# Patient Record
Sex: FEMALE | Race: WHITE | NOT HISPANIC OR LATINO | Employment: OTHER | URBAN - METROPOLITAN AREA
[De-identification: names, ages, dates, MRNs, and addresses within clinical notes are randomized per-mention and may not be internally consistent; named-entity substitution may affect disease eponyms.]

---

## 2024-04-17 ENCOUNTER — OFFICE VISIT (OUTPATIENT)
Dept: OBSTETRICS AND GYNECOLOGY | Facility: CLINIC | Age: 74
End: 2024-04-17
Payer: MEDICARE

## 2024-04-17 VITALS — BODY MASS INDEX: 23.47 KG/M2 | WEIGHT: 124.31 LBS | HEIGHT: 61 IN

## 2024-04-17 DIAGNOSIS — N83.8 OVARIAN MASS: Primary | ICD-10-CM

## 2024-04-17 PROBLEM — E03.9 HYPOTHYROIDISM: Status: ACTIVE | Noted: 2024-03-24

## 2024-04-17 PROBLEM — E11.9 TYPE 2 DIABETES MELLITUS, WITHOUT LONG-TERM CURRENT USE OF INSULIN: Status: ACTIVE | Noted: 2024-03-24

## 2024-04-17 PROBLEM — K63.89 COLONIC MASS: Status: ACTIVE | Noted: 2024-03-24

## 2024-04-17 PROCEDURE — 99203 OFFICE O/P NEW LOW 30 MIN: CPT | Mod: PBBFAC | Performed by: OBSTETRICS & GYNECOLOGY

## 2024-04-17 PROCEDURE — 99202 OFFICE O/P NEW SF 15 MIN: CPT | Mod: S$PBB,,, | Performed by: OBSTETRICS & GYNECOLOGY

## 2024-04-17 PROCEDURE — 99999 PR PBB SHADOW E&M-NEW PATIENT-LVL III: CPT | Mod: PBBFAC,,, | Performed by: OBSTETRICS & GYNECOLOGY

## 2024-04-17 RX ORDER — HYDROCODONE BITARTRATE AND ACETAMINOPHEN 5; 325 MG/1; MG/1
1 TABLET ORAL 2 TIMES DAILY
COMMUNITY
Start: 2024-04-02 | End: 2024-04-30 | Stop reason: SDUPTHER

## 2024-04-17 RX ORDER — METOPROLOL SUCCINATE 50 MG/1
1 TABLET, EXTENDED RELEASE ORAL EVERY MORNING
COMMUNITY

## 2024-04-17 RX ORDER — METRONIDAZOLE 500 MG/1
500 TABLET ORAL
COMMUNITY
Start: 2024-04-12 | End: 2024-04-23

## 2024-04-17 RX ORDER — HYDROCODONE BITARTRATE AND ACETAMINOPHEN 7.5; 325 MG/1; MG/1
1 TABLET ORAL EVERY 8 HOURS PRN
COMMUNITY
Start: 2024-04-12 | End: 2024-04-19

## 2024-04-17 RX ORDER — CIPROFLOXACIN 500 MG/1
500 TABLET ORAL
COMMUNITY
Start: 2024-04-12 | End: 2024-04-23

## 2024-04-17 RX ORDER — MEDROXYPROGESTERONE ACETATE 10 MG/1
1 TABLET ORAL EVERY MORNING
COMMUNITY
End: 2024-05-21

## 2024-04-17 RX ORDER — METFORMIN HYDROCHLORIDE 1000 MG/1
1000 TABLET ORAL
COMMUNITY

## 2024-04-17 RX ORDER — AMLODIPINE BESYLATE 5 MG/1
1 TABLET ORAL EVERY MORNING
COMMUNITY

## 2024-04-17 RX ORDER — LEVOTHYROXINE SODIUM 75 UG/1
1 TABLET ORAL EVERY MORNING
COMMUNITY

## 2024-04-17 NOTE — PROGRESS NOTES
"  Subjective:       Patient ID: Bella Meyer is a 73 y.o. female.    Chief Complaint:  Pelvic Pain      History of Present Illness  HPI  Self referral from [facility]  Seen in hospital recently x 2 for lower abdominal pain and bloating   Treated for inflammatory bowel disease   CT with possible colon mass, Colonoscopy negative for cancer   CT and ultrasound show 6 cm complex ovarian cyst with no ascites and with nodules noted in the omentum.  Liver is normal    is elevated but the other tumor markers are normal   CEA is elevated   Was discharged and told to seek Gyn consultation   Reviewed hospital records and imaging   Advised patient to seek out Gyn oncology consultation for surgical management of this condition because of the increase in likelihood of ovarian malignancy       Health Maintenance   Topic Date Due    Hepatitis C Screening  Never done    Lipid Panel  Never done    DEXA Scan  Never done    Colorectal Cancer Screening  Never done    Mammogram  2018    TETANUS VACCINE  2032    Shingles Vaccine  Completed     GYN & OB History  No LMP recorded. Patient is postmenopausal.   Date of Last Pap: No result found    OB History    Para Term  AB Living   2 2 2     2   SAB IAB Ectopic Multiple Live Births           2      # Outcome Date GA Lbr [place]/2nd Weight Sex Type Anes PTL Lv   2 Term 79    F Vag-Spont   SAIDA   1 Term 12/15/73    F Vag-Spont   SAIDA       Review of Systems  Review of Systems        Objective:   BP (P) 112/70   Ht 5' 1" (1.549 m)   Wt 56.4 kg (124 lb 5.4 oz)   BMI 23.49 kg/m²    Physical Exam     Assessment:        1. Ovarian mass                Plan:            Bella was seen today for pelvic pain.    Diagnoses and all orders for this visit:    Ovarian mass  -     Ambulatory referral/consult to Gynecologic Oncology; Future        "

## 2024-04-18 ENCOUNTER — PATIENT MESSAGE (OUTPATIENT)
Dept: GYNECOLOGIC ONCOLOGY | Facility: CLINIC | Age: 74
End: 2024-04-18

## 2024-04-18 ENCOUNTER — HOSPITAL ENCOUNTER (OUTPATIENT)
Dept: RADIOLOGY | Facility: OTHER | Age: 74
Discharge: HOME | End: 2024-04-18
Attending: OBSTETRICS & GYNECOLOGY
Payer: MEDICARE

## 2024-04-18 ENCOUNTER — TELEPHONE (OUTPATIENT)
Dept: OBSTETRICS AND GYNECOLOGY | Facility: CLINIC | Age: 74
End: 2024-04-18
Payer: MEDICARE

## 2024-04-18 ENCOUNTER — OFFICE VISIT (OUTPATIENT)
Dept: GYNECOLOGIC ONCOLOGY | Facility: CLINIC | Age: 74
End: 2024-04-18
Payer: MEDICARE

## 2024-04-18 VITALS
DIASTOLIC BLOOD PRESSURE: 68 MMHG | SYSTOLIC BLOOD PRESSURE: 148 MMHG | WEIGHT: 124 LBS | HEIGHT: 61 IN | BODY MASS INDEX: 23.41 KG/M2 | HEART RATE: 83 BPM

## 2024-04-18 DIAGNOSIS — R19.00 PELVIC MASS: ICD-10-CM

## 2024-04-18 DIAGNOSIS — R97.1 ELEVATED CANCER ANTIGEN 125 (CA 125): ICD-10-CM

## 2024-04-18 DIAGNOSIS — C78.6 PERITONEAL CARCINOMATOSIS: Primary | ICD-10-CM

## 2024-04-18 DIAGNOSIS — C78.6 PERITONEAL CARCINOMATOSIS: ICD-10-CM

## 2024-04-18 DIAGNOSIS — Z12.4 CERVICAL CANCER SCREENING: ICD-10-CM

## 2024-04-18 PROCEDURE — 71260 CT THORAX DX C+: CPT | Mod: 26,,, | Performed by: RADIOLOGY

## 2024-04-18 PROCEDURE — 87624 HPV HI-RISK TYP POOLED RSLT: CPT | Performed by: OBSTETRICS & GYNECOLOGY

## 2024-04-18 PROCEDURE — 74177 CT ABD & PELVIS W/CONTRAST: CPT | Mod: TC

## 2024-04-18 PROCEDURE — 88175 CYTOPATH C/V AUTO FLUID REDO: CPT | Performed by: OBSTETRICS & GYNECOLOGY

## 2024-04-18 PROCEDURE — 99459 PELVIC EXAMINATION: CPT | Mod: PBBFAC | Performed by: OBSTETRICS & GYNECOLOGY

## 2024-04-18 PROCEDURE — 99205 OFFICE O/P NEW HI 60 MIN: CPT | Mod: S$PBB,,, | Performed by: OBSTETRICS & GYNECOLOGY

## 2024-04-18 PROCEDURE — 99459 PELVIC EXAMINATION: CPT | Mod: S$PBB,,, | Performed by: OBSTETRICS & GYNECOLOGY

## 2024-04-18 PROCEDURE — 99215 OFFICE O/P EST HI 40 MIN: CPT | Mod: PBBFAC,25 | Performed by: OBSTETRICS & GYNECOLOGY

## 2024-04-18 PROCEDURE — G2211 COMPLEX E/M VISIT ADD ON: HCPCS | Mod: S$PBB,,, | Performed by: OBSTETRICS & GYNECOLOGY

## 2024-04-18 PROCEDURE — 74177 CT ABD & PELVIS W/CONTRAST: CPT | Mod: 26,,, | Performed by: RADIOLOGY

## 2024-04-18 PROCEDURE — 25500020 PHARM REV CODE 255: Performed by: OBSTETRICS & GYNECOLOGY

## 2024-04-18 PROCEDURE — 99999 PR PBB SHADOW E&M-EST. PATIENT-LVL V: CPT | Mod: PBBFAC,,, | Performed by: OBSTETRICS & GYNECOLOGY

## 2024-04-18 RX ADMIN — IOHEXOL 75 ML: 350 INJECTION, SOLUTION INTRAVENOUS at 12:04

## 2024-04-18 NOTE — PROGRESS NOTES
"Subjective     Patient ID: Bella Meyer is a 73 y.o. female.    Chief Complaint:  Peritoneal carcinomatosis     73 year old female  referred by Dr. Evans for evaluation of a pelvic mass, peritoneal carcinomatosis and elevated . She has had 2 emergency room visits for evaluation of RLQ pain, during which this was discovered on imaging (initial 3/24/24, see report below). After CT, she was admitted and IV abx started. Colonoscopy scheduled for the following day. She had attempted colonoscopy on 3/25/2024 however  reports "S/p flexible sigmoidoscopy today but unable to complete colonoscopy as distal sigmoid was fixed. No fistulas or masses were seen in the 20 cm in, unable to pass scope/pediatric scope further." Per GI note: "Diverticulitis with possible fistula, malignancy felt to be less likely as patient had negative colonoscopy around a year ago.Continue IV fluids and IV antibiotics Cipro/Flagyl ".   Of note, patient reports colonscopy with Dr.Hickle KOEHLER in [place] in  with 2 polpys removed that were benign.  She returned to the ER  for worsening of RLQ pain and was admitted. Imaging showed prior findings and ovarian complex cyst. OBGYN consulted. CA-125 was collected and elevated at 188. CEA, CA-19-9, Inhibins normal. She was instructed to follow up with Gyn. She saw Dr. Salomon in  yesterday, who referred her to us.     Patient reports consistent pain. She presents today in a wheelchair wit her two daughters. She has hydrocodone for pain relief however takes it sparingly. She also reports she gets hives after taking it. Also notes no appetite,15lb weight loss and feels malnourished and weak. Denies any changes in bowel or bladder. Denies any vaginal bleeding.     DATA REVIEWED:   CT Abdomen Pelvis with IV Contrast Only (3/24/2024)  -Abnormal inflammatory changes in the lower pelvis associated with the distal sigmoid colon. Tubular or channel-like structure along the posterior margin of the " lower descending colon possibly connecting between the sigmoid and rectal regions. Underlying neoplasm and/or fistula not excluded. Mildly enlarged left iliac bifurcation lymph nodes noted. No drainable abscess.   -There is also abnormal appearance of the descending colon and several small soft tissue nodules in the pericolonic tissues of the descending colon. Malignant neoplasm should be considered.     US Transvaginal Non-OB 4/9/2024  1. Grossly unremarkable appearance of the uterus.   2. The left ovary is not seen sonographically. Enlarged right ovary at 6.6 cm with several cysts the largest 3.4 cm with some thin septations and blood flow to the ovary. Trace fluid adjacent to the ovary. No gross ascitic fluid.     CT Abdomen Pelvis with IV Contrast Only 4/8/24  Final Result   1. Rectosigmoid colon wall thickening with tubular/channel-like structure coursing posterior to the sigmoid colon wall that could be a fistula/sinus tract or abscess. Underlying neoplasm not excluded. Findings similar to 3/24/2024 CT.   2. Findings concerning for peritoneal carcinomatosis are unchanged.   3. Right adnexal/ovarian mildly complex cystic lesion is mildly increased in size; ovarian neoplasm not excluded.     GYN HX: Denies any hx of abnormal paps (Previous GYNs include Dr. Pollard and Dr. Selby-- [facility])  PMHX: T2dM, HTN, and hypothyroidism.  PSHX:  BTL.  FAM HX:  includes a possible ? Paternal grandmother with ovarian cancer. She reports a brother with brain cancer     Review of Systems   Constitutional:  Positive for appetite change and unexpected weight change. Negative for chills and fever.   HENT:  Negative for mouth sores.    Respiratory:  Negative for chest tightness and shortness of breath.    Cardiovascular:  Negative for chest pain.   Gastrointestinal:  Positive for abdominal pain, nausea and vomiting (once, patient attributes due to abx use). Negative for abdominal distention.   Genitourinary:  Positive for pelvic  pain. Negative for dysuria, hematuria, vaginal bleeding and vaginal discharge.   Neurological:  Negative for syncope and weakness.      Objective     Vitals:    04/18/24 1024   BP: (!) 148/68   Pulse: 83      Physical Exam  Vitals reviewed. Exam conducted with a chaperone present.   Constitutional:       Appearance: Normal appearance.   HENT:      Head: Normocephalic and atraumatic.   Eyes:      Extraocular Movements: Extraocular movements intact.      Conjunctiva/sclera: Conjunctivae normal.      Pupils: Pupils are equal, round, and reactive to light.   Pulmonary:      Effort: Pulmonary effort is normal. No respiratory distress.   Abdominal:      General: There is no distension.      Palpations: Abdomen is soft.      Tenderness: There is no abdominal tenderness.   Genitourinary:     General: Normal vulva.      Vagina: Normal.      Cervix: Normal.      Adnexa:         Right: Mass present.         Left: Mass present.       Comments: Solid Nodular mass filling cul de sac and rectovaginal septum. There is no noted mobility.   Musculoskeletal:         General: Normal range of motion.   Skin:     General: Skin is warm and dry.   Neurological:      General: No focal deficit present.      Mental Status: She is alert and oriented to person, place, and time. Mental status is at baseline.   Psychiatric:         Mood and Affect: Mood normal.         Behavior: Behavior normal.         Thought Content: Thought content normal.         Judgment: Judgment normal.      Assessment and Plan     1. Peritoneal carcinomatosis    2. Pelvic mass    3. Elevated cancer antigen 125 ()    4. Cervical cancer screening  -     Liquid-Based Pap Smear, Screening  -     HPV High Risk Genotypes, PCR    Today I reviewed and interpreted referring physicians documentation and imaging reports.   I discussed with patient that she has peritoneal carcinomatosis and what I anticipate is  at least a Stage IIIC ovarian cancer based on reports. I have  ordered a CT CAP for our system as well today.  Based on extent of disease, performance status, nutritional status (albumin), and likelihood of needing extensive surgery, I have recommended a neoadjuvant approach to treatment. We discussed most patients respond to neoadjuvant chemotherapy which decreases the morbidity of interval cytoreductive surgery.  After surgery we would plan for additional chemotherapy usually equaling 6 cycles total with each cycle being three weeks. Maintanence therapy will be determined by germline genetic testing and possibly somatic tumor tests, we will discuss further when we reach this phase of care. I discussed initial therapy with carboplatin and paclitaxel IV every 21 days and associated toxicities including allergic rxn, myelosuppression, fatigue, n/v, nephrotoxicity, hepatotoxicity, alopecia, neuropathy.  She expressed understanding of risks and agreed to proceed. She was given the opportunity to ask questions, and stated all were answered.  - CT CAP Today   - Urgent IR Referral for biopsy and tissue diagnosis, discussed we will need this before treatment initiation   - [facility] genetic testing to be collected   - Treatment plan placed   - Follow up before cycle #1 with labs (CBC, CMP, ). Plan for [facility].        Maliha Crawford MD  Gynecologic Oncology

## 2024-04-18 NOTE — TELEPHONE ENCOUNTER
Dr. Albarado's last office visit [facility]ed to [facility]. Pt Labs, Imaging, Colonoscopy done outside of [facility].     Marry DIAZ LPN  OB/GYN

## 2024-04-18 NOTE — TELEPHONE ENCOUNTER
----- Message from Martha Devyn sent at 4/18/2024  9:26 AM CDT -----  Type:  Needs Medical Advice    Who Called: Maria Del Carmen reyes/ [facility] -  Symptoms (please be specific): -   How long has patient had these symptoms:  -  Pharmacy name and phone #:  -  Would the patient rather a call back or a response via [facility]? -  Best Call Back Number: [phone]  Additional Information:  received referral, needs any progress notes, labs, imaging and colonoscopy report faxed to [phone]

## 2024-04-19 ENCOUNTER — TELEPHONE (OUTPATIENT)
Dept: HEMATOLOGY/ONCOLOGY | Facility: CLINIC | Age: 74
End: 2024-04-19
Payer: MEDICARE

## 2024-04-19 DIAGNOSIS — N83.8 OVARIAN MASS, RIGHT: Primary | ICD-10-CM

## 2024-04-19 DIAGNOSIS — D49.9 NEOPLASM OF UNSPECIFIED BEHAVIOR OF UNSPECIFIED SITE: ICD-10-CM

## 2024-04-19 NOTE — NURSING
Spoke with pt.  Introduced myself and explained my role in her plan of care.  Briefly reviewed chemo class & what to expect to initiate chemo.  Scheduled chemo class & baseline lab work for Friday 4/26.  She lives 3 hours away, but has sisters in [place] and plans to stay with them when she has chemo.  Going forward she will have labs drawn the day before chemo in [place] at the [facility] location. Location and contact information reviewed.  Questions answered.  Encouraged her to call with any questions or concerns. She thanked me and verbalized understanding.

## 2024-04-22 ENCOUNTER — TELEPHONE (OUTPATIENT)
Dept: OBSTETRICS AND GYNECOLOGY | Facility: CLINIC | Age: 74
End: 2024-04-22
Payer: MEDICARE

## 2024-04-22 ENCOUNTER — TELEPHONE (OUTPATIENT)
Dept: INTERVENTIONAL RADIOLOGY/VASCULAR | Facility: HOSPITAL | Age: 74
End: 2024-04-22
Payer: MEDICARE

## 2024-04-22 ENCOUNTER — HOSPITAL ENCOUNTER (OUTPATIENT)
Dept: PREADMISSION TESTING | Facility: HOSPITAL | Age: 74
Discharge: HOME | End: 2024-04-22
Attending: RADIOLOGY
Payer: MEDICARE

## 2024-04-22 ENCOUNTER — PATIENT MESSAGE (OUTPATIENT)
Dept: GYNECOLOGIC ONCOLOGY | Facility: CLINIC | Age: 74
End: 2024-04-22
Payer: MEDICARE

## 2024-04-22 VITALS — BODY MASS INDEX: 22.28 KG/M2 | HEIGHT: 61 IN | WEIGHT: 118 LBS

## 2024-04-22 DIAGNOSIS — Z79.01 ANTICOAGULANT LONG-TERM USE: ICD-10-CM

## 2024-04-22 DIAGNOSIS — C78.6 PERITONEAL CARCINOMATOSIS: Primary | ICD-10-CM

## 2024-04-22 NOTE — TELEPHONE ENCOUNTER
Message sent to Dr. Vega's staff to address.  CT LAB AND  faxed to [phone]  Unable to Fax Demographics ans Ins electronically     Marry DIAZ LPN  OB/GYN

## 2024-04-22 NOTE — TELEPHONE ENCOUNTER
----- Message from Yanely Santiago sent at 4/22/2024 12:25 PM CDT -----  Contact: Herlinda/ [facility]  Herlinda is calling regards to the referral she received, She stated that she needs  CT, Labs and Colonoscopy report in order to get the patient schedule alone with the demographics and insurance fax to [phone] and or call her at [phone].    Thanks  Td

## 2024-04-23 ENCOUNTER — HOSPITAL ENCOUNTER (OUTPATIENT)
Dept: INTERVENTIONAL RADIOLOGY/VASCULAR | Facility: HOSPITAL | Age: 74
Discharge: HOME | End: 2024-04-23
Attending: OBSTETRICS & GYNECOLOGY | Admitting: RADIOLOGY
Payer: MEDICARE

## 2024-04-23 VITALS
SYSTOLIC BLOOD PRESSURE: 130 MMHG | HEART RATE: 64 BPM | DIASTOLIC BLOOD PRESSURE: 60 MMHG | TEMPERATURE: 98 F | RESPIRATION RATE: 16 BRPM | OXYGEN SATURATION: 97 %

## 2024-04-23 DIAGNOSIS — N83.8 OVARIAN MASS: ICD-10-CM

## 2024-04-23 DIAGNOSIS — Z79.01 ANTICOAGULANT LONG-TERM USE: ICD-10-CM

## 2024-04-23 DIAGNOSIS — C78.6 PERITONEAL CARCINOMATOSIS: ICD-10-CM

## 2024-04-23 LAB
INR PPP: 1.1 (ref 0.8–1.2)
POCT GLUCOSE: 277 MG/DL (ref 70–110)
PROTHROMBIN TIME: 12 SEC (ref 9–12.5)

## 2024-04-23 PROCEDURE — 88342 IMHCHEM/IMCYTCHM 1ST ANTB: CPT | Mod: 26,XU,, | Performed by: PATHOLOGY

## 2024-04-23 PROCEDURE — 99153 MOD SED SAME PHYS/QHP EA: CPT | Performed by: RADIOLOGY

## 2024-04-23 PROCEDURE — 88309 TISSUE EXAM BY PATHOLOGIST: CPT | Mod: 26,,, | Performed by: PATHOLOGY

## 2024-04-23 PROCEDURE — 77012 CT SCAN FOR NEEDLE BIOPSY: CPT | Mod: TC | Performed by: RADIOLOGY

## 2024-04-23 PROCEDURE — 88360 TUMOR IMMUNOHISTOCHEM/MANUAL: CPT | Mod: 26,,, | Performed by: PATHOLOGY

## 2024-04-23 PROCEDURE — 99152 MOD SED SAME PHYS/QHP 5/>YRS: CPT | Performed by: RADIOLOGY

## 2024-04-23 PROCEDURE — 88341 IMHCHEM/IMCYTCHM EA ADD ANTB: CPT | Mod: 26,XU,, | Performed by: PATHOLOGY

## 2024-04-23 PROCEDURE — 49180 BIOPSY ABDOMINAL MASS: CPT | Performed by: RADIOLOGY

## 2024-04-23 PROCEDURE — 99152 MOD SED SAME PHYS/QHP 5/>YRS: CPT

## 2024-04-23 PROCEDURE — 36415 COLL VENOUS BLD VENIPUNCTURE: CPT | Performed by: RADIOLOGY

## 2024-04-23 PROCEDURE — 88309 TISSUE EXAM BY PATHOLOGIST: CPT | Performed by: PATHOLOGY

## 2024-04-23 PROCEDURE — 99203 OFFICE O/P NEW LOW 30 MIN: CPT | Mod: ,,, | Performed by: RADIOLOGY

## 2024-04-23 PROCEDURE — 25000003 PHARM REV CODE 250: Performed by: RADIOLOGY

## 2024-04-23 PROCEDURE — 88185 FLOWCYTOMETRY/TC ADD-ON: CPT | Performed by: PATHOLOGY

## 2024-04-23 PROCEDURE — 63600175 PHARM REV CODE 636 W HCPCS: Performed by: RADIOLOGY

## 2024-04-23 PROCEDURE — 77012 CT SCAN FOR NEEDLE BIOPSY: CPT | Mod: 26,,, | Performed by: RADIOLOGY

## 2024-04-23 PROCEDURE — 88341 IMHCHEM/IMCYTCHM EA ADD ANTB: CPT | Mod: 59 | Performed by: PATHOLOGY

## 2024-04-23 PROCEDURE — 85610 PROTHROMBIN TIME: CPT | Performed by: RADIOLOGY

## 2024-04-23 PROCEDURE — 88342 IMHCHEM/IMCYTCHM 1ST ANTB: CPT | Mod: 59 | Performed by: PATHOLOGY

## 2024-04-23 PROCEDURE — 88184 FLOWCYTOMETRY/ TC 1 MARKER: CPT | Performed by: PATHOLOGY

## 2024-04-23 PROCEDURE — 99152 MOD SED SAME PHYS/QHP 5/>YRS: CPT | Mod: ,,, | Performed by: RADIOLOGY

## 2024-04-23 PROCEDURE — 88189 FLOWCYTOMETRY/READ 16 & >: CPT | Mod: ,,, | Performed by: PATHOLOGY

## 2024-04-23 PROCEDURE — 88360 TUMOR IMMUNOHISTOCHEM/MANUAL: CPT | Performed by: PATHOLOGY

## 2024-04-23 RX ORDER — MIDAZOLAM HYDROCHLORIDE 2 MG/2ML
INJECTION, SOLUTION INTRAMUSCULAR; INTRAVENOUS
Status: COMPLETED | OUTPATIENT
Start: 2024-04-23 | End: 2024-04-23

## 2024-04-23 RX ORDER — NAPROXEN SODIUM 220 MG
220 TABLET ORAL 2 TIMES DAILY WITH MEALS
COMMUNITY
End: 2024-05-21

## 2024-04-23 RX ORDER — LIDOCAINE HYDROCHLORIDE 10 MG/ML
INJECTION INFILTRATION; PERINEURAL
Status: COMPLETED | OUTPATIENT
Start: 2024-04-23 | End: 2024-04-23

## 2024-04-23 RX ORDER — LIDOCAINE HYDROCHLORIDE 10 MG/ML
1 INJECTION, SOLUTION EPIDURAL; INFILTRATION; INTRACAUDAL; PERINEURAL ONCE
Status: DISCONTINUED | OUTPATIENT
Start: 2024-04-23 | End: 2024-04-24 | Stop reason: HOSPADM

## 2024-04-23 RX ORDER — FENTANYL CITRATE 50 UG/ML
INJECTION, SOLUTION INTRAMUSCULAR; INTRAVENOUS
Status: COMPLETED | OUTPATIENT
Start: 2024-04-23 | End: 2024-04-23

## 2024-04-23 RX ORDER — SODIUM CHLORIDE 9 MG/ML
INJECTION, SOLUTION INTRAVENOUS CONTINUOUS
Status: DISCONTINUED | OUTPATIENT
Start: 2024-04-23 | End: 2024-04-24 | Stop reason: HOSPADM

## 2024-04-23 RX ADMIN — FENTANYL CITRATE 50 MCG: 50 INJECTION, SOLUTION INTRAMUSCULAR; INTRAVENOUS at 08:04

## 2024-04-23 RX ADMIN — MIDAZOLAM HYDROCHLORIDE 2 MG: 1 INJECTION, SOLUTION INTRAMUSCULAR; INTRAVENOUS at 08:04

## 2024-04-23 RX ADMIN — LIDOCAINE HYDROCHLORIDE 10 ML: 10 INJECTION, SOLUTION INFILTRATION; PERINEURAL at 08:04

## 2024-04-23 NOTE — DISCHARGE SUMMARY
Radiology Discharge Summary      Hospital Course: No complications    Admit Date: 4/23/2024  Discharge Date: 04/23/2024     Instructions Given to Patient: Yes    Diet: Resume prior diet    Activity: No driving for today. No heavy lifting, strenuous activity, exercising or submerging in water x 2 days.    Description of Condition on Discharge: Stable    Vital Signs (Most Recent): Temp: 97.8 °F (36.6 °C) (04/23/24 0649)  Pulse: 72 (04/23/24 0857)  Resp: 15 (04/23/24 0857)  BP: (!) 144/67 (04/23/24 0857)  SpO2: 98 % (04/23/24 0857)    Discharge Disposition: Home    Discharge Diagnosis:  73 y.o. female with pelvic mass with peritoneal carcinomatosis, including a 2.0 x 2.5 x 2.7-cm midline lower abdominal peritoneal nodule, associated with elevated CA-125 and concerns for stage IIIc ovarian CA requiring image-guided tissue-sampling for diagnosis and treatment planning.    Patient is now s/p successful US and CT-guided percutaneous midline lower abdominal-approach 18-gauge core biopsy of a peritoneal nodule with local anesthetic and moderate conscious sedation. Patient tolerated the procedure well. No immediate post-procedural complications noted.      No heavy lifting, strenuous activity, exercising or submerging in water x 2 days.     Thank you for considering IR for the care of your patient.      Ryan Workman MD  Interventional Radiology

## 2024-04-23 NOTE — PLAN OF CARE
Pre-op plan of care reviewed with patient and daughter. Admit assessment complete. Questions encouraged and answered. Post-op education begun with pt. Pt ready to proceed.

## 2024-04-23 NOTE — BRIEF OP NOTE
Radiology Post-Procedure Note    Pre Op Diagnosis: 1. Peritoneal carcinomatosis  Post Op Diagnosis: Same    Procedure: 1. US and CT-guided percutaneous midline lower abdominal-approach 18-gauge core biopsy of a peritoneal nodule    Procedure performed by: Rayn Workman MD    Written Informed Consent Obtained: Yes  Specimen Removed: YES, 18-G cores x 8  Estimated Blood Loss: Minimal    Findings:   Successful US and CT-guided percutaneous midline lower abdominal-approach 18-gauge core biopsy of a peritoneal nodule with local anesthetic and moderate conscious sedation. Patient tolerated the procedure well. No immediate post-procedural complications noted.     Patient transferred back to [facility] for 1 hours post-op monitoring and bed rest prior to tentative discharge.    No heavy lifting, strenuous activity, exercising or submerging in water x 2 days.    Thank you for considering IR for the care of your patient.     Ryan Workman MD  Interventional Radiology

## 2024-04-23 NOTE — H&P
Radiology History & Physical      SUBJECTIVE:     Principal Problem: Peritoneal carcinomatosis    History of Present Illness:  Bella Meyer is a 73 y.o. female with PMHx of HTN, HPLD, DMII and recent images revealing a pelvic mass with peritoneal carcinomatosis, including a 2.0 x 2.5 x 2.7-cm midline lower abdominal peritoneal nodule, associated with elevated CA-125 and concerns for stage IIIc ovarian CA requiring image-guided tissue-sampling for diagnosis and treatment planning.    A new outpatient IR consult received for US and CT-guided percutaneous midline lower abdominal-approach 18-gauge core biopsy of a peritoneal nodule.    No past medical history on file.  No past surgical history on file.    Home Meds:   Prior to Admission medications    Medication Sig Start Date End Date Taking? Authorizing Provider   amLODIPine (NORVASC) 5 MG tablet Take 1 tablet by mouth every morning.   Yes Provider, Historical   ciprofloxacin HCl (CIPRO) 500 MG tablet Take 500 mg by mouth. 4/12/24 4/23/24 Yes Provider, Historical   HYDROcodone-acetaminophen (NORCO) 5-325 mg per tablet Take 1 tablet by mouth 2 (two) times daily. 4/2/24  Yes Provider, Historical   levothyroxine (SYNTHROID) 75 MCG tablet Take 1 tablet by mouth every morning.   Yes Provider, Historical   medroxyPROGESTERone (PROVERA) 10 MG tablet Take 1 tablet by mouth every morning.   Yes Provider, Historical   metFORMIN (GLUCOPHAGE) 1000 MG tablet Take 1,000 mg by mouth.   Yes Provider, Historical   metoprolol succinate (TOPROL-XL) 50 MG 24 hr tablet Take 1 tablet by mouth every morning.   Yes Provider, Historical   metroNIDAZOLE (FLAGYL) 500 MG tablet Take 500 mg by mouth. 4/12/24 4/23/24 Yes Provider, Historical   naproxen sodium (ANAPROX) 220 MG tablet Take 220 mg by mouth 2 (two) times daily with meals.    Provider, Historical     Anticoagulants/Antiplatelets: no anticoagulation    Allergies: Review of patient's allergies indicates:  No Known Allergies    Sedation  "History:  no adverse reactions    Review of Systems:   Hematological: positive for - weight loss  Respiratory: no cough, shortness of breath, or wheezing  Cardiovascular: no chest pain or dyspnea on exertion  Gastrointestinal: positive for - abdominal pain, appetite loss, and nausea/vomiting  Genito-Urinary: positive for - pelvic pain  Musculoskeletal: negative  Neurological: no TIA or stroke symptoms     OBJECTIVE:     Vital Signs (Most Recent)  Temp: 97.8 °F (36.6 °C) (04/23/24 0649)  Pulse: 73 (04/23/24 0649)  Resp: 18 (04/23/24 0649)  BP: 135/69 (04/23/24 0649)  SpO2: 97 % (04/23/24 0649)    Physical Exam:  ASA: III  Mallampati: II    General: no acute distress  Mental Status: alert and oriented to person, place and time  HEENT: normocephalic, atraumatic  Chest: unlabored breathing  Heart: regular heart rate  Abdomen: nondistended  Extremity: moves all extremities    Laboratory  Lab Results   Component Value Date    INR 1.1 04/23/2024     No results found for: "WBC", "HGB", "HCT", "MCV", "PLT"   Lab Results   Component Value Date     04/11/2024    K 3.9 04/11/2024     04/11/2024    CO2 19 (L) 04/11/2024    BUN 5 04/11/2024    CREATININE 0.52 (L) 04/11/2024    CALCIUM 8.6 (L) 04/11/2024     ASSESSMENT/PLAN:     73 y.o. female with pelvic mass with peritoneal carcinomatosis, including a 2.0 x 2.5 x 2.7-cm midline lower abdominal peritoneal nodule, associated with elevated CA-125 and concerns for stage IIIc ovarian CA requiring image-guided tissue-sampling for diagnosis and treatment planning.    Peritoneal carcinomatosis - Will attempt US and CT-guided percutaneous midline lower abdominal-approach 18-gauge core biopsy of a peritoneal nodule with local anesthetic and moderate conscious sedation.    Risks (including, but not limited to, pain, bleeding, infection, damage to nearby structures, failure to obtain sufficient material for a diagnosis, the need for additional procedures, and death), benefits, " and alternatives were discussed with the patient. All questions were answered to the best of my abilities. The patient wishes to proceed with the procedure. Written informed consent was obtained.    Thank you for considering IR for the care of your patient.     Ryan Workman MD  Interventional Radiology

## 2024-04-23 NOTE — PLAN OF CARE
Procedure complete, pt tolerated without s/sx of complication. Report called to CAESAR Newby in [facility]. Pt transported via stretcher to [facility] per IR RN.

## 2024-04-23 NOTE — DISCHARGE INSTRUCTIONS
DIET: You may resume your home diet. If nausea is present, increase your diet gradually with fluids and bland  Foods    ACTIVITY LEVEL: You have received sedation or an anesthetic, you may feel sleepy for several hours. Rest until  you are more awake. Gradually resume your normal activities    Medications: Pain medication should be taken only if needed and as directed. If antibiotics are prescribed, the  medication should be taken until completed.    No driving, alcoholic beverages or signing legal documents for next 24 hours or while taking pain  medication.    CALL THE DOCTOR:  For any obvious bleeding (some dried blood over the incision is normal).  Redness, swelling, foul smell around incision or fever over 101.  Shortness of breath, Coughing up Bloody sputum, Pains or Swelling in your Calves .  Persistent pain or nausea not relieved by medication.    Contact us with any questions or concerns:    INTERVENTIONAL RADIOLOGY  [phone]   [facility] paging :  [phone]  Ask for the interventional radiologist on call   If not available call your Attending surgeon    If any unusual problems or difficulties occur contact your doctor. If you cannot contact your doctor but  feel your signs and symptoms warrant a physicians attention return to the emergency room.      Please contact your referring provider for results.            Fall Prevention  Millions of people fall every year and injure themselves. You may have had anesthesia or sedation which may increase your risk of falling. You may have health issues that put you at an increased risk of falling.     Here are ways to reduce your risk of falling.    Make your home safe by keeping walkways clear of objects you may trip over.  Use non-slip pads under rugs. Do not use area rugs or small throw rugs.  Use non-slip mats in bathtubs and showers.  Install handrails and lights on staircases.  Do not walk in poorly lit areas.  Do not stand on  chairs or wobbly ladders.  Use caution when reaching overhead or looking upward. This position can cause a loss of balance.  Be sure your shoes fit properly, have non-slip bottoms and are in good condition.   Wear shoes both inside and out. Avoid going barefoot or wearing slippers.  Be cautious when going up and down stairs, curbs, and when walking on uneven sidewalks.  If your balance is poor, consider using a cane or walker.  If your fall was related to alcohol use, stop or limit alcohol intake.   If your fall was related to use of sleeping medicines, talk to your doctor about this. You may need to reduce your dosage at bedtime if you awaken during the night to go to the bathroom.    To reduce the need for nighttime bathroom trips:  Avoid drinking fluids for several hours before going to bed  Empty your bladder before going to bed  Men can keep a urinal at the bedside  Stay as active as you can. Balance, flexibility, strength, and endurance all come from exercise. They all play a role in preventing falls. Ask your healthcare provider which types of activity are right for you.  Get your vision checked on a regular basis.  If you have pets, know where they are before you stand up or walk so you don't trip over them.  Use night lights.

## 2024-04-23 NOTE — Clinical Note
Bilateral: Abdomen.   Scrubbed with Chlorhexidine/Alcohol.    Hair: N/A.  Skin prep dry before draping.  Prepped by: Ryan Workman MD 4/23/2024 8:27 AM.

## 2024-04-24 ENCOUNTER — TELEPHONE (OUTPATIENT)
Dept: INTERVENTIONAL RADIOLOGY/VASCULAR | Facility: HOSPITAL | Age: 74
End: 2024-04-24
Payer: MEDICARE

## 2024-04-24 LAB
FLOW CYTOMETRY ANTIBODIES ANALYZED - TISSUE: NORMAL
FLOW CYTOMETRY COMMENT - TISSUE: NORMAL
FLOW CYTOMETRY INTERPRETATION - TISSUE: NORMAL
SPECIMEN TYPE - TISSUE: NORMAL

## 2024-04-25 ENCOUNTER — TELEPHONE (OUTPATIENT)
Dept: HEMATOLOGY/ONCOLOGY | Facility: CLINIC | Age: 74
End: 2024-04-25
Payer: MEDICARE

## 2024-04-25 LAB
FINAL PATHOLOGIC DIAGNOSIS: NORMAL
Lab: NORMAL

## 2024-04-25 NOTE — NURSING
"Spoke with pt, who asked me to call her daughter, Eugenia.  Pt is interested in dignicaps. She has been to the website to enroll and will send payment today.  Pt states her mother's hair "is everything".  Confirmed we have a kit here to use for treatment on Tuesday if hers does not arrive in time. Answered several questions related to dignicap, cryotherapy and infusion.   Pre chemo lab work will be done after chemo class.  Pt lives 3 hours away but has several family members who live in [place].  She will stay with one of them during her treatments.  The daughter said her mother is interested in oncology psych and palliative care.  Will coordinate appts.  Encouraged her to call with any other questions or concerns.  She thanked me and verbalized understanding.     "

## 2024-04-26 ENCOUNTER — DOCUMENTATION ONLY (OUTPATIENT)
Dept: HEMATOLOGY/ONCOLOGY | Facility: CLINIC | Age: 74
End: 2024-04-26
Payer: MEDICARE

## 2024-04-26 ENCOUNTER — TELEPHONE (OUTPATIENT)
Dept: HEMATOLOGY/ONCOLOGY | Facility: CLINIC | Age: 74
End: 2024-04-26

## 2024-04-26 ENCOUNTER — DOCUMENTATION ONLY (OUTPATIENT)
Dept: INFUSION THERAPY | Facility: HOSPITAL | Age: 74
End: 2024-04-26
Payer: MEDICARE

## 2024-04-26 ENCOUNTER — LAB VISIT (OUTPATIENT)
Dept: LAB | Facility: HOSPITAL | Age: 74
End: 2024-04-26
Attending: OBSTETRICS & GYNECOLOGY
Payer: MEDICARE

## 2024-04-26 ENCOUNTER — OFFICE VISIT (OUTPATIENT)
Dept: GYNECOLOGIC ONCOLOGY | Facility: CLINIC | Age: 74
End: 2024-04-26
Payer: MEDICARE

## 2024-04-26 VITALS
RESPIRATION RATE: 16 BRPM | SYSTOLIC BLOOD PRESSURE: 113 MMHG | HEART RATE: 81 BPM | OXYGEN SATURATION: 98 % | DIASTOLIC BLOOD PRESSURE: 72 MMHG | BODY MASS INDEX: 22.98 KG/M2 | WEIGHT: 121.69 LBS | HEIGHT: 61 IN | TEMPERATURE: 97 F

## 2024-04-26 DIAGNOSIS — C78.6 PERITONEAL CARCINOMATOSIS: ICD-10-CM

## 2024-04-26 DIAGNOSIS — Z71.9 ENCOUNTER FOR EDUCATION: Primary | ICD-10-CM

## 2024-04-26 DIAGNOSIS — R97.1 ELEVATED CANCER ANTIGEN 125 (CA 125): ICD-10-CM

## 2024-04-26 DIAGNOSIS — R19.00 PELVIC MASS: ICD-10-CM

## 2024-04-26 DIAGNOSIS — C78.6 PERITONEAL CARCINOMATOSIS: Primary | ICD-10-CM

## 2024-04-26 LAB
ALBUMIN SERPL BCP-MCNC: 4.1 G/DL (ref 3.5–5.2)
ALP SERPL-CCNC: 36 U/L (ref 55–135)
ALT SERPL W/O P-5'-P-CCNC: 15 U/L (ref 10–44)
ANION GAP SERPL CALC-SCNC: 17 MMOL/L (ref 8–16)
AST SERPL-CCNC: 16 U/L (ref 10–40)
BILIRUB SERPL-MCNC: 0.3 MG/DL (ref 0.1–1)
BUN SERPL-MCNC: 9 MG/DL (ref 8–23)
CALCIUM SERPL-MCNC: 10 MG/DL (ref 8.7–10.5)
CANCER AG125 SERPL-ACNC: 373 U/ML (ref 0–30)
CHLORIDE SERPL-SCNC: 100 MMOL/L (ref 95–110)
CO2 SERPL-SCNC: 20 MMOL/L (ref 23–29)
CREAT SERPL-MCNC: 0.8 MG/DL (ref 0.5–1.4)
ERYTHROCYTE [DISTWIDTH] IN BLOOD BY AUTOMATED COUNT: 11.9 % (ref 11.5–14.5)
EST. GFR  (NO RACE VARIABLE): >60 ML/MIN/1.73 M^2
GLUCOSE SERPL-MCNC: 166 MG/DL (ref 70–110)
HCT VFR BLD AUTO: 43.2 % (ref 37–48.5)
HGB BLD-MCNC: 15 G/DL (ref 12–16)
HPV HR 12 DNA SPEC QL NAA+PROBE: NEGATIVE
HPV16 AG SPEC QL: NEGATIVE
HPV18 DNA SPEC QL NAA+PROBE: NEGATIVE
IMM GRANULOCYTES # BLD AUTO: 0.04 K/UL (ref 0–0.04)
MCH RBC QN AUTO: 31.3 PG (ref 27–31)
MCHC RBC AUTO-ENTMCNC: 34.7 G/DL (ref 32–36)
MCV RBC AUTO: 90 FL (ref 82–98)
NEUTROPHILS # BLD AUTO: 5.3 K/UL (ref 1.8–7.7)
PLATELET # BLD AUTO: 406 K/UL (ref 150–450)
PMV BLD AUTO: 8.9 FL (ref 9.2–12.9)
POTASSIUM SERPL-SCNC: 4.7 MMOL/L (ref 3.5–5.1)
PROT SERPL-MCNC: 7.6 G/DL (ref 6–8.4)
RBC # BLD AUTO: 4.8 M/UL (ref 4–5.4)
SODIUM SERPL-SCNC: 137 MMOL/L (ref 136–145)
WBC # BLD AUTO: 9.38 K/UL (ref 3.9–12.7)

## 2024-04-26 PROCEDURE — 36415 COLL VENOUS BLD VENIPUNCTURE: CPT | Mod: PN | Performed by: OBSTETRICS & GYNECOLOGY

## 2024-04-26 PROCEDURE — 85027 COMPLETE CBC AUTOMATED: CPT | Mod: PN | Performed by: OBSTETRICS & GYNECOLOGY

## 2024-04-26 PROCEDURE — 99999 PR PBB SHADOW E&M-EST. PATIENT-LVL V: CPT | Mod: PBBFAC,,,

## 2024-04-26 PROCEDURE — 99215 OFFICE O/P EST HI 40 MIN: CPT | Mod: PBBFAC,PN

## 2024-04-26 PROCEDURE — 80053 COMPREHEN METABOLIC PANEL: CPT | Mod: PN | Performed by: OBSTETRICS & GYNECOLOGY

## 2024-04-26 PROCEDURE — 99215 OFFICE O/P EST HI 40 MIN: CPT | Mod: S$PBB,24,,

## 2024-04-26 PROCEDURE — 86304 IMMUNOASSAY TUMOR CA 125: CPT | Performed by: OBSTETRICS & GYNECOLOGY

## 2024-04-26 RX ORDER — PROCHLORPERAZINE MALEATE 5 MG
5 TABLET ORAL EVERY 6 HOURS PRN
Qty: 20 TABLET | Refills: 5 | Status: SHIPPED | OUTPATIENT
Start: 2024-04-26 | End: 2024-04-26

## 2024-04-26 RX ORDER — PRAVASTATIN SODIUM 10 MG/1
1 TABLET ORAL NIGHTLY
COMMUNITY

## 2024-04-26 RX ORDER — DEXAMETHASONE 4 MG/1
TABLET ORAL
Qty: 6 TABLET | Refills: 11 | Status: SHIPPED | OUTPATIENT
Start: 2024-04-26

## 2024-04-26 RX ORDER — PROCHLORPERAZINE MALEATE 5 MG
5 TABLET ORAL EVERY 6 HOURS PRN
Qty: 20 TABLET | Refills: 5 | Status: SHIPPED | OUTPATIENT
Start: 2024-04-26

## 2024-04-26 RX ORDER — DEXAMETHASONE 4 MG/1
TABLET ORAL
Qty: 6 TABLET | Refills: 11 | Status: SHIPPED | OUTPATIENT
Start: 2024-04-26 | End: 2024-04-26

## 2024-04-26 RX ORDER — OLANZAPINE 5 MG/1
TABLET ORAL
Qty: 4 TABLET | Refills: 11 | Status: SHIPPED | OUTPATIENT
Start: 2024-04-26

## 2024-04-26 RX ORDER — OLANZAPINE 5 MG/1
TABLET ORAL
Qty: 4 TABLET | Refills: 11 | Status: SHIPPED | OUTPATIENT
Start: 2024-04-26 | End: 2024-04-26

## 2024-04-26 NOTE — PROGRESS NOTES
Oncology   Chemotherapy School Education  Bella Meyer   1950    Social Service Education   This is a 73 y.o.female with a medical diagnosis of peritoneal carcinomatosis. Pt said she will be starting treatment next week. She said she is glad to be starting because has been a long time that they have been trying to figure out what was wrong with her.     Current Support System: Pt is here today with her daughter Isaiah and her  Tiburcio and sister Aaliyah are int  waiting room. Pt reports she has good support. She is  to her  Tiburcio for 53 years. She has two daughters and four grandchildren. Pt has nine siblings and said she will be staying with her different siblings while coming here for treatment. Pt lives over 3&1/2 hours away from the cancer center and plan to stay with siblings in [place]. SW will look for discount for hotels in the area per daughters request incase pt wants to stay closer tot he cancer center.     Met with pt for new pt education. Reviewed role of  during cancer treatment. Educated on role of SW on care team and resources available at the cancer center.     Answered all psychosocial/socioeconomic related questions. Pt reports they live on a [employer] income. Support enrolment completed and pt does meet requirement for help with gas card when in active treatment. SW explained this program and pt voiced understanding.    Pt's daughter asked questions about a sitter daily with home health. LALY explained  will not send a sitter daily and discussed limited services of home health verses a PCA worker with a waiver service. Her family member is a PCA. LALY provided them with the number to apply for waiver services [phone]. Explained there is a waiting list. Daughter is worried her father who needs back surgery will not always be able to help and wants to plan ahead. She will call the number LALY provided to start process of having pt assessed  for possible waiver services.     Patient provided with social contact number and advised to call with questions or make future appointment if further intervention is needed. SW to follow throughout tx.    Swathi Pedraza, PRITI  04/26/2024  3:39 PM

## 2024-04-26 NOTE — TELEPHONE ENCOUNTER
----- Message from Litzy Lane MA sent at 4/26/2024  3:05 PM CDT -----  Regarding: FW: New IO Referral  Good afternoon, can we get a referral to see Karla before her Inf on 4/30 please?  ----- Message -----  From: Valerie Sebastian RD  Sent: 4/26/2024   3:02 PM CDT  To: [facility] Staff  Subject: New IO Referral                                  Hello,    This patient would like a referral to IO. Interested in classes and acupuncture. She is starting chemotherapy Tuesday. She is coming from far away, staying with family in [facility], so would like a chairside visit if possible.    Thanks,  Valerie

## 2024-04-26 NOTE — PROGRESS NOTES
Oncology Nutrition   New Patient Education  Bella Meyer   1950    Nutrition Education   This is a 73 y.o.female with a medical diagnosis of peritoneal carcinomatosis.     Met w/ pt and her daughter, Eugenia, to discuss current nutritional status and nutrition as it relates to cancer and cancer treatment. Pt currently mild nutrition risk. Pt reports no appetite. RD discussed ways to help with appetite such as eating smaller, more frequent meals, high calorie smoothies/shakes, and high calorie/protein foods.     Wt Readings from Last 10 Encounters:   04/26/24 55.2 kg (121 lb 11.1 oz)   04/22/24 53.5 kg (118 lb)   04/18/24 56.2 kg (124 lb)   04/17/24 56.4 kg (124 lb 5.4 oz)      [x] PMHx reviewed  [x] Labs reviewed    Educated on food safety and common nutrition impact symptoms associated with chemotherapy treatment. Reinforced the importance of good hydration. Handouts provided.    Answered all nutrition related questions.     Patient provided with dietitian contact number and advised to call with questions or make future appointment if further intervention is needed. RD to follow throughout tx PRN.    Valerie Sebastian, MS, RD, LDN  04/26/2024  3:04 PM

## 2024-04-26 NOTE — PROGRESS NOTES
SUBJECTIVE     Chief complaint: Chemotherapy and Patient Education    History of present Illness:  Bella Meyer is a 73 y.o. 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 Her oncologist, Dr. Crawford, recommended treatment with Paclitaxel/Carboplatin and the patient has agreed to proceed.      Data Reviewed:   Referred by Dr. Evans for evaluation of a pelvic mass, peritoneal carcinomatosis and elevated .     3/24/24 CT AP-   Abnormal inflammatory changes in the lower pelvis associated with the distal sigmoid colon. Tubular or channel-like structure along the posterior margin of the lower descending colon possibly connecting between the sigmoid and rectal regions. Underlying neoplasm and/or fistula not excluded. Mildly enlarged left iliac bifurcation lymph nodes noted. No drainable abscess.   There is also abnormal appearance of the descending colon and several small soft tissue nodules in the pericolonic tissues of the descending colon. Malignant neoplasm should be considered.     4/9/24 TVUS-   Grossly unremarkable appearance of the uterus.   The left ovary is not seen sonographically. Enlarged right ovary at 6.6 cm with several cysts the largest 3.4 cm with some thin septations and blood flow to the ovary. Trace fluid adjacent to the ovary. No gross ascitic fluid.     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 Negative for fever.   HENT:  Negative.     Eyes: Negative.    Respiratory: Negative.  Negative for cough and shortness of breath.    Cardiovascular: Negative.  Negative for chest pain.   Gastrointestinal:  Positive for abdominal pain. Negative for abdominal distention, constipation, diarrhea and nausea.   Endocrine: Negative.    Genitourinary:  Positive for pelvic pain. Negative for difficulty urinating,  vaginal bleeding and vaginal discharge.    Musculoskeletal: Negative.    Skin: Negative.    Neurological: Negative.    Hematological: Negative.  Negative for adenopathy.   Psychiatric/Behavioral: Negative.     All other systems reviewed and are negative.     Review of patient's allergies indicates:  No Known Allergies  Current Outpatient Medications   Medication Instructions    amLODIPine (NORVASC) 5 MG tablet 1 tablet, Oral, Every morning    dexAMETHasone (DECADRON) 4 MG Tab Take 8 mg (2 tablets) by mouth daily on days 2-4 of each chemotherapy cycle.    HYDROcodone-acetaminophen (NORCO) 5-325 mg per tablet 1 tablet, Oral, 2 times daily    levothyroxine (SYNTHROID) 75 MCG tablet 1 tablet, Oral, Every morning    medroxyPROGESTERone (PROVERA) 10 MG tablet 1 tablet, Oral, Every morning    metFORMIN (GLUCOPHAGE) 1,000 mg, Oral    metoprolol succinate (TOPROL-XL) 50 MG 24 hr tablet 1 tablet, Oral, Every morning    naproxen sodium (ANAPROX) 220 mg, 2 times daily with meals    OLANZapine (ZYPREXA) 5 MG tablet Take 1 tablet by mouth nightly on days 1-4 of each chemotherapy cycle.    pravastatin (PRAVACHOL) 10 MG tablet 1 tablet, Oral, Nightly    prochlorperazine (COMPAZINE) 5 mg, Oral, Every 6 hours PRN     No past medical history on file.   No past surgical history on file.   OB History    Para Term  AB Living   2 2 2     2   SAB IAB Ectopic Multiple Live Births           2      # Outcome Date GA Lbr [place]/2nd Weight Sex Type Anes PTL Lv   2 Term 79    F Vag-Spont   SAIDA   1 Term 12/15/73    F Vag-Spont   SAIDA     Social History     Tobacco Use    Smoking status: Never    Smokeless tobacco: Never   Substance Use Topics    Alcohol use: Never    Drug use: Never      Family History   Problem Relation Name Age of Onset    Cancer Brother      Breast cancer Neg Hx      Colon cancer Neg Hx      Ovarian cancer Neg Hx      Uterine cancer Neg Hx      Cervical cancer Neg Hx       Health Maintenance Topics with due  "status: Not Due       Topic Last Completion Date    TETANUS VACCINE 09/07/2022     Health Maintenance Due   Topic Date Due    Hepatitis C Screening  Never done    Lipid Panel  Never done    High Dose Statin  Never done    DEXA Scan  Never done    Colorectal Cancer Screening  Never done    RSV Vaccine (Age 60+ and Pregnant patients) (1 - 1-dose 60+ series) Never done    Mammogram  12/21/2018    COVID-19 Vaccine (3 - Moderna risk series) 04/05/2021    Influenza Vaccine (1) 09/01/2023     OBJECTIVE     /72 (BP Location: Right arm, Patient Position: Sitting, BP Method: Medium (Automatic))   Pulse 81   Temp 96.9 °F (36.1 °C) (Temporal)   Resp 16   Ht 5' 1" (1.549 m)   Wt 55.2 kg (121 lb 11.1 oz)   SpO2 98%   BMI 22.99 kg/m²     Physical Exam  Constitutional:       General: She is awake.      Appearance: Normal appearance. She is not ill-appearing.   Neurological:      Mental Status: She is alert.   Psychiatric:         Behavior: Behavior normal. Behavior is cooperative.       ASSESSMENT     1. Peritoneal carcinomatosis  - Ambulatory referral/consult to Chemo School  - Ambulatory referral/consult to Hematology/Oncology/Nutrition  - Ambulatory referral/consult to Oncology Social Work  - Ambulatory referral/consult to CLINIC Palliative Care; Future  - dexAMETHasone (DECADRON) 4 MG Tab; Take 8 mg (2 tablets) by mouth daily on days 2-4 of each chemotherapy cycle.  Dispense: 6 tablet; Refill: 11  - OLANZapine (ZYPREXA) 5 MG tablet; Take 1 tablet by mouth nightly on days 1-4 of each chemotherapy cycle.  Dispense: 4 tablet; Refill: 11  - prochlorperazine (COMPAZINE) 5 MG tablet; Take 1 tablet (5 mg total) by mouth every 6 (six) hours as needed for Nausea.  Dispense: 20 tablet; Refill: 5    2. Pelvic mass  - Ambulatory referral/consult to Chemo School  - Ambulatory referral/consult to Hematology/Oncology/Nutrition  - Ambulatory referral/consult to Oncology Social Work    3. Elevated cancer antigen 125 ()  - " Ambulatory referral/consult to Chemo School  - Ambulatory referral/consult to Hematology/Oncology/Nutrition  - Ambulatory referral/consult to Oncology Social Work  - Ambulatory referral/consult to CLINIC Palliative Care; Future    4. Encounter for education      PLAN   Treatment plan of Paclitaxel/Carboplatin every 21 days x 6 cycles discussed with patient. Reviewed schedule and provided calendar with appointments.   Handouts provided on chemotherapy agents. Premeds, supportive meds explained.  Discussed the mechanism of action, potential side effects of this treatment as well as ways to manage them at home.   Dietary modifications discussed. Detail instructions to be provided by dietician.   Chemotherapy folder supplied with contact information.   Discussed follow-up with the physician for toxicity monitoring throughout treatment.    Cycle 1, Day 1 scheduled for 4/30/24; patient has home prescriptions.  Reviewed treatment plan and the patient was educated on the planned duration of the treatment and schedule of the treatment administration. Reviewed final pathology confirming ovarian cancer. Will obtain consent at next visit.   Discussed plan to use Dignicaps during infusions. Paperwork to be completed. Would like to add Ativan to treatment plan.   Encouraged to call office - phone number provided - to assist with any concerns.    60 minutes were spent in coordination of patient's care, record review and counseling.     TREY Finch

## 2024-04-29 PROBLEM — C56.9 MALIGNANT NEOPLASM OF OVARY: Status: ACTIVE | Noted: 2024-04-29

## 2024-04-29 RX ORDER — SODIUM CHLORIDE 0.9 % (FLUSH) 0.9 %
10 SYRINGE (ML) INJECTION
Status: CANCELLED | OUTPATIENT
Start: 2024-04-30

## 2024-04-29 RX ORDER — HEPARIN 100 UNIT/ML
500 SYRINGE INTRAVENOUS
Status: CANCELLED | OUTPATIENT
Start: 2024-04-30

## 2024-04-29 RX ORDER — FAMOTIDINE 10 MG/ML
20 INJECTION INTRAVENOUS
Status: CANCELLED | OUTPATIENT
Start: 2024-04-30

## 2024-04-29 RX ORDER — PROCHLORPERAZINE EDISYLATE 5 MG/ML
5 INJECTION INTRAMUSCULAR; INTRAVENOUS ONCE AS NEEDED
Status: CANCELLED | OUTPATIENT
Start: 2024-04-30

## 2024-04-29 RX ORDER — DIPHENHYDRAMINE HYDROCHLORIDE 50 MG/ML
50 INJECTION INTRAMUSCULAR; INTRAVENOUS ONCE AS NEEDED
Status: CANCELLED | OUTPATIENT
Start: 2024-04-30

## 2024-04-29 RX ORDER — EPINEPHRINE 0.3 MG/.3ML
0.3 INJECTION SUBCUTANEOUS ONCE AS NEEDED
Status: CANCELLED | OUTPATIENT
Start: 2024-04-30

## 2024-04-29 NOTE — PROGRESS NOTES
SUBJECTIVE     Chief complaint:  Ovarian Cancer Pre-treatment Cycle #1 Carbo/Taxol     History of present Illness:  Bella Meyer is a 73 y.o.  female with recent diagnosis of Stage IIIC ovarian cancer presenting today for evaluation prior to cycle # 1 of Paclitaxel/Carboplatin. Oncology history below.    Doing fairly well. She reports abdominal and pelvic pain. Taking hydrocodone with acceptable relief per patient. Palliative referral pending.  Otherwise, denies fever, chills, chest pain, nausea, vomiting. Having normal bowel and bladder function.     Oncology History   Peritoneal carcinomatosis   2024 Initial Diagnosis    Peritoneal carcinomatosis     2024 -  Chemotherapy    Treatment Summary   Plan Name: OP GYN paclitaxel CARBOplatin (AUC 6) Q3W  Treatment Goal: Curative  Status: Active  Start Date: 2024  End Date: 2025 (Planned)  Provider: Maliha Crawford MD  Chemotherapy: CARBOplatin (PARAPLATIN) 410 mg in sodium chloride 0.9% 291 mL chemo infusion, 410 mg (100 % of original dose 411 mg), Intravenous, Clinic/HOD 1 time, 1 of 17 cycles  Dose modification:   (original dose 411 mg, Cycle 1)  PACLitaxeL (TAXOL) 175 mg/m2 = 276 mg in sodium chloride 0.9% 250 mL chemo infusion, 175 mg/m2 = 276 mg (100 % of original dose 175 mg/m2), Intravenous, Clinic/HOD 1 time, 1 of 17 cycles  Dose modification: 135 mg/m2 (original dose 175 mg/m2, Cycle 1, Reason: MD Discretion), 175 mg/m2 (original dose 175 mg/m2, Cycle 1)     Malignant neoplasm of ovary   2024 Initial Diagnosis    Malignant neoplasm of ovary  Referred by Dr. Evans for evaluation of a pelvic mass, peritoneal carcinomatosis and elevated .     3/24/24 CT AP- Abnormal inflammatory changes in the lower pelvis associated with the distal sigmoid colon. Tubular or channel-like structure along the posterior margin of the lower descending colon possibly connecting between the sigmoid and rectal regions. Underlying neoplasm and/or  fistula not excluded. Mildly enlarged left iliac bifurcation lymph nodes noted. No drainable abscess. There is also abnormal appearance of the descending colon and several small soft tissue nodules in the pericolonic tissues of the descending colon. Malignant neoplasm should be considered.     4/9/24 TVUS- Grossly unremarkable appearance of the uterus. The left ovary is not seen sonographically. Enlarged right ovary at 6.6 cm with several cysts the largest 3.4 cm with some thin septations and blood flow to the ovary. Trace fluid adjacent to the ovary. No gross ascitic fluid.     4/9/24 - 188    4/18/24 CT CAP- Diffuse peritoneal carcinomatosis. Right adnexal mass (6 cm) which could be the primary neoplasm/ovarian cancer. Sigmoid colon thickening which could be the primary neoplasm/colon cancer.   Index implant left anterior abdomen 2 cm.   Index left pelvic sidewall implant 1.5 cm.    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 Negative for fever.   HENT:  Negative.     Eyes: Negative.    Respiratory: Negative.  Negative for cough and shortness of breath.    Cardiovascular: Negative.  Negative for chest pain.   Gastrointestinal:  Positive for abdominal pain. Negative for abdominal distention, constipation, diarrhea and nausea.   Endocrine: Negative.    Genitourinary:  Positive for pelvic pain. Negative for difficulty urinating, vaginal bleeding and vaginal discharge.    Musculoskeletal: Negative.    Skin: Negative.    Neurological: Negative.    Hematological: Negative.  Negative for adenopathy.   Psychiatric/Behavioral: Negative.     All other systems reviewed and are negative.     Review of patient's allergies indicates:  No Known Allergies  Current Outpatient Medications   Medication Instructions    amLODIPine  "(NORVASC) 5 MG tablet 1 tablet, Oral, Every morning    dexAMETHasone (DECADRON) 4 MG Tab Take 8 mg (2 tablets) by mouth daily on days 2-4 of each chemotherapy cycle.    HYDROcodone-acetaminophen (NORCO) 5-325 mg per tablet 1 tablet, Oral, 2 times daily    levothyroxine (SYNTHROID) 75 MCG tablet 1 tablet, Oral, Every morning    medroxyPROGESTERone (PROVERA) 10 MG tablet 1 tablet, Oral, Every morning    metFORMIN (GLUCOPHAGE) 1,000 mg, Oral    metoprolol succinate (TOPROL-XL) 50 MG 24 hr tablet 1 tablet, Oral, Every morning    naproxen sodium (ANAPROX) 220 mg, 2 times daily with meals    OLANZapine (ZYPREXA) 5 MG tablet Take 1 tablet by mouth nightly on days 1-4 of each chemotherapy cycle.    pravastatin (PRAVACHOL) 10 MG tablet 1 tablet, Oral, Nightly    prochlorperazine (COMPAZINE) 5 mg, Oral, Every 6 hours PRN     OBJECTIVE     /79 (BP Location: Right arm, Patient Position: Sitting, BP Method: Medium (Automatic))   Pulse 75   Temp 97.1 °F (36.2 °C) (Temporal)   Resp 16   Ht 5' 1" (1.549 m)   Wt 55.8 kg (123 lb 0.3 oz)   SpO2 99%   BMI 23.24 kg/m²     Physical Exam  Constitutional:       General: She is awake. She is not in acute distress.     Appearance: Normal appearance. She is not ill-appearing.   HENT:      Mouth/Throat:      Mouth: Mucous membranes are moist.      Pharynx: Oropharynx is clear. No oropharyngeal exudate or posterior oropharyngeal erythema.   Cardiovascular:      Rate and Rhythm: Normal rate.      Pulses: Normal pulses.   Pulmonary:      Effort: Pulmonary effort is normal.   Abdominal:      General: There is no distension.      Palpations: Abdomen is soft.      Tenderness: There is no abdominal tenderness.   Musculoskeletal:      Cervical back: Neck supple. No tenderness.   Lymphadenopathy:      Cervical: No cervical adenopathy.   Neurological:      General: No focal deficit present.      Mental Status: She is alert. Mental status is at baseline.   Skin:     General: Skin is warm and " dry.   Psychiatric:         Mood and Affect: Mood normal.         Behavior: Behavior normal. Behavior is cooperative.   Vitals reviewed.     ASSESSMENT AND PLAN     1. Malignant neoplasm of right ovary   Reviewed final pathology confirming ovarian carcinoma with patient and family present. Plan for NACT.   CBC, CMP,  and pre-treatment visit every cycle.    CT after cycle #3 with evaluation for interval debulking.   Kesha Germline Genetic testing to be collected with next set of labs.   CARIS to be sent on pathologic specimen     2. Examination prior to chemotherapy  Patient examined, labs reviewed, orders signed. OK to treat Cycle #1.   Chemotherapy Informed consent obtained and documented.   She was given the opportunity to ask questions, and stated all were answered.   Therapy requires ongoing intensive monitoring for toxicity     Return to clinic in 3 weeks prior to next cycle or sooner if needed.     Maliha Crawford MD  Gynecologic Oncology

## 2024-04-30 ENCOUNTER — OFFICE VISIT (OUTPATIENT)
Dept: GYNECOLOGIC ONCOLOGY | Facility: CLINIC | Age: 74
End: 2024-04-30
Payer: MEDICARE

## 2024-04-30 ENCOUNTER — PATIENT MESSAGE (OUTPATIENT)
Dept: GYNECOLOGIC ONCOLOGY | Facility: CLINIC | Age: 74
End: 2024-04-30

## 2024-04-30 ENCOUNTER — INFUSION (OUTPATIENT)
Dept: INFUSION THERAPY | Facility: HOSPITAL | Age: 74
End: 2024-04-30
Attending: OBSTETRICS & GYNECOLOGY
Payer: MEDICARE

## 2024-04-30 ENCOUNTER — OFFICE VISIT (OUTPATIENT)
Dept: HEMATOLOGY/ONCOLOGY | Facility: CLINIC | Age: 74
End: 2024-04-30
Payer: MEDICARE

## 2024-04-30 ENCOUNTER — DOCUMENTATION ONLY (OUTPATIENT)
Dept: INFUSION THERAPY | Facility: HOSPITAL | Age: 74
End: 2024-04-30
Payer: MEDICARE

## 2024-04-30 ENCOUNTER — DOCUMENTATION ONLY (OUTPATIENT)
Dept: HEMATOLOGY/ONCOLOGY | Facility: CLINIC | Age: 74
End: 2024-04-30

## 2024-04-30 VITALS
HEART RATE: 84 BPM | OXYGEN SATURATION: 99 % | HEIGHT: 61 IN | DIASTOLIC BLOOD PRESSURE: 82 MMHG | TEMPERATURE: 97 F | SYSTOLIC BLOOD PRESSURE: 130 MMHG | BODY MASS INDEX: 23.22 KG/M2 | WEIGHT: 123 LBS | RESPIRATION RATE: 15 BRPM

## 2024-04-30 VITALS
HEIGHT: 61 IN | HEART RATE: 75 BPM | TEMPERATURE: 97 F | OXYGEN SATURATION: 99 % | RESPIRATION RATE: 16 BRPM | WEIGHT: 123 LBS | BODY MASS INDEX: 23.22 KG/M2 | SYSTOLIC BLOOD PRESSURE: 129 MMHG | DIASTOLIC BLOOD PRESSURE: 79 MMHG

## 2024-04-30 DIAGNOSIS — C56.9 MALIGNANT NEOPLASM OF OVARY, UNSPECIFIED LATERALITY: ICD-10-CM

## 2024-04-30 DIAGNOSIS — C78.6 PERITONEAL CARCINOMATOSIS: ICD-10-CM

## 2024-04-30 DIAGNOSIS — C56.1 MALIGNANT NEOPLASM OF RIGHT OVARY: Primary | ICD-10-CM

## 2024-04-30 DIAGNOSIS — C78.6 PERITONEAL CARCINOMATOSIS: Primary | ICD-10-CM

## 2024-04-30 DIAGNOSIS — Z01.818 EXAMINATION PRIOR TO CHEMOTHERAPY: ICD-10-CM

## 2024-04-30 DIAGNOSIS — R53.1 GENERALIZED WEAKNESS: ICD-10-CM

## 2024-04-30 DIAGNOSIS — R53.83 FATIGUE, UNSPECIFIED TYPE: Primary | ICD-10-CM

## 2024-04-30 PROCEDURE — 99215 OFFICE O/P EST HI 40 MIN: CPT | Mod: S$PBB,,, | Performed by: NURSE PRACTITIONER

## 2024-04-30 PROCEDURE — G2211 COMPLEX E/M VISIT ADD ON: HCPCS | Mod: S$PBB,,, | Performed by: OBSTETRICS & GYNECOLOGY

## 2024-04-30 PROCEDURE — 96376 TX/PRO/DX INJ SAME DRUG ADON: CPT | Mod: PN

## 2024-04-30 PROCEDURE — 96417 CHEMO IV INFUS EACH ADDL SEQ: CPT | Mod: PN

## 2024-04-30 PROCEDURE — 99999 PR PBB SHADOW E&M-EST. PATIENT-LVL III: CPT | Mod: PBBFAC,,, | Performed by: NURSE PRACTITIONER

## 2024-04-30 PROCEDURE — 96413 CHEMO IV INFUSION 1 HR: CPT | Mod: PN

## 2024-04-30 PROCEDURE — 96375 TX/PRO/DX INJ NEW DRUG ADDON: CPT | Mod: PN

## 2024-04-30 PROCEDURE — 99215 OFFICE O/P EST HI 40 MIN: CPT | Mod: S$PBB,,, | Performed by: OBSTETRICS & GYNECOLOGY

## 2024-04-30 PROCEDURE — 99213 OFFICE O/P EST LOW 20 MIN: CPT | Mod: PBBFAC,27,PN,25 | Performed by: NURSE PRACTITIONER

## 2024-04-30 PROCEDURE — 96367 TX/PROPH/DG ADDL SEQ IV INF: CPT | Mod: PN

## 2024-04-30 PROCEDURE — 25000003 PHARM REV CODE 250: Mod: PN | Performed by: OBSTETRICS & GYNECOLOGY

## 2024-04-30 PROCEDURE — 99213 OFFICE O/P EST LOW 20 MIN: CPT | Mod: PBBFAC,PN,25 | Performed by: OBSTETRICS & GYNECOLOGY

## 2024-04-30 PROCEDURE — 99999 PR PBB SHADOW E&M-EST. PATIENT-LVL III: CPT | Mod: PBBFAC,,, | Performed by: OBSTETRICS & GYNECOLOGY

## 2024-04-30 PROCEDURE — 63600175 PHARM REV CODE 636 W HCPCS: Mod: PN | Performed by: OBSTETRICS & GYNECOLOGY

## 2024-04-30 RX ORDER — EPINEPHRINE 0.3 MG/.3ML
0.3 INJECTION SUBCUTANEOUS ONCE AS NEEDED
Status: DISCONTINUED | OUTPATIENT
Start: 2024-04-30 | End: 2024-04-30 | Stop reason: HOSPADM

## 2024-04-30 RX ORDER — ACETAMINOPHEN 500 MG
1000 TABLET ORAL
Status: CANCELLED
Start: 2024-04-30

## 2024-04-30 RX ORDER — SODIUM CHLORIDE 0.9 % (FLUSH) 0.9 %
10 SYRINGE (ML) INJECTION
Status: DISCONTINUED | OUTPATIENT
Start: 2024-04-30 | End: 2024-04-30 | Stop reason: HOSPADM

## 2024-04-30 RX ORDER — FAMOTIDINE 10 MG/ML
20 INJECTION INTRAVENOUS
Status: COMPLETED | OUTPATIENT
Start: 2024-04-30 | End: 2024-04-30

## 2024-04-30 RX ORDER — DIPHENHYDRAMINE HYDROCHLORIDE 50 MG/ML
50 INJECTION INTRAMUSCULAR; INTRAVENOUS ONCE AS NEEDED
Status: DISCONTINUED | OUTPATIENT
Start: 2024-04-30 | End: 2024-04-30 | Stop reason: HOSPADM

## 2024-04-30 RX ORDER — ACETAMINOPHEN 500 MG
1000 TABLET ORAL
Status: COMPLETED | OUTPATIENT
Start: 2024-04-30 | End: 2024-04-30

## 2024-04-30 RX ORDER — LORAZEPAM 2 MG/ML
0.5 INJECTION INTRAMUSCULAR
Status: CANCELLED
Start: 2024-04-30

## 2024-04-30 RX ORDER — PROCHLORPERAZINE EDISYLATE 5 MG/ML
5 INJECTION INTRAMUSCULAR; INTRAVENOUS ONCE AS NEEDED
Status: DISCONTINUED | OUTPATIENT
Start: 2024-04-30 | End: 2024-04-30 | Stop reason: HOSPADM

## 2024-04-30 RX ORDER — LORAZEPAM 2 MG/ML
0.5 INJECTION INTRAMUSCULAR
Status: COMPLETED | OUTPATIENT
Start: 2024-04-30 | End: 2024-04-30

## 2024-04-30 RX ORDER — HYDROCODONE BITARTRATE AND ACETAMINOPHEN 5; 325 MG/1; MG/1
1 TABLET ORAL 2 TIMES DAILY
Qty: 15 TABLET | Refills: 0 | Status: SHIPPED | OUTPATIENT
Start: 2024-04-30 | End: 2024-05-10 | Stop reason: SDUPTHER

## 2024-04-30 RX ADMIN — CARBOPLATIN 410 MG: 600 INJECTION, SOLUTION INTRAVENOUS at 12:04

## 2024-04-30 RX ADMIN — DEXAMETHASONE SODIUM PHOSPHATE 0.25 MG: 10 INJECTION, SOLUTION INTRAMUSCULAR; INTRAVENOUS at 09:04

## 2024-04-30 RX ADMIN — LORAZEPAM 0.5 MG: 2 INJECTION INTRAMUSCULAR; INTRAVENOUS at 11:04

## 2024-04-30 RX ADMIN — SODIUM CHLORIDE: 9 INJECTION, SOLUTION INTRAVENOUS at 09:04

## 2024-04-30 RX ADMIN — PACLITAXEL 276 MG: 6 INJECTION, SOLUTION INTRAVENOUS at 11:04

## 2024-04-30 RX ADMIN — LORAZEPAM 0.5 MG: 2 INJECTION INTRAMUSCULAR; INTRAVENOUS at 10:04

## 2024-04-30 RX ADMIN — APREPITANT 130 MG: 130 INJECTION, EMULSION INTRAVENOUS at 10:04

## 2024-04-30 RX ADMIN — ACETAMINOPHEN 1000 MG: 500 TABLET, FILM COATED ORAL at 10:04

## 2024-04-30 RX ADMIN — FAMOTIDINE 20 MG: 10 INJECTION INTRAVENOUS at 10:04

## 2024-04-30 RX ADMIN — DIPHENHYDRAMINE HYDROCHLORIDE 50 MG: 50 INJECTION, SOLUTION INTRAMUSCULAR; INTRAVENOUS at 10:04

## 2024-04-30 NOTE — LETTER
April 30, 2024    Bella Jean-Baptiste  [address]  [place] [address]             [facility] - GYN Oncology  [address]  [place] [address]  Phone: [phone]   April 30, 2024    MEDICARE   Through its , [employer]  Attn: Claims Department  [address] [address]  [place] [address]    RE: LETTER OF MEDICAL NECESSITY    PATIENT'S NAME:  BELLA JEAN-BAPTISTE  POLICY NUMBER:  GROUP NUMBER:    TO WHOM IT MAY CONCERN:    This letter is to request coverage and payment for scalp cooling for BELLA JEAN-BAPTISTE during their course of chemotherapy for MALIGNANT NEOPLASM OF OVARY [C56.9]. The DigniCap® Scalp Cooling System is an FDA cleared device that is used to reduce the likelihood of chemotherapy-induced alopecia (JO). I strongly recommend scalp cooling while SHE undergoes chemotherapy to preserve quality of life and believe it warrants coverage in this case.  The treatment regimen with DigniCap is to use it prior to the chemotherapy service, during the IV administration of chemotherapy, and after chemotherapy has been discontinued for two to three hours. The DigniCap® Scalp Cooling System requires direct supervision and is used during each chemotherapy service. The use of The DigniCap® Scalp Cooling System is discontinued once chemotherapy is discontinued.  The 2019 NCCN Guidelines and Compendium introduced a recommendation for scalp cooling as a Category 2A treatment option for patients with invasive breast cancer, and since then have recommended scalp cooling treatment as a Category 2A treatment option for patients with ovarian cancer, fallopian tube cancer and primary peritoneal cancer. Referenced with permission from the NCCN Clinical Practice Guidelines in Oncology (NCCN Guidelines®) for Breast Cancer V.2.2022. © National Comprehensive Cancer Network, Inc. 2022. All rights reserved. Accessed March 2, 2022. Referenced with permission from the NCCN Clinical Practice Guidelines in  Oncology (NCCN Guidelines®) for Ovarian Cancer/Fallopian Tube Cancer/Primary Peritoneal Cancer V.1.2022. © National Comprehensive Cancer Network, Inc. 2022. All rights reserved. Accessed March 2, 2022     Published in LM in February 2017, the pivotal trial Jesus HS, Belkis P, Elizabeth SA, et al: Association between use of a scalp cooling device and alopecia after chemotherapy for breast cancer. :606-614, 2017    for the DigniCap FDA clearance showed a 66% rate of hair retention in patients undergoing scalp cooling as well as additional improvement in patients' symptoms such as feeling less distressed by hair loss, fewer patients feeling less attractive, and patients feeling more satisfaction with their body image.  Systematic reviews and meta-analysis Carson H, Geovanna SJ, Daphney DH, Mariah 0, Magalis 5-K. Efficacy of interventions for prevention of chemotherapy-induced alopecia: A systematic review and meta-analysis. International Journal of Cancer 2014;136:F308-J335   , Aixa TG, Isaías J, Jeremiah R. Effectiveness of scalp cooling in chemotherapy. Bulletin du Cancer 2011;98(9):0626-0629.    have demonstrated a significant reduction in hair loss from scalp cooling. The studies reviewed and reported on in the medical literature have included randomized controlled trial Evens J, Gunnar T, Laura C, et al: Effect of a scalp cooling device on alopecia in women undergoing chemotherapy for breast cancer: the SCALP randomized clinical trial. -308-603, 2017.   and; prospective single arm/historical control trials. The vast majority of these peer reviewed articles have demonstrated a positive effect of scalp hypothermia in reducing hair loss; no matter the type of scalp hypothermia system used.  Reducing the incidence of hair loss during chemotherapy for cancer minimizes hair loss's effect on a patient's anxiety, distress, depression, negative body image, and lowered self-esteem, contributing to a more-positive clinical  outcome.  My patient has purchased the DigniCap directly from the  at an out-of-pocket cost of $505.50, the invoice will be attached to the claim.  DigniCap Scalp Cooling Cap  Subscription service for utilization of the scalp cooling system  I request that scalp cooling be considered a covered benefit based on medical necessity, and associated fees for this treatment be covered and reimbursed to the patient in light of their cancer diagnosis, treatment regimen and side effects of those treatments.  Please contact us with any questions at: [phone].    Sincerely,                  Maliha Crawford MD

## 2024-04-30 NOTE — PROGRESS NOTES
"Bella Meyer  73 y.o. is here to seek an integrative approach to discuss side effects related to ovarian cancer treatment.    HPI  Mrs. Meyer is here today getting chemo. She reports she started having pain to her side so she went to the ED. It was originally thought she was constipated and a colonoscopy was but could not be completed. She was given antibiotics for diverticulitis. She went back to the ED again for pain and had more imagining and an area on the ovary was noted. She then went to the GYN in [place] and then was sent to Dr. Hitchcock. She reports she stays with abdominal pain all of the time but the pain medication makes it tolerable. She sleeps well and does have occasional fatigue. She was in so much pain that she was not eating and she became weak. Her  was having to help lift her up to get around.  She is now eating more and is feeling better and she is able to walk and get around. She reports over the last week she has gained weight. She is eating protein bars and focusing on healthy diet. She was very active before the abdominal pain began. She currently reports low activity level. She reports she initially had anxiety, but now reports low stress and anxiety. She states, "when I met Dr. Hitchcock I felt so comfortable and my anxiety went away."  She has been  52 years and they have 2 daughters. They live in [facility], but are staying with family. She has a good support system in her family and friends.     Pillars Assessment    Sleep  How many hours of sleep per night? 9-10 hours  Do you have trouble falling asleep, staying asleep or waking up earlier than you need to? no  Do you have daytime fatigue? occasionally  Do you need medication for sleep? no  Do you use any supplements or other interventions for sleep? no    Resilience  Rate your current level of stress- low    Spirituality-  Believes in God    Nutrition   Food allergies or sensitivities: no  Do you adhere to a " particular type of diet? no  Do you have any concerns with your eating habits?  Increased eating    Exercise  How would you describe your physical activity level? low    Past Medical History  No past medical history on file.   Past Surgical History   No past surgical history on file.   Family History   Family History   Problem Relation Name Age of Onset    Cancer Brother      Breast cancer Neg Hx      Colon cancer Neg Hx      Ovarian cancer Neg Hx      Uterine cancer Neg Hx      Cervical cancer Neg Hx        Allergies  Review of patient's allergies indicates:  No Known Allergies   Current Medications:    Current Outpatient Medications:     amLODIPine (NORVASC) 5 MG tablet, Take 1 tablet by mouth every morning., Disp: , Rfl:     dexAMETHasone (DECADRON) 4 MG Tab, Take 8 mg (2 tablets) by mouth daily on days 2-4 of each chemotherapy cycle., Disp: 6 tablet, Rfl: 11    HYDROcodone-acetaminophen (NORCO) 5-325 mg per tablet, Take 1 tablet by mouth 2 (two) times daily., Disp: 15 tablet, Rfl: 0    levothyroxine (SYNTHROID) 75 MCG tablet, Take 1 tablet by mouth every morning., Disp: , Rfl:     medroxyPROGESTERone (PROVERA) 10 MG tablet, Take 1 tablet by mouth every morning., Disp: , Rfl:     metFORMIN (GLUCOPHAGE) 1000 MG tablet, Take 1,000 mg by mouth., Disp: , Rfl:     metoprolol succinate (TOPROL-XL) 50 MG 24 hr tablet, Take 1 tablet by mouth every morning., Disp: , Rfl:     naproxen sodium (ANAPROX) 220 MG tablet, Take 220 mg by mouth 2 (two) times daily with meals. (Patient not taking: Reported on 4/26/2024), Disp: , Rfl:     OLANZapine (ZYPREXA) 5 MG tablet, Take 1 tablet by mouth nightly on days 1-4 of each chemotherapy cycle., Disp: 4 tablet, Rfl: 11    pravastatin (PRAVACHOL) 10 MG tablet, Take 1 tablet by mouth every evening., Disp: , Rfl:     prochlorperazine (COMPAZINE) 5 MG tablet, Take 1 tablet (5 mg total) by mouth every 6 (six) hours as needed for Nausea., Disp: 20 tablet, Rfl: 5  No current  facility-administered medications for this visit.    Facility-Administered Medications Ordered in Other Visits:     acetaminophen tablet 1,000 mg, 1,000 mg, Oral, 1 time in [facility]", Maliha Crawford MD    CARBOplatin (PARAPLATIN) 410 mg in sodium chloride 0.9% 326 mL chemo infusion, 410 mg, Intravenous, 1 time in [facility]\"", Maliha Crawford MD    diphenhydrAMINE (BENADRYL) 50 mg in NS 50 mL IVPB, 50 mg, Intravenous, 1 time in Clinic/\[facility]\"", Maliha Crawford MD    diphenhydrAMINE injection 50 mg, 50 mg, Intravenous, Once PRN, Maliha Crawford MD    EPINEPHrine (EPIPEN) 0.3 mg/0.3 mL pen injection 0.3 mg, 0.3 mg, Intramuscular, Once PRN, Maliha Crawford MD    famotidine (PF) injection 20 mg, 20 mg, Intravenous, 1 time in [facility]\"", Maliha Crawford MD    hydrocortisone sodium succinate injection 100 mg, 100 mg, Intravenous, Once PRN, Maliha Crawford MD    LORazepam injection 0.5 mg, 0.5 mg, Intravenous, PRN, Maliha Crawford MD    PACLitaxeL (TAXOL) 175 mg/m2 = 276 mg in sodium chloride 0.9% 250 mL chemo infusion, 175 mg/m2 (Treatment Plan Recorded), Intravenous, 1 time in Clinic/\[facility]\"", Maliha Crawford MD    prochlorperazine injection Soln 5 mg, 5 mg, Intravenous, Once PRN, Maliha Crawford MD    sodium chloride 0.9% 250 mL flush bag, , Intravenous, PRN, Maliha Crawford MD, Last Rate: 25 mL/hr at 04/30/24 0927, New Bag at 04/30/24 0927    sodium chloride 0.9% flush 10 mL, 10 mL, Intravenous, PRNTy Ana L., MD     Review of Systems  Review of Systems   Constitutional:  Positive for malaise/fatigue.   HENT: Negative.     Eyes: Negative.    Respiratory: Negative.     Cardiovascular: Negative.    Gastrointestinal:  Positive for abdominal pain.   Genitourinary: Negative.    Musculoskeletal: Negative.    Skin: Negative.    Neurological:  Positive for weakness.   Endo/Heme/Allergies: Negative.    Psychiatric/Behavioral: Negative.        Physical Exam      /79   HR 75  RR 16  Temp  97.1  Physical Exam  Vitals  reviewed.   Constitutional:       Appearance: Normal appearance.   Neurological:      Mental Status: She is alert.   Psychiatric:         Mood and Affect: Mood normal.         Behavior: Behavior normal.      ASSESSMENT :  1. Fatigue, unspecified type    2. Generalized weakness    3. Peritoneal carcinomatosis    4. Malignant neoplasm of ovary, unspecified laterality      PLAN:  Reviewed all information discussed at today's visit and all questions were answered.    Counseled on healthy lifestyle and behavior modifications-encouraged an increase in physical activity. Recommended starting with light walking.   See Nutrition during treatment as needed    Continue with protein bars, recommended protein drink daily.   I discussed and recommended the following support services:  Cesar Chi and Yoga I suggested Cesar Chi and/or Yoga as these practices reduce stress, increases flexibility and muscle strength, improves balance and promotes serenity in the power of movement to help fight disease and boost your immune system.   Music and relaxation therapy and Meditation which can decrease stress by lowering blood pressure, slowing breathing, and helping you be more present in the moment. It improves sleep by relaxing the body and mind at the end of the day.Meditation also trains you how to focus on one thing at a time, improving concentration. It also promotes emotional well-being by decreasing depression and anxiety, and helping create a more positive outlook on life.    Discussed physical therapy, they will notify me if they choose PT. I can order PT near there home in [place].     Follow up with [facility] in 6 weeks chairside.     I spent a total of 50 minutes on the day of the visit.This includes face to face time and non-face to face time preparing to see the patient (eg, review of tests), obtaining and/or reviewing separately obtained history, documenting clinical information in the electronic or other health record,  independently interpreting results and communicating results to the patient/family/caregiver, or care coordinator.

## 2024-04-30 NOTE — PROGRESS NOTES
Oncology   Chemotherapy Infusion Visit    Quick Social Service Status Follow Up   Met w/ pt briefly to follow up on social and emotional needs on her first day of treatment. Pt is here with her two daughters and . They are taking turns sitting with the pt. SW provided pt a gas card. She will be staying in [place] when coming for treatment, 69 miles from the cancer center. Her home is over 3 hours away.   Pt is using the cold cap today. She tried to use ice on hands and feet but had difficulties with this. SW provided support. No other needs noted at this time.          Swathi Pedraza, PRITI  04/30/2024  4:18 PM

## 2024-04-30 NOTE — PLAN OF CARE
Problem: Adult Inpatient Plan of Care  Goal: Patient-Specific Goal (Individualized)  Outcome: Progressing  Flowsheets (Taken 4/30/2024 0845)  Individualized Care Needs:   recliner, blanket, pillow   oriented to pod/unit   reviewed home medications, treatment today   dignicaps and cryotherapy for hands/feet  Anxieties, Fears or Concerns: first infusion     Problem: Fatigue  Goal: Improved Activity Tolerance  Intervention: Promote Improved Energy  Flowsheets (Taken 4/30/2024 0845)  Sleep/Rest Enhancement:   natural light exposure provided   noise level reduced  Activity Management:   assistive device used  Environmental Support:   distractions minimized   calm environment promoted     Patient here today for C1D1 Taxol/Carbo. VSS. IV place to left forearm without difficulty; patent with blood return. Orders released. Pt oriented to unit. Symptoms reviewed. Questions and concerns addressed.     Reviewed home medications.   Reviewed treatment with patient and daughter. Discussed dignicap process and cryotherapy for hands/feet.

## 2024-04-30 NOTE — PLAN OF CARE
Problem: Adult Inpatient Plan of Care  Goal: Plan of Care Review  Outcome: Progressing  Flowsheets (Taken 4/30/2024 1330)  Plan of Care Reviewed With: patient     Patient tolerated #1 Taxol/Carbo without reaction. IV flushed with blood return, saline locked, and removed. AVS provided per portal;schedule reviewed. Ambulates per w/c. Escorted to lobby to family.  No acute distress noted.  Next appointment 5/21/24.    Patient attempted cryotherapy for hands and feet. She did not tolerate well. She kept the ice on her hands/feet for approximately 15 minutes of infusion.   After 2 doses of ativan, patient rested well enough to tolerate dignicaps. She remained for 2 hours post cool. Caps were removed after 5 minutes to prevent damage to hair. Patient instructed to be very careful with hair.    Taxol was given over 1 hour instead of 3. MD, supervisor, and manager notified. Patient tolerated Taxol without any reactions. See flowsheets for vital signs. Patient instructed to hydrate well over the next 3 days.

## 2024-04-30 NOTE — DISCHARGE INSTRUCTIONS
HOME MEDICATIONS:  Dexamethasone (Decadron) - take medication daily (in AM) on days 2,3,4 starting the day after chemotherapy.  Olanzepine (Zyprexa) - take medication at night on days 1,2,3,4 starting the day of chemotherapy.  Prochlorperazine (Compazine) - take as needed every 6 hours for nausea.    Things to report to your provider:  Diarrhea (4 or more watery stools in a 24 hour period)  Fever greater than 100.4 degrees  Sores in your mouth  Shortness of breath  Vomiting     If you need us before your next appointment, you can reach the [facility] at [phone]. Thank you for choosing us for your care!

## 2024-04-30 NOTE — PROGRESS NOTES
Notified Dr. Crawford of Taxol administration variation today due to the amount of diluent in bag.  Dr. Crawford confirmed ok to hydrate patient orally as IV had already been removed and believed greatest risk to patient has passed (hypersensitivity reaction or bradycardia).  Confirmed with pharmacy that there was no data to indicated a different intervention.  Spoke at length with family members in the lobby (2 daughters) with manager JOSE EDUARDO Morrison and talked thru the event, that the MD was aware and the interventions required (oral hydration).  Family members were encouraged to call us if patient had any other symptoms that they were concerned about or if they felt as if she needed some hydration and we would get her in for fluids.  Encouraged family to voice their concerns and we would address them, gave them our contact info and they understood.

## 2024-05-01 ENCOUNTER — OFFICE VISIT (OUTPATIENT)
Dept: PALLIATIVE MEDICINE | Facility: CLINIC | Age: 74
End: 2024-05-01
Payer: MEDICARE

## 2024-05-01 DIAGNOSIS — G89.3 CANCER RELATED PAIN: ICD-10-CM

## 2024-05-01 DIAGNOSIS — R97.1 ELEVATED CANCER ANTIGEN 125 (CA 125): ICD-10-CM

## 2024-05-01 DIAGNOSIS — C78.6 PERITONEAL CARCINOMATOSIS: ICD-10-CM

## 2024-05-01 DIAGNOSIS — Z51.5 PALLIATIVE CARE BY SPECIALIST: Primary | ICD-10-CM

## 2024-05-01 PROCEDURE — 99204 OFFICE O/P NEW MOD 45 MIN: CPT | Mod: 95,,, | Performed by: STUDENT IN AN ORGANIZED HEALTH CARE EDUCATION/TRAINING PROGRAM

## 2024-05-01 NOTE — PROGRESS NOTES
The patient location is: home    Visit type: audiovisual    Face to Face time with patient: 31  54 minutes of total time spent on the encounter, which includes face to face time and non-face to face time preparing to see the patient (eg, review of tests), Obtaining and/or reviewing separately obtained history, Documenting clinical information in the electronic or other health record, Independently interpreting results (not separately reported) and communicating results to the patient/family/caregiver, or Care coordination (not separately reported).         Each patient to whom he or she provides medical services by telemedicine is:  (1) informed of the relationship between the physician and patient and the respective role of any other health care provider with respect to management of the patient; and (2) notified that he or she may decline to receive medical services by telemedicine and may withdraw from such care at any time.    Notes:     Office Visit  [facility]      Reason for Consult: pain and symptom management      ASSESSMENT/PLAN:     Plan/Recommendations:  Diagnoses and all orders for this visit:    Palliative care by specialist    Peritoneal carcinomatosis  -     Ambulatory referral/consult to [facility] Pall[facility]    Elevated cancer antigen 125 ()  -     Ambulatory referral/consult to CLINIC Palliative Care    Cancer related pain        # Stage III Ovarian cancer  # Palliative care by specialist  # Cancer related pain  Patient with abdominal pain, nociceptive and potentially neuropathic component from tumor burden. She is weary of pain medications and her family has had to encourage her to take pain medication to be more comfortable. She has had good pain relief with hydrocodone 5/325 and 7.5/325. She has more doses of these medications at home and is not in need of a script at this time. We talked about potentially starting an extended release medication if needed in the future  "for more baseline control if her breakthrough pain needs increase. We also talked about the benefit of gabapentin for the neuropathic component. This will be addressed at future appointments as her family continues to encourage her to take medications and prioritize her comfort.   - Continue hydrocodone-APAP 5-325mg q6h prn  - Opioid contract to be sent and signed electronically  - Having daily bowel movements at this time, will start bowel regimen in the future if needed  - Follow up with gyn/onc    Follow up: 2 weeks virtual    Patient's encounter and above plan of care discussed with patient's daughter, Eugenia.     SUBJECTIVE:     History of Present Illness:  Patient is a 73 y.o. year old female presenting with recent diagnosis of Stage IIIC ovarian cancer. She is a patient of Dr. Maliha Crawford.     Introduced the role of palliative care including symptom management and advance care planning through the cancer journey.    She reports pain rated 10/10. When she takes a pain pill it drops down to a 4-5/10. She takes hydrocodone. Pain is located in her right side of the abdomen. Describes this pain as shooting and throbbing.     She reports daily bowel movements. Denies nausea, vomiting. She had her first infusion yesterday. She had no side effects.     Her daughter states that her mother hates pain medications. Family has noted grunting. Family has noticed movement would exacerbate pain. Would be hard to find a comfortable position to sit in. Patient wants to "take the edge off" and rest comfortably. Benadryl knocks her out.     The increased dose of hydrocodone at 7.5 helped better. She takes one dose in the morning and one at night. She sometimes take an additional dose if she takes a long car ride.     She does not feel strong. She was in the recliner all day.       No past medical history on file.  No past surgical history on file.  Family History   Problem Relation Name Age of Onset    Cancer Brother      Breast " cancer Neg Hx      Colon cancer Neg Hx      Ovarian cancer Neg Hx      Uterine cancer Neg Hx      Cervical cancer Neg Hx       Review of patient's allergies indicates:  No Known Allergies  Social Determinants of Health     Tobacco Use: Low Risk  (4/30/2024)    Patient History     Smoking Tobacco Use: Never     Smokeless Tobacco Use: Never     Passive Exposure: Not on file   Alcohol Use: Not At Risk (4/9/2024)    Received from [facility]    AUDIT-C     Frequency of Alcohol Consumption: Never     Average Number of Drinks: Patient does not drink     Frequency of Binge Drinking: Never   Financial Resource Strain: Not on file   Food Insecurity: Not on file   Transportation Needs: Not on file   Physical Activity: Not on file   Stress: Not on file   Housing Stability: Not on file   Depression: Low Risk  (4/30/2024)    Depression     Last PHQ-4: Flowsheet Data: 0   Utilities: Not on file   Health Literacy: Not on file   Social Isolation: Not on file          Medications:    Current Outpatient Medications:     amLODIPine (NORVASC) 5 MG tablet, Take 1 tablet by mouth every morning., Disp: , Rfl:     dexAMETHasone (DECADRON) 4 MG Tab, Take 8 mg (2 tablets) by mouth daily on days 2-4 of each chemotherapy cycle., Disp: 6 tablet, Rfl: 11    HYDROcodone-acetaminophen (NORCO) 5-325 mg per tablet, Take 1 tablet by mouth 2 (two) times daily., Disp: 15 tablet, Rfl: 0    levothyroxine (SYNTHROID) 75 MCG tablet, Take 1 tablet by mouth every morning., Disp: , Rfl:     medroxyPROGESTERone (PROVERA) 10 MG tablet, Take 1 tablet by mouth every morning., Disp: , Rfl:     metFORMIN (GLUCOPHAGE) 1000 MG tablet, Take 1,000 mg by mouth., Disp: , Rfl:     metoprolol succinate (TOPROL-XL) 50 MG 24 hr tablet, Take 1 tablet by mouth every morning., Disp: , Rfl:     naproxen sodium (ANAPROX) 220 MG tablet, Take 220 mg by mouth 2 (two) times daily with meals. (Patient not  taking: Reported on 4/26/2024), Disp: , Rfl:     OLANZapine (ZYPREXA) 5 MG tablet, Take 1 tablet by mouth nightly on days 1-4 of each chemotherapy cycle., Disp: 4 tablet, Rfl: 11    pravastatin (PRAVACHOL) 10 MG tablet, Take 1 tablet by mouth every evening., Disp: , Rfl:     prochlorperazine (COMPAZINE) 5 MG tablet, Take 1 tablet (5 mg total) by mouth every 6 (six) hours as needed for Nausea., Disp: 20 tablet, Rfl: 5  No current facility-administered medications for this visit.    OBJECTIVE:       ROS:  Review of Systems   Constitutional:  Negative for appetite change and unexpected weight change.   HENT:  Negative for mouth sores.    Eyes:  Negative for visual disturbance.   Respiratory:  Negative for cough and shortness of breath.    Cardiovascular:  Negative for chest pain.   Gastrointestinal:  Negative for abdominal pain and diarrhea.   Genitourinary:  Negative for frequency.   Musculoskeletal:  Negative for back pain.   Skin:  Negative for rash.   Neurological:  Negative for headaches.   Hematological:  Negative for adenopathy.   Psychiatric/Behavioral:  The patient is not nervous/anxious.        Review of Symptoms      Symptom Assessment (ESAS 0-10 Scale)  Pain:  0  Dyspnea:  0  Anxiety:  0  Nausea:  0  Depression:  0  Anorexia:  0  Fatigue:  0  Insomnia:  0  Restlessness:  0  Agitation:  0       Anxiety:  Is not nervous/anxious    ECOG Performance Status ndGndrndanddndend:nd nd2nd Living Arrangements:  Lives with family    Psychosocial/Cultural:   See Palliative Psychosocial Note: Yes  **Primary  to Follow**  Palliative Care  Consult: Yes     Time-Based Charting:  Yes  Chart Review: 23 minutes  Symptom Assessment: 31 minutes    Total Time Spent: 54 minutes      Advance Care Planning   Advance Directives:     Decision Making:  Patient answered questions  Goals of Care: The patient endorses that what is most important right now is to focus on symptom/pain control    Accordingly, we have decided that  the best plan to meet the patient's goals includes continuing with treatment              Physical Exam:  Vitals:    Physical Exam  Constitutional:       General: She is not in acute distress.     Appearance: Normal appearance. She is not ill-appearing or toxic-appearing.   Musculoskeletal:      Cervical back: Normal range of motion.   Skin:     Coloration: Skin is not jaundiced or pale.   Neurological:      Mental Status: She is alert and oriented to person, place, and time.   Psychiatric:         Mood and Affect: Mood normal.         Behavior: Behavior normal.         Thought Content: Thought content normal.         Judgment: Judgment normal.         Labs:  CBC:   WBC   Date Value Ref Range Status   04/26/2024 9.38 3.90 - 12.70 K/uL Final     Hemoglobin   Date Value Ref Range Status   04/26/2024 15.0 12.0 - 16.0 g/dL Final     Hematocrit   Date Value Ref Range Status   04/26/2024 43.2 37.0 - 48.5 % Final     MCV   Date Value Ref Range Status   04/26/2024 90 82 - 98 fL Final     Platelets   Date Value Ref Range Status   04/26/2024 406 150 - 450 K/uL Final       LFT:   Lab Results   Component Value Date    AST 16 04/26/2024    ALKPHOS 36 (L) 04/26/2024    BILITOT 0.3 04/26/2024       Albumin:   Albumin   Date Value Ref Range Status   04/26/2024 4.1 3.5 - 5.2 g/dL Final     Protein:   Total Protein   Date Value Ref Range Status   04/26/2024 7.6 6.0 - 8.4 g/dL Final       Radiology:I have reviewed all pertinent imaging results/findings within the past 24 hours.  CT chest abdomen pelvis 04/2024:     FINDINGS:  Comparison is 04/08/2024.     No mediastinal, hilar, or axillary adenopathy is seen.  No pulmonary nodules, masses, or infiltrates are seen.  The liver, gallbladder, biliary tree, spleen, stomach, pancreas, and duodenum show nothing unusual.  The adrenal glands are normal in morphology.  The kidneys enhance normally.  There is may be a small left renal cyst.  Vessels enhance normally.  No para-aortic,  retroperitoneal, or mesenteric adenopathy is seen.  There is thickening of the sigmoid colon.  There is peritoneal carcinomatosis.  Index implant left anterior abdomen 2 cm.  Left pelvic sidewall demonstrates peritoneal implants the largest 1.5 cm.  There is a 6 cm right pelvic cystic mass.  Bones demonstrate DJD.     Impression:     Diffuse peritoneal carcinomatosis.     Right adnexal mass which could be the primary neoplasm/ovarian cancer.     Sigmoid colon thickening which could be the primary neoplasm/colon cancer.     Index implant left anterior abdomen 2 cm.     Index left pelvic sidewall implant 1.5 cm.    54 minutes of total time spent on the encounter, which includes face to face time and non-face to face time preparing to see the patient (eg, review of tests), Obtaining and/or reviewing separately obtained history, Documenting clinical information in the electronic or other health record, Independently interpreting results (not separately reported) and communicating results to the patient/family/caregiver, or Care coordination (not separately reported).    0 minutes spent in discussing ACP    Signature: Kristen Blum DO

## 2024-05-10 ENCOUNTER — PATIENT MESSAGE (OUTPATIENT)
Dept: PALLIATIVE MEDICINE | Facility: CLINIC | Age: 74
End: 2024-05-10
Payer: MEDICARE

## 2024-05-10 DIAGNOSIS — C56.1 MALIGNANT NEOPLASM OF RIGHT OVARY: ICD-10-CM

## 2024-05-10 RX ORDER — HYDROCODONE BITARTRATE AND ACETAMINOPHEN 5; 325 MG/1; MG/1
1 TABLET ORAL EVERY 6 HOURS PRN
Qty: 28 TABLET | Refills: 0 | Status: SHIPPED | OUTPATIENT
Start: 2024-05-10 | End: 2024-05-15

## 2024-05-15 ENCOUNTER — OFFICE VISIT (OUTPATIENT)
Dept: PALLIATIVE MEDICINE | Facility: CLINIC | Age: 74
End: 2024-05-15
Payer: MEDICARE

## 2024-05-15 DIAGNOSIS — G89.3 CANCER RELATED PAIN: ICD-10-CM

## 2024-05-15 DIAGNOSIS — C56.1 MALIGNANT NEOPLASM OF RIGHT OVARY: ICD-10-CM

## 2024-05-15 DIAGNOSIS — Z51.5 PALLIATIVE CARE BY SPECIALIST: Primary | ICD-10-CM

## 2024-05-15 DIAGNOSIS — C78.6 PERITONEAL CARCINOMATOSIS: ICD-10-CM

## 2024-05-15 PROCEDURE — 99214 OFFICE O/P EST MOD 30 MIN: CPT | Mod: 95,,, | Performed by: STUDENT IN AN ORGANIZED HEALTH CARE EDUCATION/TRAINING PROGRAM

## 2024-05-15 RX ORDER — HYDROCODONE BITARTRATE AND ACETAMINOPHEN 7.5; 325 MG/1; MG/1
1 TABLET ORAL EVERY 4 HOURS PRN
Qty: 90 TABLET | Refills: 0 | Status: SHIPPED | OUTPATIENT
Start: 2024-05-15 | End: 2024-05-24 | Stop reason: SDUPTHER

## 2024-05-15 NOTE — PROGRESS NOTES
The patient location is: home    Visit type: audiovisual    Face to Face time with patient: 21  31 minutes of total time spent on the encounter, which includes face to face time and non-face to face time preparing to see the patient (eg, review of tests), Obtaining and/or reviewing separately obtained history, Documenting clinical information in the electronic or other health record, Independently interpreting results (not separately reported) and communicating results to the patient/family/caregiver, or Care coordination (not separately reported).         Each patient to whom he or she provides medical services by telemedicine is:  (1) informed of the relationship between the physician and patient and the respective role of any other health care provider with respect to management of the patient; and (2) notified that he or she may decline to receive medical services by telemedicine and may withdraw from such care at any time.    Notes:     Office Visit  [facility]      Reason for Consult: pain and symptom management      ASSESSMENT/PLAN:     Plan/Recommendations:  Diagnoses and all orders for this visit:    Palliative care by specialist    Malignant neoplasm of right ovary  -     HYDROcodone-acetaminophen (NORCO) 7.5-325 mg per tablet; Take 1 tablet by mouth every 4 (four) hours as needed for Pain.    Peritoneal carcinomatosis  -     HYDROcodone-acetaminophen (NORCO) 7.5-325 mg per tablet; Take 1 tablet by mouth every 4 (four) hours as needed for Pain.    Cancer related pain  -     HYDROcodone-acetaminophen (NORCO) 7.5-325 mg per tablet; Take 1 tablet by mouth every 4 (four) hours as needed for Pain.          # Stage III Ovarian cancer  # Palliative care by specialist  # Cancer related pain  Nociceptive and neuropathic in origin. Has needed increasing frequency of doses per day, now every 6 hours. Discussed the role of extended release medication in the near future. She would like to increase dose  "of hydrocodone at this time. Discussed maintaining APAP to less than 3g per day.   - Start hydrocodone-APAP 7.5-325mg q4h prn  - Opioid contract to be sent and signed electronically  - Start senna S one tab daily for OIC ppx  - Follow up with gyn/onc    Follow up: 2 weeks virtual    Patient's encounter and above plan of care discussed with patient's daughter, Eugenia.     SUBJECTIVE:     History of Present Illness:  Patient is a 73 y.o. year old female presenting with recent diagnosis of Stage IIIC ovarian cancer. She is a patient of Dr. Maliha Crawford. She presents via virtual visit for follow up.     5/15/24:  Pain has worsened. She has had excruciating abdominal pain. She could not sleep after the pain medication took effect. Then had sweats. She has been taking the Norco 5mg every 6 hours but it takes 45-60 minutes to take effect.     Has started taking two senna s to prevent constipation. Started this yesterday. She had a good BM today.     Current meds for nausea have been helping.     Very groggy days after chemotherapy.       Initial visit:    Introduced the role of palliative care including symptom management and advance care planning through the cancer journey.    She reports pain rated 10/10. When she takes a pain pill it drops down to a 4-5/10. She takes hydrocodone. Pain is located in her right side of the abdomen. Describes this pain as shooting and throbbing.     She reports daily bowel movements. Denies nausea, vomiting. She had her first infusion yesterday. She had no side effects.     Her daughter states that her mother hates pain medications. Family has noted grunting. Family has noticed movement would exacerbate pain. Would be hard to find a comfortable position to sit in. Patient wants to "take the edge off" and rest comfortably. Benadryl knocks her out.     The increased dose of hydrocodone at 7.5 helped better. She takes one dose in the morning and one at night. She sometimes take an additional " dose if she takes a long car ride.     She does not feel strong. She was in the recliner all day.       No past medical history on file.  No past surgical history on file.  Family History   Problem Relation Name Age of Onset    Cancer Brother      Breast cancer Neg Hx      Colon cancer Neg Hx      Ovarian cancer Neg Hx      Uterine cancer Neg Hx      Cervical cancer Neg Hx       Review of patient's allergies indicates:  No Known Allergies  Social Determinants of Health     Tobacco Use: Low Risk  (4/30/2024)    Patient History     Smoking Tobacco Use: Never     Smokeless Tobacco Use: Never     Passive Exposure: Not on file   Alcohol Use: Not At Risk (4/9/2024)    Received from [facility]    AUDIT-C     Frequency of Alcohol Consumption: Never     Average Number of Drinks: Patient does not drink     Frequency of Binge Drinking: Never   Financial Resource Strain: Not on file   Food Insecurity: Not on file   Transportation Needs: Not on file   Physical Activity: Not on file   Stress: Not on file   Housing Stability: Not on file   Depression: Low Risk  (4/30/2024)    Depression     Last PHQ-4: Flowsheet Data: 0   Utilities: Not on file   Health Literacy: Not on file   Social Isolation: Not on file          Medications:    Current Outpatient Medications:     amLODIPine (NORVASC) 5 MG tablet, Take 1 tablet by mouth every morning., Disp: , Rfl:     dexAMETHasone (DECADRON) 4 MG Tab, Take 8 mg (2 tablets) by mouth daily on days 2-4 of each chemotherapy cycle., Disp: 6 tablet, Rfl: 11    HYDROcodone-acetaminophen (NORCO) 7.5-325 mg per tablet, Take 1 tablet by mouth every 4 (four) hours as needed for Pain., Disp: 90 tablet, Rfl: 0    levothyroxine (SYNTHROID) 75 MCG tablet, Take 1 tablet by mouth every morning., Disp: , Rfl:     medroxyPROGESTERone (PROVERA) 10 MG tablet, Take 1 tablet by mouth every morning., Disp: , Rfl:     metFORMIN (GLUCOPHAGE)  1000 MG tablet, Take 1,000 mg by mouth., Disp: , Rfl:     metoprolol succinate (TOPROL-XL) 50 MG 24 hr tablet, Take 1 tablet by mouth every morning., Disp: , Rfl:     naproxen sodium (ANAPROX) 220 MG tablet, Take 220 mg by mouth 2 (two) times daily with meals. (Patient not taking: Reported on 4/26/2024), Disp: , Rfl:     OLANZapine (ZYPREXA) 5 MG tablet, Take 1 tablet by mouth nightly on days 1-4 of each chemotherapy cycle., Disp: 4 tablet, Rfl: 11    pravastatin (PRAVACHOL) 10 MG tablet, Take 1 tablet by mouth every evening., Disp: , Rfl:     prochlorperazine (COMPAZINE) 5 MG tablet, Take 1 tablet (5 mg total) by mouth every 6 (six) hours as needed for Nausea., Disp: 20 tablet, Rfl: 5    OBJECTIVE:       ROS:  Review of Systems   Constitutional:  Negative for appetite change and unexpected weight change.   HENT:  Negative for mouth sores.    Eyes:  Negative for visual disturbance.   Respiratory:  Negative for cough and shortness of breath.    Cardiovascular:  Negative for chest pain.   Gastrointestinal:  Negative for abdominal pain and diarrhea.   Genitourinary:  Negative for frequency.   Musculoskeletal:  Negative for back pain.   Skin:  Negative for rash.   Neurological:  Negative for headaches.   Hematological:  Negative for adenopathy.   Psychiatric/Behavioral:  The patient is not nervous/anxious.        Review of Symptoms      Symptom Assessment (ESAS 0-10 Scale)  Pain:  0  Dyspnea:  0  Anxiety:  0  Nausea:  0  Depression:  0  Anorexia:  0  Fatigue:  0  Insomnia:  0  Restlessness:  0  Agitation:  0       Anxiety:  Is not nervous/anxious    ECOG Performance Status ndGndrndanddndend:nd nd2nd Living Arrangements:  Lives with family    Psychosocial/Cultural:   See Palliative Psychosocial Note: Yes  **Primary  to Follow**  Palliative Care  Consult: Yes     Time-Based Charting:  Yes  Chart Review: 10 minutes  Symptom Assessment: 21 minutes    Total Time Spent: 31 minutes      Advance Care Planning    Advance Directives:     Decision Making:  Patient answered questions  Goals of Care: The patient endorses that what is most important right now is to focus on symptom/pain control    Accordingly, we have decided that the best plan to meet the patient's goals includes continuing with treatment              Physical Exam:  Vitals:    Physical Exam  Constitutional:       General: She is not in acute distress.     Appearance: Normal appearance. She is not ill-appearing or toxic-appearing.   Musculoskeletal:      Cervical back: Normal range of motion.   Skin:     Coloration: Skin is not jaundiced or pale.   Neurological:      Mental Status: She is alert and oriented to person, place, and time.   Psychiatric:         Mood and Affect: Mood normal.         Behavior: Behavior normal.         Thought Content: Thought content normal.         Judgment: Judgment normal.         Labs:  CBC:   WBC   Date Value Ref Range Status   04/26/2024 9.38 3.90 - 12.70 K/uL Final     Hemoglobin   Date Value Ref Range Status   04/26/2024 15.0 12.0 - 16.0 g/dL Final     Hematocrit   Date Value Ref Range Status   04/26/2024 43.2 37.0 - 48.5 % Final     MCV   Date Value Ref Range Status   04/26/2024 90 82 - 98 fL Final     Platelets   Date Value Ref Range Status   04/26/2024 406 150 - 450 K/uL Final       LFT:   Lab Results   Component Value Date    AST 16 04/26/2024    ALKPHOS 36 (L) 04/26/2024    BILITOT 0.3 04/26/2024       Albumin:   Albumin   Date Value Ref Range Status   04/26/2024 4.1 3.5 - 5.2 g/dL Final     Protein:   Total Protein   Date Value Ref Range Status   04/26/2024 7.6 6.0 - 8.4 g/dL Final       Radiology:None    31 minutes of total time spent on the encounter, which includes face to face time and non-face to face time preparing to see the patient (eg, review of tests), Obtaining and/or reviewing separately obtained history, Documenting clinical information in the electronic or other health record, Independently interpreting  results (not separately reported) and communicating results to the patient/family/caregiver, or Care coordination (not separately reported).    0 minutes spent in discussing ACP    Signature: Kristen Blum, DO

## 2024-05-21 ENCOUNTER — DOCUMENTATION ONLY (OUTPATIENT)
Dept: HEMATOLOGY/ONCOLOGY | Facility: CLINIC | Age: 74
End: 2024-05-21
Payer: MEDICARE

## 2024-05-21 ENCOUNTER — OFFICE VISIT (OUTPATIENT)
Dept: GYNECOLOGIC ONCOLOGY | Facility: CLINIC | Age: 74
End: 2024-05-21
Payer: MEDICARE

## 2024-05-21 ENCOUNTER — DOCUMENTATION ONLY (OUTPATIENT)
Dept: INFUSION THERAPY | Facility: HOSPITAL | Age: 74
End: 2024-05-21
Payer: MEDICARE

## 2024-05-21 ENCOUNTER — INFUSION (OUTPATIENT)
Dept: INFUSION THERAPY | Facility: HOSPITAL | Age: 74
End: 2024-05-21
Attending: OBSTETRICS & GYNECOLOGY
Payer: MEDICARE

## 2024-05-21 VITALS
HEART RATE: 101 BPM | BODY MASS INDEX: 22.43 KG/M2 | HEIGHT: 61 IN | RESPIRATION RATE: 16 BRPM | OXYGEN SATURATION: 98 % | SYSTOLIC BLOOD PRESSURE: 162 MMHG | TEMPERATURE: 97 F | WEIGHT: 118.81 LBS | DIASTOLIC BLOOD PRESSURE: 73 MMHG

## 2024-05-21 VITALS
SYSTOLIC BLOOD PRESSURE: 120 MMHG | HEART RATE: 88 BPM | HEIGHT: 61 IN | BODY MASS INDEX: 22.43 KG/M2 | OXYGEN SATURATION: 98 % | WEIGHT: 118.81 LBS | DIASTOLIC BLOOD PRESSURE: 79 MMHG | TEMPERATURE: 97 F | RESPIRATION RATE: 16 BRPM

## 2024-05-21 DIAGNOSIS — C78.6 PERITONEAL CARCINOMATOSIS: Primary | ICD-10-CM

## 2024-05-21 DIAGNOSIS — F32.89 OTHER DEPRESSION: ICD-10-CM

## 2024-05-21 DIAGNOSIS — C56.1 MALIGNANT NEOPLASM OF RIGHT OVARY: Primary | ICD-10-CM

## 2024-05-21 DIAGNOSIS — R53.83 FATIGUE, UNSPECIFIED TYPE: ICD-10-CM

## 2024-05-21 DIAGNOSIS — E11.9 TYPE 2 DIABETES MELLITUS WITHOUT COMPLICATION, WITHOUT LONG-TERM CURRENT USE OF INSULIN: ICD-10-CM

## 2024-05-21 DIAGNOSIS — Z01.818 EXAMINATION PRIOR TO CHEMOTHERAPY: ICD-10-CM

## 2024-05-21 PROCEDURE — 96415 CHEMO IV INFUSION ADDL HR: CPT | Mod: PN

## 2024-05-21 PROCEDURE — 96375 TX/PRO/DX INJ NEW DRUG ADDON: CPT | Mod: PN

## 2024-05-21 PROCEDURE — 96367 TX/PROPH/DG ADDL SEQ IV INF: CPT | Mod: PN

## 2024-05-21 PROCEDURE — 63600175 PHARM REV CODE 636 W HCPCS: Mod: JZ,JG,PN | Performed by: OBSTETRICS & GYNECOLOGY

## 2024-05-21 PROCEDURE — 25000003 PHARM REV CODE 250: Mod: PN | Performed by: OBSTETRICS & GYNECOLOGY

## 2024-05-21 PROCEDURE — 99214 OFFICE O/P EST MOD 30 MIN: CPT | Mod: PBBFAC,PN,25 | Performed by: OBSTETRICS & GYNECOLOGY

## 2024-05-21 PROCEDURE — 96417 CHEMO IV INFUS EACH ADDL SEQ: CPT | Mod: PN

## 2024-05-21 PROCEDURE — 99215 OFFICE O/P EST HI 40 MIN: CPT | Mod: S$PBB,,, | Performed by: OBSTETRICS & GYNECOLOGY

## 2024-05-21 PROCEDURE — 96413 CHEMO IV INFUSION 1 HR: CPT | Mod: PN

## 2024-05-21 PROCEDURE — 99999 PR PBB SHADOW E&M-EST. PATIENT-LVL IV: CPT | Mod: PBBFAC,,, | Performed by: OBSTETRICS & GYNECOLOGY

## 2024-05-21 RX ORDER — ONDANSETRON 4 MG/1
TABLET, ORALLY DISINTEGRATING ORAL
COMMUNITY
Start: 2024-04-29

## 2024-05-21 RX ORDER — ACETAMINOPHEN 500 MG
1000 TABLET ORAL
Status: COMPLETED | OUTPATIENT
Start: 2024-05-21 | End: 2024-05-21

## 2024-05-21 RX ORDER — EPINEPHRINE 0.3 MG/.3ML
0.3 INJECTION SUBCUTANEOUS ONCE AS NEEDED
Status: CANCELLED | OUTPATIENT
Start: 2024-05-21

## 2024-05-21 RX ORDER — LORAZEPAM 2 MG/ML
0.5 INJECTION INTRAMUSCULAR
Status: DISCONTINUED | OUTPATIENT
Start: 2024-05-21 | End: 2024-05-21 | Stop reason: HOSPADM

## 2024-05-21 RX ORDER — SODIUM CHLORIDE 0.9 % (FLUSH) 0.9 %
10 SYRINGE (ML) INJECTION
Status: CANCELLED | OUTPATIENT
Start: 2024-05-21

## 2024-05-21 RX ORDER — PROCHLORPERAZINE EDISYLATE 5 MG/ML
5 INJECTION INTRAMUSCULAR; INTRAVENOUS ONCE AS NEEDED
Status: CANCELLED | OUTPATIENT
Start: 2024-05-21

## 2024-05-21 RX ORDER — DIPHENHYDRAMINE HYDROCHLORIDE 50 MG/ML
50 INJECTION INTRAMUSCULAR; INTRAVENOUS ONCE AS NEEDED
Status: CANCELLED | OUTPATIENT
Start: 2024-05-21

## 2024-05-21 RX ORDER — HEPARIN 100 UNIT/ML
500 SYRINGE INTRAVENOUS
Status: CANCELLED | OUTPATIENT
Start: 2024-05-21

## 2024-05-21 RX ORDER — DIPHENHYDRAMINE HYDROCHLORIDE 50 MG/ML
50 INJECTION INTRAMUSCULAR; INTRAVENOUS ONCE AS NEEDED
Status: DISCONTINUED | OUTPATIENT
Start: 2024-05-21 | End: 2024-05-21 | Stop reason: HOSPADM

## 2024-05-21 RX ORDER — LORAZEPAM 2 MG/ML
0.5 INJECTION INTRAMUSCULAR
Status: CANCELLED
Start: 2024-05-21

## 2024-05-21 RX ORDER — ESCITALOPRAM OXALATE 10 MG/1
10 TABLET ORAL DAILY
Qty: 90 TABLET | Refills: 3 | Status: SHIPPED | OUTPATIENT
Start: 2024-05-21 | End: 2025-05-21

## 2024-05-21 RX ORDER — SODIUM CHLORIDE 0.9 % (FLUSH) 0.9 %
10 SYRINGE (ML) INJECTION
Status: DISCONTINUED | OUTPATIENT
Start: 2024-05-21 | End: 2024-05-21 | Stop reason: HOSPADM

## 2024-05-21 RX ORDER — FAMOTIDINE 10 MG/ML
20 INJECTION INTRAVENOUS
Status: COMPLETED | OUTPATIENT
Start: 2024-05-21 | End: 2024-05-21

## 2024-05-21 RX ORDER — FAMOTIDINE 10 MG/ML
20 INJECTION INTRAVENOUS
Status: CANCELLED | OUTPATIENT
Start: 2024-05-21

## 2024-05-21 RX ORDER — EPINEPHRINE 0.3 MG/.3ML
0.3 INJECTION SUBCUTANEOUS ONCE AS NEEDED
Status: DISCONTINUED | OUTPATIENT
Start: 2024-05-21 | End: 2024-05-21 | Stop reason: HOSPADM

## 2024-05-21 RX ORDER — ACETAMINOPHEN 500 MG
1000 TABLET ORAL
Status: CANCELLED
Start: 2024-05-21

## 2024-05-21 RX ADMIN — DEXAMETHASONE SODIUM PHOSPHATE 0.25 MG: 10 INJECTION, SOLUTION INTRAMUSCULAR; INTRAVENOUS at 10:05

## 2024-05-21 RX ADMIN — APREPITANT 130 MG: 130 INJECTION, EMULSION INTRAVENOUS at 09:05

## 2024-05-21 RX ADMIN — PACLITAXEL 210 MG: 6 INJECTION, SOLUTION INTRAVENOUS at 11:05

## 2024-05-21 RX ADMIN — DIPHENHYDRAMINE HYDROCHLORIDE 50 MG: 50 INJECTION, SOLUTION INTRAMUSCULAR; INTRAVENOUS at 10:05

## 2024-05-21 RX ADMIN — ACETAMINOPHEN 1000 MG: 500 TABLET, FILM COATED ORAL at 09:05

## 2024-05-21 RX ADMIN — SODIUM CHLORIDE: 9 INJECTION, SOLUTION INTRAVENOUS at 09:05

## 2024-05-21 RX ADMIN — LORAZEPAM 0.5 MG: 2 INJECTION INTRAMUSCULAR; INTRAVENOUS at 10:05

## 2024-05-21 RX ADMIN — FAMOTIDINE 20 MG: 10 INJECTION INTRAVENOUS at 09:05

## 2024-05-21 RX ADMIN — CARBOPLATIN 380 MG: 600 INJECTION, SOLUTION INTRAVENOUS at 02:05

## 2024-05-21 NOTE — PROGRESS NOTES
SW met with pt and daughter at chairside. SW provided a gas card today. Vijaya shared with SW stressful situation in today's doctors appointment. She voiced worries about what her father understands regarding her moms illness and prognosis. She also voiced worries about keeping her mom motivated through treatment. She said they are worried about her mom being depressed. SW asked if they have considered seeing the psychologist for counseling. She said the doctor placed an order today. SW reinforced this plan and encouraged them to voice concerns regarding pt and her .     SW attempted to speak with pt but she was very sleepy and was difficult to wake up.     Swathi Pedraza, CRISTHIANW

## 2024-05-21 NOTE — PROGRESS NOTES
SUBJECTIVE     Chief complaint:  Ovarian Cancer Pre-treatment Cycle #2 Carbo/Taxol     History of present Illness:  Bella Meyer is a 73 y.o.  female with recent diagnosis of Stage IIIC ovarian cancer presenting today for evaluation prior to cycle # 2 of Paclitaxel/Carboplatin. Oncology history below.    Doing fairly well. She reports ongoing abdominal and pelvic pain. Taking hydrocodone with acceptable relief per patient. Palliative managing. She also has persistent fatigue, some days worse than others. Ranges from Grade 2-3. She is having increased hot flushes as she stopped her estrogen. She also has intermittent depressive symptoms, no suicidal or homicidal ideations. Otherwise, denies fever, chills, chest pain, nausea, vomiting. Normal bladder function. Intermittent constipation which is her baseline.     Oncology History   Peritoneal carcinomatosis   2024 Initial Diagnosis    Peritoneal carcinomatosis     2024 -  Chemotherapy    Treatment Summary   Plan Name: OP GYN paclitaxel CARBOplatin (AUC 6) Q3W  Treatment Goal: Curative  Status: Active  Start Date: 2024  End Date: 2025 (Planned)  Provider: Maliha Crawford MD  Chemotherapy: CARBOplatin (PARAPLATIN) 410 mg in sodium chloride 0.9% 326 mL chemo infusion, 410 mg (100 % of original dose 411 mg), Intravenous, Clinic/HOD 1 time, 2 of 17 cycles  Dose modification:   (original dose 411 mg, Cycle 1),   (original dose 379.5 mg, Cycle 2)  Administration: 410 mg (2024)  PACLitaxeL (TAXOL) 175 mg/m2 = 276 mg in sodium chloride 0.9% 250 mL chemo infusion, 175 mg/m2 = 276 mg (100 % of original dose 175 mg/m2), Intravenous, Clinic/HOD 1 time, 2 of 17 cycles  Dose modification: 135 mg/m2 (original dose 175 mg/m2, Cycle 1, Reason: MD Discretion), 175 mg/m2 (original dose 175 mg/m2, Cycle 1)  Administration: 276 mg (2024)     Malignant neoplasm of ovary   2024 Initial Diagnosis    Malignant neoplasm of ovary  Referred by Dr. Evans  for evaluation of a pelvic mass, peritoneal carcinomatosis and elevated .     3/24/24 CT AP- Abnormal inflammatory changes in the lower pelvis associated with the distal sigmoid colon. Tubular or channel-like structure along the posterior margin of the lower descending colon possibly connecting between the sigmoid and rectal regions. Underlying neoplasm and/or fistula not excluded. Mildly enlarged left iliac bifurcation lymph nodes noted. No drainable abscess. There is also abnormal appearance of the descending colon and several small soft tissue nodules in the pericolonic tissues of the descending colon. Malignant neoplasm should be considered.     4/9/24 TVUS- Grossly unremarkable appearance of the uterus. The left ovary is not seen sonographically. Enlarged right ovary at 6.6 cm with several cysts the largest 3.4 cm with some thin septations and blood flow to the ovary. Trace fluid adjacent to the ovary. No gross ascitic fluid.     4/9/24 - 188    4/18/24 CT CAP- Diffuse peritoneal carcinomatosis. Right adnexal mass (6 cm) which could be the primary neoplasm/ovarian cancer. Sigmoid colon thickening which could be the primary neoplasm/colon cancer.   Index implant left anterior abdomen 2 cm.   Index left pelvic sidewall implant 1.5 cm.    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 Negative for fever.   HENT:  Negative.     Eyes: Negative.    Respiratory: Negative.  Negative for cough and shortness of breath.    Cardiovascular: Negative.  Negative for chest pain.   Gastrointestinal:  Positive for abdominal pain. Negative for abdominal distention, constipation, diarrhea and nausea.   Endocrine: Negative.    Genitourinary:  Positive for pelvic pain. Negative for difficulty urinating, vaginal bleeding and vaginal  "discharge.    Musculoskeletal: Negative.    Skin: Negative.    Neurological: Negative.    Hematological: Negative.  Negative for adenopathy.   Psychiatric/Behavioral: Negative.     All other systems reviewed and are negative.     Review of patient's allergies indicates:  No Known Allergies  Current Outpatient Medications   Medication Instructions    amLODIPine (NORVASC) 5 MG tablet 1 tablet, Oral, Every morning    dexAMETHasone (DECADRON) 4 MG Tab Take 8 mg (2 tablets) by mouth daily on days 2-4 of each chemotherapy cycle.    EScitalopram oxalate (LEXAPRO) 10 mg, Oral, Daily    HYDROcodone-acetaminophen (NORCO) 7.5-325 mg per tablet 1 tablet, Oral, Every 4 hours PRN    levothyroxine (SYNTHROID) 75 MCG tablet 1 tablet, Oral, Every morning    medroxyPROGESTERone (PROVERA) 10 MG tablet 1 tablet, Oral, Every morning    metFORMIN (GLUCOPHAGE) 1,000 mg, Oral    metoprolol succinate (TOPROL-XL) 50 MG 24 hr tablet 1 tablet, Oral, Every morning    naproxen sodium (ANAPROX) 220 mg, 2 times daily with meals    OLANZapine (ZYPREXA) 5 MG tablet Take 1 tablet by mouth nightly on days 1-4 of each chemotherapy cycle.    ondansetron (ZOFRAN-ODT) 4 MG TbDL DISSOLVE ONE TABLET BY MOUTH EVERY 4 TO 6 HOURS AS NEEDED FOR NAUSEA    pravastatin (PRAVACHOL) 10 MG tablet 1 tablet, Oral, Nightly    prochlorperazine (COMPAZINE) 5 mg, Oral, Every 6 hours PRN     OBJECTIVE     /79 (BP Location: Right arm, Patient Position: Sitting, BP Method: Medium (Automatic))   Pulse 88   Temp 97.4 °F (36.3 °C) (Temporal)   Resp 16   Ht 5' 1" (1.549 m)   Wt 53.9 kg (118 lb 13.3 oz)   SpO2 98%   BMI 22.45 kg/m²     Physical Exam  Constitutional:       General: She is awake. She is not in acute distress.     Appearance: Normal appearance. She is not ill-appearing.   HENT:      Mouth/Throat:      Mouth: Mucous membranes are moist.      Pharynx: Oropharynx is clear. No oropharyngeal exudate or posterior oropharyngeal erythema.   Cardiovascular:      " Rate and Rhythm: Normal rate.      Pulses: Normal pulses.   Pulmonary:      Effort: Pulmonary effort is normal.   Abdominal:      General: There is no distension.      Palpations: Abdomen is soft.      Tenderness: There is no abdominal tenderness.   Musculoskeletal:      Cervical back: Neck supple. No tenderness.   Lymphadenopathy:      Cervical: No cervical adenopathy.   Neurological:      General: No focal deficit present.      Mental Status: She is alert. Mental status is at baseline.   Skin:     General: Skin is warm and dry.   Psychiatric:         Mood and Affect: Mood normal.         Behavior: Behavior normal. Behavior is cooperative.   Vitals reviewed.       ASSESSMENT AND PLAN   Diagnoses and all orders for this visit:    Malignant neoplasm of right ovary    Examination prior to chemotherapy    Other depression  -     EScitalopram oxalate (LEXAPRO) 10 MG tablet; Take 1 tablet (10 mg total) by mouth once daily.  -     Ambulatory referral/consult to Hematology/Oncology/Psychology; Future    Fatigue, unspecified type    Type 2 diabetes mellitus without complication, without long-term current use of insulin    Labs reviewed. Pt examined. OK to treat, will dose reduce to 135mg/m2 and AUC5 given persistent fatigue.   CBC, CMP,  and pre-treatment visit every cycle.    Plan for CT after cycle #3 with evaluation for interval debulking.   Kesha Germline Genetic testing - pending   JERRY to be sent on pathologic specimen   Diabetes is uncontrolled at this time, blood glucose was 398 today on labs. She reports in 200s at home. I asked her to get in contact with PCP as she needs tighter glycemic control for consideration for future surgery.   Will start lexapro 10mg daily for depression related to diagnosis. Discussed risks/benefits. Consult heme/onc psych for ongoing management.   Return to clinic in 3 weeks prior to next cycle or sooner if needed.     Maliha Crawford MD  Gynecologic Oncology

## 2024-05-21 NOTE — PLAN OF CARE
Problem: Adult Inpatient Plan of Care  Goal: Plan of Care Review  Outcome: Progressing  Flowsheets (Taken 5/21/2024 1706)  Plan of Care Reviewed With:   patient   child   Pt tolerated Cycle 2 taxol carbo infusion well.  No adverse reaction noted.   IV flushed with NS and D/C per protocol.  Patient left clinic in no acute distress via wheelchair escorted by daughter.

## 2024-05-21 NOTE — PROGRESS NOTES
ONCOLOGY NUTRITION   FOLLOW UP VISIT        Bella Meyer is a 73 y.o. female.  DATE: 05/21/2024        Oncology Diagnosis: ovarian cancer     REFERRAL FROM:   [] Integrative Oncology   [] Med/Heme Oncology  [] Radiation Oncology  [] Surgical Oncology   [] Infusion Nurse    [x] Routine Nutrition follow up    TREATMENT PLAN:   [] Full treatment plan pending  [x] Chemotherapy  [] Immunotherapy  [] Radiation  [] Concurrent  [] Surgery  [] Treatment complete/post-treatment    ANTHROPOMETRICS:  Wt Readings from Last 10 Encounters:   05/21/24 53.9 kg (118 lb 13.3 oz)   05/21/24 53.9 kg (118 lb 13.3 oz)   04/30/24 55.8 kg (123 lb 0.3 oz)   04/30/24 55.8 kg (123 lb 0.3 oz)   04/26/24 55.2 kg (121 lb 11.1 oz)   04/22/24 53.5 kg (118 lb)   04/18/24 56.2 kg (124 lb)   04/17/24 56.4 kg (124 lb 5.4 oz)      Weight Changes: has decreased 3 pounds over last 3.5 weeks (Since initiation of tx)    PHYSICAL EXAM:  Muscle Wasting Observed:  [] No Deficit   [x] Mild Deficit   [] Moderate   [] Severe    INTAKE:  [x] PO Intake [] TF Intake  Current Diet: regular diet  Dietary Patterns:  Eating meals/snacks as tolerated  [x] Oral nutritional supplements: PRN - not sure which one    SYMPTOMS/COMPLAINTS:   [] No nutritional concerns at current  [] Diarrhea                    [] Constipation           [] Nausea                 [] Vomiting                [] Indigestion                [] Reflux              [] Poor Appetite            [] Anorexia                 [] Early Satiety         [] Gas                       [] Bloating                     [] Dry Mouth    [] Mucositis                   [] Mouth Sores           [] Poor Dentition      [] Difficulty chewing  [] Difficulty Swallowing   [] Pain with swallowing [] Change in taste      [] Change in smell   [] Pain (general)       [] Fatigue                      [] Sleep issues    [x] Weight loss  [x] other, please specify- High Blood Glucose    Nutrition Re-Assessment Risk: mild    [x] Labs  reviewed   [x] Meds reviewed    Education Provided:   [] No Education Needed at this time  [] Diarrhea                                              [] Constipation                          [] Nausea/Vomiting  [] Mucositis                [] Dry Mouth    [] Dealing with changes in Taste/Smell  [] Dealing with Poor Appetite   [] Soft/moist Diet      [] Weight Loss/Gain     [x] Weight Maintenance                           [] Indigestion/GERD                 [] Gas/Bloating          [] Foods High/ Low in specific nutrients [] Increasing Calories/Protein   [] Milkshake/Smoothies Recipes   [] Nutrition Supplements                        [] Increasing Fluid Intakes         [] Foods that fight cancer    [] Evidence bases resources                 [] Fermented Foods/Probiotics  [] Mediterranean/Plant Based Diet     [x] Other, specify: BG Management, Eating adequately                                    [x] Handouts provided: Tips to Increasing Calories and Protein and Carbohydrate Counting for People with Diabetes       [x] Samples provided: Glucerna, Ensure Max Protein    RD NOTE:  RD met with patient and daughter, Eugenia, at chairside during infusion tx. Pt states although she has lost a few pounds she actually feels she is eating better. Pt states this may be related to drinking more water and decrease of diet sodas. Pt is struggling with her BG, was almost 400 this morning.     RD Goals:   [x] Weight stable                  [] Weight gain                      [] Weight Loss                               [] Continue adequate Kcal/protein   [] Increase Kcal/protein      [] Adjust Tube-feeding Rx   [] Tolerate Tube Feedings             [] Increase tube feedings to goal     [] Tolerate Supplements     [x] Symptom Improvement   [x] Understand nutrition Education  [] Offer supportive visits   [] other, please specify    RECOMMENDATIONS:  Eat smaller, more frequent meals  Always have a protein when carbohydrates are  consumed  Choose complex carbohydrates over simple/white, refined sugar  Use ONS (Glucerna, Ensure Max Protein) to help consume adequate calories  Consume adequate fluid intake to maintain proper hydration    Follow up: next infusion or PRN throughout tx    Valerie Sebastian MS, RD, LDN  05/21/2024  3:13 PM

## 2024-05-21 NOTE — PLAN OF CARE
Problem: Adult Inpatient Plan of Care  Goal: Patient-Specific Goal (Individualized)  Outcome: Progressing  Flowsheets (Taken 5/21/2024 0941)  Individualized Care Needs: recliner,warm blanket, daughter chairside  Anxieties, Fears or Concerns: wants this infusion to go well     Problem: Fatigue  Goal: Improved Activity Tolerance  Outcome: Progressing  Intervention: Promote Improved Energy  Flowsheets (Taken 5/21/2024 0941)  Fatigue Management: frequent rest breaks encouraged  Sleep/Rest Enhancement: regular sleep/rest pattern promoted  Activity Management: Up in chair - L3  Environmental Support: rest periods encouraged

## 2024-05-27 LAB
FINAL PATHOLOGIC DIAGNOSIS: NORMAL
GROSS: NORMAL
Lab: NORMAL
SUPPLEMENTAL DIAGNOSIS: NORMAL

## 2024-05-29 ENCOUNTER — OFFICE VISIT (OUTPATIENT)
Dept: PALLIATIVE MEDICINE | Facility: CLINIC | Age: 74
End: 2024-05-29
Payer: MEDICARE

## 2024-05-29 DIAGNOSIS — G89.3 CANCER RELATED PAIN: ICD-10-CM

## 2024-05-29 DIAGNOSIS — Z51.5 PALLIATIVE CARE BY SPECIALIST: Primary | ICD-10-CM

## 2024-05-29 DIAGNOSIS — C56.1 MALIGNANT NEOPLASM OF RIGHT OVARY: ICD-10-CM

## 2024-05-29 PROCEDURE — 99215 OFFICE O/P EST HI 40 MIN: CPT | Mod: 95,,, | Performed by: STUDENT IN AN ORGANIZED HEALTH CARE EDUCATION/TRAINING PROGRAM

## 2024-05-29 RX ORDER — MORPHINE SULFATE 30 MG/1
30 TABLET, FILM COATED, EXTENDED RELEASE ORAL 2 TIMES DAILY
Qty: 28 TABLET | Refills: 0 | Status: SHIPPED | OUTPATIENT
Start: 2024-05-29 | End: 2024-05-29 | Stop reason: SDUPTHER

## 2024-05-29 NOTE — PROGRESS NOTES
The patient location is: home    Visit type: audiovisual    Face to Face time with patient: 20  40 minutes of total time spent on the encounter, which includes face to face time and non-face to face time preparing to see the patient (eg, review of tests), Obtaining and/or reviewing separately obtained history, Documenting clinical information in the electronic or other health record, Independently interpreting results (not separately reported) and communicating results to the patient/family/caregiver, or Care coordination (not separately reported).         Each patient to whom he or she provides medical services by telemedicine is:  (1) informed of the relationship between the physician and patient and the respective role of any other health care provider with respect to management of the patient; and (2) notified that he or she may decline to receive medical services by telemedicine and may withdraw from such care at any time.    Notes:     Office Visit  [facility]      Reason for Consult: pain and symptom management      ASSESSMENT/PLAN:     Plan/Recommendations:  Diagnoses and all orders for this visit:    Palliative care by specialist    Cancer related pain  -     morphine (MS CONTIN) 30 MG 12 hr tablet; Take 1 tablet (30 mg total) by mouth 2 (two) times daily. for 14 days    Malignant neoplasm of right ovary  -     morphine (MS CONTIN) 30 MG 12 hr tablet; Take 1 tablet (30 mg total) by mouth 2 (two) times daily. for 14 days        # Stage III Ovarian cancer  # Palliative care by specialist  # Cancer related pain  Nociceptive and neuropathic in origin. Improved pain control at higher dose of Norco however continued use at 4 hours around the clock. Discussed benefits of extended release medication and patient is agreeable. Will prescribe 2 weeks quantity and reassess. She is aware to continue Norco for breakthrough pain control.   - Continue hydrocodone-APAP 7.5-325mg q4h prn for breakthrough  pain  - Start MS Contin 30mg PO BID scheduled--counseled on use  - Opioid contract to be sent and signed electronically  - Continue senna S one tab daily for OIC ppx  - Follow up with gyn/onc    Follow up: 2 weeks virtual    Patient's encounter and above plan of care discussed with patient's daughter.    SUBJECTIVE:     History of Present Illness:  Patient is a 73 y.o. year old female presenting with recent diagnosis of Stage IIIC ovarian cancer. She is a patient of Dr. Maliha Crawford. She presents via virtual visit for follow up.     Overall pain is a little better. She feels stronger pain pill is lasting a bit longer and quicker onset. Still having pain though. She takes pain medication every 4-5 hours.     She plans to start PT. Her PCP is managing her care overall but her and her family are happy to keep all pain medications with palliative care. She is currently still in [place] with family.     She is having bowel movements every day. She is taking senna everyday.     5/15/24:  Pain has worsened. She has had excruciating abdominal pain. She could not sleep after the pain medication took effect. Then had sweats. She has been taking the Norco 5mg every 6 hours but it takes 45-60 minutes to take effect.     Has started taking two senna s to prevent constipation. Started this yesterday. She had a good BM today.     Current meds for nausea have been helping.     Very groggy days after chemotherapy.       Initial visit:    Introduced the role of palliative care including symptom management and advance care planning through the cancer journey.    She reports pain rated 10/10. When she takes a pain pill it drops down to a 4-5/10. She takes hydrocodone. Pain is located in her right side of the abdomen. Describes this pain as shooting and throbbing.     She reports daily bowel movements. Denies nausea, vomiting. She had her first infusion yesterday. She had no side effects.     Her daughter states that her mother hates  "pain medications. Family has noted grunting. Family has noticed movement would exacerbate pain. Would be hard to find a comfortable position to sit in. Patient wants to "take the edge off" and rest comfortably. Benadryl knocks her out.     The increased dose of hydrocodone at 7.5 helped better. She takes one dose in the morning and one at night. She sometimes take an additional dose if she takes a long car ride.     She does not feel strong. She was in the recliner all day.       No past medical history on file.  No past surgical history on file.  Family History   Problem Relation Name Age of Onset    Cancer Brother      Breast cancer Neg Hx      Colon cancer Neg Hx      Ovarian cancer Neg Hx      Uterine cancer Neg Hx      Cervical cancer Neg Hx       Review of patient's allergies indicates:  No Known Allergies  Social Determinants of Health     Tobacco Use: Low Risk  (5/23/2024)    Received from [facility] and Its Subsidiaries and Affiliates    Patient History     Smoking Tobacco Use: Never     Smokeless Tobacco Use: Never     Passive Exposure: Not on file   Alcohol Use: Not At Risk (4/9/2024)    Received from [facility] and Its Subsidiaries and Affiliates    AUDIT-C     Frequency of Alcohol Consumption: Never     Average Number of Drinks: Patient does not drink     Frequency of Binge Drinking: Never   Financial Resource Strain: Not on file   Food Insecurity: Not on file   Transportation Needs: Not on file   Physical Activity: Not on file   Stress: Not on file   Housing Stability: Not on file   Depression: Low Risk  (5/21/2024)    Depression     Last PHQ-4: Flowsheet Data: 0   Utilities: Not on file   Health Literacy: Not on file   Social Isolation: Not on file          Medications:    Current Outpatient Medications:     amLODIPine (NORVASC) 5 MG tablet, Take 1 tablet by mouth every morning., Disp: , Rfl:     dexAMETHasone (DECADRON) 4 " MG Tab, Take 8 mg (2 tablets) by mouth daily on days 2-4 of each chemotherapy cycle., Disp: 6 tablet, Rfl: 11    EScitalopram oxalate (LEXAPRO) 10 MG tablet, Take 1 tablet (10 mg total) by mouth once daily., Disp: 90 tablet, Rfl: 3    HYDROcodone-acetaminophen (NORCO) 7.5-325 mg per tablet, Take 1 tablet by mouth every 4 (four) hours as needed for Pain., Disp: 90 tablet, Rfl: 0    levothyroxine (SYNTHROID) 75 MCG tablet, Take 1 tablet by mouth every morning., Disp: , Rfl:     metFORMIN (GLUCOPHAGE) 1000 MG tablet, Take 1,000 mg by mouth., Disp: , Rfl:     metoprolol succinate (TOPROL-XL) 50 MG 24 hr tablet, Take 1 tablet by mouth every morning., Disp: , Rfl:     morphine (MS CONTIN) 30 MG 12 hr tablet, Take 1 tablet (30 mg total) by mouth 2 (two) times daily. for 14 days, Disp: 28 tablet, Rfl: 0    OLANZapine (ZYPREXA) 5 MG tablet, Take 1 tablet by mouth nightly on days 1-4 of each chemotherapy cycle., Disp: 4 tablet, Rfl: 11    ondansetron (ZOFRAN-ODT) 4 MG TbDL, DISSOLVE ONE TABLET BY MOUTH EVERY 4 TO 6 HOURS AS NEEDED FOR NAUSEA, Disp: , Rfl:     pravastatin (PRAVACHOL) 10 MG tablet, Take 1 tablet by mouth every evening., Disp: , Rfl:     prochlorperazine (COMPAZINE) 5 MG tablet, Take 1 tablet (5 mg total) by mouth every 6 (six) hours as needed for Nausea., Disp: 20 tablet, Rfl: 5    OBJECTIVE:       ROS:  Review of Systems   Constitutional:  Negative for appetite change and unexpected weight change.   HENT:  Negative for mouth sores.    Eyes:  Negative for visual disturbance.   Respiratory:  Negative for cough and shortness of breath.    Cardiovascular:  Negative for chest pain.   Gastrointestinal:  Negative for abdominal pain and diarrhea.   Genitourinary:  Negative for frequency.   Musculoskeletal:  Negative for back pain.   Skin:  Negative for rash.   Neurological:  Negative for headaches.   Hematological:  Negative for adenopathy.   Psychiatric/Behavioral:  The patient is not nervous/anxious.        Review  of Symptoms      Symptom Assessment (ESAS 0-10 Scale)  Pain:  0  Dyspnea:  0  Anxiety:  0  Nausea:  0  Depression:  0  Anorexia:  0  Fatigue:  0  Insomnia:  0  Restlessness:  0  Agitation:  0       Anxiety:  Is not nervous/anxious    ECOG Performance Status ndGndrndanddndend:nd nd2nd Living Arrangements:  Lives with family    Psychosocial/Cultural:   See Palliative Psychosocial Note: Yes  **Primary  to Follow**  Palliative Care  Consult: Yes     Time-Based Charting:  Yes  Chart Review: 20 minutes  Symptom Assessment: 20 minutes    Total Time Spent: 40 minutes      Advance Care Planning   Advance Directives:     Decision Making:  Patient answered questions  Goals of Care: The patient endorses that what is most important right now is to focus on symptom/pain control    Accordingly, we have decided that the best plan to meet the patient's goals includes continuing with treatment              Physical Exam:  Vitals:    Physical Exam  Constitutional:       General: She is not in acute distress.     Appearance: Normal appearance. She is not ill-appearing or toxic-appearing.   Musculoskeletal:      Cervical back: Normal range of motion.   Skin:     Coloration: Skin is not jaundiced or pale.   Neurological:      Mental Status: She is alert and oriented to person, place, and time.   Psychiatric:         Mood and Affect: Mood normal.         Behavior: Behavior normal.         Thought Content: Thought content normal.         Judgment: Judgment normal.         Labs:  CBC:   WBC   Date Value Ref Range Status   05/21/2024 9.64 3.90 - 12.70 K/uL Final     Hemoglobin   Date Value Ref Range Status   05/21/2024 14.4 12.0 - 16.0 g/dL Final     Hematocrit   Date Value Ref Range Status   05/21/2024 40.5 37.0 - 48.5 % Final     MCV   Date Value Ref Range Status   05/21/2024 88 82 - 98 fL Final     Platelets   Date Value Ref Range Status   05/21/2024 362 150 - 450 K/uL Final       LFT:   Lab Results   Component Value Date     AST 20 05/21/2024    ALKPHOS 46 (L) 05/21/2024    BILITOT 0.3 05/21/2024       Albumin:   Albumin   Date Value Ref Range Status   05/21/2024 3.8 3.5 - 5.2 g/dL Final     Protein:   Total Protein   Date Value Ref Range Status   05/21/2024 7.0 6.0 - 8.4 g/dL Final       Radiology:None    20 minutes of total time spent on the encounter, which includes face to face time and non-face to face time preparing to see the patient (eg, review of tests), Obtaining and/or reviewing separately obtained history, Documenting clinical information in the electronic or other health record, Independently interpreting results (not separately reported) and communicating results to the patient/family/caregiver, or Care coordination (not separately reported).    Signature: Kristen Blum DO

## 2024-05-31 ENCOUNTER — PATIENT MESSAGE (OUTPATIENT)
Dept: PALLIATIVE MEDICINE | Facility: CLINIC | Age: 74
End: 2024-05-31
Payer: MEDICARE

## 2024-06-11 ENCOUNTER — OFFICE VISIT (OUTPATIENT)
Dept: GYNECOLOGIC ONCOLOGY | Facility: CLINIC | Age: 74
End: 2024-06-11
Payer: MEDICARE

## 2024-06-11 ENCOUNTER — DOCUMENTATION ONLY (OUTPATIENT)
Dept: INFUSION THERAPY | Facility: HOSPITAL | Age: 74
End: 2024-06-11
Payer: MEDICARE

## 2024-06-11 ENCOUNTER — INFUSION (OUTPATIENT)
Dept: INFUSION THERAPY | Facility: HOSPITAL | Age: 74
End: 2024-06-11
Attending: OBSTETRICS & GYNECOLOGY
Payer: MEDICARE

## 2024-06-11 VITALS
RESPIRATION RATE: 16 BRPM | SYSTOLIC BLOOD PRESSURE: 156 MMHG | WEIGHT: 118.63 LBS | BODY MASS INDEX: 22.4 KG/M2 | HEART RATE: 72 BPM | DIASTOLIC BLOOD PRESSURE: 70 MMHG | HEIGHT: 61 IN | OXYGEN SATURATION: 98 % | TEMPERATURE: 97 F

## 2024-06-11 VITALS
BODY MASS INDEX: 22.4 KG/M2 | WEIGHT: 118.63 LBS | RESPIRATION RATE: 16 BRPM | TEMPERATURE: 97 F | HEART RATE: 87 BPM | OXYGEN SATURATION: 98 % | SYSTOLIC BLOOD PRESSURE: 141 MMHG | DIASTOLIC BLOOD PRESSURE: 81 MMHG | HEIGHT: 61 IN

## 2024-06-11 DIAGNOSIS — C78.6 PERITONEAL CARCINOMATOSIS: Primary | ICD-10-CM

## 2024-06-11 DIAGNOSIS — C56.1 MALIGNANT NEOPLASM OF RIGHT OVARY: Primary | ICD-10-CM

## 2024-06-11 DIAGNOSIS — Z01.818 EXAMINATION PRIOR TO CHEMOTHERAPY: ICD-10-CM

## 2024-06-11 PROCEDURE — 25000003 PHARM REV CODE 250: Mod: PN | Performed by: OBSTETRICS & GYNECOLOGY

## 2024-06-11 PROCEDURE — 96415 CHEMO IV INFUSION ADDL HR: CPT | Mod: PN

## 2024-06-11 PROCEDURE — 99215 OFFICE O/P EST HI 40 MIN: CPT | Mod: S$PBB,,, | Performed by: OBSTETRICS & GYNECOLOGY

## 2024-06-11 PROCEDURE — 63600175 PHARM REV CODE 636 W HCPCS: Mod: PN | Performed by: OBSTETRICS & GYNECOLOGY

## 2024-06-11 PROCEDURE — 99214 OFFICE O/P EST MOD 30 MIN: CPT | Mod: PBBFAC,PN | Performed by: OBSTETRICS & GYNECOLOGY

## 2024-06-11 PROCEDURE — 99999 PR PBB SHADOW E&M-EST. PATIENT-LVL IV: CPT | Mod: PBBFAC,,, | Performed by: OBSTETRICS & GYNECOLOGY

## 2024-06-11 PROCEDURE — 96413 CHEMO IV INFUSION 1 HR: CPT | Mod: PN

## 2024-06-11 PROCEDURE — 96367 TX/PROPH/DG ADDL SEQ IV INF: CPT | Mod: PN

## 2024-06-11 PROCEDURE — 96375 TX/PRO/DX INJ NEW DRUG ADDON: CPT | Mod: PN

## 2024-06-11 PROCEDURE — 96417 CHEMO IV INFUS EACH ADDL SEQ: CPT | Mod: PN

## 2024-06-11 RX ORDER — EPINEPHRINE 0.3 MG/.3ML
0.3 INJECTION SUBCUTANEOUS ONCE AS NEEDED
Status: DISCONTINUED | OUTPATIENT
Start: 2024-06-11 | End: 2024-06-11 | Stop reason: HOSPADM

## 2024-06-11 RX ORDER — PROCHLORPERAZINE EDISYLATE 5 MG/ML
5 INJECTION INTRAMUSCULAR; INTRAVENOUS ONCE AS NEEDED
Status: CANCELLED | OUTPATIENT
Start: 2024-06-11

## 2024-06-11 RX ORDER — LORAZEPAM 2 MG/ML
0.5 INJECTION INTRAMUSCULAR
Status: CANCELLED
Start: 2024-06-11

## 2024-06-11 RX ORDER — PROCHLORPERAZINE EDISYLATE 5 MG/ML
5 INJECTION INTRAMUSCULAR; INTRAVENOUS ONCE AS NEEDED
Status: DISCONTINUED | OUTPATIENT
Start: 2024-06-11 | End: 2024-06-11 | Stop reason: HOSPADM

## 2024-06-11 RX ORDER — SODIUM CHLORIDE 0.9 % (FLUSH) 0.9 %
10 SYRINGE (ML) INJECTION
Status: CANCELLED | OUTPATIENT
Start: 2024-06-11

## 2024-06-11 RX ORDER — DIPHENHYDRAMINE HYDROCHLORIDE 50 MG/ML
50 INJECTION INTRAMUSCULAR; INTRAVENOUS ONCE AS NEEDED
Status: DISCONTINUED | OUTPATIENT
Start: 2024-06-11 | End: 2024-06-11 | Stop reason: HOSPADM

## 2024-06-11 RX ORDER — FAMOTIDINE 10 MG/ML
20 INJECTION INTRAVENOUS
Status: CANCELLED | OUTPATIENT
Start: 2024-06-11

## 2024-06-11 RX ORDER — SENNOSIDES 8.6 MG/1
1 TABLET ORAL EVERY MORNING
COMMUNITY

## 2024-06-11 RX ORDER — HEPARIN 100 UNIT/ML
500 SYRINGE INTRAVENOUS
Status: CANCELLED | OUTPATIENT
Start: 2024-06-11

## 2024-06-11 RX ORDER — BLOOD-GLUCOSE SENSOR
EACH MISCELLANEOUS
COMMUNITY
Start: 2024-05-28

## 2024-06-11 RX ORDER — LORAZEPAM 2 MG/ML
0.5 INJECTION INTRAMUSCULAR
Status: DISCONTINUED | OUTPATIENT
Start: 2024-06-11 | End: 2024-06-11 | Stop reason: HOSPADM

## 2024-06-11 RX ORDER — ACETAMINOPHEN 500 MG
1000 TABLET ORAL
Status: CANCELLED
Start: 2024-06-11

## 2024-06-11 RX ORDER — EPINEPHRINE 0.3 MG/.3ML
0.3 INJECTION SUBCUTANEOUS ONCE AS NEEDED
Status: CANCELLED | OUTPATIENT
Start: 2024-06-11

## 2024-06-11 RX ORDER — ACETAMINOPHEN 500 MG
1000 TABLET ORAL
Status: COMPLETED | OUTPATIENT
Start: 2024-06-11 | End: 2024-06-11

## 2024-06-11 RX ORDER — HEPARIN 100 UNIT/ML
500 SYRINGE INTRAVENOUS
Status: DISCONTINUED | OUTPATIENT
Start: 2024-06-11 | End: 2024-06-11 | Stop reason: HOSPADM

## 2024-06-11 RX ORDER — SODIUM CHLORIDE 0.9 % (FLUSH) 0.9 %
10 SYRINGE (ML) INJECTION
Status: DISCONTINUED | OUTPATIENT
Start: 2024-06-11 | End: 2024-06-11 | Stop reason: HOSPADM

## 2024-06-11 RX ORDER — DIPHENHYDRAMINE HYDROCHLORIDE 50 MG/ML
50 INJECTION INTRAMUSCULAR; INTRAVENOUS ONCE AS NEEDED
Status: CANCELLED | OUTPATIENT
Start: 2024-06-11

## 2024-06-11 RX ORDER — FAMOTIDINE 10 MG/ML
20 INJECTION INTRAVENOUS
Status: COMPLETED | OUTPATIENT
Start: 2024-06-11 | End: 2024-06-11

## 2024-06-11 RX ADMIN — CARBOPLATIN 410 MG: 10 INJECTION, SOLUTION INTRAVENOUS at 02:06

## 2024-06-11 RX ADMIN — DIPHENHYDRAMINE HYDROCHLORIDE 50 MG: 50 INJECTION, SOLUTION INTRAMUSCULAR; INTRAVENOUS at 10:06

## 2024-06-11 RX ADMIN — SODIUM CHLORIDE: 9 INJECTION, SOLUTION INTRAVENOUS at 10:06

## 2024-06-11 RX ADMIN — PACLITAXEL 210 MG: 6 INJECTION, SOLUTION INTRAVENOUS at 12:06

## 2024-06-11 RX ADMIN — ACETAMINOPHEN 1000 MG: 500 TABLET, FILM COATED ORAL at 11:06

## 2024-06-11 RX ADMIN — APREPITANT 130 MG: 130 INJECTION, EMULSION INTRAVENOUS at 11:06

## 2024-06-11 RX ADMIN — DEXAMETHASONE SODIUM PHOSPHATE 0.25 MG: 10 INJECTION, SOLUTION INTRAMUSCULAR; INTRAVENOUS at 11:06

## 2024-06-11 RX ADMIN — FAMOTIDINE 20 MG: 10 INJECTION INTRAVENOUS at 11:06

## 2024-06-11 RX ADMIN — LORAZEPAM 0.5 MG: 2 INJECTION INTRAMUSCULAR; INTRAVENOUS at 11:06

## 2024-06-11 NOTE — PROGRESS NOTES
SW met with pt and  at chairside. Pt is alert and talkative. She reports she has been feeling much better since her pain medication was been changed. She said she is no longer experiencing pain during the day or night like she was. Her  also said she has been eating better. Pt said things are going well staying in her families home during treatment. She said they are taking good care of her.     SW did provide pt a gas card today.     LALY also met with pt's daughter who confirmed her mom has been doing better with change in pain meds, addition of Lexapro and a dexcom for her blood sugars. She did say the pt learned today may need a colon resection. Pt was disappointed but said they reminded her she is doing better overall and walked into the cancer center today for the first time since March. LALY provided support and encouraged the family to reach out if needs arise.     Swathi Pedraza, CRISTHIANW       Graft Donor Site Bandage (Optional-Leave Blank If You Don't Want In Note): Steri-strips and a pressure bandage were applied to the donor site.

## 2024-06-11 NOTE — PROGRESS NOTES
SUBJECTIVE     Chief complaint:  Ovarian Cancer Pre-treatment Carbo/Taxol     History of present Illness:  Bella Meyer is a 73 y.o.  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 Oncology history below.    Dose reduced cycle 2 to 135mg/m2 and AUC5 given persistent fatigue. Started on Lexapro at last visit. Has an appointment 24 with Onc/Psychology. Started on insulin by endocrinologist for optimization of glucose control in anticipation of future surgery.     Doing fairly well. She reports ongoing abdominal and pelvic pain. Taking hydrocodone with acceptable relief per patient. Palliative managing. She also has persistent fatigue, some days worse than others. Ranges from Grade 2-3. She is having increased hot flushes as she stopped her estrogen. She also has intermittent depressive symptoms, no suicidal or homicidal ideations. Otherwise, denies fever, chills, chest pain, nausea, vomiting. Normal bladder function. Intermittent constipation which is her baseline.     Oncology History   Peritoneal carcinomatosis   2024 Initial Diagnosis    Peritoneal carcinomatosis     2024 -  Chemotherapy    Treatment Summary   Plan Name: OP GYN paclitaxel CARBOplatin (AUC 6) Q3W  Treatment Goal: Curative  Status: Active  Start Date: 2024  End Date: 2025 (Planned)  Provider: Maliha Crawford MD  Chemotherapy: CARBOplatin (PARAPLATIN) 410 mg in sodium chloride 0.9% 326 mL chemo infusion, 410 mg (100 % of original dose 411 mg), Intravenous, Clinic/HOD 1 time, 3 of 17 cycles  Dose modification:   (original dose 411 mg, Cycle 1),   (original dose 379.5 mg, Cycle 2),   (original dose 411 mg, Cycle 3, Reason: Dose not tolerated)  Administration: 410 mg (2024), 380 mg (2024)  PACLitaxeL (TAXOL) 175 mg/m2 = 276 mg in sodium chloride 0.9% 250 mL chemo infusion, 175 mg/m2 =  276 mg (100 % of original dose 175 mg/m2), Intravenous, Clinic/HOD 1 time, 3 of 17 cycles  Dose modification: 135 mg/m2 (original dose 175 mg/m2, Cycle 1, Reason: MD Discretion), 175 mg/m2 (original dose 175 mg/m2, Cycle 1)  Administration: 276 mg (4/30/2024), 210 mg (5/21/2024)     Malignant neoplasm of ovary   4/23/2024 Initial Diagnosis    Malignant neoplasm of ovary  Referred by Dr. Evans for evaluation of a pelvic mass, peritoneal carcinomatosis and elevated .     3/24/24 CT AP- Abnormal inflammatory changes in the lower pelvis associated with the distal sigmoid colon. Tubular or channel-like structure along the posterior margin of the lower descending colon possibly connecting between the sigmoid and rectal regions. Underlying neoplasm and/or fistula not excluded. Mildly enlarged left iliac bifurcation lymph nodes noted. No drainable abscess. There is also abnormal appearance of the descending colon and several small soft tissue nodules in the pericolonic tissues of the descending colon. Malignant neoplasm should be considered.     4/9/24 TVUS- Grossly unremarkable appearance of the uterus. The left ovary is not seen sonographically. Enlarged right ovary at 6.6 cm with several cysts the largest 3.4 cm with some thin septations and blood flow to the ovary. Trace fluid adjacent to the ovary. No gross ascitic fluid.     4/9/24 - 188    4/18/24 CT CAP- Diffuse peritoneal carcinomatosis. Right adnexal mass (6 cm) which could be the primary neoplasm/ovarian cancer. Sigmoid colon thickening which could be the primary neoplasm/colon cancer.   Index implant left anterior abdomen 2 cm.   Index left pelvic sidewall implant 1.5 cm.    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 Negative for fever.   HENT:  Negative.     Eyes: Negative.    Respiratory: Negative.  Negative for cough and shortness of breath.    Cardiovascular: Negative.  Negative for chest pain.   Gastrointestinal:  Positive for abdominal pain. Negative for abdominal distention, constipation, diarrhea and nausea.   Endocrine: Negative.    Genitourinary:  Positive for pelvic pain. Negative for difficulty urinating, vaginal bleeding and vaginal discharge.    Musculoskeletal: Negative.    Skin: Negative.    Neurological: Negative.    Hematological: Negative.  Negative for adenopathy.   Psychiatric/Behavioral: Negative.     All other systems reviewed and are negative.     Review of patient's allergies indicates:  No Known Allergies  Current Outpatient Medications   Medication Instructions    amLODIPine (NORVASC) 5 MG tablet 1 tablet, Oral, Every morning    blood-glucose sensor (DEXCOM G7 SENSOR) Charito Use every 10 days    dexAMETHasone (DECADRON) 4 MG Tab Take 8 mg (2 tablets) by mouth daily on days 2-4 of each chemotherapy cycle.    EScitalopram oxalate (LEXAPRO) 10 mg, Oral, Daily    HYDROcodone-acetaminophen (NORCO) 7.5-325 mg per tablet 1 tablet, Oral, Every 4 hours PRN    levothyroxine (SYNTHROID) 75 MCG tablet 1 tablet, Oral, Every morning    metFORMIN (GLUCOPHAGE) 1,000 mg, Oral    metoprolol succinate (TOPROL-XL) 50 MG 24 hr tablet 1 tablet, Oral, Every morning    morphine (MS CONTIN) 30 mg, Oral, 2 times daily    OLANZapine (ZYPREXA) 5 MG tablet Take 1 tablet by mouth nightly on days 1-4 of each chemotherapy cycle.    ondansetron (ZOFRAN-ODT) 4 MG TbDL DISSOLVE ONE TABLET BY MOUTH EVERY 4 TO 6 HOURS AS NEEDED FOR NAUSEA    pravastatin (PRAVACHOL) 10 MG tablet 1 tablet, Oral, Nightly    prochlorperazine (COMPAZINE) 5 mg, Oral, Every  "6 hours PRN    senna (SENOKOT) 8.6 mg tablet 1 tablet, Oral, Every morning     OBJECTIVE     BP (!) 141/81 (BP Location: Right arm, Patient Position: Sitting, BP Method: Medium (Automatic))   Pulse 87   Temp 96.9 °F (36.1 °C) (Temporal)   Resp 16   Ht 5' 1" (1.549 m)   Wt 53.8 kg (118 lb 9.7 oz)   SpO2 98%   BMI 22.41 kg/m²     Physical Exam  Constitutional:       General: She is awake. She is not in acute distress.     Appearance: Normal appearance. She is not ill-appearing.     Genitourinary:    Vagina normal.   Right adnexum displays mass. Left adnexum displays mass.    Adnexa exam comments: Solid mass fills rectovaginal space. Minimal mobility. .   Cervix is normal. HENT:      Mouth/Throat:      Mouth: Mucous membranes are moist.      Pharynx: Oropharynx is clear. No oropharyngeal exudate or posterior oropharyngeal erythema.   Cardiovascular:      Rate and Rhythm: Normal rate.      Pulses: Normal pulses.   Pulmonary:      Effort: Pulmonary effort is normal.   Abdominal:      General: There is no distension.      Palpations: Abdomen is soft.      Tenderness: There is no abdominal tenderness.   Musculoskeletal:      Cervical back: Neck supple. No tenderness.   Lymphadenopathy:      Cervical: No cervical adenopathy.   Neurological:      General: No focal deficit present.      Mental Status: She is alert. Mental status is at baseline.   Skin:     General: Skin is warm and dry.   Psychiatric:         Mood and Affect: Mood normal.         Behavior: Behavior normal. Behavior is cooperative.   Vitals reviewed.       ASSESSMENT AND PLAN   1. Malignant neoplasm of right ovary  - CT Chest Abdomen Pelvis With IV Contrast (XPD) Routine Oral Contrast; Future    2. Examination prior to chemotherapy     Labs reviewed. Pt examined. OK to treat.  CBC, CMP,  and pre-treatment visit every cycle.    Plan for CT after this cycle with evaluation for interval debulking. Anticipate with exam from today, she will need LAR at " time of resection to reach NGR. Discussed with patient.   Return to clinic in 3 weeks prior to next cycle or sooner if needed     Maliha Crawford MD  Gynecologic Oncology

## 2024-06-11 NOTE — PLAN OF CARE
Pt tolerated taxol and carboplatin well today. Dignicap applied to scalp per protocol. Reviewed follow-up appointments. All questions were answered, pt adequate for d/c.

## 2024-06-11 NOTE — PROGRESS NOTES
Oncology Nutrition   Chemotherapy Infusion Visit    Nutrition Follow Up   RD met with patient at chairside during infusion tx. Pt states she is doing much better because they have figured out her pain medication. Pt denies any pain- is ambulating much better and eating more. Pt states she does have constipation with pain medicine but it is managed with Senokot. No further needs identified by RD today. Weight stable.    Wt Readings from Last 10 Encounters:   06/11/24 53.8 kg (118 lb 9.7 oz)   06/11/24 53.8 kg (118 lb 9.7 oz)   05/21/24 53.9 kg (118 lb 13.3 oz)   05/21/24 53.9 kg (118 lb 13.3 oz)   04/30/24 55.8 kg (123 lb 0.3 oz)   04/30/24 55.8 kg (123 lb 0.3 oz)   04/26/24 55.2 kg (121 lb 11.1 oz)   04/22/24 53.5 kg (118 lb)   04/18/24 56.2 kg (124 lb)   04/17/24 56.4 kg (124 lb 5.4 oz)       All other nutrition questions/concerns addressed as appropriate. Will continue to follow and monitor throughout treatment PRN.     Valerie Sebastian, MS, RD, LDN  06/11/2024  12:21 PM

## 2024-06-12 ENCOUNTER — OFFICE VISIT (OUTPATIENT)
Dept: PALLIATIVE MEDICINE | Facility: CLINIC | Age: 74
End: 2024-06-12
Payer: MEDICARE

## 2024-06-12 ENCOUNTER — TELEPHONE (OUTPATIENT)
Dept: GYNECOLOGIC ONCOLOGY | Facility: CLINIC | Age: 74
End: 2024-06-12
Payer: MEDICARE

## 2024-06-12 DIAGNOSIS — C78.6 PERITONEAL CARCINOMATOSIS: ICD-10-CM

## 2024-06-12 DIAGNOSIS — G89.3 CANCER RELATED PAIN: ICD-10-CM

## 2024-06-12 DIAGNOSIS — C56.1 MALIGNANT NEOPLASM OF RIGHT OVARY: ICD-10-CM

## 2024-06-12 DIAGNOSIS — Z51.5 PALLIATIVE CARE BY SPECIALIST: Primary | ICD-10-CM

## 2024-06-12 PROCEDURE — 99442 PR PHYSICIAN TELEPHONE EVALUATION 11-20 MIN: CPT | Mod: 95,,, | Performed by: STUDENT IN AN ORGANIZED HEALTH CARE EDUCATION/TRAINING PROGRAM

## 2024-06-12 RX ORDER — MORPHINE SULFATE 15 MG/1
15 TABLET, FILM COATED, EXTENDED RELEASE ORAL 2 TIMES DAILY
Qty: 60 TABLET | Refills: 0 | Status: SHIPPED | OUTPATIENT
Start: 2024-06-12 | End: 2024-07-12

## 2024-06-12 NOTE — TELEPHONE ENCOUNTER
11:00 AM: RE: 6/24 psych appt in [place] with Dr. Wilkinson. Per pts daughter, pt would prefer appt closer to home ([place]). Advised pts daughter to keep scheduled appt for now as we reach out [facility] facilities closer to [place] for additional options. Pts daughter verbalizes understanding.

## 2024-06-12 NOTE — PROGRESS NOTES
The patient location is: home    Visit type: audio      20 minutes of total time spent on the encounter, which includes face to face time and non-face to face time preparing to see the patient (eg, review of tests), Obtaining and/or reviewing separately obtained history, Documenting clinical information in the electronic or other health record, Independently interpreting results (not separately reported) and communicating results to the patient/family/caregiver, or Care coordination (not separately reported).         Each patient to whom he or she provides medical services by telemedicine is:  (1) informed of the relationship between the physician and patient and the respective role of any other health care provider with respect to management of the patient; and (2) notified that he or she may decline to receive medical services by telemedicine and may withdraw from such care at any time.    Notes:     Office Visit  [facility]      Reason for Consult: pain and symptom management      ASSESSMENT/PLAN:     Plan/Recommendations:  Diagnoses and all orders for this visit:    Palliative care by specialist    Cancer related pain  -     morphine (MS CONTIN) 15 MG 12 hr tablet; Take 1 tablet (15 mg total) by mouth 2 (two) times daily.    Malignant neoplasm of right ovary  -     morphine (MS CONTIN) 15 MG 12 hr tablet; Take 1 tablet (15 mg total) by mouth 2 (two) times daily.    Peritoneal carcinomatosis          # Stage III Ovarian cancer  # Palliative care by specialist  # Cancer related pain  Nociceptive and neuropathic in origin. Significant pain control with MS Contin 15mg twice daily. 30mg twice daily was too intense, it made her not feel well at all. She is active and denies any pain with 15mg dose. No breakthrough pain med requirements.   - Continue hydrocodone-APAP 7.5-325mg q4h prn for breakthrough pain  - Continue MS Contin 15mg PO BID scheduled (refilled today)  - Opioid contract to be sent and  signed electronically  - Continue senna S one tab daily for OIC ppx  - Follow up with gyn/onc    Follow up: 4 weeks virtual      SUBJECTIVE:     History of Present Illness:  Patient is a 73 y.o. year old female presenting with recent diagnosis of Stage IIIC ovarian cancer. She is a patient of Dr. Maliha Crawford. She presents via virtual visit for follow up. She is currently on paclitaxel/carboplatin.    6/12/24: She states she is doing really well. She had her treatment yesterday and tolerated it well. No pain at all after being on the half tablet of the morphine. She denies any breakthrough at all. She has been able to resume her housework. She feels almost back to normal. She gets tired. Takes a nap once a day. She is having daily bowel movements. She takes senna daily.    5/29/24:Overall pain is a little better. She feels stronger pain pill is lasting a bit longer and quicker onset. Still having pain though. She takes pain medication every 4-5 hours.     She plans to start PT. Her PCP is managing her care overall but her and her family are happy to keep all pain medications with palliative care. She is currently still in [place] with family.     She is having bowel movements every day. She is taking senna everyday.     5/15/24:  Pain has worsened. She has had excruciating abdominal pain. She could not sleep after the pain medication took effect. Then had sweats. She has been taking the Norco 5mg every 6 hours but it takes 45-60 minutes to take effect.     Has started taking two senna s to prevent constipation. Started this yesterday. She had a good BM today.     Current meds for nausea have been helping.     Very groggy days after chemotherapy.       Initial visit:    Introduced the role of palliative care including symptom management and advance care planning through the cancer journey.    She reports pain rated 10/10. When she takes a pain pill it drops down to a 4-5/10. She takes hydrocodone. Pain is located  "in her right side of the abdomen. Describes this pain as shooting and throbbing.     She reports daily bowel movements. Denies nausea, vomiting. She had her first infusion yesterday. She had no side effects.     Her daughter states that her mother hates pain medications. Family has noted grunting. Family has noticed movement would exacerbate pain. Would be hard to find a comfortable position to sit in. Patient wants to "take the edge off" and rest comfortably. Benadryl knocks her out.     The increased dose of hydrocodone at 7.5 helped better. She takes one dose in the morning and one at night. She sometimes take an additional dose if she takes a long car ride.     She does not feel strong. She was in the recliner all day.       No past medical history on file.  No past surgical history on file.  Family History   Problem Relation Name Age of Onset    Cancer Brother      Breast cancer Neg Hx      Colon cancer Neg Hx      Ovarian cancer Neg Hx      Uterine cancer Neg Hx      Cervical cancer Neg Hx       Review of patient's allergies indicates:  No Known Allergies  Social Determinants of Health     Tobacco Use: Low Risk  (6/11/2024)    Patient History     Smoking Tobacco Use: Never     Smokeless Tobacco Use: Never     Passive Exposure: Not on file   Alcohol Use: Not At Risk (4/9/2024)    [facility]    AUDIT-C     Frequency of Alcohol Consumption: Never     Average Number of Drinks: Patient does not drink     Frequency of Binge Drinking: Never   Financial Resource Strain: Not on file   Food Insecurity: Not on file   Transportation Needs: Not on file   Physical Activity: Not on file   Stress: Not on file   Housing Stability: Not on file   Depression: Low Risk  (6/11/2024)    Depression     Last PHQ-4: Flowsheet Data: 0   Utilities: Not on file   Health Literacy: Not on file   Social Isolation: Not on file    "       Medications:    Current Outpatient Medications:     amLODIPine (NORVASC) 5 MG tablet, Take 1 tablet by mouth every morning., Disp: , Rfl:     blood-glucose sensor (DEXCOM G7 SENSOR) Charito, Use every 10 days, Disp: , Rfl:     dexAMETHasone (DECADRON) 4 MG Tab, Take 8 mg (2 tablets) by mouth daily on days 2-4 of each chemotherapy cycle., Disp: 6 tablet, Rfl: 11    EScitalopram oxalate (LEXAPRO) 10 MG tablet, Take 1 tablet (10 mg total) by mouth once daily., Disp: 90 tablet, Rfl: 3    HYDROcodone-acetaminophen (NORCO) 7.5-325 mg per tablet, Take 1 tablet by mouth every 4 (four) hours as needed for Pain. (Patient not taking: Reported on 6/11/2024), Disp: 90 tablet, Rfl: 0    levothyroxine (SYNTHROID) 75 MCG tablet, Take 1 tablet by mouth every morning., Disp: , Rfl:     metFORMIN (GLUCOPHAGE) 1000 MG tablet, Take 1,000 mg by mouth., Disp: , Rfl:     metoprolol succinate (TOPROL-XL) 50 MG 24 hr tablet, Take 1 tablet by mouth every morning., Disp: , Rfl:     morphine (MS CONTIN) 15 MG 12 hr tablet, Take 1 tablet (15 mg total) by mouth 2 (two) times daily., Disp: 60 tablet, Rfl: 0    OLANZapine (ZYPREXA) 5 MG tablet, Take 1 tablet by mouth nightly on days 1-4 of each chemotherapy cycle., Disp: 4 tablet, Rfl: 11    ondansetron (ZOFRAN-ODT) 4 MG TbDL, DISSOLVE ONE TABLET BY MOUTH EVERY 4 TO 6 HOURS AS NEEDED FOR NAUSEA, Disp: , Rfl:     pravastatin (PRAVACHOL) 10 MG tablet, Take 1 tablet by mouth every evening., Disp: , Rfl:     prochlorperazine (COMPAZINE) 5 MG tablet, Take 1 tablet (5 mg total) by mouth every 6 (six) hours as needed for Nausea., Disp: 20 tablet, Rfl: 5    senna (SENOKOT) 8.6 mg tablet, Take 1 tablet by mouth every morning., Disp: , Rfl:   No current facility-administered medications for this visit.    OBJECTIVE:       ROS:  Review of Systems   Constitutional:  Negative for appetite change and unexpected weight change.   HENT:  Negative for mouth sores.    Eyes:  Negative for visual disturbance.    Respiratory:  Negative for cough and shortness of breath.    Cardiovascular:  Negative for chest pain.   Gastrointestinal:  Negative for abdominal pain and diarrhea.   Genitourinary:  Negative for frequency.   Musculoskeletal:  Negative for back pain.   Skin:  Negative for rash.   Neurological:  Negative for headaches.   Hematological:  Negative for adenopathy.   Psychiatric/Behavioral:  The patient is not nervous/anxious.        Review of Symptoms      Symptom Assessment (ESAS 0-10 Scale)  Pain:  0  Dyspnea:  0  Anxiety:  0  Nausea:  0  Depression:  0  Anorexia:  0  Fatigue:  0  Insomnia:  0  Restlessness:  0  Agitation:  0       Anxiety:  Is not nervous/anxious    ECOG Performance Status ndGndrndanddndend:nd nd2nd Living Arrangements:  Lives with family    Psychosocial/Cultural:   See Palliative Psychosocial Note: Yes  **Primary  to Follow**  Palliative Care  Consult: Yes     Time-Based Charting:  Yes  Chart Review: 10 minutes  Face to Face: 10 minutes    Total Time Spent: 20 minutes      Advance Care Planning   Advance Directives:     Decision Making:  Patient answered questions  Goals of Care: The patient endorses that what is most important right now is to focus on symptom/pain control    Accordingly, we have decided that the best plan to meet the patient's goals includes continuing with treatment              Physical Exam:  Vitals:    Physical Exam  Constitutional:       General: She is not in acute distress.     Appearance: Normal appearance. She is not ill-appearing or toxic-appearing.   Musculoskeletal:      Cervical back: Normal range of motion.   Skin:     Coloration: Skin is not jaundiced or pale.   Neurological:      Mental Status: She is alert and oriented to person, place, and time.   Psychiatric:         Mood and Affect: Mood normal.         Behavior: Behavior normal.         Thought Content: Thought content normal.         Judgment: Judgment normal.         Labs:  CBC:   WBC   Date  Value Ref Range Status   06/11/2024 7.47 3.90 - 12.70 K/uL Final     Hemoglobin   Date Value Ref Range Status   06/11/2024 13.7 12.0 - 16.0 g/dL Final     Hematocrit   Date Value Ref Range Status   06/11/2024 38.5 37.0 - 48.5 % Final     MCV   Date Value Ref Range Status   06/11/2024 87 82 - 98 fL Final     Platelets   Date Value Ref Range Status   06/11/2024 252 150 - 450 K/uL Final       LFT:   Lab Results   Component Value Date    AST 21 06/11/2024    ALKPHOS 33 (L) 06/11/2024    BILITOT 0.4 06/11/2024       Albumin:   Albumin   Date Value Ref Range Status   06/11/2024 3.7 3.5 - 5.2 g/dL Final     Protein:   Total Protein   Date Value Ref Range Status   06/11/2024 6.9 6.0 - 8.4 g/dL Final       Radiology:None    20 minutes of total time spent on the encounter, which includes face to face time and non-face to face time preparing to see the patient (eg, review of tests), Obtaining and/or reviewing separately obtained history, Documenting clinical information in the electronic or other health record, Independently interpreting results (not separately reported) and communicating results to the patient/family/caregiver, or Care coordination (not separately reported).    Signature: Kristen Blum DO

## 2024-06-13 ENCOUNTER — TELEPHONE (OUTPATIENT)
Dept: SURGERY | Facility: CLINIC | Age: 74
End: 2024-06-13
Payer: MEDICARE

## 2024-06-13 NOTE — TELEPHONE ENCOUNTER
Spoke with pt regarding VV with provider to discuss surgery options and plan for a pre-op colonoscopy. Offered to complete visit on 7/8 at 3:40 PM. Advised to log on to portal at least 5 minutes ahead of time to consent to virtual visit. Pt verbalized understanding to all. Denies further questions at this time.

## 2024-06-14 ENCOUNTER — TELEPHONE (OUTPATIENT)
Dept: GYNECOLOGIC ONCOLOGY | Facility: CLINIC | Age: 74
End: 2024-06-14
Payer: MEDICARE

## 2024-06-14 NOTE — TELEPHONE ENCOUNTER
----- Message from Maliha Crawford MD sent at 6/14/2024 10:32 AM CDT -----  Regarding: RE: Lexapro  Fine with PCP - thanks. Maliha  ----- Message -----  From: Renetta Pool RN  Sent: 6/14/2024   9:14 AM CDT  To: Maliha Crawford MD  Subject: Lexapro                                          PCP is willing to prescribe lexapro; is this appropriate or would you prefer pt keeps psych appt with Dr. Wilkinson on 6/24? Thanks for your input!  ----- Message -----  From: Chemo Shukla  Sent: 6/13/2024   4:18 PM CDT  To: Ty Jett Staff    Name of Who is Calling:TRE JEAN-BAPTISTE [[id number]]                   What is the request in detail:PT wants a call back from the nurse please assist                   Can the clinic reply by [facility]: No                   What Number to Call Back [facility]:[phone]

## 2024-06-14 NOTE — TELEPHONE ENCOUNTER
8:59 AM: Per daughter, pts PCP is willing to prescribe leaxpro. Inquiring if Dr. Crawford still wants pt to see Dr. Wilkinson (psych) or if Dr. Crawford is ok with PCP prescibing lexapro. RN will reach out to Dr. Crawford for further directive. Daughter verbalizes understanding.      ----- Message from Renetta Pool RN sent at 6/13/2024  4:21 PM CDT -----    ----- Message -----  From: Chemo Shukla  Sent: 6/13/2024   4:18 PM CDT  To: Ty Jett Staff    Name of Who is Calling:TRE JEAN-BAPTISTE [[id number]]                   What is the request in detail:PT wants a call back from the nurse please assist                   Can the clinic reply by [facility]: No                   What Number [facility]:[phone]

## 2024-06-19 ENCOUNTER — TELEPHONE (OUTPATIENT)
Dept: PSYCHIATRY | Facility: CLINIC | Age: 74
End: 2024-06-19
Payer: MEDICARE

## 2024-06-19 NOTE — TELEPHONE ENCOUNTER
Spoke to patient, she said she did a virtual visit with a provider closer to home, so she wouldn't have to drive 3 hours for this. So please cancel out referral. ----- Message from Emanuel Cole MA sent at 6/19/2024 11:12 AM CDT -----    ----- Message -----  From: Damaris Alfaro  Sent: 6/19/2024  11:10 AM CDT  To: [facility] Psych[facility] Clinical Support Staff      ----- Message -----  From: Pia Clancy, Patient Care Assistant  Sent: 6/19/2024  11:05 AM CDT  To: Jaja Duran Staff    Type: Needs Medical Advice  Who Called:  dilan  Dav Call Back Number: [phone]    Additional Information: dilan states she needs to cancel her 6/24 and don't need service no more ,please call to further discuss ,thank you .

## 2024-06-21 ENCOUNTER — TELEPHONE (OUTPATIENT)
Dept: GYNECOLOGIC ONCOLOGY | Facility: HOSPITAL | Age: 74
End: 2024-06-21
Payer: MEDICARE

## 2024-06-21 ENCOUNTER — PATIENT MESSAGE (OUTPATIENT)
Dept: GYNECOLOGIC ONCOLOGY | Facility: CLINIC | Age: 74
End: 2024-06-21
Payer: MEDICARE

## 2024-06-21 DIAGNOSIS — I26.99 ACUTE PULMONARY EMBOLISM, UNSPECIFIED PULMONARY EMBOLISM TYPE, UNSPECIFIED WHETHER ACUTE COR PULMONALE PRESENT: Primary | ICD-10-CM

## 2024-06-21 RX ORDER — APIXABAN 5 MG (74)
KIT ORAL
Qty: 75 TABLET | Refills: 0 | Status: SHIPPED | OUTPATIENT
Start: 2024-06-21

## 2024-06-22 NOTE — TELEPHONE ENCOUNTER
S/w pt re: imaging results c/w PE. Eliquis starter pack and additional supply sent to pt's preferred pharmacy. Denies CP, SOB, palpitations, difficulty breathing, LE pain, HA, vision changes. All questions answered at this time.

## 2024-06-25 ENCOUNTER — TELEPHONE (OUTPATIENT)
Dept: GYNECOLOGIC ONCOLOGY | Facility: OTHER | Age: 74
End: 2024-06-25
Payer: MEDICARE

## 2024-06-25 DIAGNOSIS — I26.99 PULMONARY EMBOLISM, UNSPECIFIED CHRONICITY, UNSPECIFIED PULMONARY EMBOLISM TYPE, UNSPECIFIED WHETHER ACUTE COR PULMONALE PRESENT: Primary | ICD-10-CM

## 2024-06-25 NOTE — TELEPHONE ENCOUNTER
Called to check in on pt and discuss CT.  CT shows RI to chemotherapy  Also showed new PE. Started Eliquis. Will consult heme. Plan to continue neoadjuvant chemo at this time and delay interval surgery given new PE and need for uninterrupted anticoagulation x 3 months. Will plan interval surgery after cycle #6.

## 2024-06-26 ENCOUNTER — PATIENT MESSAGE (OUTPATIENT)
Dept: HEMATOLOGY/ONCOLOGY | Facility: CLINIC | Age: 74
End: 2024-06-26
Payer: MEDICARE

## 2024-06-26 ENCOUNTER — TELEPHONE (OUTPATIENT)
Dept: HEMATOLOGY/ONCOLOGY | Facility: CLINIC | Age: 74
End: 2024-06-26
Payer: MEDICARE

## 2024-06-26 NOTE — TELEPHONE ENCOUNTER
RAFAELOVSAVANAH for pt that we were returning her call. Left contact information to call us back  ----- Message from Jeana Franco sent at 6/26/2024  9:46 AM CDT -----  Type:  Needs Medical Advice    Who Called:  Bella Lawrenceon    Would the patient rather a call back or a response via [facility]?    Best Call Back Number:   [phone]  Additional Information:  Pt has a virtual appt on 7/1/24 at 9:00 am and would like for the Dr to call the number noted above.

## 2024-07-01 ENCOUNTER — OFFICE VISIT (OUTPATIENT)
Dept: HEMATOLOGY/ONCOLOGY | Facility: CLINIC | Age: 74
End: 2024-07-01

## 2024-07-01 DIAGNOSIS — R53.1 GENERALIZED WEAKNESS: ICD-10-CM

## 2024-07-01 DIAGNOSIS — R53.83 FATIGUE, UNSPECIFIED TYPE: ICD-10-CM

## 2024-07-01 DIAGNOSIS — C56.1 MALIGNANT NEOPLASM OF RIGHT OVARY: ICD-10-CM

## 2024-07-01 DIAGNOSIS — R63.0 DECREASED APPETITE: Primary | ICD-10-CM

## 2024-07-01 PROCEDURE — 99214 OFFICE O/P EST MOD 30 MIN: CPT | Mod: 95,,, | Performed by: NURSE PRACTITIONER

## 2024-07-01 NOTE — PROGRESS NOTES
The patient location is: home  The chief complaint leading to consultation is: fatigue, decreased appetite    Visit type: audiovisual    30 minutes of total time spent on the encounter, which includes face to face time and non-face to face time preparing to see the patient (eg, review of tests), Obtaining and/or reviewing separately obtained history, Documenting clinical information in the electronic or other health record, Independently interpreting results (not separately reported) and communicating results to the patient/family/caregiver, or Care coordination (not separately reported).     Each patient to whom he or she provides medical services by telemedicine is:  (1) informed of the relationship between the physician and patient and the respective role of any other health care provider with respect to management of the patient; and (2) notified that he or she may decline to receive medical services by telemedicine and may withdraw from such care at any time.    Notes:   Bella Meyer  73 y.o. is here to seek an integrative approach to discuss side effects related to ovarian cancer treatment.    HPI  Mrs. Meyer is here today getting chemo. She reports she started having pain to her side so she went to the ED. It was originally thought she was constipated and a colonoscopy was but could not be completed. She was given antibiotics for diverticulitis. She went back to the ED again for pain and had more imagining and an area on the ovary was noted. She then went to the GYN in [place] and then was sent to Dr. Hitchcock. She reports she stays with abdominal pain all of the time but the pain medication makes it tolerable. She sleeps well and does have occasional fatigue. She was in so much pain that she was not eating and she became weak. Her  was having to help lift her up to get around.  She is now eating more and is feeling better and she is able to walk and get around. She reports over the last week she has  "gained weight. She is eating protein bars and focusing on healthy diet. She was very active before the abdominal pain began. She currently reports low activity level. She reports she initially had anxiety, but now reports low stress and anxiety. She states, "when I met Dr. Hitchcock I felt so comfortable and my anxiety went away."  She has been  52 years and they have 2 daughters. They live in [place], but are staying with family. She has a good support system in her family and friends.     Today's Visit  Mrs. Meyer reports she continues to feel tired. She thinks it might be the pain medication. The medication is working and she has not pain, but she is always tired. She is sleeping well at night.  She reports she is eating, but does not eat a lot. She states, "I do not want to eat and I am not hungry." She reports the food taste the good, she just doesn't want to eat.  She thinks she is losing weight.  She is making herself get up and move and she is doing daily housework, no exercise.      Pillars Assessment    Sleep  How many hours of sleep per night? 10-11 hours  Do you have trouble falling asleep, staying asleep or waking up earlier than you need to? no  Do you have daytime fatigue? yes  Do you need medication for sleep? no  Do you use any supplements or other interventions for sleep? no    Resilience  Rate your current level of stress- low    Nutrition   Food allergies or sensitivities: no  Do you adhere to a particular type of diet? no  Do you have any concerns with your eating habits? yes    Exercise  How would you describe your physical activity level? low    Past Medical History  No past medical history on file.   Past Surgical History   No past surgical history on file.   Family History   Family History   Problem Relation Name Age of Onset    Cancer Brother      Breast cancer Neg Hx      Colon cancer Neg Hx      Ovarian cancer Neg Hx      Uterine cancer Neg Hx      Cervical cancer Neg Hx      "   Allergies  Review of patient's allergies indicates:  No Known Allergies   Current Medications:    Current Outpatient Medications:     amLODIPine (NORVASC) 5 MG tablet, Take 1 tablet by mouth every morning., Disp: , Rfl:     apixaban (ELIQUIS DVT-PE TREAT 30D START) 5 mg (74 tabs) DsPk, For the first 7 days take two 5 mg tablets twice daily.  After 7 days take one 5 mg tablet twice daily., Disp: 75 tablet, Rfl: 0    apixaban (ELIQUIS) 5 mg Tab, Take 1 tablet (5 mg total) by mouth 2 (two) times daily., Disp: 60 tablet, Rfl: 2    blood-glucose sensor (DEXCOM G7 SENSOR) Charito, Use every 10 days, Disp: , Rfl:     dexAMETHasone (DECADRON) 4 MG Tab, Take 8 mg (2 tablets) by mouth daily on days 2-4 of each chemotherapy cycle., Disp: 6 tablet, Rfl: 11    EScitalopram oxalate (LEXAPRO) 10 MG tablet, Take 1 tablet (10 mg total) by mouth once daily., Disp: 90 tablet, Rfl: 3    HYDROcodone-acetaminophen (NORCO) 7.5-325 mg per tablet, Take 1 tablet by mouth every 4 (four) hours as needed for Pain. (Patient not taking: Reported on 6/11/2024), Disp: 90 tablet, Rfl: 0    levothyroxine (SYNTHROID) 75 MCG tablet, Take 1 tablet by mouth every morning., Disp: , Rfl:     metFORMIN (GLUCOPHAGE) 1000 MG tablet, Take 1,000 mg by mouth., Disp: , Rfl:     metoprolol succinate (TOPROL-XL) 50 MG 24 hr tablet, Take 1 tablet by mouth every morning., Disp: , Rfl:     morphine (MS CONTIN) 15 MG 12 hr tablet, Take 1 tablet (15 mg total) by mouth 2 (two) times daily., Disp: 60 tablet, Rfl: 0    OLANZapine (ZYPREXA) 5 MG tablet, Take 1 tablet by mouth nightly on days 1-4 of each chemotherapy cycle., Disp: 4 tablet, Rfl: 11    ondansetron (ZOFRAN-ODT) 4 MG TbDL, DISSOLVE ONE TABLET BY MOUTH EVERY 4 TO 6 HOURS AS NEEDED FOR NAUSEA, Disp: , Rfl:     pravastatin (PRAVACHOL) 10 MG tablet, Take 1 tablet by mouth every evening., Disp: , Rfl:     prochlorperazine (COMPAZINE) 5 MG tablet, Take 1 tablet (5 mg total) by mouth every 6 (six) hours as needed for  Nausea., Disp: 20 tablet, Rfl: 5    senna (SENOKOT) 8.6 mg tablet, Take 1 tablet by mouth every morning., Disp: , Rfl:      Review of Systems  Review of Systems   Constitutional:  Positive for malaise/fatigue and weight loss.   HENT: Negative.     Eyes: Negative.    Respiratory: Negative.     Cardiovascular: Negative.    Genitourinary: Negative.    Musculoskeletal: Negative.    Skin: Negative.    Neurological:  Positive for weakness.   Endo/Heme/Allergies: Negative.    Psychiatric/Behavioral: Negative.        Physical Exam      There were no vitals filed for this visit.    Physical Exam  Vitals reviewed.   Constitutional:       Appearance: Normal appearance.   Neurological:      Mental Status: She is alert.   Psychiatric:         Mood and Affect: Mood normal.         Behavior: Behavior normal.      ASSESSMENT :  1. Decreased appetite    2. Fatigue, unspecified type    3. Generalized weakness    4. Malignant neoplasm of right ovary      PLAN:  Reviewed all information discussed at today's visit and all questions were answered.    Counseled on healthy lifestyle and behavior modifications-continued to encourage an increase in physical activity. Recommended starting with light walking 5 minutes daily with the goal of increasing. Counseled on the role of exercise and fatigue.   Staff message sent to Renee Navarro NP and dietitians regarding patients appetite.   Dietitians will see patient chairside.   I discussed and recommended the following support services:  Cesar Chi and Yoga I suggested Cesar Chi and/or Yoga as these practices reduce stress, increases flexibility and muscle strength, improves balance and promotes serenity in the power of movement to help fight disease and boost your immune system.   Music and relaxation therapy and Meditation which can decrease stress by lowering blood pressure, slowing breathing, and helping you be more present in the moment. It improves sleep by relaxing the body and mind at the end  of the day.Meditation also trains you how to focus on one thing at a time, improving concentration. It also promotes emotional well-being by decreasing depression and anxiety, and helping create a more positive outlook on life.    Declined PT at this time.     Follow up with [facility] in 6 months

## 2024-07-02 ENCOUNTER — INFUSION (OUTPATIENT)
Dept: INFUSION THERAPY | Facility: HOSPITAL | Age: 74
End: 2024-07-02
Attending: OBSTETRICS & GYNECOLOGY
Payer: MEDICARE

## 2024-07-02 ENCOUNTER — PATIENT MESSAGE (OUTPATIENT)
Dept: GYNECOLOGIC ONCOLOGY | Facility: CLINIC | Age: 74
End: 2024-07-02

## 2024-07-02 ENCOUNTER — LAB VISIT (OUTPATIENT)
Dept: LAB | Facility: HOSPITAL | Age: 74
End: 2024-07-02
Attending: OBSTETRICS & GYNECOLOGY
Payer: MEDICARE

## 2024-07-02 ENCOUNTER — DOCUMENTATION ONLY (OUTPATIENT)
Dept: INFUSION THERAPY | Facility: HOSPITAL | Age: 74
End: 2024-07-02
Payer: MEDICARE

## 2024-07-02 ENCOUNTER — OFFICE VISIT (OUTPATIENT)
Dept: GYNECOLOGIC ONCOLOGY | Facility: CLINIC | Age: 74
End: 2024-07-02
Payer: MEDICARE

## 2024-07-02 VITALS
WEIGHT: 120.56 LBS | OXYGEN SATURATION: 98 % | SYSTOLIC BLOOD PRESSURE: 140 MMHG | DIASTOLIC BLOOD PRESSURE: 70 MMHG | HEIGHT: 61 IN | TEMPERATURE: 97 F | RESPIRATION RATE: 17 BRPM | BODY MASS INDEX: 22.76 KG/M2 | HEART RATE: 71 BPM

## 2024-07-02 VITALS
HEIGHT: 61 IN | WEIGHT: 120.56 LBS | DIASTOLIC BLOOD PRESSURE: 62 MMHG | HEART RATE: 86 BPM | SYSTOLIC BLOOD PRESSURE: 106 MMHG | OXYGEN SATURATION: 98 % | RESPIRATION RATE: 17 BRPM | TEMPERATURE: 97 F | BODY MASS INDEX: 22.76 KG/M2

## 2024-07-02 DIAGNOSIS — C78.6 PERITONEAL CARCINOMATOSIS: Primary | ICD-10-CM

## 2024-07-02 DIAGNOSIS — C78.6 PERITONEAL CARCINOMATOSIS: ICD-10-CM

## 2024-07-02 DIAGNOSIS — R97.1 ELEVATED CANCER ANTIGEN 125 (CA 125): ICD-10-CM

## 2024-07-02 DIAGNOSIS — Z01.818 EXAMINATION PRIOR TO CHEMOTHERAPY: ICD-10-CM

## 2024-07-02 DIAGNOSIS — C56.1 MALIGNANT NEOPLASM OF RIGHT OVARY: Primary | ICD-10-CM

## 2024-07-02 DIAGNOSIS — R19.00 PELVIC MASS: ICD-10-CM

## 2024-07-02 LAB
ALBUMIN SERPL BCP-MCNC: 3.7 G/DL (ref 3.5–5.2)
ALP SERPL-CCNC: 39 U/L (ref 55–135)
ALT SERPL W/O P-5'-P-CCNC: 13 U/L (ref 10–44)
ANION GAP SERPL CALC-SCNC: 15 MMOL/L (ref 8–16)
AST SERPL-CCNC: 20 U/L (ref 10–40)
BILIRUB SERPL-MCNC: 0.4 MG/DL (ref 0.1–1)
BUN SERPL-MCNC: 10 MG/DL (ref 8–23)
CALCIUM SERPL-MCNC: 9.7 MG/DL (ref 8.7–10.5)
CANCER AG125 SERPL-ACNC: 67 U/ML (ref 0–30)
CHLORIDE SERPL-SCNC: 102 MMOL/L (ref 95–110)
CO2 SERPL-SCNC: 22 MMOL/L (ref 23–29)
CREAT SERPL-MCNC: 0.7 MG/DL (ref 0.5–1.4)
ERYTHROCYTE [DISTWIDTH] IN BLOOD BY AUTOMATED COUNT: 15.4 % (ref 11.5–14.5)
EST. GFR  (NO RACE VARIABLE): >60 ML/MIN/1.73 M^2
GLUCOSE SERPL-MCNC: 155 MG/DL (ref 70–110)
HCT VFR BLD AUTO: 35.7 % (ref 37–48.5)
HGB BLD-MCNC: 12.7 G/DL (ref 12–16)
IMM GRANULOCYTES # BLD AUTO: 0.04 K/UL (ref 0–0.04)
MCH RBC QN AUTO: 32.1 PG (ref 27–31)
MCHC RBC AUTO-ENTMCNC: 35.6 G/DL (ref 32–36)
MCV RBC AUTO: 90 FL (ref 82–98)
NEUTROPHILS # BLD AUTO: 2.7 K/UL (ref 1.8–7.7)
PLATELET # BLD AUTO: 289 K/UL (ref 150–450)
PMV BLD AUTO: 8.3 FL (ref 9.2–12.9)
POTASSIUM SERPL-SCNC: 5 MMOL/L (ref 3.5–5.1)
PROT SERPL-MCNC: 7 G/DL (ref 6–8.4)
RBC # BLD AUTO: 3.96 M/UL (ref 4–5.4)
SODIUM SERPL-SCNC: 139 MMOL/L (ref 136–145)
WBC # BLD AUTO: 5.73 K/UL (ref 3.9–12.7)

## 2024-07-02 PROCEDURE — 25000003 PHARM REV CODE 250: Mod: PN

## 2024-07-02 PROCEDURE — 86304 IMMUNOASSAY TUMOR CA 125: CPT | Performed by: OBSTETRICS & GYNECOLOGY

## 2024-07-02 PROCEDURE — 96415 CHEMO IV INFUSION ADDL HR: CPT | Mod: PN

## 2024-07-02 PROCEDURE — 96367 TX/PROPH/DG ADDL SEQ IV INF: CPT | Mod: PN

## 2024-07-02 PROCEDURE — 99999 PR PBB SHADOW E&M-EST. PATIENT-LVL IV: CPT | Mod: PBBFAC,,,

## 2024-07-02 PROCEDURE — 85027 COMPLETE CBC AUTOMATED: CPT | Mod: PN | Performed by: OBSTETRICS & GYNECOLOGY

## 2024-07-02 PROCEDURE — 36415 COLL VENOUS BLD VENIPUNCTURE: CPT | Mod: PN | Performed by: OBSTETRICS & GYNECOLOGY

## 2024-07-02 PROCEDURE — 80053 COMPREHEN METABOLIC PANEL: CPT | Mod: PN | Performed by: OBSTETRICS & GYNECOLOGY

## 2024-07-02 PROCEDURE — 96413 CHEMO IV INFUSION 1 HR: CPT | Mod: PN

## 2024-07-02 PROCEDURE — 99214 OFFICE O/P EST MOD 30 MIN: CPT | Mod: PBBFAC,PN,25

## 2024-07-02 PROCEDURE — 63600175 PHARM REV CODE 636 W HCPCS: Mod: PN

## 2024-07-02 PROCEDURE — 96417 CHEMO IV INFUS EACH ADDL SEQ: CPT | Mod: PN

## 2024-07-02 PROCEDURE — 96375 TX/PRO/DX INJ NEW DRUG ADDON: CPT | Mod: PN

## 2024-07-02 RX ORDER — HEPARIN 100 UNIT/ML
500 SYRINGE INTRAVENOUS
Status: CANCELLED | OUTPATIENT
Start: 2024-07-02

## 2024-07-02 RX ORDER — DIPHENHYDRAMINE HYDROCHLORIDE 50 MG/ML
50 INJECTION INTRAMUSCULAR; INTRAVENOUS ONCE AS NEEDED
Status: DISCONTINUED | OUTPATIENT
Start: 2024-07-02 | End: 2024-07-02 | Stop reason: HOSPADM

## 2024-07-02 RX ORDER — EPINEPHRINE 0.3 MG/.3ML
0.3 INJECTION SUBCUTANEOUS ONCE AS NEEDED
Status: DISCONTINUED | OUTPATIENT
Start: 2024-07-02 | End: 2024-07-02 | Stop reason: HOSPADM

## 2024-07-02 RX ORDER — ACETAMINOPHEN 500 MG
1000 TABLET ORAL
Status: CANCELLED
Start: 2024-07-02

## 2024-07-02 RX ORDER — FAMOTIDINE 10 MG/ML
20 INJECTION INTRAVENOUS
Status: COMPLETED | OUTPATIENT
Start: 2024-07-02 | End: 2024-07-02

## 2024-07-02 RX ORDER — SODIUM CHLORIDE 0.9 % (FLUSH) 0.9 %
10 SYRINGE (ML) INJECTION
Status: DISCONTINUED | OUTPATIENT
Start: 2024-07-02 | End: 2024-07-02 | Stop reason: HOSPADM

## 2024-07-02 RX ORDER — SODIUM CHLORIDE 0.9 % (FLUSH) 0.9 %
10 SYRINGE (ML) INJECTION
Status: CANCELLED | OUTPATIENT
Start: 2024-07-02

## 2024-07-02 RX ORDER — INSULIN GLARGINE 300 U/ML
INJECTION, SOLUTION SUBCUTANEOUS
COMMUNITY

## 2024-07-02 RX ORDER — ACETAMINOPHEN 500 MG
1000 TABLET ORAL
Status: COMPLETED | OUTPATIENT
Start: 2024-07-02 | End: 2024-07-02

## 2024-07-02 RX ORDER — GEL DRESSING
GEL (GRAM) TOPICAL
COMMUNITY
Start: 2024-06-13

## 2024-07-02 RX ORDER — PROCHLORPERAZINE EDISYLATE 5 MG/ML
5 INJECTION INTRAMUSCULAR; INTRAVENOUS ONCE AS NEEDED
Status: DISCONTINUED | OUTPATIENT
Start: 2024-07-02 | End: 2024-07-02 | Stop reason: HOSPADM

## 2024-07-02 RX ORDER — INSULIN LISPRO-AABC 100 [IU]/ML
2-10 INJECTION, SOLUTION SUBCUTANEOUS
COMMUNITY
Start: 2024-06-14

## 2024-07-02 RX ORDER — LORAZEPAM 2 MG/ML
0.5 INJECTION INTRAMUSCULAR
Status: CANCELLED
Start: 2024-07-02

## 2024-07-02 RX ORDER — FAMOTIDINE 10 MG/ML
20 INJECTION INTRAVENOUS
Status: CANCELLED | OUTPATIENT
Start: 2024-07-02

## 2024-07-02 RX ORDER — BLOOD-GLUCOSE,RECEIVER,CONT
EACH MISCELLANEOUS
COMMUNITY
Start: 2024-06-13

## 2024-07-02 RX ORDER — DIPHENHYDRAMINE HYDROCHLORIDE 50 MG/ML
50 INJECTION INTRAMUSCULAR; INTRAVENOUS ONCE AS NEEDED
Status: CANCELLED | OUTPATIENT
Start: 2024-07-02

## 2024-07-02 RX ORDER — HEPARIN 100 UNIT/ML
500 SYRINGE INTRAVENOUS
Status: DISCONTINUED | OUTPATIENT
Start: 2024-07-02 | End: 2024-07-02 | Stop reason: HOSPADM

## 2024-07-02 RX ORDER — EPINEPHRINE 0.3 MG/.3ML
0.3 INJECTION SUBCUTANEOUS ONCE AS NEEDED
Status: CANCELLED | OUTPATIENT
Start: 2024-07-02

## 2024-07-02 RX ORDER — LORAZEPAM 2 MG/ML
0.5 INJECTION INTRAMUSCULAR
Status: DISCONTINUED | OUTPATIENT
Start: 2024-07-02 | End: 2024-07-02 | Stop reason: HOSPADM

## 2024-07-02 RX ORDER — PROCHLORPERAZINE EDISYLATE 5 MG/ML
5 INJECTION INTRAMUSCULAR; INTRAVENOUS ONCE AS NEEDED
Status: CANCELLED | OUTPATIENT
Start: 2024-07-02

## 2024-07-02 RX ADMIN — ACETAMINOPHEN 1000 MG: 500 TABLET, FILM COATED ORAL at 11:07

## 2024-07-02 RX ADMIN — DIPHENHYDRAMINE HYDROCHLORIDE 50 MG: 50 INJECTION, SOLUTION INTRAMUSCULAR; INTRAVENOUS at 11:07

## 2024-07-02 RX ADMIN — FAMOTIDINE 20 MG: 10 INJECTION INTRAVENOUS at 11:07

## 2024-07-02 RX ADMIN — PACLITAXEL 210 MG: 6 INJECTION, SOLUTION INTRAVENOUS at 12:07

## 2024-07-02 RX ADMIN — APREPITANT 130 MG: 130 INJECTION, EMULSION INTRAVENOUS at 11:07

## 2024-07-02 RX ADMIN — SODIUM CHLORIDE: 9 INJECTION, SOLUTION INTRAVENOUS at 10:07

## 2024-07-02 RX ADMIN — DEXAMETHASONE SODIUM PHOSPHATE 0.25 MG: 10 INJECTION, SOLUTION INTRAMUSCULAR; INTRAVENOUS at 11:07

## 2024-07-02 RX ADMIN — CARBOPLATIN 450 MG: 10 INJECTION, SOLUTION INTRAVENOUS at 03:07

## 2024-07-02 NOTE — PROGRESS NOTES
SUBJECTIVE     Chief complaint:  Ovarian Cancer Pre-treatment Carbo/Taxol     History of present Illness:  Bella Meyer is a 73 y.o.  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 Oncology history below.    Dose reduced cycle 2 to 135mg/m2 and AUC5 given persistent fatigue.     Doing fairly well. Incidental finding of asymptomatic PE on 24 CT. Denies chest pain or shortness of breath. Started on Elliquis. She reports ongoing abdominal and pelvic pain which is controlled with morphine ER. Palliative managing. She also has persistent fatigue and poor appetite, some days worse than others. Fatigue ranges from Grade 2-3. She also has intermittent depressive symptoms, no suicidal or homicidal ideations. PCP managing Lexapro. Started on insulin by endocrinologist for optimization of glucose control in anticipation of future surgery. Reports glucose levels are well controlled. Wearing a dexcom. Otherwise, denies fever, chills, chest pain, nausea, vomiting. Normal bladder function. Intermittent constipation which is her baseline.     Oncology History   Peritoneal carcinomatosis   2024 Initial Diagnosis    Peritoneal carcinomatosis     2024 -  Chemotherapy    Treatment Summary   Plan Name: OP GYN paclitaxel CARBOplatin (AUC 6) Q3W  Treatment Goal: Curative  Status: Active  Start Date: 2024  End Date: 2025 (Planned)  Provider: Maliha Crawford MD  Chemotherapy: CARBOplatin (PARAPLATIN) 410 mg in sodium chloride 0.9% 326 mL chemo infusion, 410 mg (100 % of original dose 411 mg), Intravenous, Clinic/HOD 1 time, 4 of 17 cycles  Dose modification:   (original dose 411 mg, Cycle 1),   (original dose 379.5 mg, Cycle 2),   (original dose 411 mg, Cycle 3, Reason: Dose not tolerated),   (original dose 452 mg, Cycle 4)  Administration: 410 mg (2024), 380 mg (2024),  410 mg (6/11/2024)  PACLitaxeL (TAXOL) 175 mg/m2 = 276 mg in sodium chloride 0.9% 250 mL chemo infusion, 175 mg/m2 = 276 mg (100 % of original dose 175 mg/m2), Intravenous, Clinic/HOD 1 time, 4 of 17 cycles  Dose modification: 135 mg/m2 (original dose 175 mg/m2, Cycle 1, Reason: MD Discretion), 175 mg/m2 (original dose 175 mg/m2, Cycle 1)  Administration: 276 mg (4/30/2024), 210 mg (5/21/2024), 210 mg (6/11/2024)     Malignant neoplasm of ovary   4/23/2024 Initial Diagnosis    Malignant neoplasm of ovary  Referred by Dr. Evans for evaluation of a pelvic mass, peritoneal carcinomatosis and elevated .     3/24/24 CT AP- Abnormal inflammatory changes in the lower pelvis associated with the distal sigmoid colon. Tubular or channel-like structure along the posterior margin of the lower descending colon possibly connecting between the sigmoid and rectal regions. Underlying neoplasm and/or fistula not excluded. Mildly enlarged left iliac bifurcation lymph nodes noted. No drainable abscess. There is also abnormal appearance of the descending colon and several small soft tissue nodules in the pericolonic tissues of the descending colon. Malignant neoplasm should be considered.     4/9/24 TVUS- Grossly unremarkable appearance of the uterus. The left ovary is not seen sonographically. Enlarged right ovary at 6.6 cm with several cysts the largest 3.4 cm with some thin septations and blood flow to the ovary. Trace fluid adjacent to the ovary. No gross ascitic fluid.     4/9/24 - 188    4/18/24 CT CAP- Diffuse peritoneal carcinomatosis. Right adnexal mass (6 cm) which could be the primary neoplasm/ovarian cancer. Sigmoid colon thickening which could be the primary neoplasm/colon cancer.   Index implant left anterior abdomen 2 cm.   Index left pelvic sidewall implant 1.5 cm.    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  Positive pulmonary emboli within the right lower lobe segmental branches, nonocclusive.  No right heart strain.  Persistent sigmoid wall thickening.       Review of Systems   Constitutional:  Positive for appetite change and fatigue. Negative for fever and unexpected weight change.   HENT:  Negative.     Eyes: Negative.    Respiratory: Negative.  Negative for chest tightness, cough and shortness of breath.    Cardiovascular: Negative.  Negative for chest pain and palpitations.   Gastrointestinal:  Positive for abdominal pain. Negative for abdominal distention, constipation, diarrhea and nausea.   Endocrine: Negative.    Genitourinary:  Positive for pelvic pain. Negative for difficulty urinating, vaginal bleeding and vaginal discharge.    Musculoskeletal: Negative.    Skin: Negative.    Neurological: Negative.    Hematological: Negative.  Negative for adenopathy.   Psychiatric/Behavioral: Negative.     All other systems reviewed and are negative.     Review of patient's allergies indicates:  No Known Allergies  Current Outpatient Medications   Medication Instructions    adhesive remover (UNISOLVE ADHESIVE REMOVER WIPE) Misc One every 10 days    amLODIPine (NORVASC) 5 MG tablet 1 tablet, Oral, Every morning    apixaban (ELIQUIS DVT-PE TREAT 30D START) 5 mg (74 tabs) DsPk For the first 7 days take two 5 mg tablets twice daily.  After 7 days take one 5 mg tablet twice daily.    apixaban (ELIQUIS) 5 mg, Oral, 2 times daily    blood-glucose meter,continuous (DEXCOM G7 ) Misc Use daily     "blood-glucose sensor (DEXCOM G7 SENSOR) Charito Use every 10 days    dexAMETHasone (DECADRON) 4 MG Tab Take 8 mg (2 tablets) by mouth daily on days 2-4 of each chemotherapy cycle.    EScitalopram oxalate (LEXAPRO) 10 mg, Oral, Daily    HYDROcodone-acetaminophen (NORCO) 7.5-325 mg per tablet 1 tablet, Oral, Every 4 hours PRN    levothyroxine (SYNTHROID) 75 MCG tablet 1 tablet, Oral, Every morning    LYUMJEV KWIKPEN U-100 INSULIN 2-10 Units, Subcutaneous    metFORMIN (GLUCOPHAGE) 1,000 mg, Oral    metoprolol succinate (TOPROL-XL) 50 MG 24 hr tablet 1 tablet, Oral, Every morning    morphine (MS CONTIN) 15 mg, Oral, 2 times daily    OLANZapine (ZYPREXA) 5 MG tablet Take 1 tablet by mouth nightly on days 1-4 of each chemotherapy cycle.    ondansetron (ZOFRAN-ODT) 4 MG TbDL DISSOLVE ONE TABLET BY MOUTH EVERY 4 TO 6 HOURS AS NEEDED FOR NAUSEA    pravastatin (PRAVACHOL) 10 MG tablet 1 tablet, Oral, Nightly    prochlorperazine (COMPAZINE) 5 mg, Oral, Every 6 hours PRN    senna (SENOKOT) 8.6 mg tablet 1 tablet, Oral, Every morning    TOUJEO SOLOSTAR U-300 INSULIN 300 unit/mL (1.5 mL) InPn pen SMARTSI Unit(s) SUB-Q Daily     OBJECTIVE     BP (!) 140/70 (BP Location: Left arm, Patient Position: Sitting, BP Method: Small (Automatic))   Pulse 71   Temp 96.8 °F (36 °C) (Temporal)   Resp 17   Ht 5' 1" (1.549 m)   Wt 54.7 kg (120 lb 9.5 oz)   SpO2 98%   BMI 22.79 kg/m²     Physical Exam  Constitutional:       General: She is awake. She is not in acute distress.     Appearance: Normal appearance. She is not ill-appearing.   HENT:      Mouth/Throat:      Mouth: Mucous membranes are moist.      Pharynx: Oropharynx is clear. No oropharyngeal exudate or posterior oropharyngeal erythema.   Cardiovascular:      Rate and Rhythm: Normal rate.      Pulses: Normal pulses.   Pulmonary:      Effort: Pulmonary effort is normal.   Abdominal:      General: There is no distension.      Palpations: Abdomen is soft.      Tenderness: There is no " abdominal tenderness.   Musculoskeletal:      Cervical back: Neck supple. No tenderness.   Lymphadenopathy:      Cervical: No cervical adenopathy.   Neurological:      General: No focal deficit present.      Mental Status: She is alert. Mental status is at baseline.   Skin:     General: Skin is warm and dry.   Psychiatric:         Mood and Affect: Mood normal.         Behavior: Behavior normal. Behavior is cooperative.   Vitals reviewed.       ASSESSMENT AND PLAN   1. Malignant neoplasm of right ovary    2. Peritoneal carcinomatosis    3. Examination prior to chemotherapy       Labs reviewed. Pt examined. OK to treat.  CBC, CMP,  and pre-treatment visit every cycle.    Plan for surgery after cycle #6 with anticipated LAR at the time of resection to reach NGR. Patient has virtual appointment on 7/8/24 with Dr. Caicedo.     Return to clinic in 3 weeks prior to next cycle or sooner if needed     AMBROSIO Finch-SIMON  Gynecologic Oncology

## 2024-07-02 NOTE — PLAN OF CARE
Problem: Adult Inpatient Plan of Care  Goal: Plan of Care Review  Outcome: Progressing  Flowsheets (Taken 7/2/2024 1000)  Plan of Care Reviewed With:   patient   spouse  Goal: Patient-Specific Goal (Individualized)  Outcome: Progressing  Flowsheets (Taken 7/2/2024 1000)  Individualized Care Needs: Recliner, warm blankets,  at chairside, conversation, education  Anxieties, Fears or Concerns: None  Patient/Family-Specific Goals (Include Timeframe): Free of S/S of reaction with infusions.     Problem: Fatigue  Goal: Improved Activity Tolerance  Outcome: Progressing  Intervention: Promote Improved Energy  Flowsheets (Taken 7/2/2024 1000)  Fatigue Management:   frequent rest breaks encouraged   paced activity encouraged  Sleep/Rest Enhancement: regular sleep/rest pattern promoted  Activity Management: Ambulated -L4  Environmental Support: rest periods encouraged   Patient to Infusion for Taxol and Carbo following appointment with her provider. Accompanied by her  and dtr. Treatment plan reviewed with patient. VSS. She has decided not to continue with the dignicap, but will continue with cryotherapy. Cryotherapy initiated with Taxol. Tolerated treatment. Provided with copy of upcoming appointment schedule. Escorted to the front lobby in no distress for discharge to home.

## 2024-07-02 NOTE — PROGRESS NOTES
Oncology Nutrition   Chemotherapy Infusion Visit    Nutrition Follow Up   RD met with patient at chairside during infusion tx. Pt states her appetite remains better than when she first started. She reports her blood sugar has also been better controlled. She has been doing a variety of different protein shakes - Premier Protein, Muscle Milk, and Fairlife. Pt asking for high calorie, low carb options. RD suggested avocados, cheese, butter/oils, nuts, and animal products. Pt appreciative of visit and help.    Wt Readings from Last 10 Encounters:   07/02/24 54.7 kg (120 lb 9.5 oz)   07/02/24 54.7 kg (120 lb 9.5 oz)   06/11/24 53.8 kg (118 lb 9.7 oz)   06/11/24 53.8 kg (118 lb 9.7 oz)   05/21/24 53.9 kg (118 lb 13.3 oz)   05/21/24 53.9 kg (118 lb 13.3 oz)   04/30/24 55.8 kg (123 lb 0.3 oz)   04/30/24 55.8 kg (123 lb 0.3 oz)   04/26/24 55.2 kg (121 lb 11.1 oz)   04/22/24 53.5 kg (118 lb)       All other nutrition questions/concerns addressed as appropriate. Will continue to follow and monitor throughout treatment PRN.     Valerie Sebastian, MS, RD, LDN  07/02/2024  12:21 PM

## 2024-07-03 ENCOUNTER — DOCUMENTATION ONLY (OUTPATIENT)
Dept: INFUSION THERAPY | Facility: HOSPITAL | Age: 74
End: 2024-07-03
Payer: MEDICARE

## 2024-07-03 NOTE — PROGRESS NOTES
Late entry for 7/2/24    LALY met with pt,  and daughter. Pt reports she is doing alright. She said she has been able to do a little bit around the house when she feels up to it. She said it feels good to get back to a little activity. She does make sure she rest when needed. She was recently was able to go back to her home and spend several days before going back to family's home for this round of treatment. She enjoyed this time. LALY provided support.     LALY provided a gas card, no other practical needs noted.     Swathi Pedraza, CRISTHIANW

## 2024-07-08 ENCOUNTER — OFFICE VISIT (OUTPATIENT)
Dept: SURGERY | Facility: CLINIC | Age: 74
End: 2024-07-08
Payer: MEDICARE

## 2024-07-08 DIAGNOSIS — N83.8 OVARIAN MASS: Primary | ICD-10-CM

## 2024-07-08 PROCEDURE — 99441 PR PHYSICIAN TELEPHONE EVALUATION 5-10 MIN: CPT | Mod: 95,,, | Performed by: SURGERY

## 2024-07-10 NOTE — PROGRESS NOTES
The patient location is: [place]   The chief complaint leading to consultation is: ovarian cancer and sigmoid colon thickening     Visit type: audiovisual    Face to Face time with patient: 7 min   15 minutes of total time spent on the encounter, which includes face to face time and non-face to face time preparing to see the patient (eg, review of tests), Obtaining and/or reviewing separately obtained history, Documenting clinical information in the electronic or other health record, Independently interpreting results (not separately reported) and communicating results to the patient/family/caregiver, or Care coordination (not separately reported).         Each patient to whom he or she provides medical services by telemedicine is:  (1) informed of the relationship between the physician and patient and the respective role of any other health care provider with respect to management of the patient; and (2) notified that he or she may decline to receive medical services by telemedicine and may withdraw from such care at any time.    Notes:    Ms. Meyer is a 72 yo F with stage IIIC ovarian cancer being treated by Dr. Crawford.     She initially presented to an outside hospital 3/2024 with abdominal pain.  Was noted to have a thickened sigmoid at that time and concern for possible diverticular disease with ? Colorectal fistula. Had attempted colonoscopy at that time which was aborted due to a fixed sigmoid colon at approximately 20 cm.  Patient had a reportedly negative colonoscopy 1 year prior.  Subsequent work up revealed ovarian cancer.  Currently undergoing chemotherapy.  Repeat imaging showed improved peritoneal disease and persistent thickening of the sigmoid colon.     CT 6/11/24   Abdomen/Pelvis:     Hepatobiliary: Liver is normal in appearance.  No evidence of biliary duct dilatation.  Gallbladder is normal in appearance.     Pancreas: Unremarkable.     Spleen: Unremarkable.     Adrenals: Unremarkable.      Kidneys/Ureters: Kidneys normal in appearance.  No evidence of hydronephrosis or hydroureter.  No evidence of urolithiasis, cyst or mass.     Stomach/Bowel: No evidence of obstruction. Sigmoid wall thickening is again noted.     Reproductive organs/bladder:  Bilateral ovarian cystic masses measuring 6.3 cm on the right and 6.2 cm a couple large from previous.  No significant mural nodule new or aggressive features.     Peritoneum/Retroperitoneum: No evidence of free fluid.  No evidence of free air.  Soft tissue density masses beneath the abdominal rectus artery smaller in the interval.  Mental endplates in the upper abdomen are also decreased in size     Lymph nodes: No evidence of lymphadenopathy.     Vessels: Unremarkable.     Bones/Soft tissue: Unremarkable.     Impression:     Mild interval enlargement of the bilateral ovarian cystic masses however, there is interval decrease in size and number of omental implants/metastasis.     Positive pulmonary emboli within the right lower lobe segmental branches, nonocclusive.  No right heart strain.     Persistent sigmoid wall thickening.        Electronically signed by:Justo Toussaint  Date:                                            06/21/2024  Time:                                           18:57    A/P:   Ms. Meyer is a 72 yo F with Stage III C ovarian cancer with concern for involvement of the sigmoid colon     - Imaging personally reviewed and sigmoid colon is in close proximity to pelvic masses.  Recent colonoscopy aborted due to adhesions and fixed sigmoid colon.    - We discussed possibility of colon resection at time of surgery.  Would possibly need on table colonoscopy as well to rule out intraluminal lesions in the colon. Possible ostomy depending on extent of resection in the setting of malignancy requiring chemotherapy  - will coordinate pre-op appt with Dr. Crawford for surgical planning when appropriate.     Ruby Caicedo MD  Staff Surgeon   Colon & Rectal  Surgery

## 2024-07-12 ENCOUNTER — OFFICE VISIT (OUTPATIENT)
Dept: PALLIATIVE MEDICINE | Facility: CLINIC | Age: 74
End: 2024-07-12
Payer: MEDICARE

## 2024-07-12 DIAGNOSIS — T45.1X5A CHEMOTHERAPY-INDUCED PERIPHERAL NEUROPATHY: ICD-10-CM

## 2024-07-12 DIAGNOSIS — C56.1 MALIGNANT NEOPLASM OF RIGHT OVARY: ICD-10-CM

## 2024-07-12 DIAGNOSIS — G62.0 CHEMOTHERAPY-INDUCED PERIPHERAL NEUROPATHY: ICD-10-CM

## 2024-07-12 DIAGNOSIS — C78.6 PERITONEAL CARCINOMATOSIS: ICD-10-CM

## 2024-07-12 DIAGNOSIS — G89.3 CANCER RELATED PAIN: ICD-10-CM

## 2024-07-12 DIAGNOSIS — Z51.5 PALLIATIVE CARE BY SPECIALIST: Primary | ICD-10-CM

## 2024-07-12 RX ORDER — DULOXETIN HYDROCHLORIDE 30 MG/1
30 CAPSULE, DELAYED RELEASE ORAL DAILY
Qty: 30 CAPSULE | Refills: 0 | Status: SHIPPED | OUTPATIENT
Start: 2024-07-12 | End: 2024-08-11

## 2024-07-12 NOTE — PROGRESS NOTES
The patient location is: home    Visit type: audiovisual    Face to Face time with patient: 10  20 minutes of total time spent on the encounter, which includes face to face time and non-face to face time preparing to see the patient (eg, review of tests), Obtaining and/or reviewing separately obtained history, Documenting clinical information in the electronic or other health record, Independently interpreting results (not separately reported) and communicating results to the patient/family/caregiver, or Care coordination (not separately reported).         Each patient to whom he or she provides medical services by telemedicine is:  (1) informed of the relationship between the physician and patient and the respective role of any other health care provider with respect to management of the patient; and (2) notified that he or she may decline to receive medical services by telemedicine and may withdraw from such care at any time.    Notes:     Office Visit  [facility]      Reason for Consult: pain and symptom management      ASSESSMENT/PLAN:     Plan/Recommendations:  Diagnoses and all orders for this visit:    Palliative care by specialist    Chemotherapy-induced peripheral neuropathy  -     DULoxetine (CYMBALTA) 30 MG capsule; Take 1 capsule (30 mg total) by mouth once daily.    Malignant neoplasm of right ovary    Peritoneal carcinomatosis    Cancer related pain        # Stage III Ovarian Cancer  # Palliative care by specialist  # Peritoneal carcinomatosis  Currently on Paclitaxel/Carboplatin. Denies severe side effects. Overall feeling well. Evaluated by colorectal surgery. Due to VTE, holding off on surgery until September at this time.   - Follow up gyn onc  - Follow CRS    # Cancer related pain  Nociceptive and neuropathic in origin. Due to fear of morphine, she has decided to revert back to Norco. Using 2-3 times per day. Pain is well controlled. Discussed morphine details at length. She will  consider going back on morphine in the future.   - Continue hydrocodone-APAP 7.5-325mg q4h prn for breakthrough pain  - discontinue MS Contin 15mg PO BID scheduled (refilled today)  - Opioid contract to be sent and signed electronically  - Continue senna S one tab daily for OIC ppx  - Follow up with gyn/onc    # Chemotherapy induced peripheral neuropathy  Numbness and tingling of hands and feet. This has been bothersome.   - Start Cymbalta 30mg once daily    Follow up: 4 weeks virtual      SUBJECTIVE:     History of Present Illness:  Patient is a 73 y.o. year old female presenting with recent diagnosis of Stage IIIC ovarian cancer. She is a patient of Dr. Maliha Crawford. She presents via virtual visit for follow up. She is currently on paclitaxel/carboplatin.    7/12/24:  Currently using Norco 2-3 times per day. Got scared of morphine so she stopped taking it and requested Norco reinitiation prior to this appointment. Discussed use of morphine and stereotyped fears. Having daily bms. Saw colorectal surgery, planning surgery for September. Delayed due to recent clot.    She endorses numbness and tingling of her hands and feet. Discussed neuropathy. She is open to try treatment.    6/12/24: She states she is doing really well. She had her treatment yesterday and tolerated it well. No pain at all after being on the half tablet of the morphine. She denies any breakthrough at all. She has been able to resume her housework. She feels almost back to normal. She gets tired. Takes a nap once a day. She is having daily bowel movements. She takes senna daily.    5/29/24:Overall pain is a little better. She feels stronger pain pill is lasting a bit longer and quicker onset. Still having pain though. She takes pain medication every 4-5 hours.     She plans to start PT. Her PCP is managing her care overall but her and her family are happy to keep all pain medications with palliative care. She is currently still in [place] with  "family.     She is having bowel movements every day. She is taking senna everyday.     5/15/24:  Pain has worsened. She has had excruciating abdominal pain. She could not sleep after the pain medication took effect. Then had sweats. She has been taking the Norco 5mg every 6 hours but it takes 45-60 minutes to take effect.     Has started taking two senna s to prevent constipation. Started this yesterday. She had a good BM today.     Current meds for nausea have been helping.     Very groggy days after chemotherapy.       Initial visit:    Introduced the role of palliative care including symptom management and advance care planning through the cancer journey.    She reports pain rated 10/10. When she takes a pain pill it drops down to a 4-5/10. She takes hydrocodone. Pain is located in her right side of the abdomen. Describes this pain as shooting and throbbing.     She reports daily bowel movements. Denies nausea, vomiting. She had her first infusion yesterday. She had no side effects.     Her daughter states that her mother hates pain medications. Family has noted grunting. Family has noticed movement would exacerbate pain. Would be hard to find a comfortable position to sit in. Patient wants to "take the edge off" and rest comfortably. Benadryl knocks her out.     The increased dose of hydrocodone at 7.5 helped better. She takes one dose in the morning and one at night. She sometimes take an additional dose if she takes a long car ride.     She does not feel strong. She was in the recliner all day.       No past medical history on file.  No past surgical history on file.  Family History   Problem Relation Name Age of Onset    Cancer Brother      Breast cancer Neg Hx      Colon cancer Neg Hx      Ovarian cancer Neg Hx      Uterine cancer Neg Hx      Cervical cancer Neg Hx       Review of patient's allergies indicates:  No Known Allergies  Social Determinants of Health     Tobacco Use: Low Risk  (7/2/2024)    " Patient History     Smoking Tobacco Use: Never     Smokeless Tobacco Use: Never     Passive Exposure: Not on file   Alcohol Use: Not At Risk (7/5/2024)    AUDIT-C     Frequency of Alcohol Consumption: Never     Average Number of Drinks: Patient does not drink     Frequency of Binge Drinking: Never   Financial Resource Strain: Low Risk  (7/5/2024)    Overall Financial Resource Strain (CARDIA)     Difficulty of Paying Living Expenses: Not hard at all   Food Insecurity: No Food Insecurity (7/5/2024)    Hunger Vital Sign     Worried About Running Out of Food in the Last Year: Never true     Ran Out of Food in the Last Year: Never true   Transportation Needs: Not on file   Physical Activity: Inactive (7/5/2024)    Exercise Vital Sign     Days of Exercise per Week: 0 days     Minutes of Exercise per Session: 20 min   Stress: No Stress Concern Present (7/5/2024)    [employer] [place] [employer] - Occupational Stress Questionnaire     Feeling of Stress : Only a little   Housing Stability: Unknown (7/5/2024)    Housing Stability Vital Sign     Unable to Pay for Housing in the Last Year: No     Homeless in the Last Year: Not on file   Depression: Low Risk  (7/2/2024)    Depression     Last PHQ-4: Flowsheet Data: 0   Utilities: Not At Risk (7/5/2024)    [facility] Utilities     Threatened with loss of utilities: No   Health Literacy: Inadequate Health Literacy (7/5/2024)     Health Literacy     Frequency of need for help with medical instructions: Sometimes   Social Isolation: Not on file          Medications:    Current Outpatient Medications:     adhesive remover (UNISOLVE ADHESIVE REMOVER WIPE) Misc, One every 10 days, Disp: , Rfl:     amLODIPine (NORVASC) 5 MG tablet, Take 1 tablet by mouth every morning., Disp: , Rfl:     apixaban (ELIQUIS DVT-PE TREAT 30D START) 5 mg (74 tabs) DsPk, For the first 7 days take two 5 mg tablets twice daily.  After 7 days take one 5 mg tablet twice daily., Disp: 75 tablet, Rfl: 0     apixaban (ELIQUIS) 5 mg Tab, Take 1 tablet (5 mg total) by mouth 2 (two) times daily., Disp: 60 tablet, Rfl: 2    blood-glucose meter,continuous (DEXCOM G7 ) Misc, Use daily, Disp: , Rfl:     blood-glucose sensor (DEXCOM G7 SENSOR) [place], Use every 10 days, Disp: , Rfl:     dexAMETHasone (DECADRON) 4 MG Tab, Take 8 mg (2 tablets) by mouth daily on days 2-4 of each chemotherapy cycle., Disp: 6 tablet, Rfl: 11    DULoxetine (CYMBALTA) 30 MG capsule, Take 1 capsule (30 mg total) by mouth once daily., Disp: 30 capsule, Rfl: 0    EScitalopram oxalate (LEXAPRO) 10 MG tablet, Take 1 tablet (10 mg total) by mouth once daily., Disp: 90 tablet, Rfl: 3    HYDROcodone-acetaminophen (NORCO) 7.5-325 mg per tablet, Take 1 tablet by mouth every 4 (four) hours as needed for Pain., Disp: 180 tablet, Rfl: 0    levothyroxine (SYNTHROID) 75 MCG tablet, Take 1 tablet by mouth every morning., Disp: , Rfl:     LYUMJEV KWIKPEN U-100 INSULIN 100 unit/mL pen, Inject 2-10 Units into the skin., Disp: , Rfl:     metFORMIN (GLUCOPHAGE) 1000 MG tablet, Take 1,000 mg by mouth., Disp: , Rfl:     metoprolol succinate (TOPROL-XL) 50 MG 24 hr tablet, Take 1 tablet by mouth every morning., Disp: , Rfl:     morphine (MS CONTIN) 15 MG 12 hr tablet, Take 1 tablet (15 mg total) by mouth 2 (two) times daily., Disp: 60 tablet, Rfl: 0    OLANZapine (ZYPREXA) 5 MG tablet, Take 1 tablet by mouth nightly on days 1-4 of each chemotherapy cycle., Disp: 4 tablet, Rfl: 11    ondansetron (ZOFRAN-ODT) 4 MG TbDL, DISSOLVE ONE TABLET BY MOUTH EVERY 4 TO 6 HOURS AS NEEDED FOR NAUSEA, Disp: , Rfl:     pravastatin (PRAVACHOL) 10 MG tablet, Take 1 tablet by mouth every evening., Disp: , Rfl:     prochlorperazine (COMPAZINE) 5 MG tablet, Take 1 tablet (5 mg total) by mouth every 6 (six) hours as needed for Nausea., Disp: 20 tablet, Rfl: 5    senna (SENOKOT) 8.6 mg tablet, Take 1 tablet by mouth every morning., Disp: , Rfl:     TOUJEO SOLOSTAR U-300 INSULIN 300 unit/mL  (1.5 mL) InPn pen, SMARTSI Unit(s) SUB-Q Daily, Disp: , Rfl:     OBJECTIVE:       ROS:  Review of Systems   Constitutional:  Negative for appetite change and unexpected weight change.   HENT:  Negative for mouth sores.    Eyes:  Negative for visual disturbance.   Respiratory:  Negative for cough and shortness of breath.    Cardiovascular:  Negative for chest pain.   Gastrointestinal:  Negative for abdominal pain and diarrhea.   Genitourinary:  Negative for frequency.   Musculoskeletal:  Negative for back pain.   Skin:  Negative for rash.   Neurological:  Positive for numbness. Negative for headaches.   Hematological:  Negative for adenopathy.   Psychiatric/Behavioral:  The patient is not nervous/anxious.        Review of Symptoms      Symptom Assessment (ESAS 0-10 Scale)  Pain:  0  Dyspnea:  0  Anxiety:  0  Nausea:  0  Depression:  0  Anorexia:  0  Fatigue:  0  Insomnia:  0  Restlessness:  0  Agitation:  0       Anxiety:  Is not nervous/anxious    ECOG Performance Status ndGndrndanddndend:nd nd2nd Living Arrangements:  Lives with family    Psychosocial/Cultural:   See Palliative Psychosocial Note: Yes  **Primary  to Follow**  Palliative Care  Consult: Yes     Time-Based Charting:  Yes  Chart Review: 10 minutes  Face to Face: 10 minutes    Total Time Spent: 20 minutes      Advance Care Planning   Advance Directives:     Decision Making:  Patient answered questions  Goals of Care: The patient endorses that what is most important right now is to focus on symptom/pain control    Accordingly, we have decided that the best plan to meet the patient's goals includes continuing with treatment              Physical Exam:  Vitals:    Physical Exam  Constitutional:       General: She is not in acute distress.     Appearance: Normal appearance. She is not ill-appearing or toxic-appearing.   Musculoskeletal:      Cervical back: Normal range of motion.   Skin:     Coloration: Skin is not jaundiced or pale.    Neurological:      Mental Status: She is alert and oriented to person, place, and time.   Psychiatric:         Mood and Affect: Mood normal.         Behavior: Behavior normal.         Thought Content: Thought content normal.         Judgment: Judgment normal.         Labs:  CBC:   WBC   Date Value Ref Range Status   07/02/2024 5.73 3.90 - 12.70 K/uL Final     Hemoglobin   Date Value Ref Range Status   07/02/2024 12.7 12.0 - 16.0 g/dL Final     Hematocrit   Date Value Ref Range Status   07/02/2024 35.7 (L) 37.0 - 48.5 % Final     MCV   Date Value Ref Range Status   07/02/2024 90 82 - 98 fL Final     Platelets   Date Value Ref Range Status   07/02/2024 289 150 - 450 K/uL Final       LFT:   Lab Results   Component Value Date    AST 20 07/02/2024    ALKPHOS 39 (L) 07/02/2024    BILITOT 0.4 07/02/2024       Albumin:   Albumin   Date Value Ref Range Status   07/02/2024 3.7 3.5 - 5.2 g/dL Final     Protein:   Total Protein   Date Value Ref Range Status   07/02/2024 7.0 6.0 - 8.4 g/dL Final       Radiology:None    20 minutes of total time spent on the encounter, which includes face to face time and non-face to face time preparing to see the patient (eg, review of tests), Obtaining and/or reviewing separately obtained history, Documenting clinical information in the electronic or other health record, Independently interpreting results (not separately reported) and communicating results to the patient/family/caregiver, or Care coordination (not separately reported).    Signature: Kristen Blum DO

## 2024-07-19 DIAGNOSIS — I26.99 ACUTE PULMONARY EMBOLISM, UNSPECIFIED PULMONARY EMBOLISM TYPE, UNSPECIFIED WHETHER ACUTE COR PULMONALE PRESENT: ICD-10-CM

## 2024-07-22 ENCOUNTER — LAB VISIT (OUTPATIENT)
Dept: LAB | Facility: HOSPITAL | Age: 74
End: 2024-07-22
Attending: OBSTETRICS & GYNECOLOGY
Payer: MEDICARE

## 2024-07-22 DIAGNOSIS — R97.1 ELEVATED CANCER ANTIGEN 125 (CA 125): ICD-10-CM

## 2024-07-22 DIAGNOSIS — C78.6 PERITONEAL CARCINOMATOSIS: ICD-10-CM

## 2024-07-22 DIAGNOSIS — R19.00 PELVIC MASS: ICD-10-CM

## 2024-07-22 LAB
ALBUMIN SERPL BCP-MCNC: 3.6 G/DL (ref 3.5–5.2)
ALP SERPL-CCNC: 37 U/L (ref 55–135)
ALT SERPL W/O P-5'-P-CCNC: 10 U/L (ref 10–44)
ANION GAP SERPL CALC-SCNC: 15 MMOL/L (ref 8–16)
AST SERPL-CCNC: 12 U/L (ref 10–40)
BILIRUB SERPL-MCNC: 0.4 MG/DL (ref 0.1–1)
BUN SERPL-MCNC: 9 MG/DL (ref 8–23)
CALCIUM SERPL-MCNC: 9.9 MG/DL (ref 8.7–10.5)
CHLORIDE SERPL-SCNC: 101 MMOL/L (ref 95–110)
CO2 SERPL-SCNC: 21 MMOL/L (ref 23–29)
CREAT SERPL-MCNC: 0.7 MG/DL (ref 0.5–1.4)
ERYTHROCYTE [DISTWIDTH] IN BLOOD BY AUTOMATED COUNT: 14.6 % (ref 11.5–14.5)
EST. GFR  (NO RACE VARIABLE): >60 ML/MIN/1.73 M^2
GLUCOSE SERPL-MCNC: 150 MG/DL (ref 70–110)
HCT VFR BLD AUTO: 36.1 % (ref 37–48.5)
HGB BLD-MCNC: 12.5 G/DL (ref 12–16)
IMM GRANULOCYTES # BLD AUTO: 0.07 K/UL (ref 0–0.04)
MCH RBC QN AUTO: 32.8 PG (ref 27–31)
MCHC RBC AUTO-ENTMCNC: 34.6 G/DL (ref 32–36)
MCV RBC AUTO: 95 FL (ref 82–98)
NEUTROPHILS # BLD AUTO: 2.8 K/UL (ref 1.8–7.7)
PLATELET # BLD AUTO: 225 K/UL (ref 150–450)
PMV BLD AUTO: 7.8 FL (ref 9.2–12.9)
POTASSIUM SERPL-SCNC: 4.9 MMOL/L (ref 3.5–5.1)
PROT SERPL-MCNC: 7.1 G/DL (ref 6–8.4)
RBC # BLD AUTO: 3.81 M/UL (ref 4–5.4)
SODIUM SERPL-SCNC: 137 MMOL/L (ref 136–145)
WBC # BLD AUTO: 6.75 K/UL (ref 3.9–12.7)

## 2024-07-22 PROCEDURE — 80053 COMPREHEN METABOLIC PANEL: CPT | Mod: PO | Performed by: OBSTETRICS & GYNECOLOGY

## 2024-07-22 PROCEDURE — 85027 COMPLETE CBC AUTOMATED: CPT | Mod: PO | Performed by: OBSTETRICS & GYNECOLOGY

## 2024-07-22 PROCEDURE — 36415 COLL VENOUS BLD VENIPUNCTURE: CPT | Mod: PO | Performed by: OBSTETRICS & GYNECOLOGY

## 2024-07-22 PROCEDURE — 86304 IMMUNOASSAY TUMOR CA 125: CPT | Performed by: OBSTETRICS & GYNECOLOGY

## 2024-07-22 NOTE — PROGRESS NOTES
SUBJECTIVE     Chief complaint:  Ovarian Cancer Pre-treatment Carbo/Taxol     History of present Illness:  Bella Meyer is a 73 y.o.  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 Oncology history below.    Dose reduced cycle 2 to 135mg/m2 and AUC5 given persistent fatigue.     Had initial consultation with Dr. Caicedo for discussion of colon resection at the time of surgery.     Doing fairly well. Incidental finding of asymptomatic PE on 24 CT. Denies chest pain or shortness of breath. On Elliquis. She reports ongoing abdominal and pelvic pain which is controlled with morphine ER. Palliative managing. She also has persistent fatigue and poor appetite, some days worse than others. Fatigue ranges from Grade 2-3. She also has intermittent depressive symptoms, no suicidal or homicidal ideations. PCP managing Lexapro. Started on insulin by endocrinologist for optimization of glucose control in anticipation of future surgery. Reports glucose levels are well controlled. Wearing a dexcom. Otherwise, denies fever, chills, chest pain, nausea, vomiting. Normal bladder function. Intermittent constipation which is her baseline.      Oncology History   Peritoneal carcinomatosis   2024 Initial Diagnosis    Peritoneal carcinomatosis     2024 -  Chemotherapy    Treatment Summary   Plan Name: OP GYN paclitaxel CARBOplatin (AUC 6) Q3W  Treatment Goal: Curative  Status: Active  Start Date: 2024  End Date: 2025 (Planned)  Provider: Maliha Crawford MD  Chemotherapy: CARBOplatin (PARAPLATIN) 410 mg in sodium chloride 0.9% 326 mL chemo infusion, 410 mg (100 % of original dose 411 mg), Intravenous, Clinic/HOD 1 time, 5 of 17 cycles  Dose modification:   (original dose 411 mg, Cycle 1),   (original dose 379.5 mg, Cycle 2),   (original dose 411 mg, Cycle 3, Reason: Dose not tolerated),    (original dose 452 mg, Cycle 4)  Administration: 410 mg (4/30/2024), 380 mg (5/21/2024), 410 mg (6/11/2024), 450 mg (7/2/2024)  PACLitaxeL (TAXOL) 175 mg/m2 = 276 mg in sodium chloride 0.9% 250 mL chemo infusion, 175 mg/m2 = 276 mg (100 % of original dose 175 mg/m2), Intravenous, Clinic/HOD 1 time, 5 of 17 cycles  Dose modification: 135 mg/m2 (original dose 175 mg/m2, Cycle 1, Reason: MD Discretion), 175 mg/m2 (original dose 175 mg/m2, Cycle 1)  Administration: 276 mg (4/30/2024), 210 mg (5/21/2024), 210 mg (6/11/2024), 210 mg (7/2/2024)     Malignant neoplasm of ovary   4/23/2024 Initial Diagnosis    Malignant neoplasm of ovary  Referred by Dr. Evans for evaluation of a pelvic mass, peritoneal carcinomatosis and elevated .     3/24/24 CT AP- Abnormal inflammatory changes in the lower pelvis associated with the distal sigmoid colon. Tubular or channel-like structure along the posterior margin of the lower descending colon possibly connecting between the sigmoid and rectal regions. Underlying neoplasm and/or fistula not excluded. Mildly enlarged left iliac bifurcation lymph nodes noted. No drainable abscess. There is also abnormal appearance of the descending colon and several small soft tissue nodules in the pericolonic tissues of the descending colon. Malignant neoplasm should be considered.     4/9/24 TVUS- Grossly unremarkable appearance of the uterus. The left ovary is not seen sonographically. Enlarged right ovary at 6.6 cm with several cysts the largest 3.4 cm with some thin septations and blood flow to the ovary. Trace fluid adjacent to the ovary. No gross ascitic fluid.     4/9/24 - 188    4/18/24 CT CAP- Diffuse peritoneal carcinomatosis. Right adnexal mass (6 cm) which could be the primary neoplasm/ovarian cancer. Sigmoid colon thickening which could be the primary neoplasm/colon cancer.   Index implant left anterior abdomen 2 cm.   Index left pelvic sidewall implant 1.5 cm.    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  Positive pulmonary emboli within the right lower lobe segmental branches, nonocclusive.  No right heart strain.  Persistent sigmoid wall thickening.       Review of Systems   Constitutional:  Positive for appetite change and fatigue. Negative for fever and unexpected weight change.   HENT:  Negative.  Negative for mouth sores and nosebleeds.    Eyes: Negative.    Respiratory: Negative.  Negative for chest tightness, cough and shortness of breath.    Cardiovascular: Negative.  Negative for chest pain and palpitations.   Gastrointestinal:  Positive for abdominal pain. Negative for abdominal distention, constipation, diarrhea and nausea.   Endocrine: Negative.    Genitourinary:  Positive for pelvic pain. Negative for difficulty urinating, vaginal bleeding and vaginal discharge.    Musculoskeletal:         Intermittent burning pain in her right thigh   Skin: Negative.    Neurological: Negative.  Negative for headaches.   Hematological: Negative.  Negative for adenopathy.   Psychiatric/Behavioral: Negative.     All other systems reviewed and are negative.     Review of patient's allergies indicates:  No Known Allergies  Current Outpatient Medications   Medication Instructions    adhesive remover (UNISOLVE ADHESIVE REMOVER WIPE) Misc One every 10 days    amLODIPine (NORVASC) 5 MG tablet 1 tablet, Oral, Every morning    apixaban (ELIQUIS) 5 mg, Oral, 2 times daily  "   blood-glucose meter,continuous (DEXCOM G7 ) Misc Use daily    blood-glucose sensor (DEXCOM G7 SENSOR) Charito Use every 10 days    dexAMETHasone (DECADRON) 4 MG Tab Take 8 mg (2 tablets) by mouth daily on days 2-4 of each chemotherapy cycle.    DULoxetine (CYMBALTA) 30 mg, Oral, Daily    EScitalopram oxalate (LEXAPRO) 10 mg, Oral, Daily    HYDROcodone-acetaminophen (NORCO) 7.5-325 mg per tablet 1 tablet, Oral, Every 4 hours PRN    levothyroxine (SYNTHROID) 75 MCG tablet 1 tablet, Oral, Every morning    LYUMJEV KWIKPEN U-100 INSULIN 2-10 Units, Subcutaneous    metFORMIN (GLUCOPHAGE) 1,000 mg, Oral    metoprolol succinate (TOPROL-XL) 50 MG 24 hr tablet 1 tablet, Oral, Every morning    morphine (MS CONTIN) 15 mg, Oral, 2 times daily    OLANZapine (ZYPREXA) 5 MG tablet Take 1 tablet by mouth nightly on days 1-4 of each chemotherapy cycle.    ondansetron (ZOFRAN-ODT) 4 MG TbDL DISSOLVE ONE TABLET BY MOUTH EVERY 4 TO 6 HOURS AS NEEDED FOR NAUSEA    pravastatin (PRAVACHOL) 10 MG tablet 1 tablet, Oral, Nightly    prochlorperazine (COMPAZINE) 5 mg, Oral, Every 6 hours PRN    senna (SENOKOT) 8.6 mg tablet 1 tablet, Oral, Every morning    TOUJEO SOLOSTAR U-300 INSULIN 300 unit/mL (1.5 mL) InPn pen SMARTSI Unit(s) SUB-Q Daily     OBJECTIVE     /70 (BP Location: Right arm, Patient Position: Sitting, BP Method: Medium (Automatic))   Pulse 81   Temp 96.7 °F (35.9 °C) (Temporal)   Resp 16   Ht 5' 1" (1.549 m)   Wt 53.3 kg (117 lb 8.1 oz)   SpO2 98%   BMI 22.20 kg/m²     Physical Exam  Constitutional:       General: She is awake. She is not in acute distress.     Appearance: Normal appearance. She is not ill-appearing.   HENT:      Mouth/Throat:      Mouth: Mucous membranes are moist.      Pharynx: Oropharynx is clear. No oropharyngeal exudate or posterior oropharyngeal erythema.   Cardiovascular:      Rate and Rhythm: Normal rate.      Pulses: Normal pulses.   Pulmonary:      Effort: " Pulmonary effort is normal.   Abdominal:      General: There is no distension.      Palpations: Abdomen is soft.      Tenderness: There is no abdominal tenderness.   Musculoskeletal:      Cervical back: Neck supple. No tenderness.   Lymphadenopathy:      Cervical: No cervical adenopathy.   Neurological:      General: No focal deficit present.      Mental Status: She is alert. Mental status is at baseline.   Skin:     General: Skin is warm and dry.   Psychiatric:         Mood and Affect: Mood normal.         Behavior: Behavior normal. Behavior is cooperative.   Vitals reviewed.     ASSESSMENT AND PLAN   1. Malignant neoplasm of right ovary    2. Peritoneal carcinomatosis    3. Examination prior to chemotherapy      Labs reviewed. Pt examined. OK to treat.  CBC, CMP,  and pre-treatment visit every cycle.    Plan for surgery after cycle #6 with anticipated LAR at the time of resection to reach NGR.     Return to clinic in 3 weeks prior to next cycle or sooner if needed     ANNALISE FinchP-C  Gynecologic Oncology

## 2024-07-23 ENCOUNTER — INFUSION (OUTPATIENT)
Dept: INFUSION THERAPY | Facility: HOSPITAL | Age: 74
End: 2024-07-23
Attending: OBSTETRICS & GYNECOLOGY
Payer: MEDICARE

## 2024-07-23 ENCOUNTER — OFFICE VISIT (OUTPATIENT)
Dept: GYNECOLOGIC ONCOLOGY | Facility: CLINIC | Age: 74
End: 2024-07-23
Payer: MEDICARE

## 2024-07-23 VITALS
TEMPERATURE: 97 F | RESPIRATION RATE: 16 BRPM | OXYGEN SATURATION: 99 % | BODY MASS INDEX: 22.19 KG/M2 | HEIGHT: 61 IN | DIASTOLIC BLOOD PRESSURE: 52 MMHG | SYSTOLIC BLOOD PRESSURE: 119 MMHG | WEIGHT: 117.5 LBS | HEART RATE: 82 BPM

## 2024-07-23 VITALS
WEIGHT: 117.5 LBS | OXYGEN SATURATION: 98 % | HEART RATE: 81 BPM | HEIGHT: 61 IN | SYSTOLIC BLOOD PRESSURE: 125 MMHG | RESPIRATION RATE: 16 BRPM | BODY MASS INDEX: 22.19 KG/M2 | TEMPERATURE: 97 F | DIASTOLIC BLOOD PRESSURE: 70 MMHG

## 2024-07-23 DIAGNOSIS — C78.6 PERITONEAL CARCINOMATOSIS: ICD-10-CM

## 2024-07-23 DIAGNOSIS — Z01.818 EXAMINATION PRIOR TO CHEMOTHERAPY: ICD-10-CM

## 2024-07-23 DIAGNOSIS — I26.99 ACUTE PULMONARY EMBOLISM, UNSPECIFIED PULMONARY EMBOLISM TYPE, UNSPECIFIED WHETHER ACUTE COR PULMONALE PRESENT: ICD-10-CM

## 2024-07-23 DIAGNOSIS — C78.6 PERITONEAL CARCINOMATOSIS: Primary | ICD-10-CM

## 2024-07-23 DIAGNOSIS — C56.1 MALIGNANT NEOPLASM OF RIGHT OVARY: Primary | ICD-10-CM

## 2024-07-23 LAB — CANCER AG125 SERPL-ACNC: 54 U/ML (ref 0–30)

## 2024-07-23 PROCEDURE — 96413 CHEMO IV INFUSION 1 HR: CPT | Mod: PN

## 2024-07-23 PROCEDURE — 63600175 PHARM REV CODE 636 W HCPCS: Mod: PN

## 2024-07-23 PROCEDURE — 96375 TX/PRO/DX INJ NEW DRUG ADDON: CPT | Mod: PN

## 2024-07-23 PROCEDURE — 99215 OFFICE O/P EST HI 40 MIN: CPT | Mod: S$PBB,24,,

## 2024-07-23 PROCEDURE — 99214 OFFICE O/P EST MOD 30 MIN: CPT | Mod: PBBFAC,25,PN

## 2024-07-23 PROCEDURE — 25000003 PHARM REV CODE 250: Mod: PN

## 2024-07-23 PROCEDURE — 99999 PR PBB SHADOW E&M-EST. PATIENT-LVL IV: CPT | Mod: PBBFAC,,,

## 2024-07-23 PROCEDURE — 96367 TX/PROPH/DG ADDL SEQ IV INF: CPT | Mod: PN

## 2024-07-23 PROCEDURE — 96415 CHEMO IV INFUSION ADDL HR: CPT | Mod: PN

## 2024-07-23 PROCEDURE — A4216 STERILE WATER/SALINE, 10 ML: HCPCS | Mod: PN

## 2024-07-23 PROCEDURE — 96417 CHEMO IV INFUS EACH ADDL SEQ: CPT | Mod: PN

## 2024-07-23 RX ORDER — FAMOTIDINE 10 MG/ML
20 INJECTION INTRAVENOUS
Status: CANCELLED | OUTPATIENT
Start: 2024-07-23

## 2024-07-23 RX ORDER — PROCHLORPERAZINE EDISYLATE 5 MG/ML
5 INJECTION INTRAMUSCULAR; INTRAVENOUS ONCE AS NEEDED
Status: CANCELLED | OUTPATIENT
Start: 2024-07-23

## 2024-07-23 RX ORDER — HEPARIN 100 UNIT/ML
500 SYRINGE INTRAVENOUS
Status: CANCELLED | OUTPATIENT
Start: 2024-07-23

## 2024-07-23 RX ORDER — FAMOTIDINE 10 MG/ML
20 INJECTION INTRAVENOUS
Status: COMPLETED | OUTPATIENT
Start: 2024-07-23 | End: 2024-07-23

## 2024-07-23 RX ORDER — HEPARIN 100 UNIT/ML
500 SYRINGE INTRAVENOUS
Status: DISCONTINUED | OUTPATIENT
Start: 2024-07-23 | End: 2024-07-23 | Stop reason: HOSPADM

## 2024-07-23 RX ORDER — PROCHLORPERAZINE EDISYLATE 5 MG/ML
5 INJECTION INTRAMUSCULAR; INTRAVENOUS ONCE AS NEEDED
Status: DISCONTINUED | OUTPATIENT
Start: 2024-07-23 | End: 2024-07-23 | Stop reason: HOSPADM

## 2024-07-23 RX ORDER — DIPHENHYDRAMINE HYDROCHLORIDE 50 MG/ML
50 INJECTION INTRAMUSCULAR; INTRAVENOUS ONCE AS NEEDED
Status: DISCONTINUED | OUTPATIENT
Start: 2024-07-23 | End: 2024-07-23 | Stop reason: HOSPADM

## 2024-07-23 RX ORDER — EPINEPHRINE 0.3 MG/.3ML
0.3 INJECTION SUBCUTANEOUS ONCE AS NEEDED
Status: DISCONTINUED | OUTPATIENT
Start: 2024-07-23 | End: 2024-07-23 | Stop reason: HOSPADM

## 2024-07-23 RX ORDER — ACETAMINOPHEN 500 MG
1000 TABLET ORAL
Status: CANCELLED
Start: 2024-07-23

## 2024-07-23 RX ORDER — DIPHENHYDRAMINE HYDROCHLORIDE 50 MG/ML
50 INJECTION INTRAMUSCULAR; INTRAVENOUS ONCE AS NEEDED
Status: CANCELLED | OUTPATIENT
Start: 2024-07-23

## 2024-07-23 RX ORDER — LORAZEPAM 2 MG/ML
0.5 INJECTION INTRAMUSCULAR
Status: CANCELLED
Start: 2024-07-23

## 2024-07-23 RX ORDER — SODIUM CHLORIDE 0.9 % (FLUSH) 0.9 %
10 SYRINGE (ML) INJECTION
Status: CANCELLED | OUTPATIENT
Start: 2024-07-23

## 2024-07-23 RX ORDER — LORAZEPAM 2 MG/ML
0.5 INJECTION INTRAMUSCULAR
Status: DISCONTINUED | OUTPATIENT
Start: 2024-07-23 | End: 2024-07-23 | Stop reason: HOSPADM

## 2024-07-23 RX ORDER — EPINEPHRINE 0.3 MG/.3ML
0.3 INJECTION SUBCUTANEOUS ONCE AS NEEDED
Status: CANCELLED | OUTPATIENT
Start: 2024-07-23

## 2024-07-23 RX ORDER — SODIUM CHLORIDE 0.9 % (FLUSH) 0.9 %
10 SYRINGE (ML) INJECTION
Status: DISCONTINUED | OUTPATIENT
Start: 2024-07-23 | End: 2024-07-23 | Stop reason: HOSPADM

## 2024-07-23 RX ORDER — ACETAMINOPHEN 500 MG
1000 TABLET ORAL
Status: COMPLETED | OUTPATIENT
Start: 2024-07-23 | End: 2024-07-23

## 2024-07-23 RX ADMIN — ACETAMINOPHEN 1000 MG: 500 TABLET, FILM COATED ORAL at 09:07

## 2024-07-23 RX ADMIN — FAMOTIDINE 20 MG: 10 INJECTION INTRAVENOUS at 10:07

## 2024-07-23 RX ADMIN — SODIUM CHLORIDE: 9 INJECTION, SOLUTION INTRAVENOUS at 10:07

## 2024-07-23 RX ADMIN — Medication 10 ML: at 09:07

## 2024-07-23 RX ADMIN — DIPHENHYDRAMINE HYDROCHLORIDE 50 MG: 50 INJECTION, SOLUTION INTRAMUSCULAR; INTRAVENOUS at 10:07

## 2024-07-23 RX ADMIN — CARBOPLATIN 450 MG: 10 INJECTION, SOLUTION INTRAVENOUS at 02:07

## 2024-07-23 RX ADMIN — PACLITAXEL 210 MG: 6 INJECTION, SOLUTION INTRAVENOUS at 11:07

## 2024-07-23 RX ADMIN — DEXAMETHASONE SODIUM PHOSPHATE 0.25 MG: 10 INJECTION, SOLUTION INTRAMUSCULAR; INTRAVENOUS at 09:07

## 2024-07-23 RX ADMIN — APREPITANT 130 MG: 130 INJECTION, EMULSION INTRAVENOUS at 10:07

## 2024-07-23 NOTE — PLAN OF CARE
Problem: Fatigue  Goal: Improved Activity Tolerance  Outcome: Progressing  Intervention: Promote Improved Energy  Flowsheets (Taken 7/23/2024 1110)  Fatigue Management:   activity schedule adjusted   fatigue-related activity identified   frequent rest breaks encouraged     Problem: Adult Inpatient Plan of Care  Goal: Plan of Care Review  Outcome: Met

## 2024-08-06 ENCOUNTER — OFFICE VISIT (OUTPATIENT)
Dept: INTERNAL MEDICINE | Facility: CLINIC | Age: 74
End: 2024-08-06
Payer: MEDICARE

## 2024-08-06 DIAGNOSIS — K63.89 COLONIC MASS: ICD-10-CM

## 2024-08-06 DIAGNOSIS — F11.90 OPIOID USE: ICD-10-CM

## 2024-08-06 DIAGNOSIS — Z86.711 HISTORY OF PULMONARY EMBOLISM: ICD-10-CM

## 2024-08-06 DIAGNOSIS — K59.00 CONSTIPATION, UNSPECIFIED CONSTIPATION TYPE: ICD-10-CM

## 2024-08-06 DIAGNOSIS — E03.9 HYPOTHYROIDISM, UNSPECIFIED TYPE: ICD-10-CM

## 2024-08-06 DIAGNOSIS — Z86.16 HISTORY OF COVID-19: ICD-10-CM

## 2024-08-06 DIAGNOSIS — N83.8 OVARIAN MASS: ICD-10-CM

## 2024-08-06 DIAGNOSIS — R74.8: ICD-10-CM

## 2024-08-06 DIAGNOSIS — R53.1 GENERALIZED WEAKNESS: ICD-10-CM

## 2024-08-06 DIAGNOSIS — Z79.4 TYPE 2 DIABETES MELLITUS WITH DIABETIC NEUROPATHY, WITH LONG-TERM CURRENT USE OF INSULIN: ICD-10-CM

## 2024-08-06 DIAGNOSIS — Z01.818 PREOP EXAMINATION: Primary | ICD-10-CM

## 2024-08-06 DIAGNOSIS — E78.00 PURE HYPERCHOLESTEROLEMIA: ICD-10-CM

## 2024-08-06 DIAGNOSIS — I10 HYPERTENSION, UNSPECIFIED TYPE: ICD-10-CM

## 2024-08-06 DIAGNOSIS — E11.40 TYPE 2 DIABETES MELLITUS WITH DIABETIC NEUROPATHY, WITH LONG-TERM CURRENT USE OF INSULIN: ICD-10-CM

## 2024-08-06 PROBLEM — R63.0 DECREASED APPETITE: Status: RESOLVED | Noted: 2024-07-01 | Resolved: 2024-08-06

## 2024-08-08 ENCOUNTER — TELEPHONE (OUTPATIENT)
Dept: GYNECOLOGIC ONCOLOGY | Facility: CLINIC | Age: 74
End: 2024-08-08
Payer: MEDICARE

## 2024-08-08 DIAGNOSIS — N83.8 OVARIAN MASS: ICD-10-CM

## 2024-08-08 DIAGNOSIS — R19.00 PELVIC MASS: ICD-10-CM

## 2024-08-08 DIAGNOSIS — R97.1 ELEVATED CANCER ANTIGEN 125 (CA 125): ICD-10-CM

## 2024-08-08 DIAGNOSIS — C78.6 PERITONEAL CARCINOMATOSIS: ICD-10-CM

## 2024-08-08 DIAGNOSIS — C56.1 MALIGNANT NEOPLASM OF RIGHT OVARY: Primary | ICD-10-CM

## 2024-08-08 DIAGNOSIS — K63.89 COLONIC MASS: ICD-10-CM

## 2024-08-09 ENCOUNTER — PATIENT MESSAGE (OUTPATIENT)
Dept: PALLIATIVE MEDICINE | Facility: CLINIC | Age: 74
End: 2024-08-09
Payer: MEDICARE

## 2024-08-11 ENCOUNTER — PATIENT MESSAGE (OUTPATIENT)
Dept: GYNECOLOGIC ONCOLOGY | Facility: CLINIC | Age: 74
End: 2024-08-11
Payer: MEDICARE

## 2024-08-12 ENCOUNTER — LAB VISIT (OUTPATIENT)
Dept: LAB | Facility: HOSPITAL | Age: 74
End: 2024-08-12
Attending: OBSTETRICS & GYNECOLOGY
Payer: MEDICARE

## 2024-08-12 ENCOUNTER — PATIENT MESSAGE (OUTPATIENT)
Dept: PALLIATIVE MEDICINE | Facility: CLINIC | Age: 74
End: 2024-08-12
Payer: MEDICARE

## 2024-08-12 DIAGNOSIS — C78.6 PERITONEAL CARCINOMATOSIS: ICD-10-CM

## 2024-08-12 DIAGNOSIS — R97.1 ELEVATED CANCER ANTIGEN 125 (CA 125): ICD-10-CM

## 2024-08-12 DIAGNOSIS — R19.00 PELVIC MASS: ICD-10-CM

## 2024-08-12 LAB
ALBUMIN SERPL BCP-MCNC: 3.8 G/DL (ref 3.5–5.2)
ALP SERPL-CCNC: 38 U/L (ref 55–135)
ALT SERPL W/O P-5'-P-CCNC: 12 U/L (ref 10–44)
ANION GAP SERPL CALC-SCNC: 14 MMOL/L (ref 8–16)
AST SERPL-CCNC: 20 U/L (ref 10–40)
BILIRUB SERPL-MCNC: 0.3 MG/DL (ref 0.1–1)
BUN SERPL-MCNC: 16 MG/DL (ref 8–23)
CALCIUM SERPL-MCNC: 10.1 MG/DL (ref 8.7–10.5)
CHLORIDE SERPL-SCNC: 100 MMOL/L (ref 95–110)
CO2 SERPL-SCNC: 23 MMOL/L (ref 23–29)
CREAT SERPL-MCNC: 0.7 MG/DL (ref 0.5–1.4)
ERYTHROCYTE [DISTWIDTH] IN BLOOD BY AUTOMATED COUNT: 14.4 % (ref 11.5–14.5)
EST. GFR  (NO RACE VARIABLE): >60 ML/MIN/1.73 M^2
GLUCOSE SERPL-MCNC: 161 MG/DL (ref 70–110)
HCT VFR BLD AUTO: 32.9 % (ref 37–48.5)
HGB BLD-MCNC: 11.5 G/DL (ref 12–16)
IMM GRANULOCYTES # BLD AUTO: 0.03 K/UL (ref 0–0.04)
MCH RBC QN AUTO: 33.3 PG (ref 27–31)
MCHC RBC AUTO-ENTMCNC: 35 G/DL (ref 32–36)
MCV RBC AUTO: 95 FL (ref 82–98)
NEUTROPHILS # BLD AUTO: 2.8 K/UL (ref 1.8–7.7)
PLATELET # BLD AUTO: 201 K/UL (ref 150–450)
PMV BLD AUTO: 8.3 FL (ref 9.2–12.9)
POTASSIUM SERPL-SCNC: 5.3 MMOL/L (ref 3.5–5.1)
PROT SERPL-MCNC: 7.3 G/DL (ref 6–8.4)
RBC # BLD AUTO: 3.45 M/UL (ref 4–5.4)
SODIUM SERPL-SCNC: 137 MMOL/L (ref 136–145)
WBC # BLD AUTO: 6.14 K/UL (ref 3.9–12.7)

## 2024-08-12 PROCEDURE — 86304 IMMUNOASSAY TUMOR CA 125: CPT | Performed by: OBSTETRICS & GYNECOLOGY

## 2024-08-12 PROCEDURE — 85027 COMPLETE CBC AUTOMATED: CPT | Mod: PO | Performed by: OBSTETRICS & GYNECOLOGY

## 2024-08-12 PROCEDURE — 36415 COLL VENOUS BLD VENIPUNCTURE: CPT | Mod: PO | Performed by: OBSTETRICS & GYNECOLOGY

## 2024-08-12 PROCEDURE — 80053 COMPREHEN METABOLIC PANEL: CPT | Mod: PO | Performed by: OBSTETRICS & GYNECOLOGY

## 2024-08-12 NOTE — PROGRESS NOTES
SUBJECTIVE     Chief complaint:  Ovarian Cancer Pre-treatment Carbo/Taxol ; Surgery Planning and discussion     History of present Illness:  Bella Meyer is a 73 y.o.  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 Oncology history below.  Dose reduced cycle 2 to 135mg/m2 and AUC5 given persistent fatigue.   Incidental finding of asymptomatic PE on 24 CT. Denies chest pain or shortness of breath. On Elliquis. Delayed interval surgery for resolution.  Had initial consultation with Dr. Caicedo for possible colectomy at the time of surgery- Ex lap/FANNY/BSO/OMX/debulking planned for 9/3/24.   Has had worsening abdominal and pelvic pain over the weekend. Palliative managing and has made adjustments to pain regimen. Having bowel function. Intermittent nausea, no emesis.   She denies fever, chills, chest pain, nausea, vomiting. Normal bladder function. Intermittent constipation which is her baseline.      Oncology History   Peritoneal carcinomatosis   2024 Initial Diagnosis    Peritoneal carcinomatosis     2024 -  Chemotherapy    Treatment Summary   Plan Name: OP GYN paclitaxel CARBOplatin (AUC 6) Q3W  Treatment Goal: Curative  Status: Active  Start Date: 2024  End Date: 2025 (Planned)  Provider: Maliha Crawford MD  Chemotherapy: CARBOplatin (PARAPLATIN) 410 mg in sodium chloride 0.9% 326 mL chemo infusion, 410 mg (100 % of original dose 411 mg), Intravenous, Clinic/HOD 1 time, 5 of 17 cycles  Dose modification:   (original dose 411 mg, Cycle 1),   (original dose 379.5 mg, Cycle 2),   (original dose 411 mg, Cycle 3, Reason: Dose not tolerated),   (original dose 452 mg, Cycle 4)  Administration: 410 mg (2024), 380 mg (2024), 410 mg (2024), 450 mg (2024), 450 mg (2024)  PACLitaxeL (TAXOL) 175 mg/m2 = 276 mg in sodium chloride 0.9% 250 mL chemo  infusion, 175 mg/m2 = 276 mg (100 % of original dose 175 mg/m2), Intravenous, Clinic/HOD 1 time, 5 of 17 cycles  Dose modification: 135 mg/m2 (original dose 175 mg/m2, Cycle 1, Reason: MD Discretion), 175 mg/m2 (original dose 175 mg/m2, Cycle 1)  Administration: 276 mg (4/30/2024), 210 mg (5/21/2024), 210 mg (6/11/2024), 210 mg (7/2/2024), 210 mg (7/23/2024)     Malignant neoplasm of ovary   4/23/2024 Initial Diagnosis    Malignant neoplasm of ovary  Referred by Dr. Evans for evaluation of a pelvic mass, peritoneal carcinomatosis and elevated .     3/24/24 CT AP- Abnormal inflammatory changes in the lower pelvis associated with the distal sigmoid colon. Tubular or channel-like structure along the posterior margin of the lower descending colon possibly connecting between the sigmoid and rectal regions. Underlying neoplasm and/or fistula not excluded. Mildly enlarged left iliac bifurcation lymph nodes noted. No drainable abscess. There is also abnormal appearance of the descending colon and several small soft tissue nodules in the pericolonic tissues of the descending colon. Malignant neoplasm should be considered.     4/9/24 TVUS- Grossly unremarkable appearance of the uterus. The left ovary is not seen sonographically. Enlarged right ovary at 6.6 cm with several cysts the largest 3.4 cm with some thin septations and blood flow to the ovary. Trace fluid adjacent to the ovary. No gross ascitic fluid.     4/9/24 - 188    4/18/24 CT CAP- Diffuse peritoneal carcinomatosis. Right adnexal mass (6 cm) which could be the primary neoplasm/ovarian cancer. Sigmoid colon thickening which could be the primary neoplasm/colon cancer.   Index implant left anterior abdomen 2 cm.   Index left pelvic sidewall implant 1.5 cm.    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  Positive pulmonary emboli within the right lower lobe segmental branches, nonocclusive.  No right heart strain.  Persistent sigmoid wall thickening.     8/12/2024 Tumor Markers    =46       Review of Systems   Constitutional:  Positive for appetite change and fatigue. Negative for fever and unexpected weight change.   HENT:  Negative.  Negative for mouth sores and nosebleeds.    Eyes: Negative.    Respiratory: Negative.  Negative for chest tightness, cough and shortness of breath.    Cardiovascular: Negative.  Negative for chest pain, leg swelling and palpitations.   Gastrointestinal:  Positive for abdominal pain. Negative for abdominal distention, constipation, diarrhea and nausea.   Endocrine: Negative.    Genitourinary:  Positive for pelvic pain. Negative for difficulty urinating, vaginal bleeding and vaginal discharge.    Skin: Negative.    Neurological: Negative.  Negative for headaches.   Hematological: Negative.  Negative for adenopathy.   Psychiatric/Behavioral: Negative.     All other systems reviewed and are negative.     Review of patient's allergies indicates:  No Known Allergies  Current Outpatient Medications   Medication Instructions    adhesive remover (UNISOLVE ADHESIVE REMOVER WIPE) Misc One every 10 days    amLODIPine (NORVASC) 5 MG tablet 1 tablet, Oral, Every morning    apixaban (ELIQUIS) 5 mg, Oral, 2 times daily    blood-glucose meter,continuous (DEXCOM G7 ) Misc Use daily    blood-glucose sensor (DEXCOM G7 SENSOR) Charito Use every 10 days    dexAMETHasone (DECADRON) 4 MG Tab Take 8 mg (2 tablets) by mouth  "daily on days 2-4 of each chemotherapy cycle.    DULoxetine (CYMBALTA) 30 mg, Oral, Daily    EScitalopram oxalate (LEXAPRO) 10 mg, Oral, Daily    HYDROcodone-acetaminophen (NORCO) 7.5-325 mg per tablet 1 tablet, Oral, Every 4 hours PRN    levothyroxine (SYNTHROID) 75 MCG tablet 1 tablet, Oral, Every morning    LYUMJEV KWIKPEN U-100 INSULIN 2-10 Units, Subcutaneous, 3 times daily with meals    metFORMIN (GLUCOPHAGE) 1,000 mg, Oral, 2 times daily with meals    metoprolol succinate (TOPROL-XL) 50 MG 24 hr tablet 1 tablet, Oral, Every morning    morphine (MS CONTIN) 15 mg, Oral, 2 times daily    naloxone (NARCAN) 4 mg, Nasal, Once as needed    OLANZapine (ZYPREXA) 5 MG tablet Take 1 tablet by mouth nightly on days 1-4 of each chemotherapy cycle.    ondansetron (ZOFRAN-ODT) 4 MG TbDL DISSOLVE ONE TABLET BY MOUTH EVERY 4 TO 6 HOURS AS NEEDED FOR NAUSEA    oxyCODONE (ROXICODONE) 20 mg, Oral, Every 4 hours PRN    pravastatin (PRAVACHOL) 10 MG tablet 1 tablet, Oral, Nightly    prochlorperazine (COMPAZINE) 5 mg, Oral, Every 6 hours PRN    senna (SENOKOT) 8.6 mg tablet 1 tablet, Oral, 2 times daily    TOUJEO SOLOSTAR U-300 INSULIN 8 Units, Daily     OBJECTIVE     /64 (BP Location: Right arm, Patient Position: Sitting, BP Method: Small (Manual))   Pulse 78   Temp 97 °F (36.1 °C) (Temporal)   Resp 16   Ht 5' 1" (1.549 m)   Wt 52.5 kg (115 lb 11.9 oz)   SpO2 99%   BMI 21.87 kg/m²     Physical Exam  Constitutional:       General: She is awake. She is not in acute distress.     Appearance: Normal appearance. She is not ill-appearing.     Genitourinary:    Vagina and uterus normal.   Right adnexum displays fullness. Right adnexum displays no tenderness. Left adnexum displays fullness. Left adnexum displays no tenderness.    Adnexa exam comments: Exam much improved. Smooth posterior cul de sac. No rectovaginal nodularity remains. No rectal mass. .   HENT:      Mouth/Throat:      Mouth: Mucous membranes are moist.      " Pharynx: Oropharynx is clear. No oropharyngeal exudate or posterior oropharyngeal erythema.   Cardiovascular:      Rate and Rhythm: Normal rate.      Pulses: Normal pulses.   Pulmonary:      Effort: Pulmonary effort is normal.   Abdominal:      General: There is no distension.      Palpations: Abdomen is soft.      Tenderness: There is no abdominal tenderness.   Musculoskeletal:      Cervical back: Neck supple. No tenderness.   Lymphadenopathy:      Cervical: No cervical adenopathy.   Neurological:      General: No focal deficit present.      Mental Status: She is alert. Mental status is at baseline.   Skin:     General: Skin is warm and dry.   Psychiatric:         Mood and Affect: Mood normal.         Behavior: Behavior normal. Behavior is cooperative.   Vitals reviewed.       ASSESSMENT AND PLAN   1. Malignant neoplasm of right ovary  - CT Chest Abdomen Pelvis W W/O Contrast (XPD); Future    2. Peritoneal carcinomatosis  - CT Chest Abdomen Pelvis W W/O Contrast (XPD); Future    3. Examination prior to chemotherapy    4. Type 2 diabetes mellitus with diabetic neuropathy, with long-term current use of insulin    5. History of pulmonary embolism    Labs reviewed. Pt examined. Abdomen soft and non distended today, no peritoneal signs. Pelvic exam with resolution of nodularity. She is afebrile and with normal vitals. Labs are all within normal ranges. Her  decreasing.  Unsure on cause of intermittent pain she has, but does not appear to be acute process. Will obtain CT CAP this week for disease evaluation prior to surgery.  OK to treat Cycle #6.    Plan for surgery 9/3/24. Recommendation is for ExLap/FANNY/BSO/ omentectomy/possible cytoreductive surgery including bowel resection if necessary.  We reviewed risks of surgery including bleeding, need for transfusion, infection, trauma to surrounding structures (blood vessels, nerves, organs including bowel, bladder, ureters), cardiac events, VTE and even death. If bowel  resection, can have leak or anastomotic breakdown. She understands and agrees to proceed. She was given the opportunity to ask questions, and stated all had been answered. Consents signed.  Dr. Cancino and team following for pre-op optimization. Appreciate greatly.      Maliha Crawford MD  Gynecologic Oncology

## 2024-08-12 NOTE — TELEPHONE ENCOUNTER
Pt daughter, Eugenia Campbell, contacted to discuss pt care. Eugenia updated navigator that Dr Blum worked with them all weekend to assist with her mother's pain control. Eugenia updated that Dr Crawford does not recommend moving up the patients scheduled surgery but would recommend additional imaging to explore the source of the pain. Pt scheduled to see Dr Crawford tomorrow. Navigator encouraged Eugenia to keep appointment tomorrow and Dr Crawford can discuss C6 chemotherapy, surgery, imaging, and pt pain/stregnth in detail. Eugenia verbalized understanding.

## 2024-08-13 ENCOUNTER — OFFICE VISIT (OUTPATIENT)
Dept: GYNECOLOGIC ONCOLOGY | Facility: CLINIC | Age: 74
End: 2024-08-13
Payer: MEDICARE

## 2024-08-13 ENCOUNTER — INFUSION (OUTPATIENT)
Dept: INFUSION THERAPY | Facility: HOSPITAL | Age: 74
End: 2024-08-13
Attending: OBSTETRICS & GYNECOLOGY
Payer: MEDICARE

## 2024-08-13 ENCOUNTER — DOCUMENTATION ONLY (OUTPATIENT)
Dept: INFUSION THERAPY | Facility: HOSPITAL | Age: 74
End: 2024-08-13
Payer: MEDICARE

## 2024-08-13 VITALS
DIASTOLIC BLOOD PRESSURE: 64 MMHG | HEIGHT: 61 IN | RESPIRATION RATE: 16 BRPM | TEMPERATURE: 97 F | OXYGEN SATURATION: 99 % | BODY MASS INDEX: 21.85 KG/M2 | WEIGHT: 115.75 LBS | HEART RATE: 78 BPM | SYSTOLIC BLOOD PRESSURE: 124 MMHG

## 2024-08-13 VITALS
HEIGHT: 61 IN | RESPIRATION RATE: 18 BRPM | BODY MASS INDEX: 21.85 KG/M2 | WEIGHT: 115.75 LBS | HEART RATE: 79 BPM | SYSTOLIC BLOOD PRESSURE: 116 MMHG | TEMPERATURE: 97 F | DIASTOLIC BLOOD PRESSURE: 67 MMHG | OXYGEN SATURATION: 97 %

## 2024-08-13 DIAGNOSIS — C78.6 PERITONEAL CARCINOMATOSIS: Primary | ICD-10-CM

## 2024-08-13 DIAGNOSIS — Z01.818 EXAMINATION PRIOR TO CHEMOTHERAPY: ICD-10-CM

## 2024-08-13 DIAGNOSIS — C56.1 MALIGNANT NEOPLASM OF RIGHT OVARY: Primary | ICD-10-CM

## 2024-08-13 DIAGNOSIS — E11.40 TYPE 2 DIABETES MELLITUS WITH DIABETIC NEUROPATHY, WITH LONG-TERM CURRENT USE OF INSULIN: ICD-10-CM

## 2024-08-13 DIAGNOSIS — C78.6 PERITONEAL CARCINOMATOSIS: ICD-10-CM

## 2024-08-13 DIAGNOSIS — Z86.711 HISTORY OF PULMONARY EMBOLISM: ICD-10-CM

## 2024-08-13 DIAGNOSIS — Z79.4 TYPE 2 DIABETES MELLITUS WITH DIABETIC NEUROPATHY, WITH LONG-TERM CURRENT USE OF INSULIN: ICD-10-CM

## 2024-08-13 LAB — CANCER AG125 SERPL-ACNC: 37 U/ML (ref 0–30)

## 2024-08-13 PROCEDURE — 96415 CHEMO IV INFUSION ADDL HR: CPT | Mod: PN

## 2024-08-13 PROCEDURE — 96375 TX/PRO/DX INJ NEW DRUG ADDON: CPT | Mod: PN

## 2024-08-13 PROCEDURE — 99215 OFFICE O/P EST HI 40 MIN: CPT | Mod: PBBFAC,PN | Performed by: OBSTETRICS & GYNECOLOGY

## 2024-08-13 PROCEDURE — 63600175 PHARM REV CODE 636 W HCPCS: Mod: JZ,JG,PN | Performed by: OBSTETRICS & GYNECOLOGY

## 2024-08-13 PROCEDURE — 96413 CHEMO IV INFUSION 1 HR: CPT | Mod: PN

## 2024-08-13 PROCEDURE — 96417 CHEMO IV INFUS EACH ADDL SEQ: CPT | Mod: PN

## 2024-08-13 PROCEDURE — 99999 PR PBB SHADOW E&M-EST. PATIENT-LVL V: CPT | Mod: PBBFAC,,, | Performed by: OBSTETRICS & GYNECOLOGY

## 2024-08-13 PROCEDURE — A4216 STERILE WATER/SALINE, 10 ML: HCPCS | Mod: PN | Performed by: OBSTETRICS & GYNECOLOGY

## 2024-08-13 PROCEDURE — 96367 TX/PROPH/DG ADDL SEQ IV INF: CPT | Mod: PN

## 2024-08-13 PROCEDURE — 99459 PELVIC EXAMINATION: CPT | Mod: PBBFAC,PN | Performed by: OBSTETRICS & GYNECOLOGY

## 2024-08-13 PROCEDURE — 25000003 PHARM REV CODE 250: Mod: PN | Performed by: OBSTETRICS & GYNECOLOGY

## 2024-08-13 RX ORDER — EPINEPHRINE 0.3 MG/.3ML
0.3 INJECTION SUBCUTANEOUS ONCE AS NEEDED
Status: DISCONTINUED | OUTPATIENT
Start: 2024-08-13 | End: 2024-08-13 | Stop reason: HOSPADM

## 2024-08-13 RX ORDER — HEPARIN 100 UNIT/ML
500 SYRINGE INTRAVENOUS
Status: DISCONTINUED | OUTPATIENT
Start: 2024-08-13 | End: 2024-08-13 | Stop reason: HOSPADM

## 2024-08-13 RX ORDER — DIPHENHYDRAMINE HYDROCHLORIDE 50 MG/ML
50 INJECTION INTRAMUSCULAR; INTRAVENOUS ONCE AS NEEDED
Status: CANCELLED | OUTPATIENT
Start: 2024-08-13

## 2024-08-13 RX ORDER — EPINEPHRINE 0.3 MG/.3ML
0.3 INJECTION SUBCUTANEOUS ONCE AS NEEDED
Status: CANCELLED | OUTPATIENT
Start: 2024-08-13

## 2024-08-13 RX ORDER — HEPARIN 100 UNIT/ML
500 SYRINGE INTRAVENOUS
Status: CANCELLED | OUTPATIENT
Start: 2024-08-13

## 2024-08-13 RX ORDER — FAMOTIDINE 10 MG/ML
20 INJECTION INTRAVENOUS
Status: COMPLETED | OUTPATIENT
Start: 2024-08-13 | End: 2024-08-13

## 2024-08-13 RX ORDER — ACETAMINOPHEN 500 MG
1000 TABLET ORAL
Status: CANCELLED
Start: 2024-08-13

## 2024-08-13 RX ORDER — LORAZEPAM 2 MG/ML
0.5 INJECTION INTRAMUSCULAR
Status: DISCONTINUED | OUTPATIENT
Start: 2024-08-13 | End: 2024-08-13 | Stop reason: HOSPADM

## 2024-08-13 RX ORDER — ACETAMINOPHEN 500 MG
1000 TABLET ORAL
Status: DISCONTINUED | OUTPATIENT
Start: 2024-08-13 | End: 2024-08-13 | Stop reason: HOSPADM

## 2024-08-13 RX ORDER — LORAZEPAM 2 MG/ML
0.5 INJECTION INTRAMUSCULAR
Status: CANCELLED
Start: 2024-08-13

## 2024-08-13 RX ORDER — SODIUM CHLORIDE 0.9 % (FLUSH) 0.9 %
10 SYRINGE (ML) INJECTION
Status: DISCONTINUED | OUTPATIENT
Start: 2024-08-13 | End: 2024-08-13 | Stop reason: HOSPADM

## 2024-08-13 RX ORDER — FAMOTIDINE 10 MG/ML
20 INJECTION INTRAVENOUS
Status: CANCELLED | OUTPATIENT
Start: 2024-08-13

## 2024-08-13 RX ORDER — PROCHLORPERAZINE EDISYLATE 5 MG/ML
5 INJECTION INTRAMUSCULAR; INTRAVENOUS ONCE AS NEEDED
Status: DISCONTINUED | OUTPATIENT
Start: 2024-08-13 | End: 2024-08-13 | Stop reason: HOSPADM

## 2024-08-13 RX ORDER — DIPHENHYDRAMINE HYDROCHLORIDE 50 MG/ML
50 INJECTION INTRAMUSCULAR; INTRAVENOUS ONCE AS NEEDED
Status: DISCONTINUED | OUTPATIENT
Start: 2024-08-13 | End: 2024-08-13 | Stop reason: HOSPADM

## 2024-08-13 RX ORDER — SODIUM CHLORIDE 0.9 % (FLUSH) 0.9 %
10 SYRINGE (ML) INJECTION
Status: CANCELLED | OUTPATIENT
Start: 2024-08-13

## 2024-08-13 RX ORDER — PROCHLORPERAZINE EDISYLATE 5 MG/ML
5 INJECTION INTRAMUSCULAR; INTRAVENOUS ONCE AS NEEDED
Status: CANCELLED | OUTPATIENT
Start: 2024-08-13

## 2024-08-13 RX ADMIN — DEXAMETHASONE SODIUM PHOSPHATE 0.25 MG: 10 INJECTION, SOLUTION INTRAMUSCULAR; INTRAVENOUS at 11:08

## 2024-08-13 RX ADMIN — FAMOTIDINE 20 MG: 10 INJECTION INTRAVENOUS at 12:08

## 2024-08-13 RX ADMIN — Medication 10 ML: at 04:08

## 2024-08-13 RX ADMIN — DIPHENHYDRAMINE HYDROCHLORIDE 50 MG: 50 INJECTION, SOLUTION INTRAMUSCULAR; INTRAVENOUS at 11:08

## 2024-08-13 RX ADMIN — PACLITAXEL 210 MG: 6 INJECTION, SOLUTION INTRAVENOUS at 12:08

## 2024-08-13 RX ADMIN — CARBOPLATIN 450 MG: 10 INJECTION, SOLUTION INTRAVENOUS at 03:08

## 2024-08-13 RX ADMIN — APREPITANT 130 MG: 130 INJECTION, EMULSION INTRAVENOUS at 11:08

## 2024-08-13 RX ADMIN — SODIUM CHLORIDE: 9 INJECTION, SOLUTION INTRAVENOUS at 11:08

## 2024-08-13 NOTE — PLAN OF CARE
Problem: Adult Inpatient Plan of Care  Goal: Patient-Specific Goal (Individualized)  Outcome: Progressing  Flowsheets (Taken 8/13/2024 1305)  Individualized Care Needs: recliner, warm blankets  Anxieties, Fears or Concerns: pain, PIV start  Patient/Family-Specific Goals (Include Timeframe): no signs of reaction  Goal: Absence of Hospital-Acquired Illness or Injury  Outcome: Progressing  Goal: Optimal Comfort and Wellbeing  Outcome: Progressing  Goal: Readiness for Transition of Care  Outcome: Progressing     Problem: Pain Chronic (Persistent)  Goal: Optimal Pain Control and Function  Outcome: Progressing  Intervention: Develop Pain Management Plan  Flowsheets (Taken 8/13/2024 1305)  Pain Management Interventions: around-the-clock dosing utilized   Pt here for C6D1 Taxol/carbo, daughter with pt. Education given.   Cryo to wrists and ankles prior to Taxol and for duration of infusion. Pt tolerated well and discharged home via WC with daughter and .

## 2024-08-13 NOTE — PROGRESS NOTES
LALY met with pt and daughter at chairside. Pt informed SW this will be her last chemo before her surgery on 9/3/24. She will restart chemo after surgery. The pt is in some pain today stating her side is hurting. They are not sure the exact cause. Pts daughter said when she first started she was in so much pain and could not even drive in the car without hurting. SW, pt and daughter discussed how far the pt has come on her journey. Her daughter said they have seen improvements since starting care. She praised her mom's strength though all of this.     SW provided pt with a gas card. No other needs noted at this time.     Swathi Pedraza, CRISTHIANW

## 2024-08-15 ENCOUNTER — TELEPHONE (OUTPATIENT)
Dept: GYNECOLOGIC ONCOLOGY | Facility: CLINIC | Age: 74
End: 2024-08-15
Payer: MEDICARE

## 2024-08-16 ENCOUNTER — TELEPHONE (OUTPATIENT)
Dept: INTERNAL MEDICINE | Facility: CLINIC | Age: 74
End: 2024-08-16
Payer: MEDICARE

## 2024-08-16 NOTE — HPI
Corresponded with the surgeon  Plan to check on the pulmonary embolism with a CT chest on August 19th  Discussed with daughter , patient   Watch glucose

## 2024-08-16 NOTE — LETTER
August 16, 2024        Bella Meyer  [address]  [facility] [address]             [facility]  [address] [facility] [address]  Phone: [phone]   Patient: Bella Meyer   MR Number: [id number]   YOB: 1950   Date of Visit: 8/16/2024       Dear Dr. Meyer:    Thank you for referring Bella Meyer to me for evaluation. Below are the relevant portions of my assessment and plan of care.            If you have questions, please do not hesitate to call me. I look forward to following Bella along with you.    Sincerely,      Luda Cancino MD           CC  No Recipients

## 2024-08-16 NOTE — LETTER
August 16, 2024        Maliha Crawford MD  [address]  [facility] [address]             [facility]  [address] [facility] [address]  Phone: [phone]   Patient: Bella Meyer   MR Number: [id number]   YOB: 1950   Date of Visit: 8/16/2024       Dear Dr. Crawford:    Thank you for referring Bella Meyer to me for evaluation. Below are the relevant portions of my assessment and plan of care.            If you have questions, please do not hesitate to call me. I look forward to following Bella along with you.    Sincerely,      Luda Cancino MD CC [facility]  Kristen Blum, DO

## 2024-08-16 NOTE — PROGRESS NOTES
[facility]  Progress Note    Patient Name: Bella Meyer  MRN: [id number]  Date of Evaluation- 08/16/2024  PCP- No, Primary Doctor    Future cases for Bella Meyer [[id number]]       Case ID Status Date Time [place] Procedure Provider Location    [id number] [facility] 9/3/2024  7:00  LAPAROTOMY, EXPLORATORY Maliha Crawford MD [[id number]] [facility] OR 2ND F[facility]            HPI:  Corresponded with the surgeon  Plan to check on the pulmonary embolism with a CT chest on August 19th  Discussed with daughter , patient   Watch glucose

## 2024-08-16 NOTE — PROGRESS NOTES
[facility]  Progress Note    Patient Name: Bella Meyer  MRN: [id number]  Date of Evaluation- 08/16/2024  PCP- No, Primary Doctor    Future cases for Bella Meyer [[id number]]       Case ID Status Date Time [place] Procedure Provider Location    [id number] [facility] 9/3/2024  7:00  LAPAROTOMY, EXPLORATORY Maliha Crawford MD [[id number]] [facility] OR 2ND [facility]            HPI:    Virtual pre op evaluation   Audio only   Location [place] the [place]  Consent obtained for virtual evaluation     Each patient to whom he or she provides medical services by telemedicine is:  (1) informed of the relationship between the physician and patient and the respective role of any other health care provider with respect to management of the patient; and (2) notified that he or she may decline to receive medical services by telemedicine and may withdraw from such care at any time.    She is currently staying with her sister Ms Wilson and working on her strength    Last chemo 8/13 /2024  She did well with the chemotherapy    She has throbbing pain in both legs-for about 3 days   Noted palliative care MD note-  Chemotherapy induced peripheral neuropathy  Numbness and tingling of hands and legs    No swelling in the legs  No calf pain   She is no longer taking the long-acting morphine and is taking hydrocodone for the pain  Since she has been off of the long-acting morphine she is much more awake and participating with mobility with a walker and getting stronger and eating well  Since she has been off of the long-acting morphine, she is eating better and also having less problem with constipation  She was commenced on gabapentin for neuropathy    She is taking hydrocodone Tylenol every 5 hours ( 7.5- 325 mg )- total Tylenol dose through this combination medication is 1300- 1625 mg in 24 hours  Her sister asked me about using Tylenol in between the hydrocodone Tylenol use and I suggested limiting to the total Tylenol dose to 3000 mg in 24 hours  time  Suggested working with palliative care for pain control    Suggested avoidance of anti-inflammatory medication due to risk of ulceration and GI bleeding with anticoagulation on board    Compazine is making her confused-suggested avoidance and watch   She is no longer confused    Perioperative anticoagulation plan     Date of surgery - 9/3   On apixaban 5 mg twice a day  Plan for Bridging anticoagulation   Last dose of apixaban prior to surgery is on August 31st PM. dose  Creatinine clearance is 56  Weight 52.7 kg  Dose of Lovenox will be 1 milligram/kilogram body weight subcutaneously every 12 hours  3 doses prior to surgery  Lovenox- Dose 1- September 1st at 7:00 AM  Lovenox - Dose 2 - September 1st at 7:00 PM  Lovenox Dose 3 - sept 2 nd at 7 AM    Discussed that she needs anticoagulation for her pulmonary embolism but she also need surgery  Discussed that the plan is for minimal interruption of anticoagulation balancing the risk of bleeding from the blood thinner and not going too long without blood thinner  Discussed that we considered giving the Lovenox dose the night before surgery also but due to bleeding risk it was felt that the last dose should be 24 hours prior to surgery on September 2nd in the morning time  Discussed that with this approach she is going to be only without anticoagulation the night before surgery  Dose of heparin pre-operatively in holding and then every 8 hours after surgery for the first 24 hours. Once post op day #1 CBC is stable, then   resume lovenox.  Discussed that with bridging anticoagulation her time interval without therapeutic anticoagulation is reduced from 48 hours to 12 hours  Discussed that she is almost 2 months into anticoagulation    Her sister asked if we can check her for resolution of blood clot  Noted that she is having a CT chest and abdomen pelvis with and without contrast on 8/19   Given that she is already having CT chest, suggest including pulmonary  embolism protocol to to  the CT scan on 8/19     She is 1-1/2 hour drive away and has to come in for surgery  I will work with the surgeon's office and request that the   try to have her stay in the [facility]/[place] the night before surgery which might work easier for them for transportation reasons    They preferred coming in on the 2nd for a physical evaluation prior to her surgery on September 3rd          I am sending this note to palliative care MD , surgeon and the  for correspondence

## 2024-08-16 NOTE — HPI
Virtual pre op evaluation   Audio only   [place] the [place]  Consent obtained for virtual evaluation     Each patient to whom he or she provides medical services by telemedicine is:  (1) informed of the relationship between the physician and patient and the respective role of any other health care provider with respect to management of the patient; and (2) notified that he or she may decline to receive medical services by telemedicine and may withdraw from such care at any time.    She is currently staying with her sister Ms Wilson and working on her strength    Last chemo 8/13 /2024  She did well with the chemotherapy    She has throbbing pain in both legs-for about 3 days   Noted palliative care MD note-  Chemotherapy induced peripheral neuropathy  Numbness and tingling of hands and legs    No swelling in the legs  No calf pain   She is no longer taking the long-acting morphine and is taking hydrocodone for the pain  Since she has been off of the long-acting morphine she is much more awake and participating with mobility with a walker and getting stronger and eating well  Since she has been off of the long-acting morphine, she is eating better and also having less problem with constipation  She was commenced on gabapentin for neuropathy    She is taking hydrocodone Tylenol every 5 hours ( 7.5- 325 mg )- total Tylenol dose through this combination medication is 1300- 1625 mg in 24 hours  Her sister asked me about using Tylenol in between the hydrocodone Tylenol use and I suggested limiting to the total Tylenol dose to 3000 mg in 24 hours time  Suggested working with palliative care for pain control    Suggested avoidance of anti-inflammatory medication due to risk of ulceration and GI bleeding with anticoagulation on board    Compazine is making her confused-suggested avoidance and watch   She is no longer confused    Perioperative anticoagulation plan     Date of surgery - 9/3   On apixaban 5 mg twice a  day  Plan for Bridging anticoagulation   Last dose of apixaban prior to surgery is on August 31st PM. dose  Creatinine clearance is 56  Weight 52.7 kg  Dose of Lovenox will be 1 milligram/kilogram body weight subcutaneously every 12 hours  3 doses prior to surgery  Lovenox- Dose 1- September 1st at 7:00 AM  Lovenox - Dose 2 - September 1st at 7:00 PM  Lovenox Dose 3 - sept 2 nd at 7 AM    Discussed that she needs anticoagulation for her pulmonary embolism but she also need surgery  Discussed that the plan is for minimal interruption of anticoagulation balancing the risk of bleeding from the blood thinner and not going too long without blood thinner  Discussed that we considered giving the Lovenox dose the night before surgery also but due to bleeding risk it was felt that the last dose should be 24 hours prior to surgery on September 2nd in the morning time  Discussed that with this approach she is going to be only without anticoagulation the night before surgery  Dose of heparin pre-operatively in holding and then every 8 hours after surgery for the first 24 hours. Once post op day #1 CBC is stable, then   resume lovenox.  Discussed that with bridging anticoagulation her time interval without therapeutic anticoagulation is reduced from 48 hours to 12 hours  Discussed that she is almost 2 months into anticoagulation    Her sister asked if we can check her for resolution of blood clot  Noted that she is having a CT chest and abdomen pelvis with and without contrast on 8/19   Given that she is already having CT chest, suggest including pulmonary embolism protocol to to  the CT scan on 8/19     She is 1-1/2 hour drive away and has to come in for surgery  I will work with the surgeon's office and request that the   try to have her stay in the [email or web address] [place] the night before surgery which might work easier for them for transportation reasons    They preferred coming in on the 2nd for a physical  evaluation prior to her surgery on September 3rd

## 2024-08-19 ENCOUNTER — HOSPITAL ENCOUNTER (OUTPATIENT)
Dept: RADIOLOGY | Facility: HOSPITAL | Age: 74
Discharge: HOME | End: 2024-08-19
Attending: OBSTETRICS & GYNECOLOGY
Payer: MEDICARE

## 2024-08-19 DIAGNOSIS — Z86.711 HISTORY OF PULMONARY EMBOLISM: ICD-10-CM

## 2024-08-19 DIAGNOSIS — C78.6 PERITONEAL CARCINOMATOSIS: ICD-10-CM

## 2024-08-19 DIAGNOSIS — C56.1 MALIGNANT NEOPLASM OF RIGHT OVARY: ICD-10-CM

## 2024-08-19 PROCEDURE — 25500020 PHARM REV CODE 255: Mod: PO | Performed by: OBSTETRICS & GYNECOLOGY

## 2024-08-19 PROCEDURE — 74177 CT ABD & PELVIS W/CONTRAST: CPT | Mod: 26,,, | Performed by: RADIOLOGY

## 2024-08-19 PROCEDURE — 74177 CT ABD & PELVIS W/CONTRAST: CPT | Mod: TC,PO

## 2024-08-19 PROCEDURE — A9698 NON-RAD CONTRAST MATERIALNOC: HCPCS | Mod: PO | Performed by: OBSTETRICS & GYNECOLOGY

## 2024-08-19 PROCEDURE — 71275 CT ANGIOGRAPHY CHEST: CPT | Mod: 26,,, | Performed by: RADIOLOGY

## 2024-08-19 PROCEDURE — 71275 CT ANGIOGRAPHY CHEST: CPT | Mod: TC,PO

## 2024-08-19 RX ADMIN — IOHEXOL 1000 ML: 12 SOLUTION ORAL at 08:08

## 2024-08-19 RX ADMIN — IOHEXOL 75 ML: 350 INJECTION, SOLUTION INTRAVENOUS at 08:08

## 2024-08-22 ENCOUNTER — PATIENT MESSAGE (OUTPATIENT)
Dept: INTERNAL MEDICINE | Facility: CLINIC | Age: 74
End: 2024-08-22

## 2024-08-22 ENCOUNTER — TELEPHONE (OUTPATIENT)
Dept: INTERNAL MEDICINE | Facility: CLINIC | Age: 74
End: 2024-08-22

## 2024-08-22 ENCOUNTER — OFFICE VISIT (OUTPATIENT)
Dept: INTERNAL MEDICINE | Facility: CLINIC | Age: 74
End: 2024-08-22
Payer: MEDICARE

## 2024-08-22 DIAGNOSIS — E87.5 HYPERKALEMIA: Primary | ICD-10-CM

## 2024-08-22 RX ORDER — EPINEPHRINE 0.22MG
100 AEROSOL WITH ADAPTER (ML) INHALATION NIGHTLY
COMMUNITY

## 2024-08-22 RX ORDER — ENOXAPARIN SODIUM 100 MG/ML
53 INJECTION SUBCUTANEOUS EVERY 12 HOURS
Qty: 1.5 ML | Refills: 0 | Status: SHIPPED | OUTPATIENT
Start: 2024-08-22 | End: 2024-08-24

## 2024-08-22 NOTE — HPI
Spoke to daughter   Finished Dexamethasone 8/16  Aim for pre meal glucose <130 - < 150    I had educated that uncontrolled DM can cause post op complications,risk of infection, wound healing problem,increased length of stay in hospital and its associated complications.I suggest exercise as much as possible and follow diabetic diet

## 2024-08-22 NOTE — HPI
Virtual pre op evaluation   Video, Audio   Location of the patient- [place]  Consent obtained for virtual evaluation     Each patient to whom he or she provides medical services by telemedicine is:  (1) informed of the relationship between the physician and patient and the respective role of any other health care provider with respect to management of the patient; and (2) notified that he or she may decline to receive medical services by telemedicine and may withdraw from such care at any time.    Chief complaint-Preoperative evaluation , Perioperative Medical management, complication reduction plan      -    I had her daughter and her sister during the phone call    CT 8/19/2024    No acute finding. No pulmonary embolism. No evidence of metastatic disease in the chest     I have discussed with them that her blood clot was provoked contributed from cancer given that she still has the cancer at this time we will continue her on anticoagulation    CT    1.    Significant improvement in the previous cystic lesions in the pelvis.  With some residual in the right pelvis and along the pelvic wall and the left.  2.    Similar appearance of the subcentimeter hypodensity left hepatic lobe which statistically represents a cyst; however, continued attention on follow-up is recommended.    -    Plan for perioperative anticoagulation    Date of surgery - 9/3   On apixaban 5 mg twice a day    Plan for Bridging anticoagulation   Last dose of apixaban prior to surgery is on August 31st PM. dose  Creatinine clearance is 56  Weight 53 kg- 116.6 Lb  Dose of Lovenox will be 1 milligram/kilogram body weight subcutaneously every 12 hours  3 doses prior to surgery  Lovenox- Dose 1- September 1st at 7:00 AM  Lovenox - Dose 2 - September 1st at 7:00 PM  Lovenox Dose 3 - sept 2 nd at 7 AM      She is moving better, eating better and gained a little weight and overall seems to be happier

## 2024-08-22 NOTE — PROGRESS NOTES
[facility]  Progress Note    Patient Name: Bella Meyer  MRN: [id number]  Date of Evaluation- 08/22/2024  PCP- No, Primary Doctor    Future cases for Bella Meyer [[id number]]       Case ID Status Date Time [place] Procedure Provider Location    [id number] [facility] 9/3/2024  7:00  LAPAROTOMY, EXPLORATORY Maliha Crawford MD [[id number]] [facility] OR 2ND FLR            HPI:  Spoke to daughter   Finished Dexamethasone   Aim for pre meal glucose <130 - < 150    I had educated that uncontrolled DM can cause post op complications,risk of infection, wound healing problem,increased length of stay in hospital and its associated complications.I suggest exercise as much as possible and follow diabetic diet         Suggested working with Endocrinologist

## 2024-08-22 NOTE — PROGRESS NOTES
[facility]  Progress Note    Patient Name: Bella Meyer  MRN: [id number]  Date of Evaluation- 08/22/2024  PCP- No, Primary Doctor    Future cases for Bella Meyer [[id number]]       Case ID Status Date Time [place] Procedure Provider Location    [id number] [facility] 9/3/2024  7:00  LAPAROTOMY, EXPLORATORY Maliha Crawford MD [[id number]] [facility] OR 2ND FLR            HPI:  Virtual pre op evaluation   Video, Audio   Location of the patient- [place]  Consent obtained for virtual evaluation     Each patient to whom he or she provides medical services by telemedicine is:  (1) informed of the relationship between the physician and patient and the respective role of any other health care provider with respect to management of the patient; and (2) notified that he or she may decline to receive medical services by telemedicine and may withdraw from such care at any time.    Chief complaint-Preoperative evaluation , Perioperative Medical management, complication reduction plan      -    I had her daughter and her sister during the phone call    CT 8/19/2024    No acute finding. No pulmonary embolism. No evidence of metastatic disease in the chest     I have discussed with them that her blood clot was provoked contributed from cancer given that she still has the cancer at this time we will continue her on anticoagulation    CT    1.    Significant improvement in the previous cystic lesions in the pelvis.  With some residual in the right pelvis and along the pelvic wall and the left.  2.    Similar appearance of the subcentimeter hypodensity left hepatic lobe which statistically represents a cyst; however, continued attention on follow-up is recommended.    -    Plan for perioperative anticoagulation    Date of surgery - 9/3   On apixaban 5 mg twice a day    Plan for Bridging anticoagulation   Last dose of apixaban prior to surgery is on August 31st PM. dose  Creatinine clearance is 56  Weight 53 kg- 116.6 Lb  Dose of Lovenox will  be 1 milligram/kilogram body weight subcutaneously every 12 hours  3 doses prior to surgery  Lovenox- Dose 1- September 1st at 7:00 AM  Lovenox - Dose 2 - September 1st at 7:00 PM  Lovenox Dose 3 - sept 2 nd at 7 AM      She is moving better, eating better and gained a little weight and overall seems to be happier  From what I gather, she seems to be responding to the chemotherapy    She will come in for a physical evaluation on August 30th    They are hoping to stay in the [facility] the night before surgery

## 2024-08-23 ENCOUNTER — DOCUMENTATION ONLY (OUTPATIENT)
Dept: HEMATOLOGY/ONCOLOGY | Facility: CLINIC | Age: 74
End: 2024-08-23
Payer: MEDICARE

## 2024-08-23 NOTE — PROGRESS NOTES
Received referral from the clinic that the patient is interested in staying at the [facility] prior to surgery. Called and spoke to the patient. Gathered information needed to complete the referral form. Referral form sent to [facility]. Provided her my direct number for any further questions or assistance.

## 2024-08-26 ENCOUNTER — PATIENT MESSAGE (OUTPATIENT)
Dept: INTERNAL MEDICINE | Facility: CLINIC | Age: 74
End: 2024-08-26
Payer: MEDICARE

## 2024-08-26 ENCOUNTER — TELEPHONE (OUTPATIENT)
Dept: GYNECOLOGIC ONCOLOGY | Facility: CLINIC | Age: 74
End: 2024-08-26
Payer: MEDICARE

## 2024-08-27 ENCOUNTER — TELEPHONE (OUTPATIENT)
Dept: GYNECOLOGIC ONCOLOGY | Facility: CLINIC | Age: 74
End: 2024-08-27
Payer: MEDICARE

## 2024-08-27 NOTE — TELEPHONE ENCOUNTER
----- Message from Shalini Brandon RN sent at 8/27/2024 11:30 AM CDT -----  Regarding: FW: missed call    ----- Message -----  From: Cristy Aguilar  Sent: 8/27/2024  11:13 AM CDT  To: [facility] Staff  Subject: missed call                                      Name of caller:Bella       What is the requesting detail: pt returning a call from Mary. Please advise       Can the clinic reply by [facility]:       What number to call back:[phone]

## 2024-08-29 ENCOUNTER — TELEPHONE (OUTPATIENT)
Dept: GYNECOLOGIC ONCOLOGY | Facility: CLINIC | Age: 74
End: 2024-08-29
Payer: MEDICARE

## 2024-08-29 RX ORDER — CIPROFLOXACIN 500 MG/1
TABLET ORAL
Qty: 2 TABLET | Refills: 0 | Status: SHIPPED | OUTPATIENT
Start: 2024-08-29 | End: 2024-08-30

## 2024-08-29 RX ORDER — METRONIDAZOLE 500 MG/1
TABLET ORAL
Qty: 2 TABLET | Refills: 0 | Status: SHIPPED | OUTPATIENT
Start: 2024-08-29 | End: 2024-08-30

## 2024-08-30 ENCOUNTER — ANESTHESIA EVENT (OUTPATIENT)
Dept: SURGERY | Facility: HOSPITAL | Age: 74
End: 2024-08-30
Payer: MEDICARE

## 2024-08-30 ENCOUNTER — PATIENT MESSAGE (OUTPATIENT)
Dept: SURGERY | Facility: CLINIC | Age: 74
End: 2024-08-30
Payer: MEDICARE

## 2024-08-30 ENCOUNTER — TELEPHONE (OUTPATIENT)
Dept: SURGERY | Facility: CLINIC | Age: 74
End: 2024-08-30
Payer: MEDICARE

## 2024-08-30 ENCOUNTER — OFFICE VISIT (OUTPATIENT)
Dept: INTERNAL MEDICINE | Facility: CLINIC | Age: 74
End: 2024-08-30
Payer: MEDICARE

## 2024-08-30 ENCOUNTER — LAB VISIT (OUTPATIENT)
Dept: LAB | Facility: HOSPITAL | Age: 74
End: 2024-08-30
Attending: HOSPITALIST
Payer: MEDICARE

## 2024-08-30 VITALS
HEIGHT: 61 IN | RESPIRATION RATE: 16 BRPM | BODY MASS INDEX: 21.62 KG/M2 | SYSTOLIC BLOOD PRESSURE: 126 MMHG | WEIGHT: 114.5 LBS | DIASTOLIC BLOOD PRESSURE: 66 MMHG | TEMPERATURE: 98 F | HEART RATE: 88 BPM | OXYGEN SATURATION: 98 %

## 2024-08-30 DIAGNOSIS — E87.5 HYPERKALEMIA: ICD-10-CM

## 2024-08-30 DIAGNOSIS — I10 HYPERTENSION, UNSPECIFIED TYPE: ICD-10-CM

## 2024-08-30 DIAGNOSIS — E03.9 HYPOTHYROIDISM, UNSPECIFIED TYPE: ICD-10-CM

## 2024-08-30 DIAGNOSIS — Z79.4 TYPE 2 DIABETES MELLITUS WITH DIABETIC NEUROPATHY, WITH LONG-TERM CURRENT USE OF INSULIN: ICD-10-CM

## 2024-08-30 DIAGNOSIS — N83.8 OVARIAN MASS: Primary | ICD-10-CM

## 2024-08-30 DIAGNOSIS — E87.1 HYPONATREMIA: ICD-10-CM

## 2024-08-30 DIAGNOSIS — Z86.16 HISTORY OF COVID-19: ICD-10-CM

## 2024-08-30 DIAGNOSIS — R53.1 GENERALIZED WEAKNESS: ICD-10-CM

## 2024-08-30 DIAGNOSIS — E78.00 PURE HYPERCHOLESTEROLEMIA: ICD-10-CM

## 2024-08-30 DIAGNOSIS — Z86.711 HISTORY OF PULMONARY EMBOLISM: ICD-10-CM

## 2024-08-30 DIAGNOSIS — R74.8: ICD-10-CM

## 2024-08-30 DIAGNOSIS — E11.40 TYPE 2 DIABETES MELLITUS WITH DIABETIC NEUROPATHY, WITH LONG-TERM CURRENT USE OF INSULIN: ICD-10-CM

## 2024-08-30 DIAGNOSIS — K59.00 CONSTIPATION, UNSPECIFIED CONSTIPATION TYPE: ICD-10-CM

## 2024-08-30 DIAGNOSIS — F11.90 OPIOID USE: Primary | ICD-10-CM

## 2024-08-30 DIAGNOSIS — N83.8 OVARIAN MASS: ICD-10-CM

## 2024-08-30 LAB
ALBUMIN SERPL BCP-MCNC: 4.1 G/DL (ref 3.5–5.2)
ALP SERPL-CCNC: 38 U/L (ref 55–135)
ALT SERPL W/O P-5'-P-CCNC: 11 U/L (ref 10–44)
ANION GAP SERPL CALC-SCNC: 15 MMOL/L (ref 8–16)
AST SERPL-CCNC: 18 U/L (ref 10–40)
BASOPHILS # BLD AUTO: 0.03 K/UL (ref 0–0.2)
BASOPHILS NFR BLD: 0.6 % (ref 0–1.9)
BILIRUB SERPL-MCNC: 0.3 MG/DL (ref 0.1–1)
BUN SERPL-MCNC: 15 MG/DL (ref 8–23)
CALCIUM SERPL-MCNC: 10 MG/DL (ref 8.7–10.5)
CHLORIDE SERPL-SCNC: 96 MMOL/L (ref 95–110)
CO2 SERPL-SCNC: 24 MMOL/L (ref 23–29)
CREAT SERPL-MCNC: 0.8 MG/DL (ref 0.5–1.4)
DIFFERENTIAL METHOD BLD: ABNORMAL
EOSINOPHIL # BLD AUTO: 0 K/UL (ref 0–0.5)
EOSINOPHIL NFR BLD: 0 % (ref 0–8)
ERYTHROCYTE [DISTWIDTH] IN BLOOD BY AUTOMATED COUNT: 14.6 % (ref 11.5–14.5)
EST. GFR  (NO RACE VARIABLE): >60 ML/MIN/1.73 M^2
GLUCOSE SERPL-MCNC: 125 MG/DL (ref 70–110)
HCT VFR BLD AUTO: 36.6 % (ref 37–48.5)
HGB BLD-MCNC: 12.6 G/DL (ref 12–16)
IMM GRANULOCYTES # BLD AUTO: 0.02 K/UL (ref 0–0.04)
IMM GRANULOCYTES NFR BLD AUTO: 0.4 % (ref 0–0.5)
LYMPHOCYTES # BLD AUTO: 2.5 K/UL (ref 1–4.8)
LYMPHOCYTES NFR BLD: 53.1 % (ref 18–48)
MCH RBC QN AUTO: 33.6 PG (ref 27–31)
MCHC RBC AUTO-ENTMCNC: 34.4 G/DL (ref 32–36)
MCV RBC AUTO: 98 FL (ref 82–98)
MONOCYTES # BLD AUTO: 0.7 K/UL (ref 0.3–1)
MONOCYTES NFR BLD: 14.9 % (ref 4–15)
NEUTROPHILS # BLD AUTO: 1.4 K/UL (ref 1.8–7.7)
NEUTROPHILS NFR BLD: 31 % (ref 38–73)
NRBC BLD-RTO: 0 /100 WBC
PLATELET # BLD AUTO: 250 K/UL (ref 150–450)
PMV BLD AUTO: 8.8 FL (ref 9.2–12.9)
POTASSIUM SERPL-SCNC: 4.8 MMOL/L (ref 3.5–5.1)
PROT SERPL-MCNC: 7.6 G/DL (ref 6–8.4)
RBC # BLD AUTO: 3.75 M/UL (ref 4–5.4)
SODIUM SERPL-SCNC: 135 MMOL/L (ref 136–145)
WBC # BLD AUTO: 4.63 K/UL (ref 3.9–12.7)

## 2024-08-30 PROCEDURE — 80053 COMPREHEN METABOLIC PANEL: CPT | Performed by: HOSPITALIST

## 2024-08-30 PROCEDURE — 36415 COLL VENOUS BLD VENIPUNCTURE: CPT | Performed by: HOSPITALIST

## 2024-08-30 PROCEDURE — 99213 OFFICE O/P EST LOW 20 MIN: CPT | Mod: PBBFAC | Performed by: HOSPITALIST

## 2024-08-30 PROCEDURE — 99999 PR PBB SHADOW E&M-EST. PATIENT-LVL III: CPT | Mod: PBBFAC,,, | Performed by: HOSPITALIST

## 2024-08-30 PROCEDURE — 85025 COMPLETE CBC W/AUTO DIFF WBC: CPT | Performed by: HOSPITALIST

## 2024-08-30 RX ORDER — METRONIDAZOLE 500 MG/1
500 TABLET ORAL 2 TIMES DAILY
Qty: 2 TABLET | Refills: 0 | Status: CANCELLED | OUTPATIENT
Start: 2024-08-30

## 2024-08-30 RX ORDER — ENOXAPARIN SODIUM 100 MG/ML
50 INJECTION SUBCUTANEOUS EVERY 12 HOURS
COMMUNITY
Start: 2024-09-01 | End: 2024-09-02

## 2024-08-30 RX ORDER — CIPROFLOXACIN 500 MG/1
500 TABLET ORAL
Qty: 2 TABLET | Refills: 0 | Status: ON HOLD | OUTPATIENT
Start: 2024-08-30 | End: 2024-09-06 | Stop reason: HOSPADM

## 2024-08-30 RX ORDER — CIPROFLOXACIN 500 MG/1
500 TABLET ORAL 2 TIMES DAILY
Qty: 2 TABLET | Refills: 0 | Status: CANCELLED | OUTPATIENT
Start: 2024-08-30

## 2024-08-30 RX ORDER — METRONIDAZOLE 500 MG/1
TABLET ORAL
Qty: 2 TABLET | Refills: 0 | Status: ON HOLD | OUTPATIENT
Start: 2024-08-30 | End: 2024-09-06 | Stop reason: HOSPADM

## 2024-08-30 NOTE — ASSESSMENT & PLAN NOTE
She is taking 2 blood pressure medication namely amlodipine and metoprolol     Home BP readings -128/70-72     Hypertension-  Blood pressure is acceptable . I suggest continuation of --Amlodipine , Metoprolol ------ during the entire perioperative period. I suggest blood pressure monitoring .I suggest addressing pain control as uncontrolled pain can increased blood pressure

## 2024-08-30 NOTE — ASSESSMENT & PLAN NOTE
For abdominal pain and neuropathy  Currently taking oxycodone twice a day and hydrocodone about 3 times a day     She is mainly troubled with neuropathy pain down the legs with numbness and tingling of both legs  This has started after chemotherapy  Aug 7 th   She has been on opioid pain medication about 2 months time  She and her  are concerned about she getting dependent on opioids and I suggested working with the pain management and in the best case scenario she may not need opioids in the long run as the chemotherapy-induced neuropathy improves       I suggest considering the possibility of opioid tolerance  in planning post operative pain control      Discussed possibly reducing the opioid use in preparation for surgery , so that it reduces the opioid tolerance which helps post op pain control      Suggested letting pain management/ Prescribing provider  know about the up coming surgery       Suggest avoidance of abrupt stopping of opioid medication as it can cause opioid withdrawal

## 2024-08-30 NOTE — ASSESSMENT & PLAN NOTE
She had problems with diabetic control in the past but lately is doing better  She was taking short-acting insulin based on the scale and long-acting insulin 10 units  She is currently on short-acting insulin 3 times a day and long-acting insulin in the morning  Her reported 120-150 pre meals      Type 2Diabetes Mellitus  On treatment with oral agents, Insulin    Hemoglobin A1c- 7.0 April 2024      Diabetes Complications      Microvascular      Not known to have   Diabetes affecting the eyes, Kidneys   Tingling numbness of hands and feet  No reported open areas on the feet   Feet care suggested      Macrovascular      No stroke/ TIA  Not known to have CAD  No suggestion of  lower extremity claudication        Diabetes Mellitus-I suggest monitoring the glucose in the perioperative period ( Before meals and bed time,if the patient is on oral feeds or every 6 hourly ,if the patient is NPO )  Blood glucose target in hospitalized patients is 140-180. Oral Hypoglycemic agents are generally avoided during the hospital stay . If glucose is consistently elevated ,I suggest using basal ,prandial Insulin regimen to control the glucose , as elevated glucose can be associated with adverse surgical out comes. Please consider involving Hospital Medicine or Endocrinology ,if any help is needed with Glucose control. Patient will be instructed based on the pre op clinic guidelines  about adjustment of diabetic treatment (If applicable )  considering the NPO status for Surgery       I had educated that uncontrolled DM can cause post op complications,risk of infection, wound healing problem,increased length of stay in hospital and its associated complications.I suggest exercise as much as possible and follow diabetic diet         She is on continuous glucose monitoring with the alert set for low sugar and high glucose          I suggested not to take the short-acting insulin on the morning of surgery and to take 50% which is 4 units of  long-acting insulin on the morning of surgery  --  I have discussed with him that she is going to be getting bowel prep the day before surgery and given that she is going to be eating, she may be at risk of having low glucose with surgery  I suggested skipping metformin starting 9 /2 morning  I suggested using half long-acting insulin the day before surgery morning and the day of surgery morning    Her sister brought her glucose log which is averaging about 150

## 2024-08-30 NOTE — ASSESSMENT & PLAN NOTE
Mildly elevated potassium  No muscle weakness or passing out   I discussed watching potassium rich foods and I gave the list to her sister

## 2024-08-30 NOTE — ASSESSMENT & PLAN NOTE
6/20/2024-     Positive pulmonary emboli within the right lower lobe segmental branches, nonocclusive. No evidence of central pulmonary embolism. No right heart strain.         Episode 1   Associated with some  reduced mobility around the time of the thrombosis, she travels about 2 hours to get chemotherapy long journeys, cancer around the time of thrombosis, no prior surgery around the time thrombosis, no Hospital stay around the time of thrombosis,  She was taking estradiol, progesterone prior to chemotherapy due to hot flashes  She has had not had hysterectomy and oophorectomy  Her hormone replacement therapy was discontinued after which she was given Lexapro  Anticoagulated for Apixaban  IVC filter  - None      The risk factors that she has had that contributed to her blood clot was- cancer, relatively less mobile going through the cancer but usually she is very mobile, she travels to get chemotherapy for about 4 hours     Her surgery that she is planning to have in the early part of September is within the 1st 3 months of blood clot diagnosis     No AFib     Planned for blood thinning medication use around the time of surgery    Date of surgery - 9/3   On apixaban 5 mg twice a day     Plan for Bridging anticoagulation   Last dose of apixaban prior to surgery is on August 31st PM. dose  Creatinine clearance is 56  Weight 51.8   Dose of Lovenox will be 1 milligram/kilogram body weight subcutaneously every 12 hours  3 doses prior to surgery  Lovenox- Dose 1- September 1st at 7:00 AM  Lovenox - Dose 2 - September 1st at 7:00 PM  Lovenox Dose 3 - sept 2 nd at 7 AM

## 2024-08-30 NOTE — PROGRESS NOTES
[facility]  Progress Note    Patient Name: Bella Meyer  MRN: [id number]  Date of Evaluation- 08/30/2024  PCP- No, Primary Doctor    Future cases for Bella Meyer [[id number]]       Case ID Status Date Time [place] Procedure Provider Location    [id number] [facility] 9/3/2024  7:00  LAPAROTOMY, EXPLORATORY Maliha Crawford MD [[id number]] [facility] OR 2ND FLR            HPI:  History of present illness- I had the pleasure of meeting this pleasant 73 y.o. lady in the pre op clinic prior to her elective Gynecological surgery. The patient is new to me .Ms  Bella was accompanied by  mr Dey, sister SAVANAH Echevarria, Brother Jackie Menezes.    I have obtained the history by speaking to the patient and by reviewing the electronic health records.    Events leading up to surgery / History of presenting illness -    In March of 2024 she had abdominal pain on the right side for which she presented to the emergency room and imaging showed growth on the right ovary   She was referred over to her local gynecologist's and she was sent to oncologist  She was subsequently evaluated at [facility] and is planning on having surgery     She has been diagnosed with ovarian cancer and she has had chemotherapy she had 5 cycles of chemotherapy  April 30, 2024- July 23, 2024  She, thankfully is handling chemotherapy very well  Apart from hair loss, she had chemotherapy well     Her  is very caring and encourages her with her nutrition     She still has right-sided abdominal pain pain on the lower part of the abdomen which is tolerable on pain medication  To her understanding the ovaries reason for her abdominal pain     She has a regular bowel movements and has a bowel movement once a day     Riding in a car for long time increases her abdominal pain     Relevant health conditions of significance for the perioperative period/ History of presenting illness -    Underactive thyroid  Type 2 diabetes neuropathy  Pulmonary  embolism  Eliquis  Hypertension  Chemotherapy  Cholesterol  COVID infection     Not known to have  heart problem , Kidney problem, acid reflux, sleep apnea,  fatty liver , blood vessels stent , tobacco smoking, edema, mental health problems      Lives with   Single  story house    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  Pets- none  Children - 2   Pregnancies - 2  Miscarriages -none  C sections - no CS  Has help post op -    Daughter lives next door  LMP 50's             Subjective/ Objective:     Chief complaint-Preoperative evaluation, Perioperative Medical management, complication reduction plan     Active cardiac conditions- none    Revised cardiac risk index predictors- high-risk type of surgery and insulin requiring diabetes mellitus    Functional capacity -Examples of physical activity  can take 1 flight of stairs, she was an active person up until cancer diagnosis----- She can undertake all the above activities without  chest pain,chest tightness, Shortness of breath ,dizziness,lightheadedness making her exercise tolerance more,   than 4 Mets.        Review of Systems   Constitutional:  Negative for chills and fever.   HENT:          STOPBANG score  / 8      Elevated BP    Age over 50   Neck size over 40 CM     Eyes:         No new visual changes   Respiratory:          No cough , phlegm  No Hemoptysis   Cardiovascular:         As noted   Gastrointestinal:         No overt GI/ blood losses  Bowel movements- Regular    Endocrine:        Prednisone use > 20 mg daily for 3 weeks- none   Genitourinary:  Negative for dysuria.        No urinary hesitancy    Musculoskeletal:           No unusual, muscle, joint pains   Skin:  Negative for rash.   Neurological:  Negative for syncope.        No unilateral weakness   Hematological:         Current use of Anticoagulants  none   Psychiatric/Behavioral:          No Depression,Anxiety                 No anesthesia, bleeding, cardiac problems, PONV with  "previous surgeries/procedures.  Medications and Allergies reviewed in epic.   FH- No anesthesia,bleeding / venous thrombosis , in family      Physical Exam  Blood pressure 126/66, pulse 88, temperature 97.9 °F (36.6 °C), temperature source Oral, resp. rate 16, height 5' 1" (1.549 m), weight 51.9 kg (114 lb 8 oz), SpO2 98%.    I offered a sheet and the presence of a chaperone during physical examination   She was comfortable to proceed with the exam without the the presence of a chaperone        Physical Exam  Constitutional- Vitals - Body mass index is 21.63 kg/m².,   Vitals:    08/30/24 1125   BP: 126/66   Pulse: 88   Resp: 16   Temp: 97.9 °F (36.6 °C)     General appearance-Conscious,Coherent  Eyes- No conjunctival icterus,pupils  round  and reactive to light   ENT-Oral cavity- moist    , Hearing grossly normal   Neck- No thyromegaly ,Trachea -central, No jugular venous distension,   No Carotid Bruit   Cardiovascular -Heart Sounds- Normal  and  no murmur   , No gallop rhythm   Respiratory - Normal Respiratory Effort, Normal breath sounds,  no wheeze , and  no forced expiratory wheeze    Peripheral pitting pedal edema-- none , no calf pain   Gastrointestinal -Soft abdomen, No palpable masses, Non Tender,Liver,Spleen not palpable. No-- free fluid and shifting dullness  Musculoskeletal- No finger Clubbing. Strength grossly normal   Lymphatic-No Palpable cervical, axillary,Inguinal lymphadenopathy   Psychiatric - normal effect,Orientation  Rt Dorsalis pedis pulses-palpable    Lt Dorsalis pedis pulses- palpable   Rt Posterior tibial pulses -palpable   Left posterior tibial pulses -palpable   Miscellaneous -  no renal bruit   Investigations  Lab and Imaging have been reviewed in epic.    I have discussed doing an EKG for her but after discussing with the family we have decided not to go ahead with the EKG as she is doing good good from a heart standpoint    Review of old records- Was done and information gathered " regards to events leading to surgery and health conditions of significance in the perioperative period.        Preoperative cardiac risk assessment-  The patient does not have any active cardiac conditions . Revised cardiac risk index predictors-2 ---.Functional capacity is more than 4 Mets. She will be undergoing a Gynecological procedure that carries a High Risk risk     Risk of a major Cardiac event ( Defined as death, myocardial infarction, or cardiac arrest at 30 days after noncardiac surgery), based on RCRI score     10.1%       No further cardiac work up is indicated prior to proceeding with the surgery     Orders Placed This Encounter    Comprehensive Metabolic Panel    CBC Auto Differential       American Society of Anesthesiologists Physical status classification ( ASA ) class: 3     Postoperative pulmonary complication risk assessment:      ARISCAT ( [place]) risk index- risk class -  Low, if duration of surgery is under 3 hours, intermediate, if duration of surgery is over 3 hours   If the surgical incision does not involve upper abdomen    Assessment/Plan:     Opioid use  For abdominal pain and neuropathy  Currently taking oxycodone twice a day and hydrocodone about 3 times a day     She is mainly troubled with neuropathy pain down the legs with numbness and tingling of both legs  This has started after chemotherapy  Aug 7 th   She has been on opioid pain medication about 2 months time  She and her  are concerned about she getting dependent on opioids and I suggested working with the pain management and in the best case scenario she may not need opioids in the long run as the chemotherapy-induced neuropathy improves       I suggest considering the possibility of opioid tolerance  in planning post operative pain control      Discussed possibly reducing the opioid use in preparation for surgery , so that it reduces the opioid tolerance which helps post op pain control      Suggested letting pain  management/ Prescribing provider  know about the up coming surgery       Suggest avoidance of abrupt stopping of opioid medication as it can cause opioid withdrawal     Pure hypercholesterolemia  HLD-I  suggest continuation of statin during the entire perioperative period.   She is not known to have any circulation problem       Hypertension  She is taking 2 blood pressure medication namely amlodipine and metoprolol     Home BP readings -128/70-72     Hypertension-  Blood pressure is acceptable . I suggest continuation of --Amlodipine , Metoprolol ------ during the entire perioperative period. I suggest blood pressure monitoring .I suggest addressing pain control as uncontrolled pain can increased blood pressure      Ovarian mass  On chemo  Her chemotherapy between now and surgery is scheduled for August 13th- 6 th chemo  Her platelet count has been normal in the past few blood tests and her white cell count is also normal    I have discussed importance of nutritious food intake to be able to help her with the recovery and healing process  She is not eating as well due to pain    Hyperkalemia  Mildly elevated potassium  No muscle weakness or passing out   I discussed watching potassium rich foods and I gave the list to her sister    History of COVID-19 2022     Did not require hospitalization, intubation or supplemental oxygen use   Had been vaccinated   Recovered from COVID     COVID screening      No fever   No cough   No SOB  No sore throat   No loss of taste or smell   No muscle aches   No nausea, vomiting , diarrhea     History of pulmonary embolism  6/20/2024-     Positive pulmonary emboli within the right lower lobe segmental branches, nonocclusive. No evidence of central pulmonary embolism. No right heart strain.         Episode 1   Associated with some  reduced mobility around the time of the thrombosis, she travels about 2 hours to get chemotherapy long journeys, cancer around the time of thrombosis, no  prior surgery around the time thrombosis, no Hospital stay around the time of thrombosis,  She was taking estradiol, progesterone prior to chemotherapy due to hot flashes  She has had not had hysterectomy and oophorectomy  Her hormone replacement therapy was discontinued after which she was given Lexapro  Anticoagulated for Apixaban  IVC filter  - None      The risk factors that she has had that contributed to her blood clot was- cancer, relatively less mobile going through the cancer but usually she is very mobile, she travels to get chemotherapy for about 4 hours     Her surgery that she is planning to have in the early part of September is within the 1st 3 months of blood clot diagnosis     No AFib     Planned for blood thinning medication use around the time of surgery    Date of surgery - 9/3   On apixaban 5 mg twice a day     Plan for Bridging anticoagulation   Last dose of apixaban prior to surgery is on August 31st PM. dose  Creatinine clearance is 56  Weight 51.8   Dose of Lovenox will be 1 milligram/kilogram body weight subcutaneously every 12 hours  3 doses prior to surgery  Lovenox- Dose 1- September 1st at 7:00 AM  Lovenox - Dose 2 - September 1st at 7:00 PM  Lovenox Dose 3 - sept 2 nd at 7 AM          Type 2 diabetes mellitus, with long-term current use of insulin  She had problems with diabetic control in the past but lately is doing better  She was taking short-acting insulin based on the scale and long-acting insulin 10 units  She is currently on short-acting insulin 3 times a day and long-acting insulin in the morning  Her reported 120-150 pre meals      Type 2Diabetes Mellitus  On treatment with oral agents, Insulin    Hemoglobin A1c- 7.0 April 2024      Diabetes Complications      Microvascular      Not known to have   Diabetes affecting the eyes, Kidneys   Tingling numbness of hands and feet  No reported open areas on the feet   Feet care suggested      Macrovascular      No stroke/ TIA  Not  known to have CAD  No suggestion of  lower extremity claudication        Diabetes Mellitus-I suggest monitoring the glucose in the perioperative period ( Before meals and bed time,if the patient is on oral feeds or every 6 hourly ,if the patient is NPO )  Blood glucose target in hospitalized patients is 140-180. Oral Hypoglycemic agents are generally avoided during the hospital stay . If glucose is consistently elevated ,I suggest using basal ,prandial Insulin regimen to control the glucose , as elevated glucose can be associated with adverse surgical out comes. Please consider involving Hospital Medicine or Endocrinology ,if any help is needed with Glucose control. Patient will be instructed based on the pre op clinic guidelines  about adjustment of diabetic treatment (If applicable )  considering the NPO status for Surgery       I had educated that uncontrolled DM can cause post op complications,risk of infection, wound healing problem,increased length of stay in hospital and its associated complications.I suggest exercise as much as possible and follow diabetic diet         She is on continuous glucose monitoring with the alert set for low sugar and high glucose          I suggested not to take the short-acting insulin on the morning of surgery and to take 50% which is 4 units of long-acting insulin on the morning of surgery  --  I have discussed with him that she is going to be getting bowel prep the day before surgery and given that she is going to be eating, she may be at risk of having low glucose with surgery  I suggested skipping metformin starting 9 /2 morning  I suggested using half long-acting insulin the day before surgery morning and the day of surgery morning    Her sister brought her glucose log which is averaging about 150                    Hypothyroidism  Suggested spacing Thyroxine replacement with food, other Medication    Hypothyroidism- I suggest continuation of synthroid replacement in the  perioperative period     She does not have any suggestions of underactive or overactive thyroid    Constipation  It was likely that the pain medication is constipating her     Suggested working on bowel movements in preparation for surgery   Constipation- I suggest giving bowel movement regimen as opioid use,reduced ambulation  can increase the constipation      Low intestinal alkaline phosphatase level  Not known to have  low B12, Low Zinc, Low Magnesium , cirrhosis of liver    Generalized weakness  She is walking with a walker  She has gone weaker in the past week due to stress of her surgery      I have encouraged her to build up her strength and nutrition    Hyponatremia  Mild        Preventive perioperative care    Thromboembolic prophylaxis:  Her risk factors for thrombosis include cancer  surgical procedure, previous history of thrombosis, and age.I suggest  thromboembolic prophylaxis ( mechanical/pharmacological, weighing the risk benefits of pharmacological agent use considering [facility] procedural bleeding )  during the perioperative period.I suggested being active in the post operative period.      Postoperative pulmonary complication prophylaxis-Risk factors for post operative pulmonary complications include age over 65 years, ASA class >2, and proximity of the surgical site to the lungs- I suggest incentive spirometry use, early ambulation, end tidal carbon dioxide monitoring, and pain control so as to avoid diaphragmatic splinting  , oral care , head end of bed elevation      Renal complication prophylaxis-Risk factors for renal complications include pre-existing renal disease, diabetes mellitus, and hypertension . I suggest keeping her well hydrated and avoidance/ minimizing the use of  NSAID's,REYES 2 Inhibitors ,IV contrast if possible in the perioperative period.    Surgical site Infection Prophylaxis-I  suggest appropriate antibiotic for Prophylaxis against Surgical site infections  No reported Staph  infection  Skin antibacterial discussed       Delirium prophylaxis-Risk factors - Advanced Age and opioid use - I suggest avoidance / minimizing the use of  Benzodiazepines ( unless the patient has been taking it on a regular basis ),Anticholinergic medication,Antihistamines ( like  Benadryl).I suggest minimizing the use of opioid medication and use of IV tylenol,if it is appropriate. I suggest using the lowest possible dose of opioids for the shortest duration possible in the perioperative period. I suggest to Keep shades/blinds open during the day, lights off and shades closed at night to encourage normal sleep/wake cycle.I encourage the presence of the family member with the patient at all times, if at all possible as mental status changes can be picked up early by the family members and they help with reorientation. I encouraged the presence of family to help with orientation in the perioperative period. Benadryl avoidance suggested      In view of gynecological procedure the patient  is at risk of postoperative urinary retention.  I suggest avoidance / minimizing the of  Benzodiazepines,Anticholinergic medication,antihistamines ( Benadryl) , if possible in the perioperative period. I suggest using the minimum possible use of opioids for the minimum period of time in the perioperative period. Benadryl avoidance suggested      This visit was focused on Preoperative evaluation, Perioperative Medical management, complication reduction plans. I suggest that the patient follows up with primary care or relevant sub specialists for ongoing health care.    I appreciate the opportunity to be involved in this patients care. Please feel free to contact me if there were any questions about this consultation.    Patient is optimized    Patient/ care giver/ Family member was instructed to call and update me about any changes to health,  medication, office visits ,testing out side of the [facility] ,  hospitalizations between now and surgery      Luda Cancino MD  Internal Medicine  [facility]   Cell Phone- [phone]    In summary Ms. Blanco is doing well for her surgery for gynecological cancer on September 3rd    I have spent 90------ minutes of time which includes, time spent to prepare to see the patient , obtaining history ,performing examination, counseling/Educating the patient , Documenting clinical information in the record    --  8/30/2024- 1646     Checked for over-the-counter medication      Lab test from today showed normalized hemoglobin, improved RBC  Normal platelet count  Sodium mildly low at 135     Elevated potassium has resolved    Called and spoke to sister About labs   Total fluid intake 48  fluid oz   Not known to have adrenal problems  Passing Clear urine    She clarified with the pharmacist about the Lovenox dosing and administration   Home health nurse is going to come to give instructions about the Lovenox administration    Creatinine clearance is 49    Called to follow up , spoke to her sister to address any concerns with the up coming surgery or any questions on Medication instructions -     Discussed avoidance of vitamin-E and fish oil  --  8/30/2024-1858     Called and  spoke to sister   Lovenox entered in to the Med list

## 2024-08-30 NOTE — HPI
History of present illness- I had the pleasure of meeting this pleasant 73 y.o. lady in the pre op clinic prior to her elective Gynecological surgery. The patient is new to me .Ms Blanco was accompanied by  mr Dey, sister SAVANAH Echevarria, Brother Jackie Menezes.    I have obtained the history by speaking to the patient and by reviewing the electronic health records.    Events leading up to surgery / History of presenting illness -    In March of 2024 she had abdominal pain on the right side for which she presented to the emergency room and imaging showed growth on the right ovary   She was referred over to her local gynecologist's and she was sent to oncologist  She was subsequently evaluated at [facility] and is planning on having surgery     She has been diagnosed with ovarian cancer and she has had chemotherapy she had 5 cycles of chemotherapy  April 30, 2024- July 23, 2024  She, thankfully is handling chemotherapy very well  Apart from hair loss, she had chemotherapy well     Her  is very caring and encourages her with her nutrition     She still has right-sided abdominal pain pain on the lower part of the abdomen which is tolerable on pain medication  To her understanding the ovaries reason for her abdominal pain     She has a regular bowel movements and has a bowel movement once a day     Riding in a car for long time increases her abdominal pain     Relevant health conditions of significance for the perioperative period/ History of presenting illness -    Underactive thyroid  Type 2 diabetes neuropathy  Pulmonary embolism  Eliquis  Hypertension  Chemotherapy  Cholesterol  COVID infection     Not known to have  heart problem , Kidney problem, acid reflux, sleep apnea,  fatty liver , blood vessels stent , tobacco smoking, edema, mental health problems      Lives with   Single  story house    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

## 2024-08-30 NOTE — ASSESSMENT & PLAN NOTE
It was likely that the pain medication is constipating her     Suggested working on bowel movements in preparation for surgery   Constipation- I suggest giving bowel movement regimen as opioid use,reduced ambulation  can increase the constipation

## 2024-08-30 NOTE — ASSESSMENT & PLAN NOTE
2022     Did not require hospitalization, intubation or supplemental oxygen use   Had been vaccinated   Recovered from COVID     COVID screening      No fever   No cough   No SOB  No sore throat   No loss of taste or smell   No muscle aches   No nausea, vomiting , diarrhea

## 2024-08-30 NOTE — ASSESSMENT & PLAN NOTE
On chemo  Her chemotherapy between now and surgery is scheduled for August 13th- 6 th chemo  Her platelet count has been normal in the past few blood tests and her white cell count is also normal    I have discussed importance of nutritious food intake to be able to help her with the recovery and healing process  She is not eating as well due to pain   Number Of Group I Special Stains Used (45213): None

## 2024-08-30 NOTE — ASSESSMENT & PLAN NOTE
She is walking with a walker  She has gone weaker in the past week due to stress of her surgery      I have encouraged her to build up her strength and nutrition

## 2024-08-30 NOTE — ASSESSMENT & PLAN NOTE
Suggested spacing Thyroxine replacement with food, other Medication    Hypothyroidism- I suggest continuation of synthroid replacement in the perioperative period     She does not have any suggestions of underactive or overactive thyroid

## 2024-08-30 NOTE — OUTPATIENT SUBJECTIVE & OBJECTIVE
"Outpatient Subjective & Objective     Chief complaint-Preoperative evaluation, Perioperative Medical management, complication reduction plan     Active cardiac conditions- none    Revised cardiac risk index predictors- high-risk type of surgery and insulin requiring diabetes mellitus    Functional capacity -Examples of physical activity  can take 1 flight of stairs, she was an active person up until cancer diagnosis----- She can undertake all the above activities without  chest pain,chest tightness, Shortness of breath ,dizziness,lightheadedness making her exercise tolerance more,   than 4 Mets.        Review of Systems   Constitutional:  Negative for chills and fever.   HENT:          STOPBANG score  / 8      Elevated BP    Age over 50   Neck size over 40 CM     Eyes:         No new visual changes   Respiratory:          No cough , phlegm  No Hemoptysis   Cardiovascular:         As noted   Gastrointestinal:         No overt GI/ blood losses  Bowel movements- Regular    Endocrine:        Prednisone use > 20 mg daily for 3 weeks- none   Genitourinary:  Negative for dysuria.        No urinary hesitancy    Musculoskeletal:           No unusual, muscle, joint pains   Skin:  Negative for rash.   Neurological:  Negative for syncope.        No unilateral weakness   Hematological:         Current use of Anticoagulants  none   Psychiatric/Behavioral:          No Depression,Anxiety                 No anesthesia, bleeding, cardiac problems, PONV with previous surgeries/procedures.  Medications and Allergies reviewed in epic.   FH- No anesthesia,bleeding / venous thrombosis , in family      Physical Exam  Blood pressure 126/66, pulse 88, temperature 97.9 °F (36.6 °C), temperature source Oral, resp. rate 16, height 5' 1" (1.549 m), weight 51.9 kg (114 lb 8 oz), SpO2 98%.    I offered a sheet and the presence of a chaperone during physical examination   She was comfortable to proceed with the exam without the the presence of a " chaperone        Physical Exam  Constitutional- Vitals - Body mass index is 21.63 kg/m².,   Vitals:    08/30/24 1125   BP: 126/66   Pulse: 88   Resp: 16   Temp: 97.9 °F (36.6 °C)     General appearance-Conscious,Coherent  Eyes- No conjunctival icterus,pupils  round  and reactive to light   ENT-Oral cavity- moist    , Hearing grossly normal   Neck- No thyromegaly ,Trachea -central, No jugular venous distension,   No Carotid Bruit   Cardiovascular -Heart Sounds- Normal  and  no murmur   , No gallop rhythm   Respiratory - Normal Respiratory Effort, Normal breath sounds,  no wheeze , and  no forced expiratory wheeze    Peripheral pitting pedal edema-- none , no calf pain   Gastrointestinal -Soft abdomen, No palpable masses, Non Tender,Liver,Spleen not palpable. No-- free fluid and shifting dullness  Musculoskeletal- No finger Clubbing. Strength grossly normal   Lymphatic-No Palpable cervical, axillary,Inguinal lymphadenopathy   Psychiatric - normal effect,Orientation  Rt Dorsalis pedis pulses-palpable    Lt Dorsalis pedis pulses- palpable   Rt Posterior tibial pulses -palpable   Left posterior tibial pulses -palpable   Miscellaneous -  no renal bruit   Investigations  Lab and Imaging have been reviewed in epic.    I have discussed doing an EKG for her but after discussing with the family we have decided not to go ahead with the EKG as she is doing good good from a heart standpoint    Review of old records- Was done and information gathered regards to events leading to surgery and health conditions of significance in the perioperative period.    Outpatient Subjective & Objective

## 2024-08-30 NOTE — ASSESSMENT & PLAN NOTE
HLD-I  suggest continuation of statin during the entire perioperative period.   She is not known to have any circulation problem

## 2024-09-01 NOTE — ANESTHESIA PREPROCEDURE EVALUATION
[facility]  Anesthesia Pre-Operative Evaluation         Patient Name: Bella Meyer  YOB: 1950  MRN: [id number]    SUBJECTIVE:     Pre-operative evaluation for Procedure(s) (LRB):  LAPAROTOMY, EXPLORATORY (N/A)  HYSTERECTOMY, TOTAL, ABDOMINAL (N/A)  SALPINGO-OOPHORECTOMY, BILATERAL (N/A)  OMENTECTOMY (N/A)  DEBULKING, NEOPLASM (N/A)  RESECTION, COLON, LOW ANTERIOR (N/A)  SIGMOIDOSCOPY, FLEXIBLE (N/A)     09/01/2024    Bella Meyer is a 73 y.o. female with a significant medical history of well controlled HTN, IDDM2, well controlled hypothyroid, recent PE on eliquis (pre-op lovenox bridge), Ovarian ca with peritoneal carcinomatosis dx 4/2024 (has undergone 6 cycles of chemo with carboplatin and paclitaxel at time of surgery)  who presents for the above procedure.    No TTE/AMY on file    EKG 4/2024:  NSR, left axis deviation, no ischemic changes    LDA: None documented.       Prev airway: None documented.Previous gen natural airway    Drips: None documented.      Patient Active Problem List   Diagnosis    Type 2 diabetes mellitus, with long-term current use of insulin    Pure hypercholesterolemia    Ovarian mass    Hypothyroidism    Hypertension    Colonic mass    Peritoneal carcinomatosis    Malignant neoplasm of ovary    Fatigue    Generalized weakness    Constipation    Opioid use    History of COVID-19    History of pulmonary embolism    Low intestinal alkaline phosphatase level    Hyperkalemia    Hyponatremia       Review of patient's allergies indicates:  No Known Allergies    Current Inpatient Medications:      No current facility-administered medications on file prior to encounter.     Current Outpatient Medications on File Prior to Encounter   Medication Sig Dispense Refill    adhesive remover (UNISOLVE ADHESIVE REMOVER WIPE) Misc One every 10 days      amLODIPine (NORVASC) 5 MG tablet Take 1 tablet by mouth every morning.      apixaban (ELIQUIS) 5 mg Tab Take 1 tablet (5 mg  total) by mouth 2 (two) times daily. 60 tablet 2    blood-glucose meter,continuous (DEXCOM G7 ) Misc Use daily      blood-glucose sensor (DEXCOM G7 SENSOR) Charito Use every 10 days      dexAMETHasone (DECADRON) 4 MG Tab Take 8 mg (2 tablets) by mouth daily on days 2-4 of each chemotherapy cycle. 6 tablet 11    docusate sodium (COLACE ORAL) Take 1 tablet by mouth every evening.      DULoxetine (CYMBALTA) 30 MG capsule Take 1 capsule (30 mg total) by mouth once daily. 30 capsule 0    levothyroxine (SYNTHROID) 75 MCG tablet Take 1 tablet by mouth every morning.      LYUMJEV KWIKPEN U-100 INSULIN 100 unit/mL pen Inject 2-10 Units into the skin 3 (three) times daily with meals.      metFORMIN (GLUCOPHAGE) 1000 MG tablet Take 1,000 mg by mouth 2 (two) times daily with meals.      metoprolol succinate (TOPROL-XL) 50 MG 24 hr tablet Take 1 tablet by mouth every morning.      OLANZapine (ZYPREXA) 5 MG tablet Take 1 tablet by mouth nightly on days 1-4 of each chemotherapy cycle. 4 tablet 11    ondansetron (ZOFRAN-ODT) 4 MG TbDL DISSOLVE ONE TABLET BY MOUTH EVERY 4 TO 6 HOURS AS NEEDED FOR NAUSEA      pravastatin (PRAVACHOL) 10 MG tablet Take 1 tablet by mouth every evening.      prochlorperazine (COMPAZINE) 5 MG tablet Take 1 tablet (5 mg total) by mouth every 6 (six) hours as needed for Nausea. 20 tablet 5    TOUJEO SOLOSTAR U-300 INSULIN 300 unit/mL (1.5 mL) InPn pen Inject 10 Units into the skin once daily.         Past Surgical History:   Procedure Laterality Date    TUBAL LIGATION      at age 32's       Social History     Socioeconomic History    Marital status:    Tobacco Use    Smoking status: Never     Passive exposure: Past    Smokeless tobacco: Never   Substance and Sexual Activity    Alcohol use: Never    Drug use: Never    Sexual activity: Yes     Partners: Male     Birth control/protection: Post-menopausal     Social Determinants of Health     Financial Resource Strain: Low Risk  (7/5/2024)     "Overall Financial Resource Strain (CARDIA)     Difficulty of Paying Living Expenses: Not hard at all   Food Insecurity: No Food Insecurity (7/5/2024)    Hunger Vital Sign     Worried About Running Out of Food in the Last Year: Never true     Ran Out of Food in the Last Year: Never true   Physical Activity: Inactive (7/5/2024)    Exercise Vital Sign     Days of Exercise per Week: 0 days     Minutes of Exercise per Session: 20 min   Stress: No Stress Concern Present (7/5/2024)    [employer] [place] [employer] - Occupational Stress Questionnaire     Feeling of Stress : Only a little   Housing Stability: Unknown (7/5/2024)    Housing Stability Vital Sign     Unable to Pay for Housing in the Last Year: No       OBJECTIVE:     Vital Signs Range:      8/13/2024     9:34 AM 8/13/2024    10:35 AM 8/30/2024    11:25 AM   Vitals - 1 value per visit   SYSTOLIC 124 124 126   DIASTOLIC 64 64 66   Pulse 78 78 88   Temp 36.1 °C (97 °F) 36.1 °C (97 °F) 36.6 °C (97.9 °F)   Resp 16 16 16   SPO2 99 % 99 % 98 %   Weight (lb) 115.74 115.74 114.5   Weight (kg) 52.5 52.5 51.937   Height 5' 1" (1.549 m) 5' 1" (1.549 m) 5' 1" (1.549 m)   BMI (Calculated) 21.9 21.9 21.6   Pain Score Six Six Zero         CBC:   Lab Results   Component Value Date    WBC 4.63 08/30/2024    HGB 12.6 08/30/2024    HCT 36.6 (L) 08/30/2024    MCV 98 08/30/2024     08/30/2024         CMP:   Sodium   Date Value Ref Range Status   08/30/2024 135 (L) 136 - 145 mmol/L Final     Potassium   Date Value Ref Range Status   08/30/2024 4.8 3.5 - 5.1 mmol/L Final     Chloride   Date Value Ref Range Status   08/30/2024 96 95 - 110 mmol/L Final     CO2   Date Value Ref Range Status   08/30/2024 24 23 - 29 mmol/L Final     Glucose   Date Value Ref Range Status   08/30/2024 125 (H) 70 - 110 mg/dL Final     BUN   Date Value Ref Range Status   08/30/2024 15 8 - 23 mg/dL Final     Creatinine   Date Value Ref Range Status   08/30/2024 0.8 0.5 - 1.4 mg/dL Final "     Calcium   Date Value Ref Range Status   08/30/2024 10.0 8.7 - 10.5 mg/dL Final     Total Protein   Date Value Ref Range Status   08/30/2024 7.6 6.0 - 8.4 g/dL Final     Albumin   Date Value Ref Range Status   08/30/2024 4.1 3.5 - 5.2 g/dL Final     Total Bilirubin   Date Value Ref Range Status   08/30/2024 0.3 0.1 - 1.0 mg/dL Final     Comment:     For infants and newborns, interpretation of results should be based  on gestational age, weight and in agreement with clinical  observations.    Premature Infant recommended reference ranges:  Up to 24 hours.............<8.0 mg/dL  Up to 48 hours............<12.0 mg/dL  3-5 days..................<15.0 mg/dL  6-29 days.................<15.0 mg/dL       Alkaline Phosphatase   Date Value Ref Range Status   08/30/2024 38 (L) 55 - 135 U/L Final     AST   Date Value Ref Range Status   08/30/2024 18 10 - 40 U/L Final     ALT   Date Value Ref Range Status   08/30/2024 11 10 - 44 U/L Final     Anion Gap   Date Value Ref Range Status   08/30/2024 15 8 - 16 mmol/L Final     eGFR    Date Value Ref Range Status   04/11/2024 99 mL/min/1.73mSq Final     Comment:     In accordance with NKF-ASN Task Force recommendation, calculation based on the Chronic Kidney Disease Epidemiology Collaboration (CKD-EPI) equation without adjustment for race. eGFR adjusted for gender and age and calculated in ml/min/1.73mSquared. eGFR cannot be calculated if patient is under 18 years of age.     Reference Range:   >= 60 ml/min/1.73mSquared.       INR:  Lab Results   Component Value Date    INR 1.1 04/23/2024       EKG:   No results found for this or any previous visit.     2D ECHO:   No results found for this or any previous visit.         ASSESSMENT/PLAN:           Pre-op Assessment    I have reviewed the Patient Summary Reports.     I have reviewed the Nursing Notes. I have reviewed the NPO Status.   I have reviewed the Medications.     Review of Systems  Anesthesia Hx:  No problems  with previous Anesthesia   History of prior surgery of interest to airway management or planning:  Previous anesthesia: General        Denies Family Hx of Anesthesia complications.    Denies Personal Hx of Anesthesia complications.                    Social:  Non-Smoker, No Alcohol Use       Hematology/Oncology:                   Hematology Comments: Recent PE, on eliquis. Lovenox bridge prior to surgery    Current/Recent Cancer.            Oncology Comments: Ovarian ca with peritoneal carcinomatosis dx 4/2024, has undergone 6 cycles of chemo with carboplatin and paclitaxel at time of surgery     Cardiovascular:     Hypertension, well controlled   Denies MI.  Denies CAD.       Denies Angina.  Denies CHF.      Denies BERGER.                            Pulmonary:    Denies COPD.  Denies Asthma.   Denies Shortness of breath.  Denies Recent URI.                 Renal/:   Denies Chronic Renal Disease.                Hepatic/GI:      Denies GERD. Denies Liver Disease.            Neurological:    Denies CVA.    Denies Seizures.                                Endocrine:  Diabetes, well controlled, using insulin Hypothyroidism          Psych:  Denies Psychiatric History.                  Physical Exam  General: Well nourished, Cooperative and Alert    Airway:  Mallampati: III / II  Mouth Opening: Normal  TM Distance: Normal  Tongue: Normal  Neck ROM: Normal ROM    Chest/Lungs:  Normal Respiratory Rate    Heart:  Rate: Normal        Anesthesia Plan  Type of Anesthesia, risks & benefits discussed:    Anesthesia Type: Gen ETT  Intra-op Monitoring Plan: Standard ASA Monitors and Art Line  Post Op Pain Control Plan: multimodal analgesia and peripheral nerve block  Induction:  IV  Airway Plan: Direct, Post-Induction  Informed Consent: Informed consent signed with the Patient and all parties understand the risks and agree with anesthesia plan.  All questions answered.   ASA Score: 3  Day of Surgery Review of History & Physical: H&P  Update referred to the surgeon/provider.    Ready For Surgery From Anesthesia Perspective.     .

## 2024-09-02 ENCOUNTER — HOSPITAL ENCOUNTER (INPATIENT)
Facility: HOSPITAL | Age: 74
LOS: 6 days | Discharge: HOME | DRG: 737 | End: 2024-09-08
Attending: EMERGENCY MEDICINE | Admitting: OBSTETRICS & GYNECOLOGY
Payer: MEDICARE

## 2024-09-02 DIAGNOSIS — R40.20 LOC (LOSS OF CONSCIOUSNESS): ICD-10-CM

## 2024-09-02 DIAGNOSIS — Z90.710 S/P TAH-BSO (TOTAL ABDOMINAL HYSTERECTOMY AND BILATERAL SALPINGO-OOPHORECTOMY): Primary | ICD-10-CM

## 2024-09-02 DIAGNOSIS — E86.0 DEHYDRATION: ICD-10-CM

## 2024-09-02 DIAGNOSIS — E11.9 TYPE 2 DIABETES MELLITUS WITHOUT COMPLICATION, WITH LONG-TERM CURRENT USE OF INSULIN: ICD-10-CM

## 2024-09-02 DIAGNOSIS — E87.1 HYPONATREMIA: ICD-10-CM

## 2024-09-02 DIAGNOSIS — Z90.79 S/P TAH-BSO (TOTAL ABDOMINAL HYSTERECTOMY AND BILATERAL SALPINGO-OOPHORECTOMY): Primary | ICD-10-CM

## 2024-09-02 DIAGNOSIS — E87.5 HYPERKALEMIA: ICD-10-CM

## 2024-09-02 DIAGNOSIS — Z90.722 S/P TAH-BSO (TOTAL ABDOMINAL HYSTERECTOMY AND BILATERAL SALPINGO-OOPHORECTOMY): Primary | ICD-10-CM

## 2024-09-02 DIAGNOSIS — Z79.4 TYPE 2 DIABETES MELLITUS WITHOUT COMPLICATION, WITH LONG-TERM CURRENT USE OF INSULIN: ICD-10-CM

## 2024-09-02 DIAGNOSIS — C78.6 PERITONEAL CARCINOMATOSIS: ICD-10-CM

## 2024-09-02 DIAGNOSIS — E87.70 FLUID OVERLOAD: ICD-10-CM

## 2024-09-02 DIAGNOSIS — Z86.711 HISTORY OF PULMONARY EMBOLISM: ICD-10-CM

## 2024-09-02 LAB
ALBUMIN SERPL BCP-MCNC: 4 G/DL (ref 3.5–5.2)
ALP SERPL-CCNC: 37 U/L (ref 55–135)
ALT SERPL W/O P-5'-P-CCNC: 11 U/L (ref 10–44)
ANION GAP SERPL CALC-SCNC: 13 MMOL/L (ref 8–16)
AST SERPL-CCNC: 19 U/L (ref 10–40)
BASOPHILS # BLD AUTO: 0.02 K/UL (ref 0–0.2)
BASOPHILS NFR BLD: 0.3 % (ref 0–1.9)
BILIRUB SERPL-MCNC: 0.3 MG/DL (ref 0.1–1)
BUN SERPL-MCNC: 7 MG/DL (ref 8–23)
CALCIUM SERPL-MCNC: 10 MG/DL (ref 8.7–10.5)
CHLORIDE SERPL-SCNC: 99 MMOL/L (ref 95–110)
CO2 SERPL-SCNC: 20 MMOL/L (ref 23–29)
CREAT SERPL-MCNC: 0.7 MG/DL (ref 0.5–1.4)
DIFFERENTIAL METHOD BLD: ABNORMAL
EOSINOPHIL # BLD AUTO: 0 K/UL (ref 0–0.5)
EOSINOPHIL NFR BLD: 0 % (ref 0–8)
ERYTHROCYTE [DISTWIDTH] IN BLOOD BY AUTOMATED COUNT: 14.5 % (ref 11.5–14.5)
EST. GFR  (NO RACE VARIABLE): >60 ML/MIN/1.73 M^2
ESTIMATED AVG GLUCOSE: 120 MG/DL (ref 68–131)
GLUCOSE SERPL-MCNC: 123 MG/DL (ref 70–110)
GLUCOSE SERPL-MCNC: 202 MG/DL (ref 70–110)
HBA1C MFR BLD: 5.8 % (ref 4–5.6)
HCT VFR BLD AUTO: 35.4 % (ref 37–48.5)
HGB BLD-MCNC: 12.4 G/DL (ref 12–16)
IMM GRANULOCYTES # BLD AUTO: 0.02 K/UL (ref 0–0.04)
IMM GRANULOCYTES NFR BLD AUTO: 0.3 % (ref 0–0.5)
INFLUENZA A, MOLECULAR: NOT DETECTED
INFLUENZA B, MOLECULAR: NOT DETECTED
LYMPHOCYTES # BLD AUTO: 1.7 K/UL (ref 1–4.8)
LYMPHOCYTES NFR BLD: 27.1 % (ref 18–48)
MAGNESIUM SERPL-MCNC: 1.2 MG/DL (ref 1.6–2.6)
MCH RBC QN AUTO: 33.5 PG (ref 27–31)
MCHC RBC AUTO-ENTMCNC: 35 G/DL (ref 32–36)
MCV RBC AUTO: 96 FL (ref 82–98)
MONOCYTES # BLD AUTO: 0.6 K/UL (ref 0.3–1)
MONOCYTES NFR BLD: 9 % (ref 4–15)
NEUTROPHILS # BLD AUTO: 4.1 K/UL (ref 1.8–7.7)
NEUTROPHILS NFR BLD: 63.3 % (ref 38–73)
NRBC BLD-RTO: 0 /100 WBC
PLATELET # BLD AUTO: 199 K/UL (ref 150–450)
PMV BLD AUTO: 8.5 FL (ref 9.2–12.9)
POCT GLUCOSE: 152 MG/DL (ref 70–110)
POCT GLUCOSE: 199 MG/DL (ref 70–110)
POTASSIUM SERPL-SCNC: 3.7 MMOL/L (ref 3.5–5.1)
PROT SERPL-MCNC: 7.3 G/DL (ref 6–8.4)
RBC # BLD AUTO: 3.7 M/UL (ref 4–5.4)
RSV AG BY MOLECULAR METHOD: NOT DETECTED
SARS-COV-2 RNA RESP QL NAA+PROBE: NOT DETECTED
SODIUM SERPL-SCNC: 132 MMOL/L (ref 136–145)
WBC # BLD AUTO: 6.43 K/UL (ref 3.9–12.7)

## 2024-09-02 PROCEDURE — 83036 HEMOGLOBIN GLYCOSYLATED A1C: CPT | Performed by: EMERGENCY MEDICINE

## 2024-09-02 PROCEDURE — 25000003 PHARM REV CODE 250

## 2024-09-02 PROCEDURE — 82962 GLUCOSE BLOOD TEST: CPT

## 2024-09-02 PROCEDURE — 96361 HYDRATE IV INFUSION ADD-ON: CPT

## 2024-09-02 PROCEDURE — 99223 1ST HOSP IP/OBS HIGH 75: CPT | Mod: AI,,, | Performed by: OBSTETRICS & GYNECOLOGY

## 2024-09-02 PROCEDURE — 80053 COMPREHEN METABOLIC PANEL: CPT | Performed by: EMERGENCY MEDICINE

## 2024-09-02 PROCEDURE — 63600175 PHARM REV CODE 636 W HCPCS: Performed by: EMERGENCY MEDICINE

## 2024-09-02 PROCEDURE — 20600001 HC STEP DOWN PRIVATE ROOM

## 2024-09-02 PROCEDURE — 85025 COMPLETE CBC W/AUTO DIFF WBC: CPT | Performed by: EMERGENCY MEDICINE

## 2024-09-02 PROCEDURE — 96365 THER/PROPH/DIAG IV INF INIT: CPT

## 2024-09-02 PROCEDURE — 99285 EMERGENCY DEPT VISIT HI MDM: CPT | Mod: 25

## 2024-09-02 PROCEDURE — 83735 ASSAY OF MAGNESIUM: CPT | Performed by: EMERGENCY MEDICINE

## 2024-09-02 PROCEDURE — 63600175 PHARM REV CODE 636 W HCPCS

## 2024-09-02 PROCEDURE — 93010 ELECTROCARDIOGRAM REPORT: CPT | Mod: ,,, | Performed by: INTERNAL MEDICINE

## 2024-09-02 PROCEDURE — 0241U SARS-COV2 (COVID) WITH FLU/RSV BY PCR: CPT | Performed by: EMERGENCY MEDICINE

## 2024-09-02 PROCEDURE — 93005 ELECTROCARDIOGRAM TRACING: CPT

## 2024-09-02 RX ORDER — METRONIDAZOLE 500 MG/1
500 TABLET ORAL ONCE
Status: COMPLETED | OUTPATIENT
Start: 2024-09-02 | End: 2024-09-02

## 2024-09-02 RX ORDER — OXYCODONE HYDROCHLORIDE 10 MG/1
10 TABLET ORAL EVERY 4 HOURS PRN
Status: DISCONTINUED | OUTPATIENT
Start: 2024-09-02 | End: 2024-09-03

## 2024-09-02 RX ORDER — GLUCAGON 1 MG
1 KIT INJECTION
Status: DISCONTINUED | OUTPATIENT
Start: 2024-09-02 | End: 2024-09-03

## 2024-09-02 RX ORDER — HYDROMORPHONE HYDROCHLORIDE 1 MG/ML
0.5 INJECTION, SOLUTION INTRAMUSCULAR; INTRAVENOUS; SUBCUTANEOUS EVERY 6 HOURS PRN
Status: DISCONTINUED | OUTPATIENT
Start: 2024-09-02 | End: 2024-09-03

## 2024-09-02 RX ORDER — GABAPENTIN 100 MG/1
100 CAPSULE ORAL
Status: DISCONTINUED | OUTPATIENT
Start: 2024-09-02 | End: 2024-09-03

## 2024-09-02 RX ORDER — METOPROLOL SUCCINATE 25 MG/1
50 TABLET, EXTENDED RELEASE ORAL EVERY MORNING
Status: DISCONTINUED | OUTPATIENT
Start: 2024-09-03 | End: 2024-09-03

## 2024-09-02 RX ORDER — INSULIN GLARGINE 100 [IU]/ML
4 INJECTION, SOLUTION SUBCUTANEOUS ONCE
Status: DISCONTINUED | OUTPATIENT
Start: 2024-09-02 | End: 2024-09-02

## 2024-09-02 RX ORDER — DULOXETIN HYDROCHLORIDE 30 MG/1
30 CAPSULE, DELAYED RELEASE ORAL DAILY
Status: DISCONTINUED | OUTPATIENT
Start: 2024-09-03 | End: 2024-09-02

## 2024-09-02 RX ORDER — ONDANSETRON HYDROCHLORIDE 2 MG/ML
4 INJECTION, SOLUTION INTRAVENOUS EVERY 8 HOURS PRN
Status: DISCONTINUED | OUTPATIENT
Start: 2024-09-02 | End: 2024-09-03

## 2024-09-02 RX ORDER — METOPROLOL SUCCINATE 50 MG/1
50 TABLET, EXTENDED RELEASE ORAL EVERY MORNING
Status: DISCONTINUED | OUTPATIENT
Start: 2024-09-03 | End: 2024-09-02

## 2024-09-02 RX ORDER — INSULIN ASPART 100 [IU]/ML
0-5 INJECTION, SOLUTION INTRAVENOUS; SUBCUTANEOUS EVERY 6 HOURS PRN
Status: DISCONTINUED | OUTPATIENT
Start: 2024-09-02 | End: 2024-09-03

## 2024-09-02 RX ORDER — CIPROFLOXACIN 500 MG/1
500 TABLET ORAL ONCE
Status: COMPLETED | OUTPATIENT
Start: 2024-09-02 | End: 2024-09-02

## 2024-09-02 RX ORDER — MAGNESIUM SULFATE HEPTAHYDRATE 40 MG/ML
2 INJECTION, SOLUTION INTRAVENOUS
Status: COMPLETED | OUTPATIENT
Start: 2024-09-02 | End: 2024-09-02

## 2024-09-02 RX ORDER — ACETAMINOPHEN 325 MG/1
650 TABLET ORAL EVERY 4 HOURS PRN
Status: DISCONTINUED | OUTPATIENT
Start: 2024-09-02 | End: 2024-09-03

## 2024-09-02 RX ORDER — SODIUM CHLORIDE, SODIUM LACTATE, POTASSIUM CHLORIDE, CALCIUM CHLORIDE 600; 310; 30; 20 MG/100ML; MG/100ML; MG/100ML; MG/100ML
INJECTION, SOLUTION INTRAVENOUS CONTINUOUS
Status: DISCONTINUED | OUTPATIENT
Start: 2024-09-02 | End: 2024-09-03

## 2024-09-02 RX ORDER — PROCHLORPERAZINE EDISYLATE 5 MG/ML
5 INJECTION INTRAMUSCULAR; INTRAVENOUS EVERY 6 HOURS PRN
Status: DISCONTINUED | OUTPATIENT
Start: 2024-09-02 | End: 2024-09-03

## 2024-09-02 RX ORDER — ESCITALOPRAM OXALATE 10 MG/1
10 TABLET ORAL DAILY
Status: DISCONTINUED | OUTPATIENT
Start: 2024-09-03 | End: 2024-09-04

## 2024-09-02 RX ORDER — INSULIN GLARGINE 100 [IU]/ML
4 INJECTION, SOLUTION SUBCUTANEOUS ONCE
Status: COMPLETED | OUTPATIENT
Start: 2024-09-03 | End: 2024-09-03

## 2024-09-02 RX ORDER — INSULIN GLARGINE 100 [IU]/ML
4 INJECTION, SOLUTION SUBCUTANEOUS DAILY
Status: DISCONTINUED | OUTPATIENT
Start: 2024-09-03 | End: 2024-09-02

## 2024-09-02 RX ORDER — SODIUM CHLORIDE 0.9 % (FLUSH) 0.9 %
10 SYRINGE (ML) INJECTION
Status: DISCONTINUED | OUTPATIENT
Start: 2024-09-02 | End: 2024-09-03

## 2024-09-02 RX ORDER — OXYCODONE HYDROCHLORIDE 5 MG/1
5 TABLET ORAL EVERY 4 HOURS PRN
Status: DISCONTINUED | OUTPATIENT
Start: 2024-09-02 | End: 2024-09-03

## 2024-09-02 RX ORDER — LEVOTHYROXINE SODIUM 75 UG/1
75 TABLET ORAL EVERY MORNING
Status: DISCONTINUED | OUTPATIENT
Start: 2024-09-03 | End: 2024-09-08 | Stop reason: HOSPADM

## 2024-09-02 RX ORDER — GABAPENTIN 100 MG/1
100 CAPSULE ORAL DAILY
Status: DISCONTINUED | OUTPATIENT
Start: 2024-09-03 | End: 2024-09-03

## 2024-09-02 RX ORDER — HYDRALAZINE HYDROCHLORIDE 20 MG/ML
10 INJECTION INTRAMUSCULAR; INTRAVENOUS EVERY 6 HOURS PRN
Status: DISCONTINUED | OUTPATIENT
Start: 2024-09-02 | End: 2024-09-08 | Stop reason: HOSPADM

## 2024-09-02 RX ORDER — GABAPENTIN 300 MG/1
300 CAPSULE ORAL NIGHTLY
Status: DISCONTINUED | OUTPATIENT
Start: 2024-09-02 | End: 2024-09-04

## 2024-09-02 RX ADMIN — MAGNESIUM SULFATE HEPTAHYDRATE 2 G: 40 INJECTION, SOLUTION INTRAVENOUS at 02:09

## 2024-09-02 RX ADMIN — SODIUM CHLORIDE, POTASSIUM CHLORIDE, SODIUM LACTATE AND CALCIUM CHLORIDE: 600; 310; 30; 20 INJECTION, SOLUTION INTRAVENOUS at 05:09

## 2024-09-02 RX ADMIN — CIPROFLOXACIN 500 MG: 500 TABLET ORAL at 09:09

## 2024-09-02 RX ADMIN — CIPROFLOXACIN 500 MG: 500 TABLET ORAL at 10:09

## 2024-09-02 RX ADMIN — SODIUM CHLORIDE, POTASSIUM CHLORIDE, SODIUM LACTATE AND CALCIUM CHLORIDE 1000 ML: 600; 310; 30; 20 INJECTION, SOLUTION INTRAVENOUS at 01:09

## 2024-09-02 RX ADMIN — METRONIDAZOLE 500 MG: 500 TABLET ORAL at 09:09

## 2024-09-02 RX ADMIN — HYDROMORPHONE HYDROCHLORIDE 0.5 MG: 1 INJECTION, SOLUTION INTRAMUSCULAR; INTRAVENOUS; SUBCUTANEOUS at 07:09

## 2024-09-02 RX ADMIN — OXYCODONE HYDROCHLORIDE 10 MG: 10 TABLET ORAL at 04:09

## 2024-09-02 RX ADMIN — GABAPENTIN 100 MG: 100 CAPSULE ORAL at 05:09

## 2024-09-02 RX ADMIN — METRONIDAZOLE 500 MG: 500 TABLET ORAL at 10:09

## 2024-09-02 RX ADMIN — ACETAMINOPHEN 650 MG: 325 TABLET ORAL at 07:09

## 2024-09-02 RX ADMIN — GABAPENTIN 300 MG: 300 CAPSULE ORAL at 09:09

## 2024-09-02 NOTE — ED TRIAGE NOTES
"Bella Meyer, a 74 y.o. female presents to the ED w/ complaint of near syncope. Patient started her bowel prep for her colonoscopy today. Patient started to feel very weak, dizzy, and had a near syncopal episode. Patient denies LOC. Denies falling or hitting her head. Denies SOB or chest pain.     Triage note:  Chief Complaint   Patient presents with    Dizziness     Near syncope today, was prepping for colonoscopy and ovary removal, just got off chemo for ovarian cancer, family state got real weak and "went out on us"      Review of patient's allergies indicates:  No Known Allergies  Past Medical History:   Diagnosis Date    Cancer     Diabetes mellitus, type 2     Hyperlipidemia     Hypertension     Hypothyroidism     Pulmonary embolism       "

## 2024-09-02 NOTE — HOSPITAL COURSE
09/02/2024 - Admitted for dehydration secondary to colon prep. NPO, LR @ 100. Magnesium low, replaced. Plan for surgery tomorrow

## 2024-09-02 NOTE — H&P
[facility] - Emergency Dept  Gynecologic Oncology  H&P    Patient Name: Bella Meyer  MRN: [id number]  Admission Date: 2024  Primary Care Provider: Rosita, Primary Doctor   Principal Problem: Peritoneal carcinomatosis    Subjective:     Chief Complaint/Reason for Admission: Dehydration, pre-syncope, weakness    History of Present Illness: Patient is a 73 yo  with stage IIIC HGSOC (BRCA negative, HRD negative) who presented to the ED with complaints of weakness and a pre-syncopal episode. Patient reports having some nausea with vomiting around 3 am this morning. She then began her colon prep for her upcoming surgery tomorrow around 9 am. After taking the ducolax, she had multiple bouts of diarrhea. On her drive into [place] to spend the night prior to her scheduled surgery tomorrow, she needed to use the restroom. When attempting to get out of the car she felt extremely weak and had a pre-syncopal episode per her family member. She did not suffer LOC or hit her head. Since then she endorses continuing to feel weak and has had several more episodes of loose stools.     Oncology Treatment Plan:   OP GYN paclitaxel CARBOplatin (AUC 6) Q3W    Oncology History:   Peritoneal carcinomatosis   2024 Initial Diagnosis     Peritoneal carcinomatosis      2024 -  Chemotherapy     Treatment Summary   Plan Name: OP GYN paclitaxel CARBOplatin (AUC 6) Q3W  Treatment Goal: Curative  Status: Active  Start Date: 2024  End Date: 2025 (Planned)  Provider: Maliha Crawford MD  Chemotherapy: CARBOplatin (PARAPLATIN) 410 mg in sodium chloride 0.9% 326 mL chemo infusion, 410 mg (100 % of original dose 411 mg), Intravenous, Clinic/HOD 1 time, 5 of 17 cycles  Dose modification:   (original dose 411 mg, Cycle 1),   (original dose 379.5 mg, Cycle 2),   (original dose 411 mg, Cycle 3, Reason: Dose not tolerated),   (original dose 452 mg, Cycle 4)  Administration: 410 mg (2024), 380 mg (2024), 410 mg (2024), 450  mg (7/2/2024), 450 mg (7/23/2024)  PACLitaxeL (TAXOL) 175 mg/m2 = 276 mg in sodium chloride 0.9% 250 mL chemo infusion, 175 mg/m2 = 276 mg (100 % of original dose 175 mg/m2), Intravenous, Clinic/HOD 1 time, 5 of 17 cycles  Dose modification: 135 mg/m2 (original dose 175 mg/m2, Cycle 1, Reason: MD Discretion), 175 mg/m2 (original dose 175 mg/m2, Cycle 1)  Administration: 276 mg (4/30/2024), 210 mg (5/21/2024), 210 mg (6/11/2024), 210 mg (7/2/2024), 210 mg (7/23/2024)      Malignant neoplasm of ovary   4/23/2024 Initial Diagnosis     Malignant neoplasm of ovary  Referred by Dr. Evans for evaluation of a pelvic mass, peritoneal carcinomatosis and elevated .      3/24/24 CT AP- Abnormal inflammatory changes in the lower pelvis associated with the distal sigmoid colon. Tubular or channel-like structure along the posterior margin of the lower descending colon possibly connecting between the sigmoid and rectal regions. Underlying neoplasm and/or fistula not excluded. Mildly enlarged left iliac bifurcation lymph nodes noted. No drainable abscess. There is also abnormal appearance of the descending colon and several small soft tissue nodules in the pericolonic tissues of the descending colon. Malignant neoplasm should be considered.      4/9/24 TVUS- Grossly unremarkable appearance of the uterus. The left ovary is not seen sonographically. Enlarged right ovary at 6.6 cm with several cysts the largest 3.4 cm with some thin septations and blood flow to the ovary. Trace fluid adjacent to the ovary. No gross ascitic fluid.      4/9/24 - 188     4/18/24 CT CAP- Diffuse peritoneal carcinomatosis. Right adnexal mass (6 cm) which could be the primary neoplasm/ovarian cancer. Sigmoid colon thickening which could be the primary neoplasm/colon cancer.   Index implant left anterior abdomen 2 cm.   Index left pelvic sidewall implant 1.5 cm.     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  Positive pulmonary emboli within the right lower lobe segmental branches, nonocclusive.  No right heart strain.  Persistent sigmoid wall thickening.      8/12/2024 Tumor Markers     =05     8/19/24               Imaging Significant Findings  CT A/P: significant improvement in the    previous cystic lesions in the pelvis with some residual in the right pelvis and along the pelvic wall and left. Similar appearance of subcentimeter hypodensity left hepatic lobe which statistically represents a cyst    CTA: No acute finding, no PE, no evidence of metastatic disease in chest    Past Medical History:   Diagnosis Date    Cancer     Diabetes mellitus, type 2     Hyperlipidemia     Hypertension     Hypothyroidism     Pulmonary embolism      Past Surgical History:   Procedure Laterality Date    TUBAL LIGATION      at age 32's     Family History       Problem Relation (Age of Onset)    Cancer Brother          Tobacco Use    Smoking status: Never     Passive exposure: Past    Smokeless tobacco: Never   Substance and Sexual Activity    Alcohol use: Never    Drug use: Never    Sexual activity: Yes     Partners: Male     Birth control/protection: Post-menopausal       (Not in a hospital admission)      Review of patient's allergies indicates:  No Known Allergies    Review of Systems   Constitutional:  Negative for chills and fever.   Respiratory:  Negative for shortness of breath.    Cardiovascular:  Negative for chest pain.    Gastrointestinal:  Positive for diarrhea, nausea and vomiting. Negative for abdominal pain and constipation.   Genitourinary:  Negative for dysuria and vaginal bleeding.   Musculoskeletal:  Negative for back pain.   Neurological:  Negative for syncope and headaches.        + Weakness, + pre-syncope      Objective:     Vital Signs (Most Recent):  Temp: 97.5 °F (36.4 °C) (24 1305)  Pulse: 86 (24 1500)  Resp: 16 (24 1500)  BP: (!) 159/72 (24 1500)  SpO2: 100 % (24 1500) Vital Signs (24h Range):  Temp:  [97.5 °F (36.4 °C)] 97.5 °F (36.4 °C)  Pulse:  [86-96] 86  Resp:  [13-20] 16  SpO2:  [98 %-100 %] 100 %  BP: (135-159)/(72-79) 159/72     Weight: 54.4 kg (119 lb 14.9 oz)  Body mass index is 22.66 kg/m².    Physical Exam:   Constitutional: She is oriented to person, place, and time. She appears well-developed and well-nourished.       Cardiovascular:  Normal rate and regular rhythm.             Pulmonary/Chest: Effort normal and breath sounds normal. No respiratory distress.        Abdominal: Soft. Bowel sounds are normal. She exhibits no distension. There is no abdominal tenderness. There is no rebound and no guarding.             Musculoskeletal: Normal range of motion.       Neurological: She is alert and oriented to person, place, and time.    Skin: Skin is warm and dry.    Psychiatric: She has a normal mood and affect.       Laboratory:  CBC:   Recent Labs   Lab 24  1346   WBC 6.43   HGB 12.4   HCT 35.4*       and CMP:   Recent Labs   Lab 24  1346   *   K 3.7   CL 99   CO2 20*   *   BUN 7*   CREATININE 0.7   CALCIUM 10.0   PROT 7.3   ALBUMIN 4.0   BILITOT 0.3   ALKPHOS 37*   AST 19   ALT 11   ANIONGAP 13     Mag  1.2  EKG: NSR  POCT B      Assessment/Plan:     Active Diagnoses:    Diagnosis Date Noted POA    PRINCIPAL PROBLEM:  Peritoneal carcinomatosis [C78.6] 2024 Yes    Dehydration [E86.0] 2024 Unknown    History of pulmonary  embolism [Z86.711] 2024 Yes    Generalized weakness [R53.1] 2024 Yes    Malignant neoplasm of ovary [C56.9] 2024 Yes    Type 2 diabetes mellitus, with long-term current use of insulin [E11.9, Z79.4] 2024 Not Applicable    Hypothyroidism [E03.9] 2024 Yes    Pure hypercholesterolemia [E78.00] 2013 Yes    Hypertension [I10] 2013 Yes      Problems Resolved During this Admission:     Dehydration/weakness  - 74 yo  with stage IIIC HGSOC (BRCA negative, HRD negative) who presented to the ED with complaints of weakness and a pre-syncopal episode  - VSS  - Exam as above  - Labs:    CBC: WNL, no elevated WBC   CMP: WNL   Magnesium: 1.2, replaced   COVID: negative   Flu: negative  - Symptoms likely secondary to dehydration from diarrhea due to bowel prep  - Will not continue prep secondary to patient symptoms  - Diet: NPO, LR @ 125 cc/hr  - Repeat CBC, CMP and Mag ordered in M    2. HGSOC  - See oncology information above  - S/p neodjuvant chemotherapy  - Scheduled for ex lap/FANNY/BSO/Omx/debulking, possible LAR/colectomy tomorrow AM  - NPO, LR @ 100 cc/hr  - Continue home gabapentin 100/100/300   - Pain regimen: tylenol PRN, oxy 5/10 PRN    3. Hypothyroidism  - Continue home synthroid    4. Type II Diabetes  - Will hold short acting insulin in setting of NPO status and planned surgery for tomorrow, will due Lantus 4 (40% of patients home regimen in AM)  - POCT BG q6 while NPO  - SSI    5. HTN  - Continue home metoprolol, hold home amlodipine  - Hydral PRN ordered for SBP > 180    6. HLD  - Hold home statin    7. History of PE  - Currently on treatment with eliquis 5 BID, completed Lovenox bridge with last dose this AM prior to surgery  - Will give heparin pre-operatively then plan for heparin 5000 q8 starting 8 hours after surgery  - Consider restarting home eliquis on POD#1     8. Depression  - Continue home ricardo Bowers MD  Gynecologic Oncology  [facility] - Emergency  Dept

## 2024-09-02 NOTE — HPI
Patient is a 73 yo  with stage IIIC HGSOC (BRCA negative, HRD negative) who presented to the ED with complaints of weakness and a pre-syncopal episode. Patient reports having some nausea with vomiting around 3 am this morning. She then began her colon prep for her upcoming surgery tomorrow around 9 am. After taking the ducolax, she had multiple bouts of diarrhea. On her drive into [place] to spend the night prior to her scheduled surgery tomorrow, she needed to use the restroom. When attempting to get out of the car she felt extremely weak and had a pre-syncopal episode per her family member. She did not suffer LOC or hit her head. Since then she endorses continuing to feel weak and has had several more episodes of loose stools.

## 2024-09-02 NOTE — CONSULTS
[facility] Dept  Gynecologic Oncology  Consult Note    Patient Name: Bella Meyer  MRN: [id number]  Admission Date: 9/2/2024  Hospital Length of Stay: 0 days  Attending Provider: Yamile Alfonso MD  Primary Care Provider: No, Primary Doctor  Principal Problem: <principal problem not specified>     Inpatient consult to Gynecologic Oncology  Consult performed by: Herlinda Bowers MD  Consult ordered by: Yamile Alfonso MD        See H&P dated 9/2/24 for further details. In short patient is a 75 yo with stage IIIC HGSOC who presented to ED with weakness and pre-syncopal episode following the bowel prep for her scheduled surgery tomorrow. Will admit overnight for fluid resuscitation with plans for scheduled surgery in AM.     Herlinda Bowers MD  PGY-4 OB/GYN

## 2024-09-02 NOTE — ED PROVIDER NOTES
"Encounter Date: 9/2/2024       History     Chief Complaint   Patient presents with    Dizziness     Near syncope today, was prepping for colonoscopy and ovary removal, just got off chemo for ovarian cancer, family state got real weak and "went out on us"      74-year-old female, history of hypertension, diabetes, ovarian cancer, PE on Eliquis,, last chemo seems to have been August 13th, scheduled for ExLap/FANNY/BSO/ omentectomy/possible cytoreductive surgery including bowel resection if necessary, tomorrow [place] at [facility] with [place]ne Onc and colorectal surgery, presenting with severe weakness, diarrhea near-syncope.  Patient actually started to feel sick overnight, had nausea and vomiting as her 1st symptoms.  Then this morning she took for Dulcolax to start her prep for surgery and then has had multiple episodes of diarrhea since then.  She has not taken the 2nd part of the colonoscopy prep.  She feels very weak.  No fevers or chills.    The history is provided by the patient and a relative.     Review of patient's allergies indicates:  No Known Allergies  Past Medical History:   Diagnosis Date    Cancer     Diabetes mellitus, type 2     Hyperlipidemia     Hypertension     Hypothyroidism     Pulmonary embolism      Past Surgical History:   Procedure Laterality Date    TUBAL LIGATION      at age 32's     Family History   Problem Relation Name Age of Onset    Cancer Brother      Breast cancer Neg Hx      Colon cancer Neg Hx      Ovarian cancer Neg Hx      Uterine cancer Neg Hx      Cervical cancer Neg Hx       Social History     Tobacco Use    Smoking status: Never     Passive exposure: Past    Smokeless tobacco: Never   Substance Use Topics    Alcohol use: Never    Drug use: Never     Review of Systems    Physical Exam     Initial Vitals [09/02/24 1305]   BP Pulse Resp Temp SpO2   135/79 96 20 97.5 °F (36.4 °C) 98 %      MAP       --         Physical Exam    Nursing note and vitals reviewed.  Constitutional: She is not " diaphoretic. She is cooperative. She appears ill. No distress.   HENT:   MM dry   Eyes: Conjunctivae are normal.   Neck: Neck supple.   Cardiovascular:  Normal rate.           Pulmonary/Chest: No respiratory distress.   Abdominal: Abdomen is soft. She exhibits no distension. There is no abdominal tenderness. There is no rebound and no guarding.   Musculoskeletal:         General: No edema.      Cervical back: Neck supple.     Neurological: She is alert.   Skin: There is pallor.   Psychiatric: She has a normal mood and affect.         ED Course   Procedures  Labs Reviewed   CBC W/ AUTO DIFFERENTIAL - Abnormal       Result Value    WBC 6.43      RBC 3.70 (*)     Hemoglobin 12.4      Hematocrit 35.4 (*)     MCV 96      MCH 33.5 (*)     MCHC 35.0      RDW 14.5      Platelets 199      MPV 8.5 (*)     Immature Granulocytes 0.3      Gran # (ANC) 4.1      Immature Grans (Abs) 0.02      Lymph # 1.7      Mono # 0.6      Eos # 0.0      Baso # 0.02      nRBC 0      Gran % 63.3      Lymph % 27.1      Mono % 9.0      Eosinophil % 0.0      Basophil % 0.3      Differential Method Automated     COMPREHENSIVE METABOLIC PANEL - Abnormal    Sodium 132 (*)     Potassium 3.7      Chloride 99      CO2 20 (*)     Glucose 202 (*)     BUN 7 (*)     Creatinine 0.7      Calcium 10.0      Total Protein 7.3      Albumin 4.0      Total Bilirubin 0.3      Alkaline Phosphatase 37 (*)     AST 19      ALT 11      eGFR >60.0      Anion Gap 13     MAGNESIUM - Abnormal    Magnesium 1.2 (*)    HEMOGLOBIN A1C - Abnormal    Hemoglobin A1C 5.8 (*)     Estimated Avg Glucose 120     POCT GLUCOSE - Abnormal    POCT Glucose 199 (*)    POCT GLUCOSE - Abnormal    POCT Glucose 152 (*)    SARS-COV2 (COVID) WITH FLU/RSV BY PCR    SARS-CoV2 (COVID-19) Qualitative PCR Not Detected      Influenza A, Molecular Not Detected      Influenza B, Molecular Not Detected      RSV Ag by Molecular Method Not Detected     HEMOGLOBIN A1C   POCT GLUCOSE MONITORING CONTINUOUS     EKG  Readings: (Independently Interpreted)   Initial Reading: No STEMI. Rhythm: Normal Sinus Rhythm. Ectopy: No Ectopy. ST Segments: Normal ST Segments. T Waves: Normal.       Imaging Results    None          Medications   ciprofloxacin HCl tablet 500 mg (has no administration in time range)   ciprofloxacin HCl tablet 500 mg (has no administration in time range)   EScitalopram oxalate tablet 10 mg (has no administration in time range)   gabapentin capsule 100 mg (has no administration in time range)   gabapentin capsule 300 mg (has no administration in time range)   levothyroxine tablet 75 mcg (has no administration in time range)   metroNIDAZOLE tablet 500 mg (has no administration in time range)   metroNIDAZOLE tablet 500 mg (has no administration in time range)   sodium chloride 0.9% flush 10 mL (has no administration in time range)   lactated ringers infusion ( Intravenous New Bag 9/2/24 1720)   acetaminophen tablet 650 mg (650 mg Oral Given 9/2/24 1937)   oxyCODONE immediate release tablet 5 mg (has no administration in time range)   oxyCODONE immediate release tablet 10 mg (10 mg Oral Given 9/2/24 1630)   ondansetron injection 4 mg (has no administration in time range)   prochlorperazine injection Soln 5 mg (has no administration in time range)   dextrose 10% bolus 125 mL 125 mL (has no administration in time range)   glucagon (human recombinant) injection 1 mg (has no administration in time range)   insulin aspart U-100 pen 0-5 Units (has no administration in time range)   hydrALAZINE injection 10 mg (has no administration in time range)   insulin glargine U-100 (Lantus) pen 4 Units (has no administration in time range)   gabapentin capsule 100 mg (100 mg Oral Given 9/2/24 1713)   metoprolol succinate (TOPROL-XL) 24 hr tablet 50 mg (has no administration in time range)   HYDROmorphone injection 0.5 mg (0.5 mg Intravenous Given 9/2/24 1937)   lactated ringers bolus 1,000 mL (0 mLs Intravenous Stopped 9/2/24 1448)    magnesium sulfate 2g in water 50mL IVPB (premix) (0 g Intravenous Stopped 9/2/24 8795)     Medical Decision Making  The DDx for generalized, non-focal weakness would be broad and would include, but not be limited to, such diagnoses as infection, dehydration, electrolyte disturbance, anemia, hypothyroidism, malignancy, polypharmacy, deconditioning, depression and neuromuscular disease.    -The neurologic exam does not reveal any focal deficits that would be suggestive of an acute stroke.  -The patient is hemodynamically stable and clinically well-appearing  -Emergent lab work is indicated at this time to work-up for generalized weakness.  -Labs: CBC, CMP, lactate  -Imaging is not indicated today  -Medications/Intravenous fluids: IVF  -Gyn Onc consult  -Disposition: admission      Amount and/or Complexity of Data Reviewed  External Data Reviewed: notes.  Labs: ordered. Decision-making details documented in ED Course.    Risk  Prescription drug management.  Decision regarding hospitalization.              Attending Attestation:         Attending Critical Care:   Critical Care Times:   ==============================================================  Total Critical Care Time - exclusive of procedural time: 30 minutes.  ==============================================================  Critical care was necessary to treat or prevent imminent or life-threatening deterioration of the following conditions: dehydration.   Critical care was time spent personally by me on the following activities: obtaining history from patient or relative, examination of patient, review of old charts, review of x-rays / CT sent with the patient, ordering lab, x-rays, and/or EKG, development of treatment plan with patient or relative, ordering and performing treatments and interventions, evaluation of patient's response to treatment, discussion with consultants and re-evaluation of patient's conition.   Critical Care Condition: potentially  life-threatening           ED Course as of 09/02/24 2105   Mon Sep 02, 2024   1534 Magnesium (!): 1.2  Magnesium low - repletion ordered [AS]   1534 Sodium(!): 132 [AS]   1534 WBC: 6.43 [AS]      ED Course User Index  [AS] Yamile Alfonso MD                           Clinical Impression:  Final diagnoses:  [R40.20] LOC (loss of consciousness)  [E86.0] Dehydration (Primary)  [C78.6] Peritoneal carcinomatosis          ED Disposition Condition    Admit                 Yamile Alfonso MD  09/02/24 2105

## 2024-09-03 ENCOUNTER — ANESTHESIA (OUTPATIENT)
Dept: SURGERY | Facility: HOSPITAL | Age: 74
End: 2024-09-03
Payer: MEDICARE

## 2024-09-03 LAB
ABO + RH BLD: NORMAL
ALBUMIN SERPL BCP-MCNC: 1.9 G/DL (ref 3.5–5.2)
ALBUMIN SERPL BCP-MCNC: 3.4 G/DL (ref 3.5–5.2)
ALP SERPL-CCNC: 18 U/L (ref 55–135)
ALP SERPL-CCNC: 32 U/L (ref 55–135)
ALT SERPL W/O P-5'-P-CCNC: 10 U/L (ref 10–44)
ALT SERPL W/O P-5'-P-CCNC: 11 U/L (ref 10–44)
ANION GAP SERPL CALC-SCNC: 11 MMOL/L (ref 8–16)
ANION GAP SERPL CALC-SCNC: 12 MMOL/L (ref 8–16)
AST SERPL-CCNC: 17 U/L (ref 10–40)
AST SERPL-CCNC: 21 U/L (ref 10–40)
BASOPHILS # BLD AUTO: 0.01 K/UL (ref 0–0.2)
BASOPHILS # BLD AUTO: 0.01 K/UL (ref 0–0.2)
BASOPHILS NFR BLD: 0.1 % (ref 0–1.9)
BASOPHILS NFR BLD: 0.2 % (ref 0–1.9)
BILIRUB SERPL-MCNC: 0.3 MG/DL (ref 0.1–1)
BILIRUB SERPL-MCNC: 0.5 MG/DL (ref 0.1–1)
BLD GP AB SCN CELLS X3 SERPL QL: NORMAL
BLD PROD TYP BPU: NORMAL
BLD PROD TYP BPU: NORMAL
BLOOD UNIT EXPIRATION DATE: NORMAL
BLOOD UNIT EXPIRATION DATE: NORMAL
BLOOD UNIT TYPE CODE: 6200
BLOOD UNIT TYPE CODE: 6200
BLOOD UNIT TYPE: NORMAL
BLOOD UNIT TYPE: NORMAL
BUN SERPL-MCNC: 4 MG/DL (ref 8–23)
BUN SERPL-MCNC: 4 MG/DL (ref 8–23)
CA-I BLDV-SCNC: 0.97 MMOL/L (ref 1.06–1.42)
CALCIUM SERPL-MCNC: 6.8 MG/DL (ref 8.7–10.5)
CALCIUM SERPL-MCNC: 9.4 MG/DL (ref 8.7–10.5)
CHLORIDE SERPL-SCNC: 101 MMOL/L (ref 95–110)
CHLORIDE SERPL-SCNC: 105 MMOL/L (ref 95–110)
CO2 SERPL-SCNC: 18 MMOL/L (ref 23–29)
CO2 SERPL-SCNC: 25 MMOL/L (ref 23–29)
CODING SYSTEM: NORMAL
CODING SYSTEM: NORMAL
CREAT SERPL-MCNC: 0.6 MG/DL (ref 0.5–1.4)
CREAT SERPL-MCNC: 0.6 MG/DL (ref 0.5–1.4)
CROSSMATCH INTERPRETATION: NORMAL
CROSSMATCH INTERPRETATION: NORMAL
DIFFERENTIAL METHOD BLD: ABNORMAL
DIFFERENTIAL METHOD BLD: ABNORMAL
DISPENSE STATUS: NORMAL
DISPENSE STATUS: NORMAL
EOSINOPHIL # BLD AUTO: 0 K/UL (ref 0–0.5)
EOSINOPHIL # BLD AUTO: 0 K/UL (ref 0–0.5)
EOSINOPHIL NFR BLD: 0 % (ref 0–8)
EOSINOPHIL NFR BLD: 0.2 % (ref 0–8)
ERYTHROCYTE [DISTWIDTH] IN BLOOD BY AUTOMATED COUNT: 14.5 % (ref 11.5–14.5)
ERYTHROCYTE [DISTWIDTH] IN BLOOD BY AUTOMATED COUNT: 14.6 % (ref 11.5–14.5)
EST. GFR  (NO RACE VARIABLE): >60 ML/MIN/1.73 M^2
EST. GFR  (NO RACE VARIABLE): >60 ML/MIN/1.73 M^2
GLUCOSE SERPL-MCNC: 123 MG/DL (ref 70–110)
GLUCOSE SERPL-MCNC: 239 MG/DL (ref 70–110)
GLUCOSE SERPL-MCNC: 256 MG/DL (ref 70–110)
GLUCOSE SERPL-MCNC: 281 MG/DL (ref 70–110)
GLUCOSE SERPL-MCNC: 314 MG/DL (ref 70–110)
GLUCOSE SERPL-MCNC: 339 MG/DL (ref 70–110)
GLUCOSE SERPL-MCNC: 353 MG/DL (ref 70–110)
GLUCOSE SERPL-MCNC: 364 MG/DL (ref 70–110)
GLUCOSE SERPL-MCNC: 99 MG/DL (ref 70–110)
HCO3 UR-SCNC: 17.7 MMOL/L (ref 24–28)
HCT VFR BLD AUTO: 30.6 % (ref 37–48.5)
HCT VFR BLD AUTO: 31.6 % (ref 37–48.5)
HCT VFR BLD CALC: 27 %PCV (ref 36–54)
HGB BLD-MCNC: 10.5 G/DL (ref 12–16)
HGB BLD-MCNC: 10.9 G/DL (ref 12–16)
IMM GRANULOCYTES # BLD AUTO: 0.01 K/UL (ref 0–0.04)
IMM GRANULOCYTES # BLD AUTO: 0.04 K/UL (ref 0–0.04)
IMM GRANULOCYTES NFR BLD AUTO: 0.2 % (ref 0–0.5)
IMM GRANULOCYTES NFR BLD AUTO: 0.6 % (ref 0–0.5)
LYMPHOCYTES # BLD AUTO: 0.4 K/UL (ref 1–4.8)
LYMPHOCYTES # BLD AUTO: 1.9 K/UL (ref 1–4.8)
LYMPHOCYTES NFR BLD: 45.4 % (ref 18–48)
LYMPHOCYTES NFR BLD: 6.2 % (ref 18–48)
MAGNESIUM SERPL-MCNC: 1.5 MG/DL (ref 1.6–2.6)
MAGNESIUM SERPL-MCNC: 1.9 MG/DL (ref 1.6–2.6)
MCH RBC QN AUTO: 31.4 PG (ref 27–31)
MCH RBC QN AUTO: 33.3 PG (ref 27–31)
MCHC RBC AUTO-ENTMCNC: 34.3 G/DL (ref 32–36)
MCHC RBC AUTO-ENTMCNC: 34.5 G/DL (ref 32–36)
MCV RBC AUTO: 91 FL (ref 82–98)
MCV RBC AUTO: 97 FL (ref 82–98)
MONOCYTES # BLD AUTO: 0.5 K/UL (ref 0.3–1)
MONOCYTES # BLD AUTO: 0.7 K/UL (ref 0.3–1)
MONOCYTES NFR BLD: 10.3 % (ref 4–15)
MONOCYTES NFR BLD: 10.6 % (ref 4–15)
NEUTROPHILS # BLD AUTO: 1.8 K/UL (ref 1.8–7.7)
NEUTROPHILS # BLD AUTO: 5.9 K/UL (ref 1.8–7.7)
NEUTROPHILS NFR BLD: 43.4 % (ref 38–73)
NEUTROPHILS NFR BLD: 82.8 % (ref 38–73)
NRBC BLD-RTO: 0 /100 WBC
NRBC BLD-RTO: 0 /100 WBC
NUM UNITS TRANS PACKED RBC: NORMAL
NUM UNITS TRANS PACKED RBC: NORMAL
OHS QRS DURATION: 76 MS
OHS QTC CALCULATION: 464 MS
PCO2 BLDA: 38.5 MMHG (ref 35–45)
PH SMN: 7.27 [PH] (ref 7.35–7.45)
PHOSPHATE SERPL-MCNC: 3 MG/DL (ref 2.7–4.5)
PHOSPHATE SERPL-MCNC: 3.8 MG/DL (ref 2.7–4.5)
PLATELET # BLD AUTO: 183 K/UL (ref 150–450)
PLATELET # BLD AUTO: 93 K/UL (ref 150–450)
PMV BLD AUTO: 8.8 FL (ref 9.2–12.9)
PMV BLD AUTO: 8.8 FL (ref 9.2–12.9)
PO2 BLDA: 217 MMHG (ref 80–100)
POC BE: -9 MMOL/L
POC IONIZED CALCIUM: 1.15 MMOL/L (ref 1.06–1.42)
POC SATURATED O2: 100 % (ref 95–100)
POC TCO2: 19 MMOL/L (ref 23–27)
POCT GLUCOSE: 243 MG/DL (ref 70–110)
POCT GLUCOSE: 315 MG/DL (ref 70–110)
POCT GLUCOSE: 324 MG/DL (ref 70–110)
POTASSIUM BLD-SCNC: 3.7 MMOL/L (ref 3.5–5.1)
POTASSIUM SERPL-SCNC: 3.3 MMOL/L (ref 3.5–5.1)
POTASSIUM SERPL-SCNC: 3.9 MMOL/L (ref 3.5–5.1)
PROT SERPL-MCNC: 3.1 G/DL (ref 6–8.4)
PROT SERPL-MCNC: 5.9 G/DL (ref 6–8.4)
RBC # BLD AUTO: 3.15 M/UL (ref 4–5.4)
RBC # BLD AUTO: 3.47 M/UL (ref 4–5.4)
SAMPLE: ABNORMAL
SODIUM BLD-SCNC: 136 MMOL/L (ref 136–145)
SODIUM SERPL-SCNC: 135 MMOL/L (ref 136–145)
SODIUM SERPL-SCNC: 137 MMOL/L (ref 136–145)
SPECIMEN OUTDATE: NORMAL
WBC # BLD AUTO: 4.25 K/UL (ref 3.9–12.7)
WBC # BLD AUTO: 7.1 K/UL (ref 3.9–12.7)

## 2024-09-03 PROCEDURE — 86901 BLOOD TYPING SEROLOGIC RH(D): CPT

## 2024-09-03 PROCEDURE — 63600175 PHARM REV CODE 636 W HCPCS: Mod: JZ,JG

## 2024-09-03 PROCEDURE — 71000015 HC POSTOP RECOV 1ST HR: Performed by: OBSTETRICS & GYNECOLOGY

## 2024-09-03 PROCEDURE — 25000003 PHARM REV CODE 250

## 2024-09-03 PROCEDURE — 86900 BLOOD TYPING SEROLOGIC ABO: CPT

## 2024-09-03 PROCEDURE — 84100 ASSAY OF PHOSPHORUS: CPT | Mod: 91

## 2024-09-03 PROCEDURE — 0TN60ZZ RELEASE RIGHT URETER, OPEN APPROACH: ICD-10-PCS | Performed by: SURGERY

## 2024-09-03 PROCEDURE — 0UT70ZZ RESECTION OF BILATERAL FALLOPIAN TUBES, OPEN APPROACH: ICD-10-PCS | Performed by: OBSTETRICS & GYNECOLOGY

## 2024-09-03 PROCEDURE — 63600175 PHARM REV CODE 636 W HCPCS

## 2024-09-03 PROCEDURE — 0DJD8ZZ INSPECTION OF LOWER INTESTINAL TRACT, VIA NATURAL OR ARTIFICIAL OPENING ENDOSCOPIC: ICD-10-PCS | Performed by: SURGERY

## 2024-09-03 PROCEDURE — 64999 UNLISTED PX NERVOUS SYSTEM: CPT | Mod: 59,GC,, | Performed by: SURGERY

## 2024-09-03 PROCEDURE — 99900035 HC TECH TIME PER 15 MIN (STAT)

## 2024-09-03 PROCEDURE — 20600001 HC STEP DOWN PRIVATE ROOM

## 2024-09-03 PROCEDURE — 27201423 OPTIME MED/SURG SUP & DEVICES STERILE SUPPLY: Performed by: OBSTETRICS & GYNECOLOGY

## 2024-09-03 PROCEDURE — 88341 IMHCHEM/IMCYTCHM EA ADD ANTB: CPT | Mod: 59 | Performed by: PATHOLOGY

## 2024-09-03 PROCEDURE — 86920 COMPATIBILITY TEST SPIN: CPT | Performed by: ANESTHESIOLOGY

## 2024-09-03 PROCEDURE — 80053 COMPREHEN METABOLIC PANEL: CPT | Mod: 91

## 2024-09-03 PROCEDURE — 36415 COLL VENOUS BLD VENIPUNCTURE: CPT | Performed by: EMERGENCY MEDICINE

## 2024-09-03 PROCEDURE — 0DBB0ZZ EXCISION OF ILEUM, OPEN APPROACH: ICD-10-PCS | Performed by: SURGERY

## 2024-09-03 PROCEDURE — 71000016 HC POSTOP RECOV ADDL HR: Performed by: OBSTETRICS & GYNECOLOGY

## 2024-09-03 PROCEDURE — 37000008 HC ANESTHESIA 1ST 15 MINUTES: Performed by: OBSTETRICS & GYNECOLOGY

## 2024-09-03 PROCEDURE — 0UT20ZZ RESECTION OF BILATERAL OVARIES, OPEN APPROACH: ICD-10-PCS | Performed by: OBSTETRICS & GYNECOLOGY

## 2024-09-03 PROCEDURE — P9040 RBC LEUKOREDUCED IRRADIATED: HCPCS | Performed by: ANESTHESIOLOGY

## 2024-09-03 PROCEDURE — 88305 TISSUE EXAM BY PATHOLOGIST: CPT | Performed by: PATHOLOGY

## 2024-09-03 PROCEDURE — 82962 GLUCOSE BLOOD TEST: CPT | Performed by: OBSTETRICS & GYNECOLOGY

## 2024-09-03 PROCEDURE — 71000039 HC RECOVERY, EACH ADD'L HOUR: Performed by: OBSTETRICS & GYNECOLOGY

## 2024-09-03 PROCEDURE — 63600175 PHARM REV CODE 636 W HCPCS: Mod: JZ,JG | Performed by: SURGERY

## 2024-09-03 PROCEDURE — 0UT90ZZ RESECTION OF UTERUS, OPEN APPROACH: ICD-10-PCS | Performed by: OBSTETRICS & GYNECOLOGY

## 2024-09-03 PROCEDURE — 83735 ASSAY OF MAGNESIUM: CPT

## 2024-09-03 PROCEDURE — 0DBK0ZZ EXCISION OF ASCENDING COLON, OPEN APPROACH: ICD-10-PCS | Performed by: SURGERY

## 2024-09-03 PROCEDURE — 27000221 HC OXYGEN, UP TO 24 HOURS

## 2024-09-03 PROCEDURE — 88342 IMHCHEM/IMCYTCHM 1ST ANTB: CPT | Performed by: PATHOLOGY

## 2024-09-03 PROCEDURE — 94761 N-INVAS EAR/PLS OXIMETRY MLT: CPT

## 2024-09-03 PROCEDURE — 0TN70ZZ RELEASE LEFT URETER, OPEN APPROACH: ICD-10-PCS | Performed by: SURGERY

## 2024-09-03 PROCEDURE — 0DBU0ZZ EXCISION OF OMENTUM, OPEN APPROACH: ICD-10-PCS | Performed by: OBSTETRICS & GYNECOLOGY

## 2024-09-03 PROCEDURE — 88307 TISSUE EXAM BY PATHOLOGIST: CPT | Performed by: PATHOLOGY

## 2024-09-03 PROCEDURE — 30233N1 TRANSFUSION OF NONAUTOLOGOUS RED BLOOD CELLS INTO PERIPHERAL VEIN, PERCUTANEOUS APPROACH: ICD-10-PCS | Performed by: OBSTETRICS & GYNECOLOGY

## 2024-09-03 PROCEDURE — 88309 TISSUE EXAM BY PATHOLOGIST: CPT | Performed by: PATHOLOGY

## 2024-09-03 PROCEDURE — 76942 ECHO GUIDE FOR BIOPSY: CPT

## 2024-09-03 PROCEDURE — 36000709 HC OR TIME LEV III EA ADD 15 MIN: Performed by: OBSTETRICS & GYNECOLOGY

## 2024-09-03 PROCEDURE — 37000009 HC ANESTHESIA EA ADD 15 MINS: Performed by: OBSTETRICS & GYNECOLOGY

## 2024-09-03 PROCEDURE — 82330 ASSAY OF CALCIUM: CPT

## 2024-09-03 PROCEDURE — 36415 COLL VENOUS BLD VENIPUNCTURE: CPT

## 2024-09-03 PROCEDURE — 71000033 HC RECOVERY, INTIAL HOUR: Performed by: OBSTETRICS & GYNECOLOGY

## 2024-09-03 PROCEDURE — 36000708 HC OR TIME LEV III 1ST 15 MIN: Performed by: OBSTETRICS & GYNECOLOGY

## 2024-09-03 PROCEDURE — 85025 COMPLETE CBC W/AUTO DIFF WBC: CPT | Mod: 91

## 2024-09-03 RX ORDER — GABAPENTIN 100 MG/1
100 CAPSULE ORAL DAILY
Status: DISCONTINUED | OUTPATIENT
Start: 2024-09-03 | End: 2024-09-04

## 2024-09-03 RX ORDER — HYDROMORPHONE HYDROCHLORIDE 1 MG/ML
INJECTION, SOLUTION INTRAMUSCULAR; INTRAVENOUS; SUBCUTANEOUS
Status: DISCONTINUED | OUTPATIENT
Start: 2024-09-03 | End: 2024-09-03

## 2024-09-03 RX ORDER — HYDROMORPHONE HYDROCHLORIDE 1 MG/ML
0.4 INJECTION, SOLUTION INTRAMUSCULAR; INTRAVENOUS; SUBCUTANEOUS
Status: DISCONTINUED | OUTPATIENT
Start: 2024-09-03 | End: 2024-09-04

## 2024-09-03 RX ORDER — MIDAZOLAM HYDROCHLORIDE 1 MG/ML
INJECTION INTRAMUSCULAR; INTRAVENOUS
Status: DISCONTINUED | OUTPATIENT
Start: 2024-09-03 | End: 2024-09-03

## 2024-09-03 RX ORDER — ACETAMINOPHEN 500 MG
1000 TABLET ORAL
Status: DISCONTINUED | OUTPATIENT
Start: 2024-09-03 | End: 2024-09-03

## 2024-09-03 RX ORDER — TALC
6 POWDER (GRAM) TOPICAL NIGHTLY PRN
Status: DISCONTINUED | OUTPATIENT
Start: 2024-09-03 | End: 2024-09-08 | Stop reason: HOSPADM

## 2024-09-03 RX ORDER — NALOXONE HCL 0.4 MG/ML
0.02 VIAL (ML) INJECTION
Status: DISCONTINUED | OUTPATIENT
Start: 2024-09-03 | End: 2024-09-08 | Stop reason: HOSPADM

## 2024-09-03 RX ORDER — MAGNESIUM SULFATE 1 G/100ML
1 INJECTION INTRAVENOUS ONCE
Status: COMPLETED | OUTPATIENT
Start: 2024-09-03 | End: 2024-09-03

## 2024-09-03 RX ORDER — POTASSIUM CHLORIDE 7.45 MG/ML
10 INJECTION INTRAVENOUS
Status: DISPENSED | OUTPATIENT
Start: 2024-09-03 | End: 2024-09-03

## 2024-09-03 RX ORDER — FENTANYL CITRATE 50 UG/ML
INJECTION, SOLUTION INTRAMUSCULAR; INTRAVENOUS
Status: DISCONTINUED | OUTPATIENT
Start: 2024-09-03 | End: 2024-09-03

## 2024-09-03 RX ORDER — MUPIROCIN 20 MG/G
OINTMENT TOPICAL 2 TIMES DAILY
Status: COMPLETED | OUTPATIENT
Start: 2024-09-03 | End: 2024-09-05

## 2024-09-03 RX ORDER — EPHEDRINE SULFATE 50 MG/ML
INJECTION, SOLUTION INTRAVENOUS
Status: DISCONTINUED | OUTPATIENT
Start: 2024-09-03 | End: 2024-09-03

## 2024-09-03 RX ORDER — CELECOXIB 200 MG/1
400 CAPSULE ORAL
Status: COMPLETED | OUTPATIENT
Start: 2024-09-03 | End: 2024-09-03

## 2024-09-03 RX ORDER — ACETAMINOPHEN 500 MG
1000 TABLET ORAL
Status: COMPLETED | OUTPATIENT
Start: 2024-09-03 | End: 2024-09-03

## 2024-09-03 RX ORDER — SODIUM CHLORIDE 0.9 % (FLUSH) 0.9 %
10 SYRINGE (ML) INJECTION
Status: DISCONTINUED | OUTPATIENT
Start: 2024-09-03 | End: 2024-09-03

## 2024-09-03 RX ORDER — OXYCODONE HYDROCHLORIDE 5 MG/1
5 TABLET ORAL EVERY 4 HOURS PRN
Status: DISCONTINUED | OUTPATIENT
Start: 2024-09-03 | End: 2024-09-05

## 2024-09-03 RX ORDER — HEPARIN SODIUM 5000 [USP'U]/ML
5000 INJECTION, SOLUTION INTRAVENOUS; SUBCUTANEOUS ONCE
Status: COMPLETED | OUTPATIENT
Start: 2024-09-03 | End: 2024-09-03

## 2024-09-03 RX ORDER — METRONIDAZOLE 500 MG/100ML
INJECTION, SOLUTION INTRAVENOUS
Status: DISCONTINUED | OUTPATIENT
Start: 2024-09-03 | End: 2024-09-03

## 2024-09-03 RX ORDER — PROCHLORPERAZINE EDISYLATE 5 MG/ML
5 INJECTION INTRAMUSCULAR; INTRAVENOUS EVERY 6 HOURS PRN
Status: DISCONTINUED | OUTPATIENT
Start: 2024-09-03 | End: 2024-09-07

## 2024-09-03 RX ORDER — GLUCAGON 1 MG
1 KIT INJECTION
Status: DISCONTINUED | OUTPATIENT
Start: 2024-09-03 | End: 2024-09-03

## 2024-09-03 RX ORDER — ROCURONIUM BROMIDE 10 MG/ML
INJECTION, SOLUTION INTRAVENOUS
Status: DISCONTINUED | OUTPATIENT
Start: 2024-09-03 | End: 2024-09-03

## 2024-09-03 RX ORDER — ONDANSETRON HYDROCHLORIDE 2 MG/ML
INJECTION, SOLUTION INTRAVENOUS
Status: DISCONTINUED | OUTPATIENT
Start: 2024-09-03 | End: 2024-09-03

## 2024-09-03 RX ORDER — HEPARIN SODIUM 5000 [USP'U]/ML
5000 INJECTION, SOLUTION INTRAVENOUS; SUBCUTANEOUS ONCE
Status: DISCONTINUED | OUTPATIENT
Start: 2024-09-03 | End: 2024-09-03

## 2024-09-03 RX ORDER — ONDANSETRON 2 MG/ML
INJECTION INTRAMUSCULAR; INTRAVENOUS
Status: DISCONTINUED | OUTPATIENT
Start: 2024-09-03 | End: 2024-09-03

## 2024-09-03 RX ORDER — PROCHLORPERAZINE EDISYLATE 5 MG/ML
5 INJECTION INTRAMUSCULAR; INTRAVENOUS EVERY 30 MIN PRN
Status: DISCONTINUED | OUTPATIENT
Start: 2024-09-03 | End: 2024-09-03

## 2024-09-03 RX ORDER — ONDANSETRON HYDROCHLORIDE 2 MG/ML
4 INJECTION, SOLUTION INTRAVENOUS EVERY 6 HOURS PRN
Status: DISCONTINUED | OUTPATIENT
Start: 2024-09-03 | End: 2024-09-07

## 2024-09-03 RX ORDER — HYDROMORPHONE HYDROCHLORIDE 1 MG/ML
0.2 INJECTION, SOLUTION INTRAMUSCULAR; INTRAVENOUS; SUBCUTANEOUS EVERY 5 MIN PRN
Status: DISCONTINUED | OUTPATIENT
Start: 2024-09-03 | End: 2024-09-03

## 2024-09-03 RX ORDER — PROPOFOL 10 MG/ML
VIAL (ML) INTRAVENOUS
Status: DISCONTINUED | OUTPATIENT
Start: 2024-09-03 | End: 2024-09-03

## 2024-09-03 RX ORDER — DEXAMETHASONE SODIUM PHOSPHATE 4 MG/ML
INJECTION, SOLUTION INTRA-ARTICULAR; INTRALESIONAL; INTRAMUSCULAR; INTRAVENOUS; SOFT TISSUE
Status: DISCONTINUED | OUTPATIENT
Start: 2024-09-03 | End: 2024-09-03

## 2024-09-03 RX ORDER — KETAMINE HCL IN 0.9 % NACL 50 MG/5 ML
SYRINGE (ML) INTRAVENOUS
Status: DISCONTINUED | OUTPATIENT
Start: 2024-09-03 | End: 2024-09-03

## 2024-09-03 RX ORDER — ACETAMINOPHEN 500 MG
1000 TABLET ORAL EVERY 6 HOURS
Status: DISCONTINUED | OUTPATIENT
Start: 2024-09-03 | End: 2024-09-05

## 2024-09-03 RX ORDER — IBUPROFEN 200 MG
24 TABLET ORAL
Status: DISCONTINUED | OUTPATIENT
Start: 2024-09-03 | End: 2024-09-03

## 2024-09-03 RX ORDER — DEXMEDETOMIDINE HYDROCHLORIDE 100 UG/ML
INJECTION, SOLUTION INTRAVENOUS
Status: DISCONTINUED | OUTPATIENT
Start: 2024-09-03 | End: 2024-09-03

## 2024-09-03 RX ORDER — SODIUM CHLORIDE 9 MG/ML
INJECTION, SOLUTION INTRAVENOUS CONTINUOUS
Status: DISCONTINUED | OUTPATIENT
Start: 2024-09-03 | End: 2024-09-04

## 2024-09-03 RX ORDER — METHYLENE BLUE 5 MG/ML
INJECTION INTRAVENOUS
Status: DISCONTINUED | OUTPATIENT
Start: 2024-09-03 | End: 2024-09-03

## 2024-09-03 RX ORDER — ADHESIVE BANDAGE
30 BANDAGE TOPICAL 2 TIMES DAILY
Status: DISCONTINUED | OUTPATIENT
Start: 2024-09-03 | End: 2024-09-05

## 2024-09-03 RX ORDER — PHENYLEPHRINE HCL IN 0.9% NACL 1 MG/10 ML
SYRINGE (ML) INTRAVENOUS
Status: DISCONTINUED | OUTPATIENT
Start: 2024-09-03 | End: 2024-09-03

## 2024-09-03 RX ORDER — KETOROLAC TROMETHAMINE 15 MG/ML
15 INJECTION, SOLUTION INTRAMUSCULAR; INTRAVENOUS EVERY 6 HOURS
Status: COMPLETED | OUTPATIENT
Start: 2024-09-03 | End: 2024-09-04

## 2024-09-03 RX ORDER — INSULIN ASPART 100 [IU]/ML
0-5 INJECTION, SOLUTION INTRAVENOUS; SUBCUTANEOUS EVERY 6 HOURS PRN
Status: DISCONTINUED | OUTPATIENT
Start: 2024-09-03 | End: 2024-09-03

## 2024-09-03 RX ORDER — FENTANYL CITRATE 50 UG/ML
25 INJECTION, SOLUTION INTRAMUSCULAR; INTRAVENOUS EVERY 5 MIN PRN
Status: DISCONTINUED | OUTPATIENT
Start: 2024-09-03 | End: 2024-09-03

## 2024-09-03 RX ORDER — OXYCODONE HYDROCHLORIDE 10 MG/1
10 TABLET ORAL EVERY 4 HOURS PRN
Status: DISCONTINUED | OUTPATIENT
Start: 2024-09-03 | End: 2024-09-05

## 2024-09-03 RX ORDER — CEFAZOLIN SODIUM 1 G/3ML
INJECTION, POWDER, FOR SOLUTION INTRAMUSCULAR; INTRAVENOUS
Status: DISCONTINUED | OUTPATIENT
Start: 2024-09-03 | End: 2024-09-03

## 2024-09-03 RX ORDER — IBUPROFEN 600 MG/1
600 TABLET ORAL EVERY 6 HOURS
Status: DISCONTINUED | OUTPATIENT
Start: 2024-09-04 | End: 2024-09-04

## 2024-09-03 RX ORDER — MAGNESIUM SULFATE HEPTAHYDRATE 40 MG/ML
2 INJECTION, SOLUTION INTRAVENOUS ONCE
Status: COMPLETED | OUTPATIENT
Start: 2024-09-03 | End: 2024-09-03

## 2024-09-03 RX ORDER — BUPIVACAINE HYDROCHLORIDE 7.5 MG/ML
INJECTION, SOLUTION EPIDURAL; RETROBULBAR
Status: COMPLETED | OUTPATIENT
Start: 2024-09-03 | End: 2024-09-03

## 2024-09-03 RX ORDER — POTASSIUM CHLORIDE 14.9 MG/ML
INJECTION INTRAVENOUS CONTINUOUS PRN
Status: DISCONTINUED | OUTPATIENT
Start: 2024-09-03 | End: 2024-09-03

## 2024-09-03 RX ORDER — ALVIMOPAN 12 MG/1
12 CAPSULE ORAL 2 TIMES DAILY
Status: DISCONTINUED | OUTPATIENT
Start: 2024-09-03 | End: 2024-09-05

## 2024-09-03 RX ORDER — LIDOCAINE HYDROCHLORIDE 10 MG/ML
1 INJECTION, SOLUTION EPIDURAL; INFILTRATION; INTRACAUDAL; PERINEURAL ONCE
Status: DISCONTINUED | OUTPATIENT
Start: 2024-09-03 | End: 2024-09-03

## 2024-09-03 RX ORDER — POTASSIUM CHLORIDE 7.45 MG/ML
10 INJECTION INTRAVENOUS
Status: ACTIVE | OUTPATIENT
Start: 2024-09-03 | End: 2024-09-03

## 2024-09-03 RX ORDER — IBUPROFEN 200 MG
16 TABLET ORAL
Status: DISCONTINUED | OUTPATIENT
Start: 2024-09-03 | End: 2024-09-03

## 2024-09-03 RX ORDER — SODIUM CHLORIDE, SODIUM LACTATE, POTASSIUM CHLORIDE, CALCIUM CHLORIDE 600; 310; 30; 20 MG/100ML; MG/100ML; MG/100ML; MG/100ML
INJECTION, SOLUTION INTRAVENOUS CONTINUOUS
Status: DISCONTINUED | OUTPATIENT
Start: 2024-09-03 | End: 2024-09-03

## 2024-09-03 RX ORDER — INSULIN ASPART 100 [IU]/ML
0-5 INJECTION, SOLUTION INTRAVENOUS; SUBCUTANEOUS
Status: DISCONTINUED | OUTPATIENT
Start: 2024-09-03 | End: 2024-09-03

## 2024-09-03 RX ORDER — CELECOXIB 200 MG/1
400 CAPSULE ORAL
Status: DISCONTINUED | OUTPATIENT
Start: 2024-09-03 | End: 2024-09-03

## 2024-09-03 RX ORDER — LIDOCAINE HYDROCHLORIDE 20 MG/ML
INJECTION, SOLUTION EPIDURAL; INFILTRATION; INTRACAUDAL; PERINEURAL
Status: DISCONTINUED | OUTPATIENT
Start: 2024-09-03 | End: 2024-09-03

## 2024-09-03 RX ORDER — GABAPENTIN 100 MG/1
100 CAPSULE ORAL
Status: DISCONTINUED | OUTPATIENT
Start: 2024-09-04 | End: 2024-09-04

## 2024-09-03 RX ADMIN — ACETAMINOPHEN 1000 MG: 500 TABLET ORAL at 11:09

## 2024-09-03 RX ADMIN — Medication 100 MCG: at 10:09

## 2024-09-03 RX ADMIN — SODIUM CHLORIDE: 9 INJECTION, SOLUTION INTRAVENOUS at 06:09

## 2024-09-03 RX ADMIN — HYDROMORPHONE HYDROCHLORIDE 0.2 MG: 1 INJECTION, SOLUTION INTRAMUSCULAR; INTRAVENOUS; SUBCUTANEOUS at 11:09

## 2024-09-03 RX ADMIN — SODIUM CHLORIDE, POTASSIUM CHLORIDE, SODIUM LACTATE AND CALCIUM CHLORIDE: 600; 310; 30; 20 INJECTION, SOLUTION INTRAVENOUS at 02:09

## 2024-09-03 RX ADMIN — ALVIMOPAN 12 MG: 12 CAPSULE ORAL at 08:09

## 2024-09-03 RX ADMIN — Medication 100 MCG: at 09:09

## 2024-09-03 RX ADMIN — OXYCODONE HYDROCHLORIDE 10 MG: 10 TABLET ORAL at 04:09

## 2024-09-03 RX ADMIN — ACETAMINOPHEN 1000 MG: 500 TABLET ORAL at 05:09

## 2024-09-03 RX ADMIN — Medication 100 MCG: at 01:09

## 2024-09-03 RX ADMIN — PROPOFOL 100 MG: 10 INJECTION, EMULSION INTRAVENOUS at 07:09

## 2024-09-03 RX ADMIN — HYDROMORPHONE HYDROCHLORIDE 0.5 MG: 1 INJECTION, SOLUTION INTRAMUSCULAR; INTRAVENOUS; SUBCUTANEOUS at 02:09

## 2024-09-03 RX ADMIN — HYDROMORPHONE HYDROCHLORIDE 0.2 MG: 1 INJECTION, SOLUTION INTRAMUSCULAR; INTRAVENOUS; SUBCUTANEOUS at 12:09

## 2024-09-03 RX ADMIN — MAGNESIUM SULFATE HEPTAHYDRATE 2 G: 40 INJECTION, SOLUTION INTRAVENOUS at 05:09

## 2024-09-03 RX ADMIN — Medication 30 MG: at 07:09

## 2024-09-03 RX ADMIN — SODIUM CHLORIDE, SODIUM GLUCONATE, SODIUM ACETATE, POTASSIUM CHLORIDE, MAGNESIUM CHLORIDE, SODIUM PHOSPHATE, DIBASIC, AND POTASSIUM PHOSPHATE: .53; .5; .37; .037; .03; .012; .00082 INJECTION, SOLUTION INTRAVENOUS at 09:09

## 2024-09-03 RX ADMIN — MAGNESIUM SULFATE 1 G: 500 INJECTION, SOLUTION INTRAMUSCULAR; INTRAVENOUS at 05:09

## 2024-09-03 RX ADMIN — EPHEDRINE SULFATE 5 MG: 50 INJECTION INTRAVENOUS at 10:09

## 2024-09-03 RX ADMIN — SODIUM CHLORIDE, SODIUM GLUCONATE, SODIUM ACETATE, POTASSIUM CHLORIDE, MAGNESIUM CHLORIDE, SODIUM PHOSPHATE, DIBASIC, AND POTASSIUM PHOSPHATE: .53; .5; .37; .037; .03; .012; .00082 INJECTION, SOLUTION INTRAVENOUS at 07:09

## 2024-09-03 RX ADMIN — KETOROLAC TROMETHAMINE 15 MG: 15 INJECTION, SOLUTION INTRAMUSCULAR; INTRAVENOUS at 11:09

## 2024-09-03 RX ADMIN — INSULIN ASPART 2 UNITS: 100 INJECTION, SOLUTION INTRAVENOUS; SUBCUTANEOUS at 02:09

## 2024-09-03 RX ADMIN — EPHEDRINE SULFATE 5 MG: 50 INJECTION INTRAVENOUS at 09:09

## 2024-09-03 RX ADMIN — HYDROMORPHONE HYDROCHLORIDE 0.4 MG: 1 INJECTION, SOLUTION INTRAMUSCULAR; INTRAVENOUS; SUBCUTANEOUS at 10:09

## 2024-09-03 RX ADMIN — Medication 200 MCG: at 07:09

## 2024-09-03 RX ADMIN — INSULIN HUMAN 1.5 UNITS/HR: 1 INJECTION, SOLUTION INTRAVENOUS at 05:09

## 2024-09-03 RX ADMIN — MUPIROCIN: 20 OINTMENT TOPICAL at 08:09

## 2024-09-03 RX ADMIN — PHENYLEPHRINE HYDROCHLORIDE 0.3 MCG/KG/MIN: 10 INJECTION INTRAVENOUS at 09:09

## 2024-09-03 RX ADMIN — SODIUM CHLORIDE, SODIUM GLUCONATE, SODIUM ACETATE, POTASSIUM CHLORIDE, MAGNESIUM CHLORIDE, SODIUM PHOSPHATE, DIBASIC, AND POTASSIUM PHOSPHATE: .53; .5; .37; .037; .03; .012; .00082 INJECTION, SOLUTION INTRAVENOUS at 08:09

## 2024-09-03 RX ADMIN — BUPIVACAINE HYDROCHLORIDE 30 ML: 7.5 INJECTION, SOLUTION EPIDURAL; RETROBULBAR at 07:09

## 2024-09-03 RX ADMIN — Medication 100 MCG: at 08:09

## 2024-09-03 RX ADMIN — PROPOFOL 40 MG: 10 INJECTION, EMULSION INTRAVENOUS at 10:09

## 2024-09-03 RX ADMIN — METHYLENE BLUE 50 MG: 5 INJECTION INTRAVENOUS at 11:09

## 2024-09-03 RX ADMIN — ROCURONIUM BROMIDE 40 MG: 10 INJECTION, SOLUTION INTRAVENOUS at 07:09

## 2024-09-03 RX ADMIN — MIDAZOLAM HYDROCHLORIDE 1 MG: 2 INJECTION, SOLUTION INTRAMUSCULAR; INTRAVENOUS at 07:09

## 2024-09-03 RX ADMIN — ACETAMINOPHEN 1000 MG: 500 TABLET ORAL at 06:09

## 2024-09-03 RX ADMIN — DEXMEDETOMIDINE 4 MCG: 200 INJECTION, SOLUTION INTRAVENOUS at 09:09

## 2024-09-03 RX ADMIN — FENTANYL CITRATE 50 MCG: 50 INJECTION, SOLUTION INTRAMUSCULAR; INTRAVENOUS at 07:09

## 2024-09-03 RX ADMIN — EPHEDRINE SULFATE 10 MG: 50 INJECTION INTRAVENOUS at 10:09

## 2024-09-03 RX ADMIN — EPHEDRINE SULFATE 5 MG: 50 INJECTION INTRAVENOUS at 08:09

## 2024-09-03 RX ADMIN — HEPARIN SODIUM 5000 UNITS: 5000 INJECTION INTRAVENOUS; SUBCUTANEOUS at 06:09

## 2024-09-03 RX ADMIN — KETOROLAC TROMETHAMINE 15 MG: 15 INJECTION, SOLUTION INTRAMUSCULAR; INTRAVENOUS at 05:09

## 2024-09-03 RX ADMIN — OXYCODONE HYDROCHLORIDE 10 MG: 10 TABLET ORAL at 05:09

## 2024-09-03 RX ADMIN — HYDROMORPHONE HYDROCHLORIDE 0.4 MG: 1 INJECTION, SOLUTION INTRAMUSCULAR; INTRAVENOUS; SUBCUTANEOUS at 08:09

## 2024-09-03 RX ADMIN — CALCIUM GLUCONATE 1 G: 98 INJECTION, SOLUTION INTRAVENOUS at 05:09

## 2024-09-03 RX ADMIN — POTASSIUM CHLORIDE 10 MEQ: 7.46 INJECTION, SOLUTION INTRAVENOUS at 05:09

## 2024-09-03 RX ADMIN — CALCIUM CHLORIDE 1 G: 100 INJECTION, SOLUTION INTRAVENOUS at 11:09

## 2024-09-03 RX ADMIN — SODIUM CHLORIDE, SODIUM GLUCONATE, SODIUM ACETATE, POTASSIUM CHLORIDE, MAGNESIUM CHLORIDE, SODIUM PHOSPHATE, DIBASIC, AND POTASSIUM PHOSPHATE: .53; .5; .37; .037; .03; .012; .00082 INJECTION, SOLUTION INTRAVENOUS at 10:09

## 2024-09-03 RX ADMIN — Medication 200 MCG: at 10:09

## 2024-09-03 RX ADMIN — DEXAMETHASONE SODIUM PHOSPHATE 8 MG: 4 INJECTION, SOLUTION INTRAMUSCULAR; INTRAVENOUS at 07:09

## 2024-09-03 RX ADMIN — DEXMEDETOMIDINE 8 MCG: 200 INJECTION, SOLUTION INTRAVENOUS at 08:09

## 2024-09-03 RX ADMIN — ROCURONIUM BROMIDE 10 MG: 10 INJECTION, SOLUTION INTRAVENOUS at 07:09

## 2024-09-03 RX ADMIN — SUGAMMADEX 200 MG: 100 INJECTION, SOLUTION INTRAVENOUS at 01:09

## 2024-09-03 RX ADMIN — PROPOFOL 30 MG: 10 INJECTION, EMULSION INTRAVENOUS at 12:09

## 2024-09-03 RX ADMIN — HYDROMORPHONE HYDROCHLORIDE 0.4 MG: 1 INJECTION, SOLUTION INTRAMUSCULAR; INTRAVENOUS; SUBCUTANEOUS at 11:09

## 2024-09-03 RX ADMIN — SODIUM CHLORIDE 3 UNITS/HR: 9 INJECTION, SOLUTION INTRAVENOUS at 10:09

## 2024-09-03 RX ADMIN — ROCURONIUM BROMIDE 10 MG: 10 INJECTION, SOLUTION INTRAVENOUS at 10:09

## 2024-09-03 RX ADMIN — CELECOXIB 400 MG: 200 CAPSULE ORAL at 06:09

## 2024-09-03 RX ADMIN — GABAPENTIN 100 MG: 100 CAPSULE ORAL at 05:09

## 2024-09-03 RX ADMIN — ROCURONIUM BROMIDE 20 MG: 10 INJECTION, SOLUTION INTRAVENOUS at 09:09

## 2024-09-03 RX ADMIN — CALCIUM CHLORIDE 1 G: 100 INJECTION, SOLUTION INTRAVENOUS at 10:09

## 2024-09-03 RX ADMIN — METRONIDAZOLE 500 MG: 500 INJECTION, SOLUTION INTRAVENOUS at 07:09

## 2024-09-03 RX ADMIN — ONDANSETRON 4 MG: 2 INJECTION INTRAMUSCULAR; INTRAVENOUS at 12:09

## 2024-09-03 RX ADMIN — MAGNESIUM HYDROXIDE 2400 MG: 400 SUSPENSION ORAL at 08:09

## 2024-09-03 RX ADMIN — INSULIN GLARGINE 4 UNITS: 100 INJECTION, SOLUTION SUBCUTANEOUS at 04:09

## 2024-09-03 RX ADMIN — SODIUM CHLORIDE, POTASSIUM CHLORIDE, SODIUM LACTATE AND CALCIUM CHLORIDE: 600; 310; 30; 20 INJECTION, SOLUTION INTRAVENOUS at 01:09

## 2024-09-03 RX ADMIN — CEFAZOLIN 2 G: 330 INJECTION, POWDER, FOR SOLUTION INTRAMUSCULAR; INTRAVENOUS at 07:09

## 2024-09-03 RX ADMIN — SODIUM CHLORIDE, SODIUM GLUCONATE, SODIUM ACETATE, POTASSIUM CHLORIDE, MAGNESIUM CHLORIDE, SODIUM PHOSPHATE, DIBASIC, AND POTASSIUM PHOSPHATE: .53; .5; .37; .037; .03; .012; .00082 INJECTION, SOLUTION INTRAVENOUS at 12:09

## 2024-09-03 RX ADMIN — POTASSIUM CHLORIDE: 14.9 INJECTION INTRAVENOUS at 11:09

## 2024-09-03 RX ADMIN — LIDOCAINE HYDROCHLORIDE 80 MG: 20 INJECTION, SOLUTION EPIDURAL; INFILTRATION; INTRACAUDAL; PERINEURAL at 07:09

## 2024-09-03 RX ADMIN — GABAPENTIN 300 MG: 300 CAPSULE ORAL at 08:09

## 2024-09-03 RX ADMIN — HYDROMORPHONE HYDROCHLORIDE 0.2 MG: 1 INJECTION, SOLUTION INTRAMUSCULAR; INTRAVENOUS; SUBCUTANEOUS at 08:09

## 2024-09-03 RX ADMIN — CEFAZOLIN 2 G: 330 INJECTION, POWDER, FOR SOLUTION INTRAMUSCULAR; INTRAVENOUS at 11:09

## 2024-09-03 RX ADMIN — ROCURONIUM BROMIDE 20 MG: 10 INJECTION, SOLUTION INTRAVENOUS at 08:09

## 2024-09-03 RX ADMIN — Medication 10 MG: at 08:09

## 2024-09-03 RX ADMIN — ROCURONIUM BROMIDE 20 MG: 10 INJECTION, SOLUTION INTRAVENOUS at 12:09

## 2024-09-03 NOTE — OP NOTE
Date of surgery: 09/03/2024    Operative Report  Pre-operative diagnosis: metastatic ovarian cancer   Post-operative diagnosis: Same as above    Procedure:  Open low anterior resection  Ileocolic resection   Flexible sigmoidoscopy   Mobilization of splenic flexure     Findings:  Tumor involvement of the mesentery of the terminal ileum   Intact anastomotic rings  Negative leak test     This procedure was not performed to treat colon cancer through resection       Surgeon: Ruby Caicedo MD   Assistant:  Jewel Jorge MD     Indication: Ms. Meyer is a 74 year old man with a history of stage IIIC ovarian cancer with good response to chemotherapy  who is scheduled to undergo resection and debulking with Dr. Crawford.  Given concern for involvement of the rectosigmoid colon, consent was obtained for low anterior resection with flexible sigmoidoscopy and possible ostomy. The benefits, risks, and alternatives were discussed with the patient, they were given the opportunity to ask questions and they elected to proceed with operative intervention after signing written consent.    Procedure:  Dr. Crawford first proceeded with her portion of the case.  Please see her operative dictation for further details.      There was a peritoneal implant involving the right adnexal structures and the mesentery of the terminal ileum.  We elected to perform an ileocolic resection to allow for enbloc removal of the specimen.  The lateral attachments of the cecum and ascending colon were mobilized.  The specimen was elevated.  A window was made in the mesentery of the ascending colon and the colon divided with a linear stapler.  This was repeated for the terminal ileum.  The intervening mesentery was then divided with the ligasure.  After completion of Dr. Crawford's portion of the procedure, a side to side, functional end to end isoperistaltic anastomosis was created with an additional load of the ADAN 75 stapler.  The common enterotomy was  closed with a running PDS.  The suture line was imbricated with vicryl suture.  A stitch was placed at the apex of the staple line to offload tension.  The mesenteric defect was closed with a running vicryl suture     We then proceeded with mobilization of the splenic flexure, as Dr. Crawford was performing an omentectomy for her portion of the procedure.  The omentum was mobilized from the distal transverse colon and off of the left sidewall.  We continued this mobilization distally to the descending colon, dividing the lateral attachments along the white line of toldt.  The splenocolic attachments were divided.  The pancreas was visualized and was swept out of the field of dissection.  The splenic flexure was fully mobilized.      We then turned our attention back to the pelvis.  We continued mobilization of the descending colon along the white line of toldt and met Dr. Crawford's previous dissection.  The CARLA was identified and ligated.  The mesentery was divided up to the junction of the descending colon with the ligasure.  The colon was divided with an additional load of the ADAN 75 stapler.  The rectosigmoid was intimately involved with the left sidewall.  Palpation was concerning for a peritoneal implant.  We dissected around the implant and were able to mobilize this with the colon specimen.  We then continued the posterior dissection in the mesorectal plane distally.  Anteriorly, we mobilized the rectovaginal septum until we had a healthy area of rectum distal to peritoneal implants.  A window was made in the mesorectum and the rectum divided with a TA 30 stapler.  The rectum was divided with a scalpel.  The specimen was removed from the patient and sent to pathology.    The furness clamp was placed proximal to the staple line of the descending colon.  The staple line was transected.  The pursestring was created.  The furness was removed and a 29 EEA anvil was secured in place.  I then went below and passed  rectal sizers.  The stapler was then advanced and opened.  The two ends of the stapler were mated.  We confirmed appropriate orientation of the colon conduit.  The stapler was then closed and fired.  The stapler was removed and anastomotic rings were inspected.  These were intact.  The colon was then occluded and the pelvis filled with saline.  I performed flexible sigmoidoscopy.  The leak test was negative.  The anastomosis was patent and there was no active bleeding.  The scope was withdrawn.  The dissection bed was inspected and hemostasis appropriate.      Dr. Crawford then proceeded with abdominal closure.      Complications: None  Estimated blood loss: 100 mL  Disposition: PACU    Ruby Caicedo MD  Staff Surgeon   Colon & Rectal Surgery

## 2024-09-03 NOTE — PROGRESS NOTES
[facility] ([facility])  Gynecologic Oncology  Progress Note      Patient Name: Bella Meyer  MRN: [id number]  Admission Date: 2024  Hospital Length of Stay: 1 days  Attending Provider: No att. providers found  Primary Care Provider: No, Primary Doctor  Principal Problem: <principal problem not specified>    Follow-up For: Procedure(s) (LRB):  LAPAROTOMY, EXPLORATORY (N/A)  HYSTERECTOMY, TOTAL, ABDOMINAL (N/A)  SALPINGO-OOPHORECTOMY, BILATERAL (N/A)  OMENTECTOMY (N/A)  DEBULKING, NEOPLASM (N/A)  RESECTION, COLON, LOW ANTERIOR (N/A)  SIGMOIDOSCOPY, FLEXIBLE (N/A)  Post-Operative Day: Day of Surgery  Subjective:     Interval History:  Patient is a 75 yo  with stage IIIC HGSOC (BRCA negative, HRD negative) who is HD2  admitted for dehydration and pre-syncope episode after initiating bowel prep for ex lap/FANNY/BSO/Omx/interval debulking, possible LAR/colectomy scheduled for today.    Overnight patient was fluid resuscitated. NAEO. She was made NPO upon admission, denies N/V. Her pain was well controlled on home pain regimen. She continued to have loose bowel movements over night but was able to ambulate without symptoms of pre-syncope. She is urinating spontaneously. Denies F/C, CP, SOB.     Scheduled Meds:   EScitalopram oxalate  10 mg Oral Daily    gabapentin  100 mg Oral Daily    gabapentin  100 mg Oral After lunch    gabapentin  300 mg Oral QHS    levothyroxine  75 mcg Oral QAM    magnesium sulfate IVPB  2 g Intravenous Once    metoprolol succinate  50 mg Oral QAM    potassium chloride  10 mEq Intravenous Q1H     Continuous Infusions:   lactated ringers   Intravenous Continuous 125 mL/hr at 24 0129 New Bag at 24 0129     PRN Meds:  Current Facility-Administered Medications:     acetaminophen, 650 mg, Oral, Q4H PRN    dextrose 10%, 12.5 g, Intravenous, PRN    glucagon (human recombinant), 1 mg, Intramuscular, PRN    hydrALAZINE, 10 mg, Intravenous, Q6H PRN    HYDROmorphone, 0.5 mg, Intravenous,  Q6H PRN    insulin aspart U-100, 0-5 Units, Subcutaneous, Q6H PRN    ondansetron, 4 mg, Intravenous, Q8H PRN    oxyCODONE, 5 mg, Oral, Q4H PRN    oxyCODONE, 10 mg, Oral, Q4H PRN    prochlorperazine, 5 mg, Intravenous, Q6H PRN    sodium chloride 0.9%, 10 mL, Intravenous, PRN    Review of patient's allergies indicates:  No Known Allergies    Objective:     Vital Signs (Most Recent):  Temp: 98.1 °F (36.7 °C) (09/03/24 0509)  Pulse: 90 (09/03/24 0509)  Resp: 16 (09/03/24 0509)  BP: (!) 159/75 (09/03/24 0509)  SpO2: 97 % (09/03/24 0509) Vital Signs (24h Range):  Temp:  [97.5 °F (36.4 °C)-98.2 °F (36.8 °C)] 98.1 °F (36.7 °C)  Pulse:  [] 90  Resp:  [10-20] 16  SpO2:  [97 %-100 %] 97 %  BP: (135-165)/(70-84) 159/75     Weight: 54.4 kg (119 lb 14.9 oz)  Body mass index is 22.66 kg/m².    Intake/Output - Last 3 Shifts         09/01 0700  09/02 0659 09/02 0700  09/03 0659    P.O.  50    Total Intake(mL/kg)  50 (0.9)    Net  +50          Urine Occurrence  2 x    Stool Occurrence  0 x               Physical Exam:   Constitutional: She is oriented to person, place, and time. She appears well-developed. No distress.    HENT:   Head: Normocephalic and atraumatic.      Cardiovascular:  Normal rate.             Pulmonary/Chest: Effort normal.        Abdominal: Soft. She exhibits no distension. There is no abdominal tenderness.                 Neurological: She is alert and oriented to person, place, and time.    Skin: Skin is warm and dry.    Psychiatric: She has a normal mood and affect.       Lines/Drains/Airways       Peripheral Intravenous Line  Duration                  Peripheral IV - Single Lumen 09/02/24 1346 20 G Right Hand <1 day                    Laboratory:  Recent Labs   Lab 08/30/24  1355 09/02/24  1346 09/03/24  0256   WBC 4.63 6.43 4.25   HGB 12.6 12.4 10.5*   HCT 36.6* 35.4* 30.6*   MCV 98 96 97    199 183      Recent Labs   Lab 08/30/24  1355 09/02/24  1346 09/03/24  0256   * 132* 137   K 4.8 3.7  3.3*   CL 96 99 101   CO2 24 20* 25   BUN 15 7* 4*   CREATININE 0.8 0.7 0.6   * 202* 99   PROT 7.6 7.3 5.9*   BILITOT 0.3 0.3 0.3   ALKPHOS 38* 37* 32*   ALT 11 11 11   AST 18 19 17   MG  --  1.2* 1.5*   PHOS  --   --  3.8       Blood Sugars (AccuCheck):    Recent Labs     24  1308 24  1718   POCTGLUCOSE 199* 152*     Assessment/Plan:     Active Diagnoses:    Diagnosis Date Noted POA    Dehydration [E86.0] 2024 Unknown    History of pulmonary embolism [Z86.711] 2024 Yes    Malignant neoplasm of ovary [C56.9] 2024 Yes    Type 2 diabetes mellitus, with long-term current use of insulin [E11.9, Z79.4] 2024 Not Applicable    Hypothyroidism [E03.9] 2024 Yes    Pure hypercholesterolemia [E78.00] 2013 Yes    Hypertension [I10] 2013 Yes      Problems Resolved During this Admission:     VTE Risk Mitigation (From admission, onward)           Ordered     IP VTE HIGH RISK PATIENT  Once         24 1558     Place sequential compression device  Until discontinued         24 1558                  Dehydration/weakness  - 74 yo  with stage IIIC HGSOC (BRCA negative, HRD negative) who presented to the ED with complaints of weakness and a pre-syncopal episode  - VSS  - Exam stable from admission   - Labs:               CBC: decreased likely 2/2 fluid resuscitation               CMP: K 3.3 > replaced               Magnesium: 1.5 > replaced   - Diet: NPO, LR @ 125 cc/hr     2. HGSOC  - See oncology information in H&P  - S/p neodjuvant chemotherapy  - Scheduled for ex lap/FANNY/BSO/Omx/debulking, possible LAR/colectomy today  - To OR as planned this morning. Consents previously signed and in chart.    - Plan for Heparin pre operatively   - Continue home gabapentin 100/100/300   - Add typical post op pain regimen after surgery     3. Hypothyroidism  - Continue home synthroid     4. Type II Diabetes  - Will hold short acting insulin in setting of NPO status  - Lantus  4  this AM (40% of patients home regimen)  - POCT BG q6 while NPO  - SSI     5. HTN  - Continue home metoprolol, hold home amlodipine  - Hydral PRN ordered for SBP > 180     6. HLD  - Hold home statin     7. History of PE  - Currently on treatment with eliquis 5 BID, completed Lovenox bridge with last dose yesterday  - Will give heparin pre-operatively then plan for heparin 5000 q8 starting 8 hours after surgery  - Consider restarting home eliquis on POD#1      8. Depression  - Continue home ricardo Sullivan MD  Gynecologic Oncology  [facility] - Oncology ([facility])

## 2024-09-03 NOTE — ANESTHESIA PROCEDURE NOTES
Peripheral Block    Patient location during procedure: pre-op   Block not for primary anesthetic.  Reason for block: at surgeon's request and post-op pain management   Post-op Pain Location: abdominal pain   Start time: 9/3/2024 7:17 AM  Timeout: 9/3/2024 7:16 AM   End time: 9/3/2024 7:21 AM    Staffing  Authorizing Provider: Jose R Lang MD  Performing Provider: Josselin Lucas MD    Staffing  Performed by: Josselin Lucas MD  Authorized by: Jose R Lang MD    Preanesthetic Checklist  Completed: patient identified, IV checked, site marked, risks and benefits discussed, surgical consent, monitors and equipment checked, pre-op evaluation and timeout performed  Peripheral Block  Patient position: sitting  Prep: ChloraPrep  Patient monitoring: heart rate, cardiac monitor, continuous pulse ox, continuous capnometry and frequent blood pressure checks  Block type: erector spinae plane  Laterality: bilateral  Injection technique: single shot  Interspace: T8-9    Needle  Needle type: Tuohy   Needle gauge: 17 G  Needle length: 3.5 in  Needle localization: anatomical landmarks and ultrasound guidance   -ultrasound image captured on disc.  Assessment  Injection assessment: negative aspiration, negative parasthesia and local visualized surrounding nerve  Paresthesia pain: none  Heart rate change: no  Slow fractionated injection: yes    Medications:    Medications: bupivacaine (pf) (MARCAINE) injection 0.75% - Perineural   30 mL - 9/3/2024 7:20:00 AM    Additional Notes  A time out was conducted. Site cale confirmed with team and patient. Allergies reviewed.   Vital signs stable throughout block. RN monitoring vitals throughout.   Needle advanced under continuous ultrasound guidance.  Local injected incrementally after confirming negative aspiration. No signs or symptoms of intravascular or intraneural injection noted.   No persistent paresthesias elicited or expressed. Patient tolerated procedure well.  30cc  of 0.375% Bupi with epinephrine 1:300K, PF dexamethasone 1 mg, and clonidine 50 mcg used for the blocks on each side .

## 2024-09-03 NOTE — TRANSFER OF CARE
"Anesthesia Transfer of Care Note    Patient: Bella Meyer    Procedure(s) Performed: Procedure(s) (LRB):  LAPAROTOMY, EXPLORATORY (N/A)  HYSTERECTOMY, TOTAL, ABDOMINAL (N/A)  SALPINGO-OOPHORECTOMY, BILATERAL (N/A)  OMENTECTOMY (N/A)  DEBULKING, NEOPLASM (N/A)  RESECTION, COLON, LOW ANTERIOR (N/A)  SIGMOIDOSCOPY, FLEXIBLE (N/A)  ILEOCOLECTOMY (N/A)  MOBILIZATION, SPLENIC FLEXURE  URETEROLYSIS (Bilateral)    Patient location: PACU    Anesthesia Type: general and regional    Transport from OR: Transported from OR on 6-10 L/min O2 by face mask with adequate spontaneous ventilation    Post pain: adequate analgesia    Post assessment: no apparent anesthetic complications and tolerated procedure well    Post vital signs: stable    Level of consciousness: awake    Nausea/Vomiting: no nausea/vomiting    Complications: none    Transfer of care protocol was followed      Last vitals: Visit Vitals  BP (!) 171/71   Pulse 80   Temp 36.8 °C (98.2 °F)   Resp 16   Ht 5' 1" (1.549 m)   Wt 54.4 kg (119 lb 14.9 oz)   SpO2 100%   BMI 22.66 kg/m²     "

## 2024-09-03 NOTE — PROGRESS NOTES
Surgery update:     Spoke with patient due to admission prior to surgery tomorrow. Patient had dehydration and weakness after starting bowel prep. Feeling better with fluid resuscitation.  Labs reviewed and appropriate to proceed.  We reviewed plan for surgery and patient without questions at this time.  Discussed with Dr. Crawford.     Ruby Caicedo MD

## 2024-09-03 NOTE — ANESTHESIA PROCEDURE NOTES
Arterial    Diagnosis: ovarian ca    Patient location during procedure: done in OR    Staffing  Authorizing Provider: Cole Ferris MD  Performing Provider: Montana Perez MD    Staffing  Performed by: Montana Perez MD  Authorized by: Cole Ferris MD    Anesthesiologist was present at the time of the procedure.    Preanesthetic Checklist  Completed: patient identified, IV checked, site marked, risks and benefits discussed, surgical consent, monitors and equipment checked, pre-op evaluation, timeout performed and anesthesia consent givenArterial  Skin Prep: chlorhexidine gluconate  Orientation: left  Location: radial    Catheter Size: 20 G  Catheter placement by Ultrasound guidance. Heme positive aspiration all ports.   Vessel Caliber: medium, patent, compressibility normal  Needle advanced into vessel with real time Ultrasound guidance.  Guidewire confirmed in vessel.Insertion Attempts: 1  Assessment  Dressing: secured with tape and tegaderm  Patient: Tolerated well

## 2024-09-03 NOTE — ANESTHESIA PROCEDURE NOTES
Intubation    Date/Time: 9/3/2024 7:20 AM    Performed by: Montana Perez MD  Authorized by: Cole Ferris MD    Intubation:     Induction:  Intravenous    Intubated:  Postinduction    Mask Ventilation:  Easy with oral airway    Attempts:  1    Attempted By:  Resident anesthesiologist    Method of Intubation:  Direct    Blade:  Nile 3    Laryngeal View Grade: Grade I - full view of cords      Difficult Airway Encountered?: No      Complications:  None    Airway Device:  Oral endotracheal tube    Airway Device Size:  7.0    Style/Cuff Inflation:  Cuffed (inflated to minimal occlusive pressure)    Tube secured:  22    Secured at:  The lips    Placement Verified By:  Capnometry and Revisualization with laryngoscopy    Complicating Factors:  None    Findings Post-Intubation:  BS equal bilateral and atraumatic/condition of teeth unchanged

## 2024-09-03 NOTE — NURSING TRANSFER
Nursing Transfer Note      9/3/2024   6:30 PM    Transfer To: [id number]    Transfer via bed    Transfer with cardiac monitoring    Transported by PCT x2    Transfer Vital Signs: see flowsheet     Telemetry: Box Number [id number], Rate 110, and Rhythm ST  Order for Tele Monitor? Yes    Additional Lines: Jackson Catheter    4eyes on Skin: yes    Medicines sent: IVF, Insulin gtt, IV Mag/Ca/K    Chart send with patient: Yes    Notified: spouse    Patient reassessed at: [id number]

## 2024-09-03 NOTE — OP NOTE
[facility] - [facility] (2nd Fl)    Procedure(s) (LRB):  LAPAROTOMY, EXPLORATORY (N/A)  HYSTERECTOMY, TOTAL, ABDOMINAL (N/A)  SALPINGO-OOPHORECTOMY, BILATERAL (N/A)  OMENTECTOMY (N/A)  DEBULKING, NEOPLASM (N/A)  RESECTION, COLON, LOW ANTERIOR (N/A)  SIGMOIDOSCOPY, FLEXIBLE (N/A)  ILEOCOLECTOMY (N/A)  MOBILIZATION, SPLENIC FLEXURE  URETEROLYSIS (Bilateral)    DATE OF SURGERY  9/3/2024     Surgeon(s) and Role  Panel 1:  Primary: Maliha Crawford MD   Assisting: Herlinda Bowers MD and Valerie Sullivan MD     Panel 2:   Primary: Ruby Caicedo MD   Assisting: Jewel Jorge MD      ANESTHESIA TYPE  General     PRE-OPERATIVE DIAGNOSIS  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 On exploration of the abdomen and pelvis, it was noted that diaphragm surfaces, splenic hilum, liver surfaces, gal bladder, june hepatis, pelvic and paraaortic lymph node beds were all without visible or palpable disease. There was 1 cm of residual disease in the omentum, which was resected. In the pelvis, on the right, the terminal ileum and it's mesentery were adherent to the right pelvic sidewall and ovary as one large mass conglomerate, requiring this to be resected en-bloc. On the left, the pelvic peritoneum, cul de sac peritoneum and sigmoid colon serosa and mesentery had diffuse scarring and residual disease, requiring low anterior resection. At case conclusion, there was no gross residual disease (RD=0mm).     Procedure in Detail: The patient was prepped and  draped in dorsal lithotomy position. A vertical midline incision was made and carried down to the peritoneum. The abdomen was entered. The abdomen and pelvis were explored and revealed the operative findings above.  Adhesions described above were tediously lysed with a combination of sharp, blunt, and bipolar coagulation.  This took approximately 20 minutes.  There was no obvious injury to nearby structures.  Once intraperitoneal anatomy was normalized, we proceeded with the assigned procedure. .The cecum was identified and examined to the ligament of Treitz. The only evidence of disease here was the terminal ileum where mass conglomerate to right ovary was noted. Dr. Caicedo came to assist, and we resected by stapling across the ileum proximally and cecum distally. Please see her note for details. The patient was placed in steep Trendelenburg, a Bookwalter retractor was inserted, and the small bowel was packed into the upper abdomen.  On the right, the retroperitoneum was entered, the ureter was identified and isolated on a vessel loop. Retroperitoneal fibrosis was present here due to malignancy, and ureterolysis was performed to safely lateralize the ureter away from the working field. The infundibulopelvic ligament was now isolated, cauterized and transected with the Ligasure device. The right ovary with the resected ileocecum were elevated medially. The ureter was further lateralized, and specimen was resected away from the peritoneal sidewall. The utero-ovarian ligament was now isolated, cauterized and transected. The specimen (ileum, cecum right fallopian tube and ovary) was passed off the field. Attention was now turned to left. The retroperitoneum was entered and space developed, the ureter was identified and isolated on a vessel loop. Retroperitoneal fibrosis was present here due to disease, and again ureterolysis was performed to safely lateralize the ureter away from the working field. The infudibulopelvic  ligament was isolated, cauterized and transected with the Ligasure device. Attention was turned anteriorly, and the bladder was dissected off the lower uterine segment, cervix, and upper 2 cm of the vagina. Dense adhesion was present here and bladder was backfilled to assist in complex dissection. The cardinal ligaments  were then further skeletonized and clamped, cut and tied, being palpably free of the ureters. Additional straight clamps were used to clamp, cut and tie pedicles until reaching the exocervix.  At this point, two right angle Zeppelin clamps were placed under the cervix and Moriah scissors were used to amputate the specimen.  The uterus, cervix, tubes, and left ovary were removed via the laparotomy and sent to Pathology. The vagina was closed in the usual two-layer fashion.  Hemostasis was secured.       The incision was extended above the umbilicus. Attention was then turned to the omentum.  It was identified and then dissected from the transverse colon. The omentum was then removed in the usual fashion using the LigaSure device  The gastroepiploic arcade was taken.    Dr. Caicedo came to the OR at this point. Splenic flexure mobilization was performed. Attention was turned to the pelvis. The sigmoid colon serosa was matted with disease as was the residual left pelvic and posterior cul de sac peritoneum. I performed further left ureterolysis. We resected the sigmoid colon and peritoneum, clearing all residual disease. Please see her operative note for details on resection and reanastomosis.     The abdomen and pelvis were then irrigated with warm normal saline, and hemostasis was assured. Cytoscopy revealed bladder to be intact and efflux of urine from bilateral ureteral orifices.  Surgiflo and hemoblast were applied along the pelvic dissection bed. A final survey was done of the abdomen and pelvis.  There was no active bleeding and the integrity of the bowel, bladder, and other visible organs and  tissues was again confirmed.    The fascia was closed with looped 0 PDS in a running fashion, the subcutaneous tissue was re-approximated using an absorbable suture and the skin was closed with 4-0 Monocryl in a subcuticular fashion.  The patient was transferred to recovery in stable condition.  Sponge, salt, needle, and instrument count was correct.  I was present and scrubbed for all parts of the procedure.       UNUSUAL CIRCUMSTANCES  Yes. The extent of the disease, including disease distribution, and the aggressive biology of the cancer made the procedure significantly more difficult to perform and take twice as long as usual.    DIABETIC  Insulin dependent    SPECIMENS  Specimens (From admission, onward)       Start     Ordered    09/03/24 1252  Specimen to Pathology, Surgery Gynecology and Obstetrics  Once        Comments: Pre-op Diagnosis: Malignant neoplasm of right ovary [C56.1]Peritoneal carcinomatosis [C78.6]Pelvic mass [R19.00]Elevated cancer antigen 125 () [R97.1]Ovarian mass [N83.8]Colonic mass [K63.89]Procedure(s):LAPAROTOMY, EXPLORATORYHYSTERECTOMY, TOTAL, ABDOMINALSALPINGO-OOPHORECTOMY, BILATERALOMENTECTOMYDEBULKING, NEOPLASMRESECTION, COLON, LOW ANTERIORSIGMOIDOSCOPY, FLEXIBLE Number of specimens: 6Name of specimens: 1. Abdominal wall fat-permanent,   2. Omentum-permanent,  3. Ileum, cecum, right tube and right ovary-permanent,  4. Uterus, cervix, left tube and left ovary-permanent,  5. Sigmoid Colon-permanent,  6. Anastomotic rings-permanent     References:    Click here for ordering Quick Tip   Question Answer Comment   Procedure Type: Gynecology and Obstetrics    Specimen Class: Known or suspected malignancy    Release to patient Immediate        09/03/24 1321                     DRAINS       Urethral Catheter 09/03/24 0750 16 Fr. (Active)        ESTIMATED BLOOD LOSS  1000 mL

## 2024-09-03 NOTE — NURSING
Clarified one time dose orders of flagyl and cipro with Dr. Bowers. The patient is to get both one time doses 2 hrs apart.

## 2024-09-03 NOTE — CONSULTS
"  [facility] - [facility] (2nd Fl)  Adult Nutrition  Consult Note    SUMMARY     Recommendations    1.) If and when medically feasible, recommend to ADAT- goal of Regular Diet     - may add Diabetic modifiers as needed.     2.) Once diet advances, recommend Boost Glucose Control TID to help meet needs.     3.) If PO intake remains low, consider appetite stimulant to help meet needs.     4.) Re-consult if nutrition support is warranted.     5.) RD to monitor wt, nutritional status, skin, labs.     Goals: to meet % of EEN/EPN by next RD f/u  Nutrition Goal Status: new  Communication of RD Recs:  (POC)    Assessment and Plan    Nutrition Problem  Inadequate oral intake     Related to (etiology):   Low appetite, insufficient intake of energy    Signs and Symptoms (as evidenced by):   NPO     Interventions/Recommendations (treatment strategy):  Collaboration of nutritional care with other providers.   ONS PRN    Nutrition Diagnosis Status:   New     Reason for Assessment    Reason For Assessment: consult  Diagnosis: cancer diagnosis/related complications  Relevant Medical History: DM2, HLD, Ovarian CA  Interdisciplinary Rounds: did not attend    General Information Comments: RD consulted for poor oral intake. RD was unable to see pt today, d/t CLARITZA, in OR for ex-lap, hysterectomy, colon resection, de-bulking neoplasm today. Per chart review, UBW appears to be 114-124#, with #. RD unable to obtain any nutritional hx. RD to perform NFPE at f/u if medically warranted. RD team to continue to monitor and f/u.     Nutrition Discharge Planning: Regular/Diabetic diet w/ Boost Glucose Control TID    Nutrition Related Social Determinants of Health: SDOH: Unable to assess at this time.      Nutrition Risk Screen    Nutrition Risk Screen: reduced oral intake over the last month    Nutrition/Diet History    Food Allergies: NKFA    Anthropometrics    Temp: 98.2 °F (36.8 °C)  Height Method: Stated  Height: 5' 1" (154.9 " cm)  Height (inches): 61 in  Weight Method: Bed Scale  Weight: 54.4 kg (119 lb 14.9 oz)  Weight (lb): 119.93 lb  Ideal Body Weight (IBW), Female: 105 lb  % Ideal Body Weight, Female (lb): 114.22 %  BMI (Calculated): 22.7    Lab/Procedures/Meds    Pertinent Labs Reviewed: reviewed  Pertinent Labs Comments: K: 3.3, M.5, ALP: 32, PRO: 5.9, alb: 3.4    Pertinent Medications Reviewed: reviewed  Pertinent Medications Comments: Levothyroxine, LR    Estimated/Assessed Needs    Weight Used For Calorie Calculations: 51.2 kg (112 lb 14 oz) (dosing wt)  Energy Calorie Requirements (kcal): 1536- 1792 kcal  Energy Need Method: Kcal/kg (30-35 kcal/kg)    Protein Requirements: 77g (1.5g/kg)  Weight Used For Protein Calculations: 51.2 kg (112 lb 14 oz) (dosing wt)    Fluid Requirements (mL): as per MD  Estimated Fluid Requirement Method: RDA Method  RDA Method (mL): 1536    CHO Requirement: 192- 224g    Nutrition Prescription Ordered    Current Diet Order: NPO    Evaluation of Received Nutrient/Fluid Intake    I/O: incomplete  Energy Calories Required: not meeting needs  Protein Required: not meeting needs  Fluid Required:  (as per MD)  Comments: LBM 9/2  % Intake of Estimated Energy Needs: 0 - 25 %  % Meal Intake: NPO    Nutrition Risk    Level of Risk/Frequency of Follow-up:  (RD to f/u x 1/week)     Monitor and Evaluation    Food and Nutrient Intake: energy intake  Food and Nutrient Adminstration: diet order  Anthropometric Measurements: weight, weight change, body mass index  Biochemical Data, Medical Tests and Procedures: electrolyte and renal panel, gastrointestinal profile, glucose/endocrine profile, inflammatory profile, lipid profile  Nutrition-Focused Physical Findings: overall appearance, skin     Nutrition Follow-Up    RD Follow-up?: Yes

## 2024-09-03 NOTE — PLAN OF CARE
Recommendations     1.) If and when medically feasible, recommend to ADAT- goal of Regular Diet                 - may add Diabetic modifiers as needed.      2.) Once diet advances, recommend Boost Glucose Control TID to help meet needs.      3.) If PO intake remains low, consider appetite stimulant to help meet needs.      4.) Re-consult if nutrition support is warranted.      5.) RD to monitor wt, nutritional status, skin, labs.      Goals: to meet % of EEN/EPN by next RD f/u  Nutrition Goal Status: new  Communication of RD Recs:  (POC)

## 2024-09-03 NOTE — PROGRESS NOTES
Progress Note  Gynecology Oncology     Admit Date: 2024  LOS: 2    Reason for Admission:  Dehydration    SUBJECTIVE:     Bella Meyer is a 74 y.o.  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 Surgery was complicated by intraoperative blood loss of 1L and received 2u pRBCs intra-op.     Post operative course was complicated by BG in 300's in PACU and patient went to the floor with an insulin drip.    Overnight patient started complaining of pain and received dilaudid x1. During this time patient became tachycardic, otherwise VSS and patient asymptomatic. UOP was reported to be low. Labs were drawn at this time and were stable. One dose of lasix was given with increase in UOP however tachycardia remained. Patient then had change in mental status with difficulty arousing and repaid response was called. CBC, BMP, BNP, Troponin, Lactic acid, Chest Xray, and EKG were all collected. Patient received narcan x 1 and became more awake and alert. Work up with changes to EKG from baseline    Otherwise patient tolerated small amount of CLD  without N/V. Had not yet ambulated. Voiding via acosta. Not yet passing flatus. She is not yet having bowel movements.    OBJECTIVE:     Vital Signs   Temp:  [97.3 °F (36.3 °C)-98.8 °F (37.1 °C)] 98.8 °F (37.1 °C)  Pulse:  [] 122  Resp:  [13-21] 20  SpO2:  [92 %-100 %] 96 %  BP: ()/(56-90) 133/66      Intake/Output Summary (Last 24 hours) at 2024 0704  Last data filed at 2024 0426  Gross per 24 hour   Intake 93813.08 ml   Output 2420 ml   Net 9378.08 ml       Physical Exam:  Gen: A&Ox2, sinus tachycardia   CV: RRR  Pulm: LCTAB, normal respiratory effort  Abd: soft, non-distended, appropriately-tender to palpation without rebound or guarding  Inc: VML w/ island dressing in place with minimal  shadowing  Ext: trace edema in BL LE. SCDs in place. Pitting edema in BL hands  : Jackson in place draining clear green tinged urine   Neuro: Diminished but equal strength in upper and lower extremities. Able to follow command. Equal strength in face.     Laboratory:  Lactate: 2.5    Troponin: < 0.006    BNP: 35    Recent Labs   Lab 24  1407 24  0230 24  0538   WBC 7.10 14.10* 14.81*   HGB 10.9* 11.0* 10.4*   HCT 31.6* 29.5* 28.8*   MCV 91 87 89   PLT 93* 113* 111*      Recent Labs   Lab 24  0256 24  1407 24  0124 24  0538    135* 133* 133*   K 3.3* 3.9 4.1 3.8    105 105 104   CO2 25 18* 18* 21*   BUN 4* 4* 9 8   CREATININE 0.6 0.6 0.7 0.7   GLU 99 239* 199* 99   PROT 5.9* 3.1* 4.2*  --    BILITOT 0.3 0.5 0.3  --    ALKPHOS 32* 18* 22*  --    ALT 11 10 15  --    AST 17 21 41*  --    MG 1.5* 1.9 2.0  --    PHOS 3.8 3.0 2.6*  --       Blood Sugars (AccuCheck):    Recent Labs     24  1308 24  1718 24  1405 24  1601 24  1805 24  1958   POCTGLUCOSE 199* 152* 243* 324* 315* 346*     Imaging:  Chest Xray: official read in process.     ASSESSMENT/PLAN:     Active Hospital Problems    Diagnosis  POA    *Dehydration [E86.0]  Yes    History of pulmonary embolism [Z86.711]  Yes    Malignant neoplasm of ovary [C56.9]  Yes    Type 2 diabetes mellitus, with long-term current use of insulin [E11.9, Z79.4]  Not Applicable    Hypothyroidism [E03.9]  Yes    Pure hypercholesterolemia [E78.00]  Yes    Hypertension [I10]  Yes      Resolved Hospital Problems   No resolved problems to display.       Assessment: 74 y.o.  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     Plan:   1. Post-op   - Routine post-op advances  - CBC stable   -  cc /hr over last 2 hrs (s/p Lasix  x1)  - Mag 2.0   - Phos 2.6 > replaced  - BMP wnl  - Continue PRN pain medications. Dilaudid held.   - Discontinued continuous fluids due to concern for overload. Advance diet as tolerated.  - Antiemetics prn nausea/vomiting.  - Encourage IS  - Continue acosta until patient able to ambulate to perform void trial  - Heparin held post operatively due to platelets being < 100. Start prophylactic Lovenox this AM.  - SCDs in place    2. Mental status change  - EKG showed sinus tachycardiac w/ PVC, left axis deviation. Possible anterior infarct  - Troponin neg  - BNP 44  - Lactate 2.4  - Cardiology consulted to evaluate cardiac etiology of AMS  - Patient responded to Narcan, held dilaudid and continuing Toradol / Tylenol scheduled w/ Oxy 5/10 for pain control.  - Hold home gabapentin and lexapro to minimize medications that may effect mental status  - Will continue serial neuro checks with low threshold to order CT w/ and w/o contrast to evaluate for stoke.     3. HGSOC  - See oncology information in H&P  - S/p neodjuvant chemotherapy  - POD1 interval debulking, see post op care above   - Home gabapentin held     4. Hypothyroidism  - Continue home synthroid     5. Type II Diabetes  - Home Lantus held. Continue insulin drip until mental status stable and tolerating regular diet.   - POCT BG q1 while on drip  - 's overnight. 130's this AM.      6. HTN  - Holding home amlodipine   - Continue home metoprolol   - Goal BP at least >100/>60   - Hydral PRN ordered for SBP > 180     7. HLD  - Hold home statin     8. History of PE  - PE diagnosed on 6/20/2024  - Home regimen: eliquis 5 BID, completed Lovenox bridge with last dose 9/2  - Post op heparin held as platelets < 100  - CBC stable, platelets 111. Will give prophylactic Lovenox today.   - Consider therapeutic Lovenox starting tomorrow if stable.     9. Depression  - Held home lexapro    Dispo: As patient meets appropriate post-op milestones, plan for discharge to  home    Valerie Basilio MD  Obstetrics and Gynecology, PGY-2

## 2024-09-03 NOTE — ANESTHESIA POSTPROCEDURE EVALUATION
Anesthesia Post Evaluation    Patient: Bella Meyer    Procedure(s) Performed: Procedure(s) (LRB):  LAPAROTOMY, EXPLORATORY (N/A)  HYSTERECTOMY, TOTAL, ABDOMINAL (N/A)  SALPINGO-OOPHORECTOMY, BILATERAL (N/A)  OMENTECTOMY (N/A)  DEBULKING, NEOPLASM (N/A)  RESECTION, COLON, LOW ANTERIOR (N/A)  SIGMOIDOSCOPY, FLEXIBLE (N/A)  ILEOCOLECTOMY (N/A)  MOBILIZATION, SPLENIC FLEXURE  URETEROLYSIS (Bilateral)    Final Anesthesia Type: general      Patient location during evaluation: PACU  Patient participation: Yes- Able to Participate  Level of consciousness: lethargic and responds to stimulation  Post-procedure vital signs: reviewed and stable  Pain management: adequate  Airway patency: patent  DINORAH mitigation strategies: Postoperative administration of CPAP, nasopharyngeal airway, or oral appliance in the postanesthesia care unit (PACU)  PONV status at discharge: No PONV  Anesthetic complications: no      Cardiovascular status: blood pressure returned to baseline  Respiratory status: unassisted, spontaneous ventilation and face mask  Hydration status: euvolemic  Follow-up not needed.              Vitals Value Taken Time   /58 09/03/24 1443   Temp 36.4 °C (97.5 °F) 09/03/24 1410   Pulse 104 09/03/24 1447   Resp 13 09/03/24 1447   SpO2 100 % 09/03/24 1447   Vitals shown include unfiled device data.      No case tracking events are documented in the log.      Pain/Swapna Score: Pain Rating Prior to Med Admin: 4 (9/3/2024  6:21 AM)  Pain Rating Post Med Admin: 3 (9/3/2024  5:30 AM)  Swapna Score: 6 (9/3/2024  2:15 PM)

## 2024-09-03 NOTE — BRIEF OP NOTE
[facility] - [facility] (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.1]  Peritoneal carcinomatosis [C78.6]  Pelvic mass [R19.00]  Elevated cancer antigen 125 () [R97.1]  Ovarian mass [N83.8]  Colonic mass [K63.89]    Post-op Diagnosis:  Post-Op Diagnosis Codes:     * Malignant neoplasm of right ovary [C56.1]     * Peritoneal carcinomatosis [C78.6]     * Pelvic mass [R19.00]     * Elevated cancer antigen 125 () [R97.1]     * Ovarian mass [N83.8]     * Colonic mass [K63.89]    Procedure(s) (LRB):  LAPAROTOMY, EXPLORATORY (N/A)  HYSTERECTOMY, TOTAL, ABDOMINAL (N/A)  SALPINGO-OOPHORECTOMY, BILATERAL (N/A)  OMENTECTOMY (N/A)  DEBULKING, NEOPLASM (N/A)  RESECTION, COLON, LOW ANTERIOR (N/A)  SIGMOIDOSCOPY, FLEXIBLE (N/A)  ILEOCOLECTOMY (N/A)  MOBILIZATION, SPLENIC FLEXURE  URETEROLYSIS (Bilateral)    Anesthesia: General    Implants:  * No implants in log *    Operative Findings:   -Splenic flexure mobilized. LAR performed. Anastomosis created at ~10 cm without tension as we had excellent length following mobilization of the splenic flexure  -Flex sig performed   -Mass was tethered to the cecum. Ileocecectomy performed with isoperistaltic, function end-to-end ileocolic anastomosis creation.  -Case turned back over to Dr. Crawford and her team.    Estimated Blood Loss: See Op note         Specimens:   Specimen (24h ago, onward)       Start     Ordered    Pending  Specimen to Pathology, Surgery Gynecology and Obstetrics  Once        Comments: Pre-op Diagnosis: Malignant neoplasm of right ovary [C56.1]Peritoneal carcinomatosis [C78.6]Pelvic mass [R19.00]Elevated cancer antigen 125 () [R97.1]Ovarian mass [N83.8]Colonic  mass [K63.89]Procedure(s):LAPAROTOMY, EXPLORATORYHYSTERECTOMY, TOTAL, ABDOMINALSALPINGO-OOPHORECTOMY, BILATERALOMENTECTOMYDEBULKING, NEOPLASMRESECTION, COLON, LOW ANTERIORSIGMOIDOSCOPY, FLEXIBLE Number of specimens: Name of specimens: 1. Abdominal wall fat-permanent,   2. Omentum-permanent,  3. Ileum, cecum, right tube and right ovary-permanent,  4. Uterus, cervix, left tube and left ovary-permanent     References:    Click here for ordering Quick Tip   Question Answer Comment   Procedure Type: Gynecology and Obstetrics    Specimen Class: Known or suspected malignancy    Release to patient Immediate        Pending                    Jewel Jorge MD  General Surgery PGY-5  09/03/2024

## 2024-09-03 NOTE — PLAN OF CARE
Nurses Note -- 4 Eyes      9/2/2024   11:02 PM      Skin assessed during: Admit      [x] No Altered Skin Integrity Present    [x]Prevention Measures Documented      [] Yes- Altered Skin Integrity Present or Discovered   [] LDA Added if Not in Epic (Describe Wound)   [] New Altered Skin Integrity was Present on Admit and Documented in LDA   [] Wound Image Taken    Wound Care Consulted? No    Attending Nurse:  Kwaku Ashford RN/Staff Member:  ana jin    Blanchable redness to sacrum. Educated on prevention measures      Problem: Adult Inpatient Plan of Care  Goal: Plan of Care Review  Outcome: Progressing  Goal: Patient-Specific Goal (Individualized)  Outcome: Progressing  Goal: Absence of Hospital-Acquired Illness or Injury  Outcome: Progressing  Goal: Optimal Comfort and Wellbeing  Outcome: Progressing  Goal: Readiness for Transition of Care  Outcome: Progressing     Problem: Diabetes Comorbidity  Goal: Blood Glucose Level Within Targeted Range  Outcome: Progressing     Problem: Wound  Goal: Improved Oral Intake  Outcome: Progressing  Goal: Skin Health and Integrity  Outcome: Progressing     Problem: Surgery Nonspecified  Goal: Fluid and Electrolyte Balance  Outcome: Progressing  Goal: Blood Glucose Level Within Targeted Range  Outcome: Progressing  Goal: Effective Urinary Elimination  Outcome: Progressing  Goal: Effective Oxygenation and Ventilation  Outcome: Progressing

## 2024-09-03 NOTE — PROGRESS NOTES
[facility] - Oncology ([facility])  General Surgery  Progress Note    Subjective:     Interval History: Patient is a 74 y.o. female  with stage IIIC ovarian cancer being treated by Dr. Crawford.      She initially presented to an outside hospital 3/2024 with abdominal pain.  Was noted to have a thickened sigmoid at that time and concern for possible diverticular disease with ? Colorectal fistula. Had attempted colonoscopy at that time which was aborted due to a fixed sigmoid colon at approximately 20 cm.  Patient had a reportedly negative colonoscopy 1 year prior.  Subsequent work up revealed ovarian cancer.  Currently undergoing chemotherapy.  Repeat imaging showed improved peritoneal disease and persistent thickening of the sigmoid colon.     Interval 9/3/2024:  Patient presents today for LAR with flex sig and possible ostomy creation. Patient was last seen by Dr. Caicedo in clinic on 7/8/2024. There have been no changes in their history or physical exam since they were last seen in clinic.       Post-Op Info:  Procedure(s) (LRB):  LAPAROTOMY, EXPLORATORY (N/A)  HYSTERECTOMY, TOTAL, ABDOMINAL (N/A)  SALPINGO-OOPHORECTOMY, BILATERAL (N/A)  OMENTECTOMY (N/A)  DEBULKING, NEOPLASM (N/A)  RESECTION, COLON, LOW ANTERIOR (N/A)  SIGMOIDOSCOPY, FLEXIBLE (N/A)   Day of Surgery      Medications:  Continuous Infusions:   lactated ringers   Intravenous Continuous 125 mL/hr at 09/03/24 0129 New Bag at 09/03/24 0129     Scheduled Meds:   EScitalopram oxalate  10 mg Oral Daily    gabapentin  300 mg Oral QHS    heparin (porcine)  5,000 Units Subcutaneous Once    levothyroxine  75 mcg Oral QAM    LIDOcaine (PF) 10 mg/ml (1%)  1 mL Intradermal Once    magnesium sulfate IVPB  2 g Intravenous Once    metoprolol succinate  50 mg Oral QAM    potassium chloride  10 mEq Intravenous Q1H     PRN Meds:  Current Facility-Administered Medications:     acetaminophen, 650 mg, Oral, Q4H PRN    ceFAZolin (Ancef) IV (PEDS and ADULTS), 2 g, Intravenous, On  Call Procedure    dextrose 10%, 12.5 g, Intravenous, PRN    dextrose 10%, 12.5 g, Intravenous, PRN    dextrose 10%, 25 g, Intravenous, PRN    glucagon (human recombinant), 1 mg, Intramuscular, PRN    glucagon (human recombinant), 1 mg, Intramuscular, PRN    hydrALAZINE, 10 mg, Intravenous, Q6H PRN    HYDROmorphone, 0.2 mg, Intravenous, Q5 Min PRN    HYDROmorphone, 0.5 mg, Intravenous, Q6H PRN    insulin aspart U-100, 0-5 Units, Subcutaneous, Q6H PRN    ondansetron, 4 mg, Intravenous, Q8H PRN    oxyCODONE, 5 mg, Oral, Q4H PRN    oxyCODONE, 10 mg, Oral, Q4H PRN    prochlorperazine, 5 mg, Intravenous, Q6H PRN    prochlorperazine, 5 mg, Intravenous, Q30 Min PRN    sodium chloride 0.9%, 10 mL, Intravenous, PRN    sodium chloride 0.9%, 10 mL, Intravenous, PRN     Objective:     Vital Signs (Most Recent):  Temp: 98.2 °F (36.8 °C) (09/03/24 0612)  Pulse: 80 (09/03/24 0612)  Resp: 16 (09/03/24 0612)  BP: (!) 171/71 (09/03/24 0612)  SpO2: 100 % (09/03/24 0612) Vital Signs (24h Range):  Temp:  [97.5 °F (36.4 °C)-98.2 °F (36.8 °C)] 98.2 °F (36.8 °C)  Pulse:  [] 80  Resp:  [10-20] 16  SpO2:  [97 %-100 %] 100 %  BP: (135-171)/(70-84) 171/71       Intake/Output Summary (Last 24 hours) at 9/3/2024 0629  Last data filed at 9/3/2024 0436  Gross per 24 hour   Intake 50 ml   Output --   Net 50 ml       Physical Exam  Vitals and nursing note reviewed.   Constitutional:       Appearance: Normal appearance.   HENT:      Head: Normocephalic and atraumatic.   Eyes:      Pupils: Pupils are equal, round, and reactive to light.   Cardiovascular:      Rate and Rhythm: Normal rate and regular rhythm.      Pulses: Normal pulses.   Pulmonary:      Effort: Pulmonary effort is normal.      Breath sounds: Normal breath sounds.   Abdominal:      General: Abdomen is flat. There is no distension.      Palpations: Abdomen is soft.   Musculoskeletal:      Cervical back: Normal range of motion.   Skin:     General: Skin is dry.      Capillary Refill:  Capillary refill takes less than 2 seconds.   Neurological:      General: No focal deficit present.      Mental Status: She is alert and oriented to person, place, and time.         Significant Labs:  All pertinent labs from the last 24 hours have been reviewed.    Significant Diagnostics:  I have reviewed all pertinent imaging results/findings within the past 24 hours.    Assessment/Plan:     Active Diagnoses:    Diagnosis Date Noted POA    Dehydration [E86.0] 09/02/2024 Unknown    History of pulmonary embolism [Z86.711] 08/06/2024 Yes    Malignant neoplasm of ovary [C56.9] 04/29/2024 Yes    Type 2 diabetes mellitus, with long-term current use of insulin [E11.9, Z79.4] 03/24/2024 Not Applicable    Hypothyroidism [E03.9] 03/24/2024 Yes    Pure hypercholesterolemia [E78.00] 01/23/2013 Yes    Hypertension [I10] 01/23/2013 Yes      Problems Resolved During this Admission:     OR today with Gyn Onc. Dr. Caicedo will be performing LAR, Flex sig, and possible ostomy creation.     Henrietta Melton MD  General Surgery  [facility] - Oncology ([facility])

## 2024-09-04 PROBLEM — G93.40 ACUTE ENCEPHALOPATHY: Status: ACTIVE | Noted: 2024-09-04

## 2024-09-04 PROBLEM — R94.31 EKG, ABNORMAL: Status: ACTIVE | Noted: 2024-09-04

## 2024-09-04 LAB
ALBUMIN SERPL BCP-MCNC: 2.4 G/DL (ref 3.5–5.2)
ALBUMIN SERPL BCP-MCNC: 2.5 G/DL (ref 3.5–5.2)
ALLENS TEST: ABNORMAL
ALLENS TEST: ABNORMAL
ALP SERPL-CCNC: 22 U/L (ref 55–135)
ALP SERPL-CCNC: 22 U/L (ref 55–135)
ALT SERPL W/O P-5'-P-CCNC: 15 U/L (ref 10–44)
ALT SERPL W/O P-5'-P-CCNC: 15 U/L (ref 10–44)
AMMONIA PLAS-SCNC: 32 UMOL/L (ref 10–50)
ANION GAP SERPL CALC-SCNC: 10 MMOL/L (ref 8–16)
ANION GAP SERPL CALC-SCNC: 8 MMOL/L (ref 8–16)
AST SERPL-CCNC: 40 U/L (ref 10–40)
AST SERPL-CCNC: 41 U/L (ref 10–40)
BACTERIA #/AREA URNS AUTO: ABNORMAL /HPF
BASOPHILS # BLD AUTO: 0.03 K/UL (ref 0–0.2)
BASOPHILS # BLD AUTO: 0.06 K/UL (ref 0–0.2)
BASOPHILS NFR BLD: 0.2 % (ref 0–1.9)
BASOPHILS NFR BLD: 0.4 % (ref 0–1.9)
BILIRUB DIRECT SERPL-MCNC: 0.1 MG/DL (ref 0.1–0.3)
BILIRUB SERPL-MCNC: 0.3 MG/DL (ref 0.1–1)
BILIRUB SERPL-MCNC: 0.3 MG/DL (ref 0.1–1)
BILIRUB UR QL STRIP: ABNORMAL
BNP SERPL-MCNC: 35 PG/ML (ref 0–99)
BNP SERPL-MCNC: 44 PG/ML (ref 0–99)
BUN SERPL-MCNC: 8 MG/DL (ref 8–23)
BUN SERPL-MCNC: 9 MG/DL (ref 8–23)
CALCIUM SERPL-MCNC: 7.9 MG/DL (ref 8.7–10.5)
CALCIUM SERPL-MCNC: 8.3 MG/DL (ref 8.7–10.5)
CHLORIDE SERPL-SCNC: 104 MMOL/L (ref 95–110)
CHLORIDE SERPL-SCNC: 105 MMOL/L (ref 95–110)
CLARITY UR REFRACT.AUTO: ABNORMAL
CO2 SERPL-SCNC: 18 MMOL/L (ref 23–29)
CO2 SERPL-SCNC: 21 MMOL/L (ref 23–29)
COLOR UR AUTO: ABNORMAL
CREAT SERPL-MCNC: 0.7 MG/DL (ref 0.5–1.4)
CREAT SERPL-MCNC: 0.7 MG/DL (ref 0.5–1.4)
DELSYS: ABNORMAL
DELSYS: ABNORMAL
DIFFERENTIAL METHOD BLD: ABNORMAL
DIFFERENTIAL METHOD BLD: ABNORMAL
EOSINOPHIL # BLD AUTO: 0 K/UL (ref 0–0.5)
EOSINOPHIL # BLD AUTO: 0 K/UL (ref 0–0.5)
EOSINOPHIL NFR BLD: 0 % (ref 0–8)
EOSINOPHIL NFR BLD: 0 % (ref 0–8)
ERYTHROCYTE [DISTWIDTH] IN BLOOD BY AUTOMATED COUNT: 15.9 % (ref 11.5–14.5)
ERYTHROCYTE [DISTWIDTH] IN BLOOD BY AUTOMATED COUNT: 16.4 % (ref 11.5–14.5)
EST. GFR  (NO RACE VARIABLE): >60 ML/MIN/1.73 M^2
EST. GFR  (NO RACE VARIABLE): >60 ML/MIN/1.73 M^2
FIO2: 21
FIO2: 21
GLUCOSE SERPL-MCNC: 111 MG/DL (ref 70–110)
GLUCOSE SERPL-MCNC: 129 MG/DL (ref 70–110)
GLUCOSE SERPL-MCNC: 130 MG/DL (ref 70–110)
GLUCOSE SERPL-MCNC: 131 MG/DL (ref 70–110)
GLUCOSE SERPL-MCNC: 136 MG/DL (ref 70–110)
GLUCOSE SERPL-MCNC: 138 MG/DL (ref 70–110)
GLUCOSE SERPL-MCNC: 143 MG/DL (ref 70–110)
GLUCOSE SERPL-MCNC: 145 MG/DL (ref 70–110)
GLUCOSE SERPL-MCNC: 148 MG/DL (ref 70–110)
GLUCOSE SERPL-MCNC: 151 MG/DL (ref 70–110)
GLUCOSE SERPL-MCNC: 153 MG/DL (ref 70–110)
GLUCOSE SERPL-MCNC: 157 MG/DL (ref 70–110)
GLUCOSE SERPL-MCNC: 159 MG/DL (ref 70–110)
GLUCOSE SERPL-MCNC: 164 MG/DL (ref 70–110)
GLUCOSE SERPL-MCNC: 184 MG/DL (ref 70–110)
GLUCOSE SERPL-MCNC: 199 MG/DL (ref 70–110)
GLUCOSE SERPL-MCNC: 222 MG/DL (ref 70–110)
GLUCOSE SERPL-MCNC: 241 MG/DL (ref 70–110)
GLUCOSE SERPL-MCNC: 304 MG/DL (ref 70–110)
GLUCOSE SERPL-MCNC: 305 MG/DL (ref 70–110)
GLUCOSE SERPL-MCNC: 99 MG/DL (ref 70–110)
GLUCOSE UR QL STRIP: ABNORMAL
HCO3 UR-SCNC: 17.3 MMOL/L (ref 24–28)
HCO3 UR-SCNC: 19 MMOL/L (ref 24–28)
HCO3 UR-SCNC: 24.5 MMOL/L (ref 24–28)
HCT VFR BLD AUTO: 28.8 % (ref 37–48.5)
HCT VFR BLD AUTO: 29.5 % (ref 37–48.5)
HCT VFR BLD CALC: 18 %PCV (ref 36–54)
HCT VFR BLD CALC: 29 %PCV (ref 36–54)
HGB BLD-MCNC: 10.4 G/DL (ref 12–16)
HGB BLD-MCNC: 11 G/DL (ref 12–16)
HGB UR QL STRIP: ABNORMAL
HYALINE CASTS UR QL AUTO: 5 /LPF
IMM GRANULOCYTES # BLD AUTO: 0.05 K/UL (ref 0–0.04)
IMM GRANULOCYTES # BLD AUTO: 0.06 K/UL (ref 0–0.04)
IMM GRANULOCYTES NFR BLD AUTO: 0.3 % (ref 0–0.5)
IMM GRANULOCYTES NFR BLD AUTO: 0.4 % (ref 0–0.5)
KETONES UR QL STRIP: ABNORMAL
LACTATE SERPL-SCNC: 1.7 MMOL/L (ref 0.5–2.2)
LACTATE SERPL-SCNC: 2.4 MMOL/L (ref 0.5–2.2)
LDH SERPL L TO P-CCNC: 3.57 MMOL/L (ref 0.5–2.2)
LEUKOCYTE ESTERASE UR QL STRIP: ABNORMAL
LYMPHOCYTES # BLD AUTO: 2 K/UL (ref 1–4.8)
LYMPHOCYTES # BLD AUTO: 2.5 K/UL (ref 1–4.8)
LYMPHOCYTES NFR BLD: 14 % (ref 18–48)
LYMPHOCYTES NFR BLD: 16.5 % (ref 18–48)
MAGNESIUM SERPL-MCNC: 2 MG/DL (ref 1.6–2.6)
MAGNESIUM SERPL-MCNC: 2 MG/DL (ref 1.6–2.6)
MCH RBC QN AUTO: 32.2 PG (ref 27–31)
MCH RBC QN AUTO: 32.5 PG (ref 27–31)
MCHC RBC AUTO-ENTMCNC: 36.1 G/DL (ref 32–36)
MCHC RBC AUTO-ENTMCNC: 37.3 G/DL (ref 32–36)
MCV RBC AUTO: 87 FL (ref 82–98)
MCV RBC AUTO: 89 FL (ref 82–98)
MICROSCOPIC COMMENT: ABNORMAL
MODE: ABNORMAL
MODE: ABNORMAL
MONOCYTES # BLD AUTO: 0.9 K/UL (ref 0.3–1)
MONOCYTES # BLD AUTO: 1.3 K/UL (ref 0.3–1)
MONOCYTES NFR BLD: 6 % (ref 4–15)
MONOCYTES NFR BLD: 8.5 % (ref 4–15)
NEUTROPHILS # BLD AUTO: 11 K/UL (ref 1.8–7.7)
NEUTROPHILS # BLD AUTO: 11.2 K/UL (ref 1.8–7.7)
NEUTROPHILS NFR BLD: 74.3 % (ref 38–73)
NEUTROPHILS NFR BLD: 79.4 % (ref 38–73)
NITRITE UR QL STRIP: NEGATIVE
NRBC BLD-RTO: 0 /100 WBC
NRBC BLD-RTO: 0 /100 WBC
OHS QRS DURATION: 72 MS
OHS QTC CALCULATION: 450 MS
PCO2 BLDA: 36.6 MMHG (ref 35–45)
PCO2 BLDA: 37.3 MMHG (ref 35–45)
PCO2 BLDA: 49.7 MMHG (ref 35–45)
PH SMN: 7.27 [PH] (ref 7.35–7.45)
PH SMN: 7.3 [PH] (ref 7.35–7.45)
PH SMN: 7.32 [PH] (ref 7.35–7.45)
PH UR STRIP: ABNORMAL [PH] (ref 5–8)
PHOSPHATE SERPL-MCNC: 2.6 MG/DL (ref 2.7–4.5)
PHOSPHATE SERPL-MCNC: 3.1 MG/DL (ref 2.7–4.5)
PLATELET # BLD AUTO: 111 K/UL (ref 150–450)
PLATELET # BLD AUTO: 113 K/UL (ref 150–450)
PMV BLD AUTO: 9.2 FL (ref 9.2–12.9)
PMV BLD AUTO: 9.4 FL (ref 9.2–12.9)
PO2 BLDA: 223 MMHG (ref 80–100)
PO2 BLDA: 233 MMHG (ref 80–100)
PO2 BLDA: 28 MMHG (ref 40–60)
POC BE: -10 MMOL/L
POC BE: -2 MMOL/L
POC BE: -7 MMOL/L
POC IONIZED CALCIUM: 0.87 MMOL/L (ref 1.06–1.42)
POC IONIZED CALCIUM: 1.43 MMOL/L (ref 1.06–1.42)
POC SATURATED O2: 100 % (ref 95–100)
POC SATURATED O2: 100 % (ref 95–100)
POC SATURATED O2: 46 % (ref 95–100)
POC TCO2: 18 MMOL/L (ref 23–27)
POC TCO2: 20 MMOL/L (ref 23–27)
POC TCO2: 26 MMOL/L (ref 24–29)
POCT GLUCOSE: 104 MG/DL (ref 70–110)
POCT GLUCOSE: 346 MG/DL (ref 70–110)
POCT GLUCOSE: 97 MG/DL (ref 70–110)
POTASSIUM BLD-SCNC: 3.5 MMOL/L (ref 3.5–5.1)
POTASSIUM BLD-SCNC: 5.4 MMOL/L (ref 3.5–5.1)
POTASSIUM SERPL-SCNC: 3.8 MMOL/L (ref 3.5–5.1)
POTASSIUM SERPL-SCNC: 4.1 MMOL/L (ref 3.5–5.1)
PROT SERPL-MCNC: 4 G/DL (ref 6–8.4)
PROT SERPL-MCNC: 4.2 G/DL (ref 6–8.4)
PROT UR QL STRIP: NEGATIVE
RBC # BLD AUTO: 3.23 M/UL (ref 4–5.4)
RBC # BLD AUTO: 3.38 M/UL (ref 4–5.4)
RBC #/AREA URNS AUTO: 23 /HPF (ref 0–4)
SAMPLE: ABNORMAL
SITE: ABNORMAL
SITE: ABNORMAL
SODIUM BLD-SCNC: 133 MMOL/L (ref 136–145)
SODIUM BLD-SCNC: 137 MMOL/L (ref 136–145)
SODIUM SERPL-SCNC: 133 MMOL/L (ref 136–145)
SODIUM SERPL-SCNC: 133 MMOL/L (ref 136–145)
SP GR UR STRIP: >1.03 (ref 1–1.03)
SP02: 96
SP02: 96
TROPONIN I SERPL DL<=0.01 NG/ML-MCNC: <0.006 NG/ML (ref 0–0.03)
URN SPEC COLLECT METH UR: ABNORMAL
WBC # BLD AUTO: 14.1 K/UL (ref 3.9–12.7)
WBC # BLD AUTO: 14.81 K/UL (ref 3.9–12.7)
WBC #/AREA URNS AUTO: 0 /HPF (ref 0–5)

## 2024-09-04 PROCEDURE — 99900035 HC TECH TIME PER 15 MIN (STAT)

## 2024-09-04 PROCEDURE — 83605 ASSAY OF LACTIC ACID: CPT | Performed by: OBSTETRICS & GYNECOLOGY

## 2024-09-04 PROCEDURE — 84100 ASSAY OF PHOSPHORUS: CPT

## 2024-09-04 PROCEDURE — 84484 ASSAY OF TROPONIN QUANT: CPT | Performed by: OBSTETRICS & GYNECOLOGY

## 2024-09-04 PROCEDURE — 93005 ELECTROCARDIOGRAM TRACING: CPT

## 2024-09-04 PROCEDURE — 97166 OT EVAL MOD COMPLEX 45 MIN: CPT

## 2024-09-04 PROCEDURE — 83605 ASSAY OF LACTIC ACID: CPT | Mod: 91

## 2024-09-04 PROCEDURE — 93010 ELECTROCARDIOGRAM REPORT: CPT | Mod: ,,, | Performed by: INTERNAL MEDICINE

## 2024-09-04 PROCEDURE — 94761 N-INVAS EAR/PLS OXIMETRY MLT: CPT | Mod: XB

## 2024-09-04 PROCEDURE — 83605 ASSAY OF LACTIC ACID: CPT

## 2024-09-04 PROCEDURE — 25500020 PHARM REV CODE 255: Performed by: OBSTETRICS & GYNECOLOGY

## 2024-09-04 PROCEDURE — 99222 1ST HOSP IP/OBS MODERATE 55: CPT | Mod: ,,, | Performed by: INTERNAL MEDICINE

## 2024-09-04 PROCEDURE — 80048 BASIC METABOLIC PNL TOTAL CA: CPT | Mod: XB | Performed by: OBSTETRICS & GYNECOLOGY

## 2024-09-04 PROCEDURE — 20600001 HC STEP DOWN PRIVATE ROOM

## 2024-09-04 PROCEDURE — 84100 ASSAY OF PHOSPHORUS: CPT | Mod: 91 | Performed by: OBSTETRICS & GYNECOLOGY

## 2024-09-04 PROCEDURE — 99223 1ST HOSP IP/OBS HIGH 75: CPT | Mod: GC,,, | Performed by: STUDENT IN AN ORGANIZED HEALTH CARE EDUCATION/TRAINING PROGRAM

## 2024-09-04 PROCEDURE — 82803 BLOOD GASES ANY COMBINATION: CPT

## 2024-09-04 PROCEDURE — 36415 COLL VENOUS BLD VENIPUNCTURE: CPT | Performed by: OBSTETRICS & GYNECOLOGY

## 2024-09-04 PROCEDURE — 82140 ASSAY OF AMMONIA: CPT

## 2024-09-04 PROCEDURE — 97162 PT EVAL MOD COMPLEX 30 MIN: CPT

## 2024-09-04 PROCEDURE — 99223 1ST HOSP IP/OBS HIGH 75: CPT | Mod: ,,, | Performed by: PSYCHIATRY & NEUROLOGY

## 2024-09-04 PROCEDURE — 63600175 PHARM REV CODE 636 W HCPCS: Performed by: OBSTETRICS & GYNECOLOGY

## 2024-09-04 PROCEDURE — 80076 HEPATIC FUNCTION PANEL: CPT | Performed by: OBSTETRICS & GYNECOLOGY

## 2024-09-04 PROCEDURE — 99291 CRITICAL CARE FIRST HOUR: CPT | Mod: ,,, | Performed by: NURSE PRACTITIONER

## 2024-09-04 PROCEDURE — 81001 URINALYSIS AUTO W/SCOPE: CPT | Performed by: NURSE PRACTITIONER

## 2024-09-04 PROCEDURE — 83735 ASSAY OF MAGNESIUM: CPT | Mod: 91 | Performed by: OBSTETRICS & GYNECOLOGY

## 2024-09-04 PROCEDURE — 82800 BLOOD PH: CPT

## 2024-09-04 PROCEDURE — 63600175 PHARM REV CODE 636 W HCPCS

## 2024-09-04 PROCEDURE — 83735 ASSAY OF MAGNESIUM: CPT

## 2024-09-04 PROCEDURE — 25000003 PHARM REV CODE 250

## 2024-09-04 PROCEDURE — 97112 NEUROMUSCULAR REEDUCATION: CPT

## 2024-09-04 PROCEDURE — 80053 COMPREHEN METABOLIC PANEL: CPT

## 2024-09-04 PROCEDURE — 97530 THERAPEUTIC ACTIVITIES: CPT

## 2024-09-04 PROCEDURE — 85025 COMPLETE CBC W/AUTO DIFF WBC: CPT | Mod: 91 | Performed by: OBSTETRICS & GYNECOLOGY

## 2024-09-04 PROCEDURE — 36415 COLL VENOUS BLD VENIPUNCTURE: CPT

## 2024-09-04 PROCEDURE — 83880 ASSAY OF NATRIURETIC PEPTIDE: CPT | Mod: 91 | Performed by: OBSTETRICS & GYNECOLOGY

## 2024-09-04 RX ORDER — FUROSEMIDE 10 MG/ML
40 INJECTION INTRAMUSCULAR; INTRAVENOUS ONCE
Status: DISCONTINUED | OUTPATIENT
Start: 2024-09-04 | End: 2024-09-04

## 2024-09-04 RX ORDER — METOPROLOL SUCCINATE 50 MG/1
50 TABLET, EXTENDED RELEASE ORAL EVERY MORNING
Status: DISCONTINUED | OUTPATIENT
Start: 2024-09-04 | End: 2024-09-08 | Stop reason: HOSPADM

## 2024-09-04 RX ORDER — ENOXAPARIN SODIUM 100 MG/ML
40 INJECTION SUBCUTANEOUS EVERY 24 HOURS
Status: DISCONTINUED | OUTPATIENT
Start: 2024-09-04 | End: 2024-09-04

## 2024-09-04 RX ORDER — KETOROLAC TROMETHAMINE 15 MG/ML
15 INJECTION, SOLUTION INTRAMUSCULAR; INTRAVENOUS EVERY 6 HOURS
Status: COMPLETED | OUTPATIENT
Start: 2024-09-05 | End: 2024-09-05

## 2024-09-04 RX ORDER — FUROSEMIDE 10 MG/ML
40 INJECTION INTRAMUSCULAR; INTRAVENOUS ONCE
Status: COMPLETED | OUTPATIENT
Start: 2024-09-04 | End: 2024-09-04

## 2024-09-04 RX ADMIN — SODIUM PHOSPHATE, MONOBASIC, MONOHYDRATE AND SODIUM PHOSPHATE, DIBASIC, ANHYDROUS 15 MMOL: 142; 276 INJECTION, SOLUTION INTRAVENOUS at 04:09

## 2024-09-04 RX ADMIN — FUROSEMIDE 40 MG: 10 INJECTION, SOLUTION INTRAMUSCULAR; INTRAVENOUS at 04:09

## 2024-09-04 RX ADMIN — IBUPROFEN 600 MG: 600 TABLET, FILM COATED ORAL at 06:09

## 2024-09-04 RX ADMIN — HYDROMORPHONE HYDROCHLORIDE 0.4 MG: 1 INJECTION, SOLUTION INTRAMUSCULAR; INTRAVENOUS; SUBCUTANEOUS at 12:09

## 2024-09-04 RX ADMIN — NALXONE HYDROCHLORIDE 0.02 MG: 0.4 INJECTION INTRAMUSCULAR; INTRAVENOUS; SUBCUTANEOUS at 05:09

## 2024-09-04 RX ADMIN — MUPIROCIN: 20 OINTMENT TOPICAL at 10:09

## 2024-09-04 RX ADMIN — ACETAMINOPHEN 1000 MG: 500 TABLET ORAL at 10:09

## 2024-09-04 RX ADMIN — ENOXAPARIN SODIUM 40 MG: 40 INJECTION SUBCUTANEOUS at 05:09

## 2024-09-04 RX ADMIN — LEVOTHYROXINE SODIUM 75 MCG: 75 TABLET ORAL at 10:09

## 2024-09-04 RX ADMIN — MAGNESIUM HYDROXIDE 2400 MG: 400 SUSPENSION ORAL at 07:09

## 2024-09-04 RX ADMIN — ALVIMOPAN 12 MG: 12 CAPSULE ORAL at 10:09

## 2024-09-04 RX ADMIN — METOPROLOL SUCCINATE 50 MG: 50 TABLET, EXTENDED RELEASE ORAL at 10:09

## 2024-09-04 RX ADMIN — MAGNESIUM HYDROXIDE 2400 MG: 400 SUSPENSION ORAL at 10:09

## 2024-09-04 RX ADMIN — OXYCODONE 5 MG: 5 TABLET ORAL at 01:09

## 2024-09-04 RX ADMIN — OXYCODONE 5 MG: 5 TABLET ORAL at 10:09

## 2024-09-04 RX ADMIN — IOHEXOL 100 ML: 350 INJECTION, SOLUTION INTRAVENOUS at 07:09

## 2024-09-04 RX ADMIN — OXYCODONE 5 MG: 5 TABLET ORAL at 06:09

## 2024-09-04 RX ADMIN — KETOROLAC TROMETHAMINE 15 MG: 15 INJECTION, SOLUTION INTRAMUSCULAR; INTRAVENOUS at 12:09

## 2024-09-04 RX ADMIN — ACETAMINOPHEN 1000 MG: 500 TABLET ORAL at 06:09

## 2024-09-04 RX ADMIN — KETOROLAC TROMETHAMINE 15 MG: 15 INJECTION, SOLUTION INTRAMUSCULAR; INTRAVENOUS at 06:09

## 2024-09-04 RX ADMIN — ACETAMINOPHEN 1000 MG: 500 TABLET ORAL at 12:09

## 2024-09-04 NOTE — CONSULTS
"Nutrition consult received stating "Post-op".  RD following, please see note from 9/3 for full assessment.    Recommendations:  1) As tolerated, advance diet to Regular.  2) Add Boost Plus ONS BID if PO intake inadequate.  3) RD to monitor & follow-up.    Thanks!  MS Fanny, RD, LDN   "

## 2024-09-04 NOTE — CODE/ RAPID DOCUMENTATION
"  RAPID RESPONSE NURSE STROKE CODE NOTE         Admit Date: 2024  LOS: 2  Code Status: Full Code   Date of Consult: 2024  : 1950  Age: 74 y.o.  Weight:   Wt Readings from Last 1 Encounters:   24 54.4 kg (119 lb 14.9 oz)     Sex: female  Race: White   Bed: [address] [place] [address] A:   MRN: [id number]  Time Rapid Response Team page Received: 07:29 AM  Time Rapid Response Team at Bedside: 07:30 AMs  Time Rapid Response Team left Bedside: 08:15 AM  Was the patient discharged from an ICU this admission? No   Was the patient discharged from a PACU within last 24 hours? Yes   Did the patient receive conscious sedation/general anesthesia in last 24 hours? No  Was the patient in the ED within the past 24 hours? No  Was the patient on NIPPV within the past 24 hours? No   Did this progress into an ARC or CPA: No  Attending Physician: Maliha Crawford MD  Primary Service: Gynecologic Oncology       SITUATION    Notified by pager.  Called to evaluate the patient for Neuro    BACKGROUND    Why is the patient in the hospital?: Dehydration  Patient has a past medical history of Cancer, Diabetes mellitus, type 2, Hyperlipidemia, Hypertension, Hypothyroidism, and Pulmonary embolism.    ASSESSMENT    Last VS: /62 (BP Location: Right leg, Patient Position: Lying)   Pulse 106   Temp 97.8 °F (36.6 °C) (Axillary)   Resp 20   Ht 5' 1" (1.549 m)   Wt 54.4 kg (119 lb 14.9 oz)   SpO2 98%   BMI 22.66 kg/m²     24H Vital Sign Range:  Temp:  [97.3 °F (36.3 °C)-98.8 °F (37.1 °C)]   Pulse:  []   Resp:  [13-21]   BP: ()/(56-90)   SpO2:  [92 %-100 %]     Last know well time: Unclear - primary provider at bedside stated she had been working up AMS since ~5:30 AM    Glucose time: 07:31 AM   Glucose result: 97    Physical Exam  Constitutional:       Appearance: She is ill-appearing.   Cardiovascular:      Rate and Rhythm: Tachycardia present.   Pulmonary:      Effort: Pulmonary effort is normal.   Genitourinary:     " Comments: Blue/green urine draining in acosta catheter.   Musculoskeletal:      Comments: BUE and BLE edematous.    Skin:     General: Skin is warm and dry.   Neurological:      Mental Status: She is easily aroused. She is lethargic.      GCS: GCS eye subscore is 2. GCS verbal subscore is 4. GCS motor subscore is 6.      Motor: Weakness and tremor present.   Psychiatric:         Attention and Perception: She is inattentive.         Speech: Speech is delayed.        09/04/24 0730   Vital Signs   Temp 97.8 °F (36.6 °C)   Temp Source Axillary   Pulse (!) 127   Heart Rate Source Monitor;Continuous   SpO2 96 %   Pulse Oximetry Type Continuous   Oximetry Probe Status Assessed;Intact;No Change Needed   Device (Oxygen Therapy) room air   /62   MAP (mmHg) 84   BP Location Right leg   BP Method Automatic   Patient Position Lying      Latest Reference Range & Units 09/04/24 08:02 09/04/24 08:09   POC PH 7.35 - 7.45  7.301 (L)    POC PCO2 35 - 45 mmHg 49.7 (H)    POC PO2 40 - 60 mmHg 28 (LL)    POC HCO3 24 - 28 mmol/L 24.5    POC SATURATED O2 95 - 100 % 46    Sample  VENOUS VENOUS   POC TCO2 24 - 29 mmol/L 26    POC BE -2 to 2 mmol/L -2    FiO2  21 21   DelSys  Room Air Room Air   Site  Other Other   Mode  SPONT SPONT   POC Lactate 0.5 - 2.2 mmol/L  3.57 (HH)   (LL): Data is critically low  (HH): Data is critically high  (L): Data is abnormally low  (H): Data is abnormally high    Time Stroke Code initiated: 07:29 AM  Stroke team Arrival time: 07:34 AM  Stroke Code activation triggers: Generalized weakness, decreased responsiveness, disorientation, generalized, intermittent, spontaneous twitching   Vascular Neurology provider you spoke with:  GUILHERME Patrick    Time arrived at CT: 07:41 AM  Time CT completed: 08:03 AM    VAN positive or negative: negative    Thrombolytic decision: No  Thrombolytic bolus (mg and time): N/A  Thrombolytic infusion (mg and time): N/A  Thrombolytic end time: N/A    IR decision:  TBD  IR arrival:  N/A  IR end time: N/A     RECOMMENDATIONS    We recommend:   - Continue telemetry monitoring   - Repeat VBG in 1 hour  - Trend lactic and CBC - low threshold for infectious w/u  - Q 2 hour neuro exams  - Maintain PIV access  - Strict I/Os  - Delirium prevention  - Fall precautions  - Aspiration precautions   - Check UA, check ammonia level   - Consider neurology consult       FOLLOW-UP/PLAN    Call the Rapid Response Nurse, Lizzette Liu RN at [phone] for additional questions or concerns.    PHYSICIAN ESCALATION    Orders received and case discussed with NIC Sullivan MD, GUILHERME Patrick, and DORIS Mckeon NP     Disposition: Remain in room 844.

## 2024-09-04 NOTE — PT/OT/SLP EVAL
"Physical Therapy Co-Evaluation    Patient Name:  Bella Meyer   MRN:  [id number]    Recommendations:     Discharge Recommendations: Moderate Intensity Therapy   Discharge Equipment Recommendations: none   Barriers to discharge: Pt currently requiring increased level of assistance with mobility      Assessment:     Bella Meyer is a 74 y.o. female admitted with a medical diagnosis of Dehydration.  She presents with the following impairments/functional limitations: weakness, impaired endurance, impaired functional mobility, gait instability, impaired balance, decreased upper extremity function, decreased lower extremity function, impaired cardiopulmonary response to activity, edema.    Cooperative, alert. Pt tolerated 2 reps STS EOB and presented with decreased standing tolerance and upright posture. Pt was ambulatory in community prior to admission. Pt will benefit from skilled PT services while in house in order to address the aforementioned deficits.      Rehab Prognosis: Good; patient would benefit from acute skilled PT services to address these deficits and reach maximum level of function.    Recent Surgery: Procedure(s) (LRB):  LAPAROTOMY, EXPLORATORY (N/A)  HYSTERECTOMY, TOTAL, ABDOMINAL (N/A)  SALPINGO-OOPHORECTOMY, BILATERAL (N/A)  OMENTECTOMY (N/A)  DEBULKING, NEOPLASM (N/A)  RESECTION, COLON, LOW ANTERIOR (N/A)  SIGMOIDOSCOPY, FLEXIBLE (N/A)  ILEOCOLECTOMY (N/A)  MOBILIZATION, SPLENIC FLEXURE  URETEROLYSIS (Bilateral) 1 Day Post-Op    Plan:     During this hospitalization, patient to be seen 4 x/week to address the identified rehab impairments via gait training, therapeutic activities, therapeutic exercises, neuromuscular re-education and progress toward the following goals:    Plan of Care Expires:  10/04/24    Subjective     "I'm tired"    Pain/Comfort:  Pain Rating 1:  (not rated)  Location - Orientation 1: generalized    Patients cultural, spiritual, Bahai conflicts given the current situation: " no    Living Environment:  Per sister, pt lives with sister with 2 SH and 0 RALEIGH. Pt has a WIS with shower chair.   Prior to admission, patients level of function was mod I RW in house, rollator community.  Equipment used at home: walker, rolling, rollator, wheelchair, bath bench, bedside commode, shower chair (adjustable bed with rails).  DME owned (not currently used): wheelchair.  Upon discharge, patient will have assistance from sisters, rotating care.    Objective:     Communicated with RN prior to session.  Patient found HOB elevated with telemetry, pulse ox (continuous), peripheral IV, acosta catheter  upon PT entry to room.    General Precautions: Standard, fall  Orthopedic Precautions:N/A   Braces: N/A  Respiratory Status: Room air    Exams:  BLE ROM: WFL  BLE Strength: grossly 3/5    Functional Mobility:  Bed Mobility:     Supine to Sit: moderate assistance  Sit to Supine: maximal assistance  Transfers:     Sit to Stand:  minimum assistance and of 2 persons with no AD  Gait: pt amb 2 lateral steps Chepe x2 and presented with head down, mild unsteadiness, B shortened steps   Balance:   Fair-good sitting balance  Fair standing balance      AM-PAC 6 CLICK MOBILITY  Total Score:12       Treatment & Education:  Educated pt on PT role/POC  Educated pt on importance of OOB activity and daily ambulation   Pt educated on proper body mechanics, safety techniques, and energy conservation with PT facilitation and cueing throughout session   Pt verbalized understanding      Patient left HOB elevated with all lines intact, call button in reach, RN notified, and OT and sister present.    GOALS:   Multidisciplinary Problems       Physical Therapy Goals          Problem: Physical Therapy    Goal Priority Disciplines Outcome Goal Variances Interventions   Physical Therapy Goal     PT, PT/OT Progressing     Description: Goals to be met by: 10/04/2024     Patient will increase functional independence with mobility by  performin. Supine to sit with MInimal Assistance  2. Sit to supine with MInimal Assistance  3. Sit to stand transfer with Supervision  4. Bed to chair transfer with Supervision using LRAD  5. Gait  x 100 feet with Supervision using LRAD.                          History:     Past Medical History:   Diagnosis Date    Cancer     Diabetes mellitus, type 2     Hyperlipidemia     Hypertension     Hypothyroidism     Pulmonary embolism        Past Surgical History:   Procedure Laterality Date    BILATERAL SALPINGO-OOPHORECTOMY (BSO) N/A 9/3/2024    Procedure: SALPINGO-OOPHORECTOMY, BILATERAL;  Surgeon: Maliha Crawford MD;  [facility];  Service: Gynecology Oncology;  Laterality: N/A;    DEBULKING OF TUMOR N/A 9/3/2024    Procedure: DEBULKING, NEOPLASM;  Surgeon: Maliha Crawford MD;  Location: [facility] OR [facility] FLR;  Service: Gynecology Oncology;  Laterality: N/A;    FLEXIBLE SIGMOIDOSCOPY N/A 9/3/2024    Procedure: SIGMOIDOSCOPY, FLEXIBLE;  Surgeon: Ruby Caicedo MD;  [facility];  Service: Colon and Rectal;  Laterality: N/A;    ILEOCOLECTOMY N/A 9/3/2024    Procedure: ILEOCOLECTOMY;  Surgeon: Ruby Caicedo MD;  [facility];  Service: Colon and Rectal;  Laterality: N/A;    LAPAROTOMY, EXPLORATORY N/A 9/3/2024    Procedure: LAPAROTOMY, EXPLORATORY;  Surgeon: Maliha Crawford MD;  [facility];  Service: Gynecology Oncology;  Laterality: N/A;  4 hr case    LOW ANTERIOR RESECTION OF COLON N/A 9/3/2024    Procedure: RESECTION, COLON, LOW ANTERIOR;  Surgeon: Ruby Caicedo MD;  [facility];  Service: Colon and Rectal;  Laterality: N/A;  4 hr case    LYSIS OF ADHESIONS OF URETER Bilateral 9/3/2024    Procedure: URETEROLYSIS;  Surgeon: Maliha Crawford MD;  [facility];  Service: Gynecology Oncology;  Laterality: Bilateral;    MOBILIZATION OF SPLENIC FLEXURE  9/3/2024    Procedure: MOBILIZATION, SPLENIC FLEXURE;  Surgeon: Ruby Caicedo MD;   Location: [facility] OR 2ND FLR;  Service: Colon and Rectal;;    OMENTECTOMY N/A 9/3/2024    Procedure: OMENTECTOMY;  Surgeon: Maliha Crawford MD;  Location: [facility] OR [facility] FLR;  Service: Gynecology Oncology;  Laterality: N/A;    TOTAL ABDOMINAL HYSTERECTOMY N/A 9/3/2024    Procedure: HYSTERECTOMY, TOTAL, ABDOMINAL;  Surgeon: Maliha Crawford MD;  [facility];  Service: Gynecology Oncology;  Laterality: N/A;    TUBAL LIGATION      at age 32's       Time Tracking:     PT Received On: 09/04/24  PT Start Time: 1347     PT Stop Time: 1406  PT Total Time (min): 19 min     Billable Minutes: Evaluation 9 and Neuromuscular Re-education 10    Co-treatment performed due to patient's multiple deficits requiring two skilled therapists to appropriately and safely assess patient's strength and endurance while facilitating functional tasks in addition to accommodating for patient's activity tolerance.       09/04/2024

## 2024-09-04 NOTE — NURSING
Pt AAOx4 and involved in plan of care and communicating needs throughout shift. PRN Oxy 5mg PO given x1 with moderate relief. Tolerating sips of water. Bilateral edema to upper and lower extremities. All VSS; Afebrile. Voiding with a acosta catheter, urine is blue d/t receiving methylene blue. Continuing to monitor UOP. Q2 neuro checks. Q1 blood sugar checks. Regular insulin running continuously 0.2 units/hr, titration based on BS.  Pt remaining free from falls or injury throughout shift; bed locked and in lowest position; call light within reach. Bed alarm on. Family at bedside. Pt instructed to call for assistance as needed. Q1H rounding done on pt.

## 2024-09-04 NOTE — SUBJECTIVE & OBJECTIVE
Past Medical History:   Diagnosis Date    Cancer     Diabetes mellitus, type 2     Hyperlipidemia     Hypertension     Hypothyroidism     Pulmonary embolism      Past Surgical History:   Procedure Laterality Date    BILATERAL SALPINGO-OOPHORECTOMY (BSO) N/A 9/3/2024    Procedure: SALPINGO-OOPHORECTOMY, BILATERAL;  Surgeon: Maliha Crawford MD;  Location: [facility] OR 2ND FLR;  Service: Gynecology Oncology;  Laterality: N/A;    DEBULKING OF TUMOR N/A 9/3/2024    Procedure: DEBULKING, NEOPLASM;  Surgeon: Maliha Crawford MD;  Location: [facility] OR 2ND FLR;  Service: Gynecology Oncology;  Laterality: N/A;    FLEXIBLE SIGMOIDOSCOPY N/A 9/3/2024    Procedure: SIGMOIDOSCOPY, FLEXIBLE;  Surgeon: Ruby Caicedo MD;  Location: [facility] OR 2ND FLR;  Service: Colon and Rectal;  Laterality: N/A;    ILEOCOLECTOMY N/A 9/3/2024    Procedure: ILEOCOLECTOMY;  Surgeon: Ruby Caicedo MD;  Location: [facility] OR 2ND FLR;  Service: Colon and Rectal;  Laterality: N/A;    LAPAROTOMY, EXPLORATORY N/A 9/3/2024    Procedure: LAPAROTOMY, EXPLORATORY;  Surgeon: Maliha Crawford MD;  Location: [facility] OR 2ND FLR;  Service: Gynecology Oncology;  Laterality: N/A;  4 hr case    LOW ANTERIOR RESECTION OF COLON N/A 9/3/2024    Procedure: RESECTION, COLON, LOW ANTERIOR;  Surgeon: Ruby Caicedo MD;  Location: [facility] OR 2ND FLR;  Service: Colon and Rectal;  Laterality: N/A;  4 hr case    LYSIS OF ADHESIONS OF URETER Bilateral 9/3/2024    Procedure: URETEROLYSIS;  Surgeon: Maliha Crawford MD;  Location: [facility] OR 2ND FLR;  Service: Gynecology Oncology;  Laterality: Bilateral;    MOBILIZATION OF SPLENIC FLEXURE  9/3/2024    Procedure: MOBILIZATION, SPLENIC FLEXURE;  Surgeon: Ruby Caicedo MD;  Location: [facility] OR 2ND FLR;  Service: Colon and Rectal;;    OMENTECTOMY N/A 9/3/2024    Procedure: OMENTECTOMY;  Surgeon: Maliha Crawford MD;  Location: [facility] OR 2ND FLR;  Service: Gynecology Oncology;  Laterality: N/A;    TOTAL ABDOMINAL HYSTERECTOMY N/A 9/3/2024    Procedure:  HYSTERECTOMY, TOTAL, ABDOMINAL;  Surgeon: Maliha Crawford MD;  Location: [facility] OR [address] [place] [address];  Service: Gynecology Oncology;  Laterality: N/A;    TUBAL LIGATION      at age 32's     Review of patient's allergies indicates:  No Known Allergies    No current facility-administered medications on file prior to encounter.     Current Outpatient Medications on File Prior to Encounter   Medication Sig    adhesive remover (UNISOLVE ADHESIVE REMOVER WIPE) Misc One every 10 days    amLODIPine (NORVASC) 5 MG tablet Take 1 tablet by mouth every morning.    apixaban (ELIQUIS) 5 mg Tab Take 1 tablet (5 mg total) by mouth 2 (two) times daily.    blood-glucose meter,continuous (DEXCOM G7 ) Misc Use daily    blood-glucose sensor (DEXCOM G7 SENSOR) Charito Use every 10 days    ciprofloxacin HCl (CIPRO) 500 MG tablet Take 1 tablet (500 mg total) by mouth On call Procedure. take 1 tablespoons of at 9 pm and 1 tablespoons at 11 pm the night before surgery    coenzyme Q10 (CO Q-10) 100 mg capsule Take 100 mg by mouth nightly.    dexAMETHasone (DECADRON) 4 MG Tab Take 8 mg (2 tablets) by mouth daily on days 2-4 of each chemotherapy cycle.    docusate sodium (COLACE ORAL) Take 1 tablet by mouth every evening.    DULoxetine (CYMBALTA) 30 MG capsule Take 1 capsule (30 mg total) by mouth once daily.    EScitalopram oxalate (LEXAPRO) 10 MG tablet Take 1 tablet (10 mg total) by mouth once daily.    gabapentin (NEURONTIN) 100 MG capsule Take 2 capsules (200 mg total) by mouth every evening. (Patient taking differently: Take 200 mg by mouth every evening. 100 mg in the morning 100 mg noon and 300 mg in the evening)    HYDROcodone-acetaminophen (NORCO) 7.5-325 mg per tablet Take 1 tablet by mouth every 4 (four) hours as needed for Pain.    levothyroxine (SYNTHROID) 75 MCG tablet Take 1 tablet by mouth every morning.    HASEEB LOZANO U-100 INSULIN 100 unit/mL pen Inject 2-10 Units into the skin 3 (three) times daily with meals.    metFORMIN  (GLUCOPHAGE) 1000 MG tablet Take 1,000 mg by mouth 2 (two) times daily with meals.    metoprolol succinate (TOPROL-XL) 50 MG 24 hr tablet Take 1 tablet by mouth every morning.    metroNIDAZOLE (FLAGYL) 500 MG tablet take 1 tab at 9 pm and 1 tab at 11 pm the night before surgery    naloxone (NARCAN) 4 mg/actuation Spry 1 spray (4 mg total) by Nasal route 1 (one) time if needed (for emergency opioid reversal).    OLANZapine (ZYPREXA) 5 MG tablet Take 1 tablet by mouth nightly on days 1-4 of each chemotherapy cycle.    ondansetron (ZOFRAN-ODT) 4 MG TbDL DISSOLVE ONE TABLET BY MOUTH EVERY 4 TO 6 HOURS AS NEEDED FOR NAUSEA    oxyCODONE (ROXICODONE) 20 mg Tab immediate release tablet Take 1 tablet (20 mg total) by mouth every 4 (four) hours as needed for Pain.    polyethylene glycol 3350 (MIRALAX ORAL) Take by mouth daily as needed. Constipation    pravastatin (PRAVACHOL) 10 MG tablet Take 1 tablet by mouth every evening.    prochlorperazine (COMPAZINE) 5 MG tablet Take 1 tablet (5 mg total) by mouth every 6 (six) hours as needed for Nausea.    TOUJEO SOLOSTAR U-300 INSULIN 300 unit/mL (1.5 mL) InPn pen Inject 10 Units into the skin once daily.     Family History       Problem Relation (Age of Onset)    Cancer Brother          Tobacco Use    Smoking status: Never     Passive exposure: Past    Smokeless tobacco: Never   Substance and Sexual Activity    Alcohol use: Never    Drug use: Never    Sexual activity: Yes     Partners: Male     Birth control/protection: Post-menopausal     Review of Systems   Constitutional:  Positive for activity change and fatigue. Negative for fever.   HENT:  Negative for voice change.    Eyes:  Negative for visual disturbance.   Respiratory:  Negative for shortness of breath.    Cardiovascular:  Negative for chest pain.   Gastrointestinal:  Positive for abdominal pain. Negative for vomiting.   Musculoskeletal:  Negative for neck stiffness.   Neurological:  Positive for tremors. Negative for  dizziness, seizures, facial asymmetry, speech difficulty, numbness and headaches.   Psychiatric/Behavioral:  Positive for sleep disturbance. Negative for agitation, confusion and hallucinations.      Objective:     Vital Signs (Most Recent):  Temp: 98.5 °F (36.9 °C) (09/04/24 1509)  Pulse: 92 (09/04/24 1509)  Resp: 18 (09/04/24 1509)  BP: 118/73 (09/04/24 1509)  SpO2: (!) 94 % (09/04/24 1509) Vital Signs (24h Range):  Temp:  [97.3 °F (36.3 °C)-98.8 °F (37.1 °C)] 98.5 °F (36.9 °C)  Pulse:  [] 92  Resp:  [13-21] 18  SpO2:  [92 %-100 %] 94 %  BP: (109-168)/(56-90) 118/73     Weight: 54.4 kg (119 lb 14.9 oz)  Body mass index is 22.66 kg/m².     Physical Exam  Eyes:      Extraocular Movements: EOM normal.      Pupils: Pupils are equal, round, and reactive to light.   Psychiatric:         Speech: Speech normal.          NEUROLOGICAL EXAMINATION:     MENTAL STATUS   Follows 1 step commands.   Attention: decreased.   Speech: speech is normal   Level of consciousness: drowsy ,  arousable by verbal stimuli       Oriented to self, 3 sisters at bedside, hospital  Poor attention, unable to spell WORLD backwards or recite months of the year backwards        CRANIAL NERVES     CN III, IV, VI   Pupils are equal, round, and reactive to light.  Extraocular motions are normal.   Nystagmus: none   Diplopia: none  Ophthalmoparesis: none    CN V   Facial sensation intact.     CN VII   Facial expression full, symmetric.     CN VIII   Hearing: intact    CN IX, X   Palate: symmetric    CN XII   CN XII normal.     MOTOR EXAM   Muscle bulk: normal  Overall muscle tone: normal  Right arm pronator drift: absent  Left arm pronator drift: absent    Strength   Right deltoid: 5/5  Left deltoid: 5/5  Right biceps: 5/5  Left biceps: 5/5  Right triceps: 5/5  Left triceps: 5/5  Right interossei: 5/5  Left interossei: 5/5  Right anterior tibial: 5/5  Left anterior tibial: 5/5  Right posterior tibial: 5/5  Left posterior tibial: 5/5  Right  peroneal: 5/5  Left peroneal: 5/5    SENSORY EXAM   Light touch normal.     GAIT AND COORDINATION     Gait  Gait: (deferred)    Tremor   Resting tremor: absent    Significant Labs: All pertinent lab results from the past 24 hours have been reviewed.    Significant Imaging: I have reviewed all pertinent imaging results/findings within the past 24 hours.    CTA MP (9/4/24):  No acute intracranial pathology. No evidence for large vessel occlusion or significant stenosis about the intracranial vasculature.  Mild diffuse diminutive caliber of about the intracranial vertebral arteries and basilar artery without evidence for focal occlusion. Sequela of chronic microvascular ischemic change. Atherosclerosis of at the right carotid bulb with less than 50% stenosis per NASCET criteria.

## 2024-09-04 NOTE — CARE UPDATE
Resident to bedside for neuro check. Patient is stable compared to last check at 1200. Alert and oriented to person, place and time. Reports fatigue. She just finished working with PT/OT and stood up twice. Patient reports 8/10 pain. She received Tylenol/ibuprofen about 40 min ago and oxy 5 about 20 minutes ago. Tolerating sips of water. Patient able to lift her arms 90 degrees.  Able to lift feet off of bed. Strength 4/5 in upper and lower limbs bilaterally. Swelling in hands to mid forearm bilaterally, stable.  cc since acosta emptied 2h ago at 1200 check. Abdomen soft, non tender. Island bandage in place with 3 small areas of shadowing at superior middle and inferior aspects of bandage. Continue q 2 hr neuro check and close UOP follow up.      Nikole Bach MD  Obstetrics & Gynecology, PGY-1

## 2024-09-04 NOTE — CONSULTS
"[facility] - Oncology ([facility])  Neurology  Consult Note    Patient Name: Bella Meyer  MRN: [id number]  Admission Date: 9/2/2024  Hospital Length of Stay: 2 days  Code Status: Full Code   Attending Provider: Maliha Crawford MD   Consulting Provider: Ara Singh PA-C  Primary Care Physician: Rosita, Primary Doctor  Principal Problem:Dehydration    Inpatient consult to Neurology  Consult performed by: Ara Singh PA-C  Consult ordered by: Herlinda Bowers MD         Subjective:     Chief Complaint:  AMS     HPI:   74 y.o. female with HTN, HLD, DMII, PE (June 2024, on Eliquis) and stage IIIC ovarian cancer was admitted 9/2 after presenting with dizziness and near syncope in the setting of dehydration following surgery prep. She was fluid resuscitated and underwent ex lap, total abdominal hysterectomy, BSO, omentectomy, neoplasm debulking, low anterior color resection, ileocolectomy and splenic flexure mobilization on 9/3. Overnight, there was concern for decreased urine output and tachycardia. Around 5 am, there was change in her mental status documented as decreased responsiveness and strength. Per review of MAR, she received dilaudid 0.4 mg at 00:22. Around 5 am, she received Narcan x 2 (05:00, 05:53). Some improvement after narcan, but did not return to baseline mentation, so rapid response and stroke code called. CTA MP without no acute intracranial pathology or vascular occlusions. She was also noted to have intermittent extremity twitching. Neurology consulted 9/4 for "intermittent AMS. " Sisters at bedside today says he is back at her cognitive baseline.      Past Medical History:   Diagnosis Date    Cancer     Diabetes mellitus, type 2     Hyperlipidemia     Hypertension     Hypothyroidism     Pulmonary embolism      Past Surgical History:   Procedure Laterality Date    BILATERAL SALPINGO-OOPHORECTOMY (BSO) N/A 9/3/2024    Procedure: SALPINGO-OOPHORECTOMY, BILATERAL;  Surgeon: Maliha Crawford MD;  " Location: [facility] OR 2ND FLR;  Service: Gynecology Oncology;  Laterality: N/A;    DEBULKING OF TUMOR N/A 9/3/2024    Procedure: DEBULKING, NEOPLASM;  Surgeon: Maliha Crawford MD;  Location: [facility] OR 2ND FLR;  Service: Gynecology Oncology;  Laterality: N/A;    FLEXIBLE SIGMOIDOSCOPY N/A 9/3/2024    Procedure: SIGMOIDOSCOPY, FLEXIBLE;  Surgeon: Ruby Caicedo MD;  Location: [facility] OR 2ND FLR;  Service: Colon and Rectal;  Laterality: N/A;    ILEOCOLECTOMY N/A 9/3/2024    Procedure: ILEOCOLECTOMY;  Surgeon: Ruby Caicedo MD;  Location: [facility] OR 2ND FLR;  Service: Colon and Rectal;  Laterality: N/A;    LAPAROTOMY, EXPLORATORY N/A 9/3/2024    Procedure: LAPAROTOMY, EXPLORATORY;  Surgeon: Maliha Crawford MD;  Location: [facility] OR 2ND FLR;  Service: Gynecology Oncology;  Laterality: N/A;  4 hr case    LOW ANTERIOR RESECTION OF COLON N/A 9/3/2024    Procedure: RESECTION, COLON, LOW ANTERIOR;  Surgeon: Ruby Caicedo MD;  Location: [facility] OR 2ND FLR;  Service: Colon and Rectal;  Laterality: N/A;  4 hr case    LYSIS OF ADHESIONS OF URETER Bilateral 9/3/2024    Procedure: URETEROLYSIS;  Surgeon: Maliha Crawford MD;  Location: [facility] OR 2ND FLR;  Service: Gynecology Oncology;  Laterality: Bilateral;    MOBILIZATION OF SPLENIC FLEXURE  9/3/2024    Procedure: MOBILIZATION, SPLENIC FLEXURE;  Surgeon: Ruby Caicedo MD;  Location: [facility] OR 2ND FLR;  Service: Colon and Rectal;;    OMENTECTOMY N/A 9/3/2024    Procedure: OMENTECTOMY;  Surgeon: Maliha Crawford MD;  Location: [facility] OR 2ND FLR;  Service: Gynecology Oncology;  Laterality: N/A;    TOTAL ABDOMINAL HYSTERECTOMY N/A 9/3/2024    Procedure: HYSTERECTOMY, TOTAL, ABDOMINAL;  Surgeon: Maliha Crawford MD;  Location: [facility] OR 2ND FLR;  Service: Gynecology Oncology;  Laterality: N/A;    TUBAL LIGATION      at age 32's     Review of patient's allergies indicates:  No Known Allergies    No current facility-administered medications on file prior to encounter.     Current Outpatient Medications on  File Prior to Encounter   Medication Sig    adhesive remover (UNISOLVE ADHESIVE REMOVER WIPE) Misc One every 10 days    amLODIPine (NORVASC) 5 MG tablet Take 1 tablet by mouth every morning.    apixaban (ELIQUIS) 5 mg Tab Take 1 tablet (5 mg total) by mouth 2 (two) times daily.    blood-glucose meter,continuous (DEXCOM G7 ) Misc Use daily    blood-glucose sensor (DEXCOM G7 SENSOR) Charito Use every 10 days    ciprofloxacin HCl (CIPRO) 500 MG tablet Take 1 tablet (500 mg total) by mouth On call Procedure. take 1 tablespoons of at 9 pm and 1 tablespoons at 11 pm the night before surgery    coenzyme Q10 (CO Q-10) 100 mg capsule Take 100 mg by mouth nightly.    dexAMETHasone (DECADRON) 4 MG Tab Take 8 mg (2 tablets) by mouth daily on days 2-4 of each chemotherapy cycle.    docusate sodium (COLACE ORAL) Take 1 tablet by mouth every evening.    DULoxetine (CYMBALTA) 30 MG capsule Take 1 capsule (30 mg total) by mouth once daily.    EScitalopram oxalate (LEXAPRO) 10 MG tablet Take 1 tablet (10 mg total) by mouth once daily.    gabapentin (NEURONTIN) 100 MG capsule Take 2 capsules (200 mg total) by mouth every evening. (Patient taking differently: Take 200 mg by mouth every evening. 100 mg in the morning 100 mg noon and 300 mg in the evening)    HYDROcodone-acetaminophen (NORCO) 7.5-325 mg per tablet Take 1 tablet by mouth every 4 (four) hours as needed for Pain.    levothyroxine (SYNTHROID) 75 MCG tablet Take 1 tablet by mouth every morning.    HASEEB LOZANO U-100 INSULIN 100 unit/mL pen Inject 2-10 Units into the skin 3 (three) times daily with meals.    metFORMIN (GLUCOPHAGE) 1000 MG tablet Take 1,000 mg by mouth 2 (two) times daily with meals.    metoprolol succinate (TOPROL-XL) 50 MG 24 hr tablet Take 1 tablet by mouth every morning.    metroNIDAZOLE (FLAGYL) 500 MG tablet take 1 tab at 9 pm and 1 tab at 11 pm the night before surgery    naloxone (NARCAN) 4 mg/actuation Spry 1 spray (4 mg total) by Nasal route 1  (one) time if needed (for emergency opioid reversal).    OLANZapine (ZYPREXA) 5 MG tablet Take 1 tablet by mouth nightly on days 1-4 of each chemotherapy cycle.    ondansetron (ZOFRAN-ODT) 4 MG TbDL DISSOLVE ONE TABLET BY MOUTH EVERY 4 TO 6 HOURS AS NEEDED FOR NAUSEA    oxyCODONE (ROXICODONE) 20 mg Tab immediate release tablet Take 1 tablet (20 mg total) by mouth every 4 (four) hours as needed for Pain.    polyethylene glycol 3350 (MIRALAX ORAL) Take by mouth daily as needed. Constipation    pravastatin (PRAVACHOL) 10 MG tablet Take 1 tablet by mouth every evening.    prochlorperazine (COMPAZINE) 5 MG tablet Take 1 tablet (5 mg total) by mouth every 6 (six) hours as needed for Nausea.    TOUJEO SOLOSTAR U-300 INSULIN 300 unit/mL (1.5 mL) InPn pen Inject 10 Units into the skin once daily.     Family History       Problem Relation (Age of Onset)    Cancer Brother          Tobacco Use    Smoking status: Never     Passive exposure: Past    Smokeless tobacco: Never   Substance and Sexual Activity    Alcohol use: Never    Drug use: Never    Sexual activity: Yes     Partners: Male     Birth control/protection: Post-menopausal     Review of Systems   Constitutional:  Positive for activity change and fatigue. Negative for fever.   HENT:  Negative for voice change.    Eyes:  Negative for visual disturbance.   Respiratory:  Negative for shortness of breath.    Cardiovascular:  Negative for chest pain.   Gastrointestinal:  Positive for abdominal pain. Negative for vomiting.   Musculoskeletal:  Negative for neck stiffness.   Neurological:  Positive for tremors. Negative for dizziness, seizures, facial asymmetry, speech difficulty, numbness and headaches.   Psychiatric/Behavioral:  Positive for sleep disturbance. Negative for agitation, confusion and hallucinations.      Objective:     Vital Signs (Most Recent):  Temp: 98.5 °F (36.9 °C) (09/04/24 1509)  Pulse: 92 (09/04/24 1509)  Resp: 18 (09/04/24 1509)  BP: 118/73 (09/04/24  1509)  SpO2: (!) 94 % (09/04/24 1509) Vital Signs (24h Range):  Temp:  [97.3 °F (36.3 °C)-98.8 °F (37.1 °C)] 98.5 °F (36.9 °C)  Pulse:  [] 92  Resp:  [13-21] 18  SpO2:  [92 %-100 %] 94 %  BP: (109-168)/(56-90) 118/73     Weight: 54.4 kg (119 lb 14.9 oz)  Body mass index is 22.66 kg/m².     Physical Exam  Eyes:      Extraocular Movements: EOM normal.      Pupils: Pupils are equal, round, and reactive to light.   Psychiatric:         Speech: Speech normal.     NEUROLOGICAL EXAMINATION:     MENTAL STATUS   Follows 1 step commands.   Attention: decreased.   Speech: speech is normal   Level of consciousness: drowsy ,  arousable by verbal stimuli       Oriented to self, 3 sisters at bedside, hospital  Poor attention, unable to spell WORLD backwards or recite months of the year backwards      CRANIAL NERVES     CN III, IV, VI   Pupils are equal, round, and reactive to light.  Extraocular motions are normal.   Nystagmus: none   Diplopia: none  Ophthalmoparesis: none    CN V   Facial sensation intact.     CN VII   Facial expression full, symmetric.     CN VIII   Hearing: intact    CN IX, X   Palate: symmetric    CN XII   CN XII normal.     MOTOR EXAM   Muscle bulk: normal  Overall muscle tone: normal  Right arm pronator drift: absent  Left arm pronator drift: absent    Strength   Right deltoid: 5/5  Left deltoid: 5/5  Right biceps: 5/5  Left biceps: 5/5  Right triceps: 5/5  Left triceps: 5/5  Right interossei: 5/5  Left interossei: 5/5  Right anterior tibial: 5/5  Left anterior tibial: 5/5  Right posterior tibial: 5/5  Left posterior tibial: 5/5  Right peroneal: 5/5  Left peroneal: 5/5    SENSORY EXAM   Light touch normal.     GAIT AND COORDINATION     Gait  Gait: (deferred)    Tremor   Resting tremor: absent    Significant Labs: All pertinent lab results from the past 24 hours have been reviewed.    Significant Imaging: I have reviewed all pertinent imaging results/findings within the past 24 hours.    CTA MP  "(9/4/24):  No acute intracranial pathology. No evidence for large vessel occlusion or significant stenosis about the intracranial vasculature.  Mild diffuse diminutive caliber of about the intracranial vertebral arteries and basilar artery without evidence for focal occlusion. Sequela of chronic microvascular ischemic change. Atherosclerosis of at the right carotid bulb with less than 50% stenosis per NASCET criteria.     Assessment and Plan:     Acute encephalopathy  74 y.o. female with HTN, HLD, DMII, PE (on Eliquis) and stage IIIC ovarian cancer was admitted 9/2 and underwent ex lap, FANNY, BSO, omentectomy, neoplasm debulking, color resection, ileocolectomy and splenic flexure mobilization on 9/3. Around 5 am, there was change in her mental status documented as decreased responsiveness and strength. Per review of MAR, she received dilaudid 0.4 mg at 00:22. Around 5 am, she received Narcan x 2 (05:00, 05:53). Some improvement after narcan, but did not return to baseline mentation, so rapid response and stroke code called. CTA MP without no acute intracranial pathology or vascular occlusions. She was also noted to have intermittent extremity twitching. Neurology consulted 9/4 for "intermittent AMS. "    Suspect mental status change and movements, likely myoclonus in the setting of opiates from surgery    Recommendations:  --oriented and following commands today  Sisters at bedside say she is at her cognitive baseline  --no myoclonus noted at time of physician rounds  --continue pain control per primary team  --no further head imaging warranted at this time     VTE Risk Mitigation (From admission, onward)           Ordered     enoxaparin injection 40 mg  Every 24 hours         09/04/24 0459     Place sequential compression device  Until discontinued         09/03/24 1453     Place sequential compression device  Until discontinued         09/03/24 1451     IP VTE HIGH RISK PATIENT  Once         09/03/24 1446         "          Thank you for your consult. I will sign off. Please contact us if you have any additional questions.    Ara Singh PA-C  General Neurology Consult

## 2024-09-04 NOTE — NURSING
Nurses Note -- 4 Eyes      9/4/2024   1:18 PM      Skin assessed during: Daily Assessment      [x] No Altered Skin Integrity Present    [x]Prevention Measures Documented  Blanchable redness on sacral spine. Present before admission as stated by patient.     [] Yes- Altered Skin Integrity Present or Discovered   [] LDA Added if Not in Epic (Describe Wound)   [] New Altered Skin Integrity was Present on Admit and Documented in LDA   [] Wound Image Taken    Wound Care Consulted? No    Attending Nurse:  Sandra MAYNARD    Second RN/Staff Member:  Renee MAYNARD

## 2024-09-04 NOTE — NURSING
07:30- GYN ONC resident found patient with increase altered mental status change from the early morning and extremity spasms/twitching. Resident called a code stroke. Rapid and neurology at bedside. BG checked. VBG drawn. Stroke assessment completed. VSS. Pt brought down for STAT CTA.   08:00- Pt came back form CT. More alert than before and could answer all neuro assessment questions. Will continue q2 neuro checks as advised by neurology. Closely monitoring UOP.

## 2024-09-04 NOTE — CARE UPDATE
Resident to bedside for neuro check. Patient looks significantly better. Closer to baseline from pre op HD1. Oriented to person, place and time. Just reports being tired. Tolerating sips of water. Patient able to lift her arms 90 degrees. Strength equal and increased from earlier. Able to lift feet off of bed. And extend feet bilaterally. Swelling in hands to mid forearm bilaterally. UOP 75 cc since nurse emptied acosta one hour prior. Abdomen soft, non tender. Continue q 2 hr neuro check and close UOP follow up.     Valerie Basilio MD  Obstetrics and Gynecology, PGY-2

## 2024-09-04 NOTE — HPI
Patient is a 75 yo female with stage IIIC ovarian cancer (BRCA negative, HRD negative), HTN, hypothyroidism, DM 2, and HLD who presented to the ED with complaints of weakness and a pre-syncopal episode. Patient reports having some nausea with vomiting around 3 am on 9/2. She then began her colon prep for her surgery scheduled on 9/3 around 9 am. After taking the ducolax, she had multiple bouts of diarrhea. On her drive into [place] to spend the night prior to her scheduled surgery, she needed to use the restroom. When attempting to get out of the car she felt extremely weak and had a pre-syncopal episode per her family member. She did not suffer LOC or hit her head.     Patient is lying in bed and appears to be lethargic. She is able to answer orientation questions appropriately but dozed off shortly afterwards. She has sisters and a daughter at bedside. They say that she is known to have vasovagal episodes when she is frightened. The patient says that she did not adequately rehydrate or eat during bowel prep. Stroke code called on patient this morning after family noticed a change in mental status.

## 2024-09-04 NOTE — HPI
"74 y.o. female with HTN, HLD, DMII, PE (June 2024, on Eliquis) and stage IIIC ovarian cancer was admitted 9/2 after presenting with dizziness and near syncope in the setting of dehydration following surgery prep. She was fluid resuscitated and underwent ex lap, total abdominal hysterectomy, BSO, omentectomy, neoplasm debulking, low anterior color resection, ileocolectomy and splenic flexure mobilization on 9/3. Overnight, there was concern for decreased urine output and tachycardia. Around 5 am, there was change in her mental status documented as decreased responsiveness and strength. Per review of MAR, she received dilaudid 0.4 mg at 00:22. Around 5 am, she received Narcan x 2 (05:00, 05:53). Some improvement after narcan, but did not return to baseline mentation, so rapid response and stroke code called. CTA MP without no acute intracranial pathology or vascular occlusions. She was also noted to have intermittent extremity twitching. Neurology consulted 9/4 for "intermittent AMS. " Sisters at bedside today says he is back at her cognitive baseline.   "

## 2024-09-04 NOTE — PLAN OF CARE
PT Evaluation completed and PT POC established.    Problem: Physical Therapy  Goal: Physical Therapy Goal  Description: Goals to be met by: 10/04/2024     Patient will increase functional independence with mobility by performin. Supine to sit with MInimal Assistance  2. Sit to supine with MInimal Assistance  3. Sit to stand transfer with Supervision  4. Bed to chair transfer with Supervision using LRAD  5. Gait  x 100 feet with Supervision using LRAD.     Outcome: Progressing

## 2024-09-04 NOTE — CARE UPDATE
Resident to bedside for neuro check. Patient is stable compared to last check at 1200. Alert and oriented to person, place and time. Reports fatigue. Pain well controlled s/p oxy 5. Tolerating sips of water. Patient declined to perform neuro check as she had just done a neuro check with nurses. Nurses report her check is stable. Swelling in hands to mid forearm bilaterally, stable. UOP 70 cc since acosta emptied 2h ago at 1400 check. Abdomen soft, non tender. Island bandage in place with 3 small areas of shadowing at superior middle and inferior aspects of bandage.     Nikole Bach MD  Obstetrics & Gynecology, PGY-1

## 2024-09-04 NOTE — NURSING
CONTACTED DR. KABA WITH [facility] WITH CONCERNS ABOUT PATIENT'S DECREASED URINE OUTPUT. THE PT HAS URINATED 16 ML/HR FOR THE LAST 6 HRS. THE PATIENT'S HR IS SUSTAINING 120-130.     THE PATIENT IS NOT CURRENTLY EXPERIENCING SOB, CRACKLES, OR WHEEZING.     THE PATIENT AROUSES TO VOICE. ORIENTEDX3    THE PATIENT HAS +2-3 EDEMA TO BLE AND BUE EXTREMITIES. DR. KABA TO ORDER STAT LABS AND INSTRUCTED RN TO CONTINUE TO MONITOR FOR S/S OTHER THAN HR. REITERATED TO DR. KABA, THE DECREASED URINE OUTPUT IN R/T FLUID VOLUME GIVEN TO PATIENT IS CONCERNING. WILL CONTINUE TO ROUND ON PT.    0500-Contacted Dr. Crawford in regards to Bella having had a change in loc. poss score is a 3. arousable to considerable stimulation. not able to focus, falling asleep mid sentence, can't tell me her bday.  narcan x1 to see if it is the dilaudid lingering in her system. Pt is still not arousable. Contacted Rapid response. Dr. Crawford in agreeance

## 2024-09-04 NOTE — ASSESSMENT & PLAN NOTE
"74 y.o. female with HTN, HLD, DMII, PE (on Eliquis) and stage IIIC ovarian cancer was admitted 9/2 and underwent ex lap, FANNY, BSO, omentectomy, neoplasm debulking, color resection, ileocolectomy and splenic flexure mobilization on 9/3. Around 5 am, there was change in her mental status documented as decreased responsiveness and strength. Per review of MAR, she received dilaudid 0.4 mg at 00:22. Around 5 am, she received Narcan x 2 (05:00, 05:53). Some improvement after narcan, but did not return to baseline mentation, so rapid response and stroke code called. CTA MP without no acute intracranial pathology or vascular occlusions. She was also noted to have intermittent extremity twitching. Neurology consulted 9/4 for "intermittent AMS. "    Suspect mental status change and movements, likely myoclonus in the setting of opiates from surgery    Recommendations:  --oriented and following commands today  Sisters at bedside say she is at her cognitive baseline  --no myoclonus noted at time of physician rounds  --continue pain control per primary team  --no further head imaging warranted at this time     "

## 2024-09-04 NOTE — CARE UPDATE
Resident to bedside for neuro check. Patient is stable compared to last check at 1000, closer to baseline from pre op HD1. Oriented to person, place and time. Reports fatigue. Tolerating sips of water. Patient able to lift her arms 90 degrees.  Able to lift feet off of bed. Strength 4/5 in upper and lower limbs bilaterally. Swelling in hands to mid forearm bilaterally, stable. UOP 40 cc since acosta emptied 2h ago at 1000 check. Abdomen soft, non tender. Continue q 2 hr neuro check and close UOP follow up.      Nikole Bach MD  Obstetrics & Gynecology, PGY-1

## 2024-09-04 NOTE — CONSULTS
[facility] - [facility]cology ([facility])  Cardiology  Consult Note    Patient Name: Bella Meyer  MRN: [id number]  Admission Date: 9/2/2024  Hospital Length of Stay: 2 days  Code Status: Full Code   Attending Provider: Maliha Crawford MD   Consulting Provider: Jazzy Vidal MD  Primary Care Physician: Rosita, Primary Doctor  Principal Problem:Dehydration    Patient information was obtained from patient, relative(s), and ER records.     Inpatient consult to Cardiology  Consult performed by: Jazzy Vidal MD  Consult ordered by: Valerie Sullivan MD  Reason for consult: EKG abnormalities        Subjective:     Chief Complaint:  Altered Mental Status     HPI:   Patient is a 73 yo female with stage IIIC ovarian cancer (BRCA negative, HRD negative), HTN, hypothyroidism, DM 2, and HLD who presented to the ED with complaints of weakness and a pre-syncopal episode. Patient reports having some nausea with vomiting around 3 am on 9/2. She then began her colon prep for her surgery scheduled on 9/3 around 9 am. After taking the ducolax, she had multiple bouts of diarrhea. On her drive into [place] to spend the night prior to her scheduled surgery, she needed to use the restroom. When attempting to get out of the car she felt extremely weak and had a pre-syncopal episode per her family member. She did not suffer LOC or hit her head.     Patient is lying in bed and appears to be lethargic. She is able to answer orientation questions appropriately but dozed off shortly afterwards. She has sisters and a daughter at bedside. They say that she is known to have vasovagal episodes when she is frightened. The patient says that she did not adequately rehydrate or eat during bowel prep. Stroke code called on patient this morning after family noticed a change in mental status.     Past Medical History:   Diagnosis Date    Cancer     Diabetes mellitus, type 2     Hyperlipidemia     Hypertension     Hypothyroidism     Pulmonary embolism        Past  Surgical History:   Procedure Laterality Date    BILATERAL SALPINGO-OOPHORECTOMY (BSO) N/A 9/3/2024    Procedure: SALPINGO-OOPHORECTOMY, BILATERAL;  Surgeon: Maliha Crawford MD;  Location: [facility] 2ND FLR;  Service: Gynecology Oncology;  Laterality: N/A;    DEBULKING OF TUMOR N/A 9/3/2024    Procedure: DEBULKING, NEOPLASM;  Surgeon: Maliha Crawford MD;  Location: [facility] OR 2ND FLR;  Service: Gynecology Oncology;  Laterality: N/A;    FLEXIBLE SIGMOIDOSCOPY N/A 9/3/2024    Procedure: SIGMOIDOSCOPY, FLEXIBLE;  Surgeon: Ruby Caicedo MD;  Location: [facility] OR 2ND FLR;  Service: Colon and Rectal;  Laterality: N/A;    ILEOCOLECTOMY N/A 9/3/2024    Procedure: ILEOCOLECTOMY;  Surgeon: Ruby Caiecdo MD;  Location: [facility] 2ND FLR;  Service: Colon and Rectal;  Laterality: N/A;    LAPAROTOMY, EXPLORATORY N/A 9/3/2024    Procedure: LAPAROTOMY, EXPLORATORY;  Surgeon: Maliha Crawford MD;  Location: [facility] 2ND FLR;  Service: Gynecology Oncology;  Laterality: N/A;  4 hr case    LOW ANTERIOR RESECTION OF COLON N/A 9/3/2024    Procedure: RESECTION, COLON, LOW ANTERIOR;  Surgeon: Ruby Caicedo MD;  Location: [facility]ND FLR;  Service: Colon and Rectal;  Laterality: N/A;  4 hr case    LYSIS OF ADHESIONS OF URETER Bilateral 9/3/2024    Procedure: URETEROLYSIS;  Surgeon: Maliha Crawford MD;  Location: [facility] 2ND FLR;  Service: Gynecology Oncology;  Laterality: Bilateral;    MOBILIZATION OF SPLENIC FLEXURE  9/3/2024    Procedure: MOBILIZATION, SPLENIC FLEXURE;  Surgeon: Ruby Caicedo MD;  Location: [facility] 2ND FLR;  Service: Colon and Rectal;;    OMENTECTOMY N/A 9/3/2024    Procedure: OMENTECTOMY;  Surgeon: Maliha Crawford MD;  Location: [facility] 2ND FLR;  Service: Gynecology Oncology;  Laterality: N/A;    TOTAL ABDOMINAL HYSTERECTOMY N/A 9/3/2024    Procedure: HYSTERECTOMY, TOTAL, ABDOMINAL;  Surgeon: Maliha Crawford MD;  Location: [facility] OR [address] [place] [address];  Service: Gynecology Oncology;  Laterality: N/A;    TUBAL LIGATION      at age 32's        Review of patient's allergies indicates:  No Known Allergies    No current facility-administered medications on file prior to encounter.     Current Outpatient Medications on File Prior to Encounter   Medication Sig    adhesive remover (UNISOLVE ADHESIVE REMOVER WIPE) Misc One every 10 days    amLODIPine (NORVASC) 5 MG tablet Take 1 tablet by mouth every morning.    apixaban (ELIQUIS) 5 mg Tab Take 1 tablet (5 mg total) by mouth 2 (two) times daily.    blood-glucose meter,continuous (DEXCOM G7 ) Misc Use daily    blood-glucose sensor (DEXCOM G7 SENSOR) Charito Use every 10 days    ciprofloxacin HCl (CIPRO) 500 MG tablet Take 1 tablet (500 mg total) by mouth On call Procedure. take 1 tablespoons of at 9 pm and 1 tablespoons at 11 pm the night before surgery    coenzyme Q10 (CO Q-10) 100 mg capsule Take 100 mg by mouth nightly.    dexAMETHasone (DECADRON) 4 MG Tab Take 8 mg (2 tablets) by mouth daily on days 2-4 of each chemotherapy cycle.    docusate sodium (COLACE ORAL) Take 1 tablet by mouth every evening.    DULoxetine (CYMBALTA) 30 MG capsule Take 1 capsule (30 mg total) by mouth once daily.    EScitalopram oxalate (LEXAPRO) 10 MG tablet Take 1 tablet (10 mg total) by mouth once daily.    gabapentin (NEURONTIN) 100 MG capsule Take 2 capsules (200 mg total) by mouth every evening. (Patient taking differently: Take 200 mg by mouth every evening. 100 mg in the morning 100 mg noon and 300 mg in the evening)    HYDROcodone-acetaminophen (NORCO) 7.5-325 mg per tablet Take 1 tablet by mouth every 4 (four) hours as needed for Pain.    levothyroxine (SYNTHROID) 75 MCG tablet Take 1 tablet by mouth every morning.    LYUMJEV KWIKPEN U-100 INSULIN 100 unit/mL pen Inject 2-10 Units into the skin 3 (three) times daily with meals.    metFORMIN (GLUCOPHAGE) 1000 MG tablet Take 1,000 mg by mouth 2 (two) times daily with meals.    metoprolol succinate (TOPROL-XL) 50 MG 24 hr tablet Take 1 tablet by mouth every morning.     metroNIDAZOLE (FLAGYL) 500 MG tablet take 1 tab at 9 pm and 1 tab at 11 pm the night before surgery    naloxone (NARCAN) 4 mg/actuation Spry 1 spray (4 mg total) by Nasal route 1 (one) time if needed (for emergency opioid reversal).    OLANZapine (ZYPREXA) 5 MG tablet Take 1 tablet by mouth nightly on days 1-4 of each chemotherapy cycle.    ondansetron (ZOFRAN-ODT) 4 MG TbDL DISSOLVE ONE TABLET BY MOUTH EVERY 4 TO 6 HOURS AS NEEDED FOR NAUSEA    oxyCODONE (ROXICODONE) 20 mg Tab immediate release tablet Take 1 tablet (20 mg total) by mouth every 4 (four) hours as needed for Pain.    polyethylene glycol 3350 (MIRALAX ORAL) Take by mouth daily as needed. Constipation    pravastatin (PRAVACHOL) 10 MG tablet Take 1 tablet by mouth every evening.    prochlorperazine (COMPAZINE) 5 MG tablet Take 1 tablet (5 mg total) by mouth every 6 (six) hours as needed for Nausea.    TOUJEO SOLOSTAR U-300 INSULIN 300 unit/mL (1.5 mL) InPn pen Inject 10 Units into the skin once daily.     Family History       Problem Relation (Age of Onset)    Cancer Brother          Tobacco Use    Smoking status: Never     Passive exposure: Past    Smokeless tobacco: Never   Substance and Sexual Activity    Alcohol use: Never    Drug use: Never    Sexual activity: Yes     Partners: Male     Birth control/protection: Post-menopausal     Review of Systems   Constitutional: Positive for malaise/fatigue. Negative for diaphoresis and fever.   HENT:  Negative for congestion, ear pain and sore throat.    Eyes: Negative.    Cardiovascular:  Positive for leg swelling. Negative for chest pain, cyanosis, irregular heartbeat, palpitations and syncope.   Respiratory:  Negative for cough, shortness of breath and wheezing.    Skin: Negative.    Musculoskeletal: Negative.    Gastrointestinal:  Negative for bloating, abdominal pain, constipation, diarrhea, dysphagia, nausea and vomiting.   Genitourinary:  Negative for hematuria.   Neurological:  Positive for tremors.  Negative for headaches and light-headedness.   Psychiatric/Behavioral: Negative.       Objective:     Vital Signs (Most Recent):  Temp: 97.8 °F (36.6 °C) (09/04/24 0730)  Pulse: 106 (09/04/24 0807)  Resp: 20 (09/04/24 0345)  BP: 121/62 (09/04/24 0730)  SpO2: 98 % (09/04/24 0807) Vital Signs (24h Range):  Temp:  [97.3 °F (36.3 °C)-98.8 °F (37.1 °C)] 97.8 °F (36.6 °C)  Pulse:  [] 106  Resp:  [13-21] 20  SpO2:  [92 %-100 %] 98 %  BP: ()/(56-90) 121/62     Weight: 54.4 kg (119 lb 14.9 oz)  Body mass index is 22.66 kg/m².    SpO2: 98 %         Intake/Output Summary (Last 24 hours) at 9/4/2024 1008  Last data filed at 9/4/2024 0917  Gross per 24 hour   Intake 7698.08 ml   Output 2295 ml   Net 5403.08 ml       Lines/Drains/Airways       Drain  Duration                  Urethral Catheter 09/03/24 0750 16 Fr. 1 day              Peripheral Intravenous Line  Duration                  Peripheral IV - Single Lumen 09/03/24 1750 20 G Right Wrist <1 day         Peripheral IV - Single Lumen 09/03/24 1800 18 G Right Upper Arm <1 day                     Physical Exam  Constitutional:       General: She is sleeping. She is in acute distress.      Appearance: She is ill-appearing.   HENT:      Head: Normocephalic.      Nose: Nose normal.   Cardiovascular:      Rate and Rhythm: Normal rate and regular rhythm.      Pulses: Normal pulses.      Heart sounds: Normal heart sounds. No murmur heard.     No friction rub. No gallop.   Pulmonary:      Effort: Pulmonary effort is normal.      Breath sounds: Normal breath sounds.   Abdominal:      General: Abdomen is flat. Bowel sounds are normal. There is no distension.      Palpations: Abdomen is soft.      Tenderness: There is abdominal tenderness. There is no guarding.   Musculoskeletal:         General: Swelling present. Normal range of motion.      Cervical back: Normal range of motion and neck supple.      Right lower leg: Edema present.      Left lower leg: Edema present.    Skin:     General: Skin is warm.   Neurological:      Mental Status: She is oriented to person, place, and time. She is lethargic.      GCS: GCS eye subscore is 4. GCS verbal subscore is 5. GCS motor subscore is 6.      Motor: Weakness and tremor present.   Psychiatric:         Mood and Affect: Mood normal.          Significant Labs: CMP   Recent Labs   Lab 09/03/24  1407 09/04/24  0124 09/04/24  0538   * 133* 133*   K 3.9 4.1 3.8    105 104   CO2 18* 18* 21*   * 199* 99   BUN 4* 9 8   CREATININE 0.6 0.7 0.7   CALCIUM 6.8* 8.3* 7.9*   PROT 3.1* 4.2* 4.0*   ALBUMIN 1.9* 2.5* 2.4*   BILITOT 0.5 0.3 0.3   ALKPHOS 18* 22* 22*   AST 21 41* 40   ALT 10 15 15   ANIONGAP 12 10 8   , CBC   Recent Labs   Lab 09/03/24  1407 09/04/24  0230 09/04/24  0538   WBC 7.10 14.10* 14.81*   HGB 10.9* 11.0* 10.4*   HCT 31.6* 29.5* 28.8*   PLT 93* 113* 111*   , and Troponin   Recent Labs   Lab 09/04/24  0538   TROPONINI <0.006       Significant Imaging:  No new imaging  Assessment and Plan:     EKG, abnormal  Bella Meyer is a 74 y.o. female with a hx of Stage 3 ovarian cancer, HTN, HLD, hypothyroidism, and DM2 presented to the hospital for weakness and pre-syncopal episode prior to scheduled surgery. Patient original EKG showed normal sinus rhythm. Post op EKG showed sinus tachycardia with heart rate  of 127. Concern for cardiac involvement in mental status change. We are being consulted for EKG abnormalities.    Recommendations:  - sinus tachycardia likely result of surgical interventions and recovery  - troponin negative  - no concern for cardiac involvement in altered mental status change  - At this moment we feel as though the patient is stable from a cardiac standpoint, and we will be signing off. Please do not hesitate to call us if you need us for anything else concerning the patient. Thank you for the consult        VTE Risk Mitigation (From admission, onward)           Ordered     enoxaparin injection 40 mg   Every 24 hours         09/04/24 0459     Place sequential compression device  Until discontinued         09/03/24 1453     Place sequential compression device  Until discontinued         09/03/24 1451     IP VTE HIGH RISK PATIENT  Once         09/03/24 1446                    Thank you for your consult. I will sign off. Please contact us if you have any additional questions.    Jazzy Vidal MD  PGY-1  Cardiology   [facility] - Oncology ([facility])

## 2024-09-04 NOTE — PLAN OF CARE
OT evaluation completed. OT POC and goals established.     Problem: Occupational Therapy  Goal: Occupational Therapy Goal  Description: Goals to be met by: 9/18/2024     Patient will increase functional independence with ADLs by performing:    UE Dressing with Supervision.  LE Dressing with Contact Guard Assistance.  Grooming while seated with Stand-by Assistance.  Toileting from bedside commode with Contact Guard Assistance for hygiene and clothing management.   Supine to sit with Contact Guard Assistance.  Step transfer with Contact Guard Assistance  Toilet transfer to bedside commode with Contact Guard Assistance.    Outcome: Progressing

## 2024-09-04 NOTE — SIGNIFICANT EVENT
"Medical Emergency Team Consult Note  Critical Care Medicine    CC: Dehydration  Date: 09/04/2024  Admit Date: 9/2/2024  Hospital Length of Stay: 2  MRN: [id number]  The patient location is: Bed 844/844 A  Dx: Dehydration  Code Status: Full Code   Chart Reviewed: 09/04/2024, 7:43 AM      SUBJECTIVE:     HPI:  Bella Meyer has a past medical history of Cancer, Diabetes mellitus, type 2, Hyperlipidemia, Hypertension, Hypothyroidism, and Pulmonary embolism.    Significant Events: Called urgently to the bedside for a stroke alert      OBJECTIVE:     Physical Exam  Vitals and nursing note reviewed.   Constitutional:       Appearance: She is ill-appearing.   Eyes:      Pupils: Pupils are equal, round, and reactive to light.   Cardiovascular:      Rate and Rhythm: Regular rhythm. Tachycardia present.      Pulses: Normal pulses.      Heart sounds: Normal heart sounds.   Pulmonary:      Effort: Pulmonary effort is normal.      Breath sounds: Normal breath sounds.   Abdominal:      Tenderness: There is abdominal tenderness.   Musculoskeletal:         General: Normal range of motion.      Right lower leg: Edema present.      Left lower leg: Edema present.   Skin:     General: Skin is warm.      Coloration: Skin is pale.      Findings: Bruising present.   Neurological:      Mental Status: She is oriented to person, place, and time.         Last VS: /62 (BP Location: Right leg, Patient Position: Lying)   Pulse (!) 127   Temp 97.8 °F (36.6 °C) (Axillary)   Resp 20   Ht 5' 1" (1.549 m)   Wt 54.4 kg (119 lb 14.9 oz)   SpO2 96%   BMI 22.66 kg/m²     24H Vital Sign Range:    Temp:  [97.3 °F (36.3 °C)-98.8 °F (37.1 °C)]   Pulse:  []   Resp:  [13-21]   BP: ()/(56-90)   SpO2:  [92 %-100 %]     Level of Consciousness (AVPU): responds to voice      Intake/Output Summary (Last 24 hours) at 9/4/2024 0743  Last data filed at 9/4/2024 0426  Gross per 24 hour   Intake 62171.08 ml   Output 2420 ml   Net 9278.08 ml "       Recent Labs     09/03/24  1407 09/04/24  0230 09/04/24  0538   WBC 7.10 14.10* 14.81*   HGB 10.9* 11.0* 10.4*   HCT 31.6* 29.5* 28.8*   PLT 93* 113* 111*       Recent Labs     09/03/24  0256 09/03/24  1407 09/04/24  0124 09/04/24  0538    135* 133* 133*   K 3.3* 3.9 4.1 3.8    105 105 104   CO2 25 18* 18* 21*   BUN 4* 4* 9 8   CREATININE 0.6 0.6 0.7 0.7   GLU 99 239* 199* 99   PHOS 3.8 3.0 2.6*  --    MG 1.5* 1.9 2.0  --         Recent Labs     09/03/24  1220   PH 7.271*   PCO2 38.5   PO2 217*   HCO3 17.7*   POCSATURATED 100   BE -9*        Lab Results   Component Value Date    LACTATE 2.4 (H) 09/04/2024    LACTATE 1.5 04/08/2024    LACTATE 2.1 04/08/2024         ASSESSMENT AND PLAN :     Acute Encephalopathy  Called to bedside for stroke alert. Pt with altered mental status this am. Rapid response called earlier this morning. Narcan administered x 2 with positive response. Pt able to state name, month and age. When asked the year she repeated September. Pt with generalized weakness. No unilateral weakness. Troponin negative. Lactic 2.4>>3.57. WBC elevated likely secondary to surgery and steroid use but cannot rule out infection.     --Stroke provider at bedside, recommend CTA multiphase  --VBG now  --Add on liver functional panel, Mg, Phos  --Ammonia  --UA x 1 now  --Low threshold to complete infectious workup and start broad spectrum antibiotics  --Delirium precautions    Will follow up with patient.     Uninterrupted Critical Care/Counseling Time (not including procedures): 35 minutes  Critical care was time spent personally by me on the following activities: development of treatment plan with patient or surrogate and bedside caregivers, discussions with consultants, evaluation of patient's response to treatment, examination of patient, ordering and performing treatments and interventions, ordering and review of laboratory studies, ordering and review of radiographic studies, pulse oximetry,  re-evaluation of patient's condition. This critical care time did not overlap with that of any other provider or involve time for any procedures.    Carrol Mckeon DNP, ACN-AG  [facility]  Pulmonary Critical Care  09/04/2024

## 2024-09-04 NOTE — CONSULTS
Clark Barrera - Oncology ([facility])  Vascular Neurology  Comprehensive Stroke Center  Consult Note    Consults  Assessment/Plan:     Patient is a 74 y.o. year old female with:    Acute encephalopathy  Bella Meyer is a 74 y.o. 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 Stroke code activated 9/4 AM for lethargy and altered mental status. LKN around midnight, ~7.5hrs prior. Patient was given dilauded at 0022 for pain, rapid response called at 0530 for decreased responsiveness. Patient was given narcan x 2 with mild improvement in mentation, however mentation did not return to baseline prompting stroke code activation. Exam significant for generalized weakness and lethargy, but no focal neuro deficits appreciated. Of note patient intermittently with brief extremity spasm/twitching however no rhythmic jerking or overt seizure like activity observed.     -CTH/CTA: no acute intracranial abnormalities, vascular occlusion, or critical stenosis      Recommendations:  --Etiology of clinical presentation unclear at this time, acute stroke felt less likely given lack of focal deficits on exam  --Gen Neuro consulted per primary team  --SBP goal <180, maintain MAP >65  --Avoid hypotension or drastic fluctuations in BP to prevent hypoperfusion  --Continue home eliquis for AC once stable from medical standpoint given increased stroke risk in setting of hypercoagulability due to malignancy.  --Management of modifiable risk factors: HTN, HLD, DM  --Ongoing management and care per primary team  --No further stroke workup indicated at this time, VN will sign off  --Please contact stroke team with any questions/concerns        STROKE DOCUMENTATION     Acute Stroke Times   Last Known Normal Date: 09/04/24  Last Known Normal Time: 0000  Unknown Symptom Onset  Time: Unknown Time  Stroke Team Called Date: 09/04/24  Stroke Team Called Time: 0726  Stroke Team Arrival Date: 09/04/24  Stroke Team Arrival Time: 0729  CT Interpretation Time: 0745  Thrombolytic Therapy Recommended: No  CTA Interpretation Time: 0759  Thrombectomy Recommended: No    NIH Scale:  1a. Level of Consciousness: 1-->Not alert, but arousable by minor stimulation to obey, answer, or respond  1b. LOC Questions: 0-->Answers both questions correctly  1c. LOC Commands: 0-->Performs both tasks correctly  2. Best Gaze: 0-->Normal  3. Visual: 0-->No visual loss  4. Facial Palsy: 0-->Normal symmetrical movements  5a. Motor Arm, Left: 1-->Drift, limb holds 90 (or 45) degrees, but drifts down before full 10 seconds, does not hit bed or other support  5b. Motor Arm, Right: 1-->Drift, limb holds 90 (or 45) degrees, but drifts down before full 10 secs, does not hit bed or other support  6a. Motor Leg, Left: 1-->Drift, leg falls by the end of the 5-sec period but does not hit bed  6b. Motor Leg, Right: 1-->Drift, leg falls by the end of the 5-sec period but does not hit bed  7. Limb Ataxia: 0-->Absent  8. Sensory: 0-->Normal, no sensory loss  9. Best Language: 0-->No aphasia, normal  10. Dysarthria: 0-->Normal  11. Extinction and Inattention (formerly Neglect): 0-->No abnormality  Total (NIH Stroke Scale): 5    Modified Morales Score: 0  [place] Coma Scale:    ABCD2 Score:    [id number] Score:   HAS -BLED Score:   ICH Score:   Hunt & Morrison Classification:       Thrombolysis Candidate? No, Out of window - Symptom onset > 4.5 hours, Recent surgery/trauma (< 14 days)     Delays to Thrombolysis?  Not Applicable    Interventional Revascularization Candidate?   Is the patient eligible for mechanical endovascular reperfusion (TAMMY)?  No; No large vessel occlusion identified on imaging     Delays to Thrombectomy? Not Applicable    Hemorrhagic change of an Ischemic Stroke: Does this patient have an ischemic stroke with hemorrhagic  changes? No     Subjective:     History of Present Illness:  Bella Meyer is a 74 y.o. 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 Stroke code activated 9/4 AM for lethargy and altered mental status. LKN around midnight, ~7.5hrs prior. Patient was given dilauded at 0022 for pain, rapid response called at 0530 for decreased responsiveness. Patient was given narcan x 2 with mild improvement in mentation, however mentation did not return to baseline prompting stroke code activation. Exam significant for generalized weakness and lethargy, but no focal neuro deficits appreciated. Of note patient intermittently with brief extremity spasm/twitching however no rhythmic jerking or overt seizure like activity observed. CTH/CTA negative for acute intracranial abnormalities, vascular occlusion, or critical stenosis. Determined not a candidate for acute stroke interventions, OOW for TNK and no LVO for thrombectomy.           Past Medical History:   Diagnosis Date    Cancer     Diabetes mellitus, type 2     Hyperlipidemia     Hypertension     Hypothyroidism     Pulmonary embolism      Past Surgical History:   Procedure Laterality Date    BILATERAL SALPINGO-OOPHORECTOMY (BSO) N/A 9/3/2024    Procedure: SALPINGO-OOPHORECTOMY, BILATERAL;  Surgeon: Maliha Crawford MD;  Location: [facility] OR [address] [place] [address];  Service: Gynecology Oncology;  Laterality: N/A;    DEBULKING OF TUMOR N/A 9/3/2024    Procedure: DEBULKING, NEOPLASM;  Surgeon: Maliha Crawford MD;  Location: [facility] OR [address] [place] [address];  Service: Gynecology Oncology;  Laterality: N/A;    FLEXIBLE SIGMOIDOSCOPY N/A 9/3/2024    Procedure: SIGMOIDOSCOPY, FLEXIBLE;  Surgeon: Ruby Caicedo MD;  Location: [facility] OR [address] [place] [address];  Service: Colon and Rectal;  Laterality: N/A;    ILEOCOLECTOMY N/A 9/3/2024    Procedure:  ILEOCOLECTOMY;  Surgeon: Ruby Caicedo MD;  Location: [facility] OR 2ND FLR;  Service: Colon and Rectal;  Laterality: N/A;    LAPAROTOMY, EXPLORATORY N/A 9/3/2024    Procedure: LAPAROTOMY, EXPLORATORY;  Surgeon: Maliha Crawford MD;  Location: [facility] OR 2ND FLR;  Service: Gynecology Oncology;  Laterality: N/A;  4 hr case    LOW ANTERIOR RESECTION OF COLON N/A 9/3/2024    Procedure: RESECTION, COLON, LOW ANTERIOR;  Surgeon: Ruby Caicedo MD;  Location: [facility] OR 2ND FLR;  Service: Colon and Rectal;  Laterality: N/A;  4 hr case    LYSIS OF ADHESIONS OF URETER Bilateral 9/3/2024    Procedure: URETEROLYSIS;  Surgeon: Maliha Crawford MD;  Location: [facility] OR 2ND FLR;  Service: Gynecology Oncology;  Laterality: Bilateral;    MOBILIZATION OF SPLENIC FLEXURE  9/3/2024    Procedure: MOBILIZATION, SPLENIC FLEXURE;  Surgeon: Ruby Caicedo MD;  Location: [facility] OR 2ND FLR;  Service: Colon and Rectal;;    OMENTECTOMY N/A 9/3/2024    Procedure: OMENTECTOMY;  Surgeon: Maliha Crawford MD;  Location: [facility] OR 2ND FLR;  Service: Gynecology Oncology;  Laterality: N/A;    TOTAL ABDOMINAL HYSTERECTOMY N/A 9/3/2024    Procedure: HYSTERECTOMY, TOTAL, ABDOMINAL;  Surgeon: Maliha Crawford MD;  Location: [facility] OR 2ND FLR;  Service: Gynecology Oncology;  Laterality: N/A;    TUBAL LIGATION      at age 32's     Social History     Tobacco Use    Smoking status: Never     Passive exposure: Past    Smokeless tobacco: Never   Substance Use Topics    Alcohol use: Never    Drug use: Never     Review of patient's allergies indicates:  No Known Allergies    Medications: I have reviewed the current medication administration record.    Medications Prior to Admission   Medication Sig Dispense Refill Last Dose    adhesive remover (UNISOLVE ADHESIVE REMOVER WIPE) Misc One every 10 days       amLODIPine (NORVASC) 5 MG tablet Take 1 tablet by mouth every morning.       apixaban (ELIQUIS) 5 mg Tab Take 1 tablet (5 mg total) by mouth 2 (two) times daily. 60 tablet 2      blood-glucose meter,continuous (DEXCOM G7 ) Misc Use daily       blood-glucose sensor (DEXCOM G7 SENSOR) Charito Use every 10 days       ciprofloxacin HCl (CIPRO) 500 MG tablet Take 1 tablet (500 mg total) by mouth On call Procedure. take 1 tablespoons of at 9 pm and 1 tablespoons at 11 pm the night before surgery 2 tablet 0     coenzyme Q10 (CO Q-10) 100 mg capsule Take 100 mg by mouth nightly.       dexAMETHasone (DECADRON) 4 MG Tab Take 8 mg (2 tablets) by mouth daily on days 2-4 of each chemotherapy cycle. 6 tablet 11     docusate sodium (COLACE ORAL) Take 1 tablet by mouth every evening.       DULoxetine (CYMBALTA) 30 MG capsule Take 1 capsule (30 mg total) by mouth once daily. 30 capsule 0     [] enoxaparin (LOVENOX) 60 mg/0.6 mL Syrg Inject 50 mg into the skin every 12 (twelve) hours.       EScitalopram oxalate (LEXAPRO) 10 MG tablet Take 1 tablet (10 mg total) by mouth once daily. 90 tablet 3     gabapentin (NEURONTIN) 100 MG capsule Take 2 capsules (200 mg total) by mouth every evening. (Patient taking differently: Take 200 mg by mouth every evening. 100 mg in the morning 100 mg noon and 300 mg in the evening) 60 capsule 0     HYDROcodone-acetaminophen (NORCO) 7.5-325 mg per tablet Take 1 tablet by mouth every 4 (four) hours as needed for Pain. 120 tablet 0     levothyroxine (SYNTHROID) 75 MCG tablet Take 1 tablet by mouth every morning.       HASEEB LOZANO U-100 INSULIN 100 unit/mL pen Inject 2-10 Units into the skin 3 (three) times daily with meals.       metFORMIN (GLUCOPHAGE) 1000 MG tablet Take 1,000 mg by mouth 2 (two) times daily with meals.       metoprolol succinate (TOPROL-XL) 50 MG 24 hr tablet Take 1 tablet by mouth every morning.       metroNIDAZOLE (FLAGYL) 500 MG tablet take 1 tab at 9 pm and 1 tab at 11 pm the night before surgery 2 tablet 0     naloxone (NARCAN) 4 mg/actuation Spry 1 spray (4 mg total) by Nasal route 1 (one) time if needed (for emergency opioid reversal). 1  each 0     OLANZapine (ZYPREXA) 5 MG tablet Take 1 tablet by mouth nightly on days 1-4 of each chemotherapy cycle. 4 tablet 11     ondansetron (ZOFRAN-ODT) 4 MG TbDL DISSOLVE ONE TABLET BY MOUTH EVERY 4 TO 6 HOURS AS NEEDED FOR NAUSEA       oxyCODONE (ROXICODONE) 20 mg Tab immediate release tablet Take 1 tablet (20 mg total) by mouth every 4 (four) hours as needed for Pain. 100 tablet 0     polyethylene glycol 3350 (MIRALAX ORAL) Take by mouth daily as needed. Constipation       pravastatin (PRAVACHOL) 10 MG tablet Take 1 tablet by mouth every evening.       prochlorperazine (COMPAZINE) 5 MG tablet Take 1 tablet (5 mg total) by mouth every 6 (six) hours as needed for Nausea. 20 tablet 5     TOUJEO SOLOSTAR U-300 INSULIN 300 unit/mL (1.5 mL) InPn pen Inject 10 Units into the skin once daily.          Review of Systems   Unable to perform ROS: Other (lethargy)     Objective:     Vital Signs (Most Recent):  Temp: 98.8 °F (37.1 °C) (09/04/24 1112)  Pulse: 88 (09/04/24 1124)  Resp: 20 (09/04/24 1112)  BP: 120/77 (09/04/24 1112)  SpO2: (!) 92 % (09/04/24 1112)    Vital Signs Range (Last 24H):  Temp:  [97.3 °F (36.3 °C)-98.8 °F (37.1 °C)]   Pulse:  []   Resp:  [13-21]   BP: ()/(56-90)   SpO2:  [92 %-100 %]        Physical Exam  Vitals and nursing note reviewed.   Constitutional:       General: She is not in acute distress.  HENT:      Head: Normocephalic.      Nose: Nose normal.   Eyes:      Extraocular Movements: Extraocular movements intact.      Conjunctiva/sclera: Conjunctivae normal.   Cardiovascular:      Rate and Rhythm: Normal rate.   Pulmonary:      Effort: Pulmonary effort is normal. No respiratory distress.   Skin:     General: Skin is warm.   Neurological:      Mental Status: She is easily aroused. She is lethargic.      Comments: See below for neuro exam   Psychiatric:         Behavior: Behavior normal. Behavior is cooperative.              Neurological Exam:   LOC: drowsy  Attention Span: Good  "  Language: No aphasia  Articulation: No dysarthria  Orientation: Person, Place, Time   Visual Fields: Full  EOM (CN III, IV, VI): Full/intact  Facial Movement (CN VII): Symmetric facial expression    Motor: Arm left  Paresis: 4/5  Leg left  Paresis: 4/5  Arm right  Paresis: 4/5  Leg right Paresis: 4/5  Sensation: Intact to light touch, temperature and vibration      Laboratory:  CMP:   Recent Labs   Lab 09/04/24  0538   CALCIUM 7.9*   ALBUMIN 2.4*   PROT 4.0*   *   K 3.8   CO2 21*      BUN 8   CREATININE 0.7   ALKPHOS 22*   ALT 15   AST 40   BILITOT 0.3     CBC:   Recent Labs   Lab 09/04/24  0538   WBC 14.81*   RBC 3.23*   HGB 10.4*   HCT 28.8*   *   MCV 89   MCH 32.2*   MCHC 36.1*     Lipid Panel: No results for input(s): "CHOL", "LDLCALC", "HDL", "TRIG" in the last 168 hours.  Coagulation: No results for input(s): "PT", "INR", "APTT" in the last 168 hours.  Hgb A1C:   Recent Labs   Lab 09/02/24  1346   HGBA1C 5.8*     TSH: No results for input(s): "TSH" in the last 168 hours.    Diagnostic Results:      Brain/Vessel Imaging:  CTA stroke multiphase 9/4/2024  Impression:  No acute intracranial pathology.  If concern persists for acute ischemic event, consider MRI brain for further evaluation.  No evidence for large vessel occlusion or significant stenosis about the intracranial vasculature.  Mild diffuse diminutive caliber of about the intracranial vertebral arteries and basilar artery without evidence for focal occlusion.  Sequela of chronic microvascular ischemic change.  Atherosclerosis of at the right carotid bulb with less than 50% stenosis per NASCET criteria.  Few scattered pulmonary micro nodules.  For multiple solid nodules all <6 mm, Fleischner Society 2017 guidelines recommend no routine follow up for a low risk patient, or follow up with non-contrast chest CT at 12 months after discovery in a high risk patient.      Sarah Urrutia PA-C  [facility]  Department of " Vascular Neurology   Clark Barrera - Oncology ([facility])

## 2024-09-04 NOTE — SUBJECTIVE & OBJECTIVE
Past Medical History:   Diagnosis Date    Cancer     Diabetes mellitus, type 2     Hyperlipidemia     Hypertension     Hypothyroidism     Pulmonary embolism      Past Surgical History:   Procedure Laterality Date    BILATERAL SALPINGO-OOPHORECTOMY (BSO) N/A 9/3/2024    Procedure: SALPINGO-OOPHORECTOMY, BILATERAL;  Surgeon: Maliha Crawford MD;  Location: [facility] OR 2ND FLR;  Service: Gynecology Oncology;  Laterality: N/A;    DEBULKING OF TUMOR N/A 9/3/2024    Procedure: DEBULKING, NEOPLASM;  Surgeon: Maliha Crawford MD;  Location: [facility] OR 2ND FLR;  Service: Gynecology Oncology;  Laterality: N/A;    FLEXIBLE SIGMOIDOSCOPY N/A 9/3/2024    Procedure: SIGMOIDOSCOPY, FLEXIBLE;  Surgeon: Ruby Caicedo MD;  Location: [facility] OR 2ND FLR;  Service: Colon and Rectal;  Laterality: N/A;    ILEOCOLECTOMY N/A 9/3/2024    Procedure: ILEOCOLECTOMY;  Surgeon: Ruby Caicedo MD;  Location: [facility] OR 2ND FLR;  Service: Colon and Rectal;  Laterality: N/A;    LAPAROTOMY, EXPLORATORY N/A 9/3/2024    Procedure: LAPAROTOMY, EXPLORATORY;  Surgeon: Maliha Crawford MD;  Location: [facility] OR 2ND FLR;  Service: Gynecology Oncology;  Laterality: N/A;  4 hr case    LOW ANTERIOR RESECTION OF COLON N/A 9/3/2024    Procedure: RESECTION, COLON, LOW ANTERIOR;  Surgeon: Ruby Caicedo MD;  Location: [facility] OR 2ND FLR;  Service: Colon and Rectal;  Laterality: N/A;  4 hr case    LYSIS OF ADHESIONS OF URETER Bilateral 9/3/2024    Procedure: URETEROLYSIS;  Surgeon: Maliha Crawford MD;  Location: [facility] OR 2ND FLR;  Service: Gynecology Oncology;  Laterality: Bilateral;    MOBILIZATION OF SPLENIC FLEXURE  9/3/2024    Procedure: MOBILIZATION, SPLENIC FLEXURE;  Surgeon: Ruby Caicedo MD;  Location: [facility] OR 2ND FLR;  Service: Colon and Rectal;;    OMENTECTOMY N/A 9/3/2024    Procedure: OMENTECTOMY;  Surgeon: Maliha Crawford MD;  Location: [facility] OR 2ND FLR;  Service: Gynecology Oncology;  Laterality: N/A;    TOTAL ABDOMINAL HYSTERECTOMY N/A 9/3/2024    Procedure:  HYSTERECTOMY, TOTAL, ABDOMINAL;  Surgeon: Maliha Crawford MD;  Location: [facility] OR [address] [place] [address];  Service: Gynecology Oncology;  Laterality: N/A;    TUBAL LIGATION      at age 32's     Social History     Tobacco Use    Smoking status: Never     Passive exposure: Past    Smokeless tobacco: Never   Substance Use Topics    Alcohol use: Never    Drug use: Never     Review of patient's allergies indicates:  No Known Allergies    Medications: I have reviewed the current medication administration record.    Medications Prior to Admission   Medication Sig Dispense Refill Last Dose    adhesive remover (UNISOLVE ADHESIVE REMOVER WIPE) Misc One every 10 days       amLODIPine (NORVASC) 5 MG tablet Take 1 tablet by mouth every morning.       apixaban (ELIQUIS) 5 mg Tab Take 1 tablet (5 mg total) by mouth 2 (two) times daily. 60 tablet 2     blood-glucose meter,continuous (DEXCOM G7 ) Misc Use daily       blood-glucose sensor (DEXCOM G7 SENSOR) Charito Use every 10 days       ciprofloxacin HCl (CIPRO) 500 MG tablet Take 1 tablet (500 mg total) by mouth On call Procedure. take 1 tablespoons of at 9 pm and 1 tablespoons at 11 pm the night before surgery 2 tablet 0     coenzyme Q10 (CO Q-10) 100 mg capsule Take 100 mg by mouth nightly.       dexAMETHasone (DECADRON) 4 MG Tab Take 8 mg (2 tablets) by mouth daily on days 2-4 of each chemotherapy cycle. 6 tablet 11     docusate sodium (COLACE ORAL) Take 1 tablet by mouth every evening.       DULoxetine (CYMBALTA) 30 MG capsule Take 1 capsule (30 mg total) by mouth once daily. 30 capsule 0     [] enoxaparin (LOVENOX) 60 mg/0.6 mL Syrg Inject 50 mg into the skin every 12 (twelve) hours.       EScitalopram oxalate (LEXAPRO) 10 MG tablet Take 1 tablet (10 mg total) by mouth once daily. 90 tablet 3     gabapentin (NEURONTIN) 100 MG capsule Take 2 capsules (200 mg total) by mouth every evening. (Patient taking differently: Take 200 mg by mouth every evening. 100 mg in the morning 100  mg noon and 300 mg in the evening) 60 capsule 0     HYDROcodone-acetaminophen (NORCO) 7.5-325 mg per tablet Take 1 tablet by mouth every 4 (four) hours as needed for Pain. 120 tablet 0     levothyroxine (SYNTHROID) 75 MCG tablet Take 1 tablet by mouth every morning.       LYUMJEV KWIKPEN U-100 INSULIN 100 unit/mL pen Inject 2-10 Units into the skin 3 (three) times daily with meals.       metFORMIN (GLUCOPHAGE) 1000 MG tablet Take 1,000 mg by mouth 2 (two) times daily with meals.       metoprolol succinate (TOPROL-XL) 50 MG 24 hr tablet Take 1 tablet by mouth every morning.       metroNIDAZOLE (FLAGYL) 500 MG tablet take 1 tab at 9 pm and 1 tab at 11 pm the night before surgery 2 tablet 0     naloxone (NARCAN) 4 mg/actuation Spry 1 spray (4 mg total) by Nasal route 1 (one) time if needed (for emergency opioid reversal). 1 each 0     OLANZapine (ZYPREXA) 5 MG tablet Take 1 tablet by mouth nightly on days 1-4 of each chemotherapy cycle. 4 tablet 11     ondansetron (ZOFRAN-ODT) 4 MG TbDL DISSOLVE ONE TABLET BY MOUTH EVERY 4 TO 6 HOURS AS NEEDED FOR NAUSEA       oxyCODONE (ROXICODONE) 20 mg Tab immediate release tablet Take 1 tablet (20 mg total) by mouth every 4 (four) hours as needed for Pain. 100 tablet 0     polyethylene glycol 3350 (MIRALAX ORAL) Take by mouth daily as needed. Constipation       pravastatin (PRAVACHOL) 10 MG tablet Take 1 tablet by mouth every evening.       prochlorperazine (COMPAZINE) 5 MG tablet Take 1 tablet (5 mg total) by mouth every 6 (six) hours as needed for Nausea. 20 tablet 5     TOUJEO SOLOSTAR U-300 INSULIN 300 unit/mL (1.5 mL) InPn pen Inject 10 Units into the skin once daily.          Review of Systems   Unable to perform ROS: Other (lethargy)     Objective:     Vital Signs (Most Recent):  Temp: 98.8 °F (37.1 °C) (09/04/24 1112)  Pulse: 88 (09/04/24 1124)  Resp: 20 (09/04/24 1112)  BP: 120/77 (09/04/24 1112)  SpO2: (!) 92 % (09/04/24 1112)    Vital Signs Range (Last 24H):  Temp:   "[97.3 °F (36.3 °C)-98.8 °F (37.1 °C)]   Pulse:  []   Resp:  [13-21]   BP: ()/(56-90)   SpO2:  [92 %-100 %]        Physical Exam  Vitals and nursing note reviewed.   Constitutional:       General: She is not in acute distress.  HENT:      Head: Normocephalic.      Nose: Nose normal.   Eyes:      Extraocular Movements: Extraocular movements intact.      Conjunctiva/sclera: Conjunctivae normal.   Cardiovascular:      Rate and Rhythm: Normal rate.   Pulmonary:      Effort: Pulmonary effort is normal. No respiratory distress.   Skin:     General: Skin is warm.   Neurological:      Mental Status: She is easily aroused. She is lethargic.      Comments: See below for neuro exam   Psychiatric:         Behavior: Behavior normal. Behavior is cooperative.              Neurological Exam:   LOC: drowsy  Attention Span: Good   Language: No aphasia  Articulation: No dysarthria  Orientation: Person, Place, Time   Visual Fields: Full  EOM (CN III, IV, VI): Full/intact  Facial Movement (CN VII): Symmetric facial expression    Motor: Arm left  Paresis: 4/5  Leg left  Paresis: 4/5  Arm right  Paresis: 4/5  Leg right Paresis: 4/5  Sensation: Intact to light touch, temperature and vibration      Laboratory:  CMP:   Recent Labs   Lab 09/04/24  0538   CALCIUM 7.9*   ALBUMIN 2.4*   PROT 4.0*   *   K 3.8   CO2 21*      BUN 8   CREATININE 0.7   ALKPHOS 22*   ALT 15   AST 40   BILITOT 0.3     CBC:   Recent Labs   Lab 09/04/24  0538   WBC 14.81*   RBC 3.23*   HGB 10.4*   HCT 28.8*   *   MCV 89   MCH 32.2*   MCHC 36.1*     Lipid Panel: No results for input(s): "CHOL", "LDLCALC", "HDL", "TRIG" in the last 168 hours.  Coagulation: No results for input(s): "PT", "INR", "APTT" in the last 168 hours.  Hgb A1C:   Recent Labs   Lab 09/02/24  1346   HGBA1C 5.8*     TSH: No results for input(s): "TSH" in the last 168 hours.    Diagnostic Results:      Brain/Vessel Imaging:  CTA stroke multiphase 9/4/2024  Impression:  No " acute intracranial pathology.  If concern persists for acute ischemic event, consider MRI brain for further evaluation.  No evidence for large vessel occlusion or significant stenosis about the intracranial vasculature.  Mild diffuse diminutive caliber of about the intracranial vertebral arteries and basilar artery without evidence for focal occlusion.  Sequela of chronic microvascular ischemic change.  Atherosclerosis of at the right carotid bulb with less than 50% stenosis per NASCET criteria.  Few scattered pulmonary micro nodules.  For multiple solid nodules all <6 mm, Fleischner Society 2017 guidelines recommend no routine follow up for a low risk patient, or follow up with non-contrast chest CT at 12 months after discovery in a high risk patient.

## 2024-09-04 NOTE — CARE UPDATE
RAPID RESPONSE NURSE PROACTIVE ROUNDING NOTE       Time of Visit: 05:30    Admit Date: 2024  LOS: 2  Code Status: Full Code   Date of Visit: 2024  : 1950  Age: 74 y.o.  Sex: female  Race: White  Bed: 844/844 A:   MRN: [id number]  Was the patient discharged from an ICU this admission? No   Was the patient discharged from a PACU within last 24 hours? Yes   Did the patient receive conscious sedation/general anesthesia in last 24 hours? No  Was the patient in the ED within the past 24 hours? No  Was the patient on NIPPV within the past 24 hours? No   Attending Physician: Maliha Crawford MD  Primary Service: Gynecologic Oncology   Time spent at the bedside: 30 - 45 min    SITUATION    Notified by bedside RN via phone call.  Reason for alert: AMS  Called to evaluate the patient for Neuro.    BACKGROUND     Why is the patient in the hospital?: Dehydration    Patient has a past medical history of Cancer, Diabetes mellitus, type 2, Hyperlipidemia, Hypertension, Hypothyroidism, and Pulmonary embolism.    Last Vitals:  Temp: 98.8 °F (37.1 °C) (345)  Pulse: 123 (530)  Resp: 20 (345)  BP: 111/74 (530)  SpO2: 96 % (530)    24 Hours Vitals Range:  Temp:  [97.3 °F (36.3 °C)-98.8 °F (37.1 °C)]   Pulse:  []   Resp:  [13-21]   BP: ()/(56-90)   SpO2:  [92 %-100 %]     Labs:  Recent Labs     24  1407 24  0230 24  0538   WBC 7.10 14.10* 14.81*   HGB 10.9* 11.0* 10.4*   HCT 31.6* 29.5* 28.8*   PLT 93* 113* 111*       Recent Labs     24  0256 24  1407 24  0124    135* 133*   K 3.3* 3.9 4.1    105 105   CO2 25 18* 18*   BUN 4* 4* 9   CREATININE 0.6 0.6 0.7   GLU 99 239* 199*   PHOS 3.8 3.0 2.6*   MG 1.5* 1.9 2.0        Recent Labs     24  1220   PH 7.271*   PCO2 38.5   PO2 217*   HCO3 17.7*   POCSATURATED 100   BE -9*        ASSESSMENT    Called by bedside RN for AMS. Upon RRN arrival, pt noted to be lethargic. Aroused to  vigorous stimulation. No focal deficits noted. See flowsheets for VS.  Physical Exam  Constitutional:       Appearance: She is ill-appearing.   HENT:      Head: Normocephalic.      Mouth/Throat:      Mouth: Mucous membranes are dry.   Eyes:      Pupils: Pupils are equal, round, and reactive to light.   Cardiovascular:      Rate and Rhythm: Tachycardia present.      Pulses:           Radial pulses are 2+ on the right side and 2+ on the left side.        Dorsalis pedis pulses are 1+ on the right side and 1+ on the left side.   Abdominal:      Palpations: Abdomen is soft.   Skin:     General: Skin is warm and dry.   Neurological:      Mental Status: She is lethargic.      GCS: GCS eye subscore is 3. GCS verbal subscore is 4. GCS motor subscore is 6.      Motor: Weakness present.   Psychiatric:         Mood and Affect: Affect is flat.         Speech: Speech is delayed.         Behavior: Behavior is withdrawn.         INTERVENTIONS    The patient was seen for Neurological problem. Staff concerns included mental status change and decreased responsiveness. The following interventions were performed: POCT glucose, BMP, narcotics discontinued, Naloxone 0.4 mg/ ml given IV, continuous pulse ox monitoring continued , and continuous cardiac monitoring continued. Additional interventions include: Cardiology Consult, chest XR; EKG, troponin and serum Lactic collected and sent    RECOMMENDATIONS    Avoid sedating medications; limit narcotic use for pain management.   Q2 Neuro Assessments  Continue cardiac monitoring  Continue pulse ox monitoring   Maintain IV access   Maintain fall / safety precautions       PROVIDER ESCALATION    Yes/No  Yes    Orders received and case discussed with Dr. Crawford .    Disposition: Remain in room 844.    FOLLOW-UP    Bedside Kwaku MAYNARD  updated on plan of care. Instructed to call the Rapid Response Nurse, Renate Murray RN at [phone] for additional questions or concerns.

## 2024-09-04 NOTE — PROGRESS NOTES
Progress Note  Gynecology Oncology     Admit Date: 2024  LOS: 3    Reason for Admission:  Dehydration    SUBJECTIVE:     Bella Meyer is a 74 y.o.  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 Surgery was complicated by intraoperative blood loss of 1L and received 2u pRBCs intra-op.     Post operative course was complicated by BG in 300's in PACU on POD#0 and patient went to the floor with an insulin drip. On POD1 patient was tachycardic and had AMS. A rapid response was called and a code stroke. After a full work up including consults from cardiology and neurology tachycardia was stated to be sinus tachycardia likely due to post op changes and AMS due to medication (narcotics)    Overnight no acute events. Patient tolerating AAO x3. Patient reports pain is minimal, requiring x3 oxy 5 and x1 oxy 10. She is tolerating small sips of fluids without N/V. Patient had multiple bowel movements that she was unable to control and did not feel coming on. Patient has not stood up since standing with PT yesterday. She is voiding via acosta.    OBJECTIVE:     Vital Signs   Temp:  [97.8 °F (36.6 °C)-98.8 °F (37.1 °C)] 98.1 °F (36.7 °C)  Pulse:  [] 86  Resp:  [16-20] 20  SpO2:  [92 %-98 %] 94 %  BP: (100-148)/(57-77) 148/72      Intake/Output Summary (Last 24 hours) at 2024 0710  Last data filed at 2024 0527  Gross per 24 hour   Intake 934.89 ml   Output 998 ml   Net -63.11 ml       Physical Exam:  Gen: A&Ox3, NSR  CV: RRR  Pulm: LCTAB, normal respiratory effort  Abd: soft, non-distended, appropriately-tender to palpation without rebound or guarding  Inc: VML C/D/I  Ext: SCDs in place   : Acosta in place draining clear green tinged urine     Laboratory:  Lactate: 2.5  Troponin: < 0.006    BNP: 35    Recent Labs   Lab 24  9341  09/04/24  0538 09/05/24  0320   WBC 14.10* 14.81* 11.76   HGB 11.0* 10.4* 11.9*   HCT 29.5* 28.8* 30.2*   MCV 87 89 87   * 111* 104*      Recent Labs   Lab 09/03/24  1407 09/04/24  0124 09/04/24  0538 09/05/24  0320   * 133* 133* 136   K 3.9 4.1 3.8 4.2    105 104 103   CO2 18* 18* 21* 25   BUN 4* 9 8 11   CREATININE 0.6 0.7 0.7 0.7   * 199* 99 137*   PROT 3.1* 4.2* 4.0*  --    BILITOT 0.5 0.3 0.3  --    ALKPHOS 18* 22* 22*  --    ALT 10 15 15  --    AST 21 41* 40  --    MG 1.9 2.0 2.0 2.0   PHOS 3.0 2.6* 3.1 4.3      Blood Sugars (AccuCheck):    Recent Labs     09/02/24  1308 09/02/24  1718 09/03/24  1405 09/03/24  1601 09/03/24  1805 09/03/24  1958 09/04/24  0731 09/04/24  0801   POCTGLUCOSE 199* 152* 243* 324* 315* 346* 97 104     Imaging:  EXAMINATION:  CTA STROKE MULTI-PHASE     CLINICAL HISTORY:  Neuro deficit, acute, stroke suspected;     TECHNIQUE:  Axial CT images obtained throughout the region of the head before and after the administration of intravenous contrast.  CT angiogram was performed through the cervical and intracranial vasculature during the IV bolus administration of 100mL of Omnipaque 350.  Two additional phases through the intracranial vasculature via multiphase technique.  Multiplanar MPR and MIP reformats were performed.     CT source data was analyzed using artificial intelligence software for detection of a large vessel occlusions (LVO) in order to enable computer assisted triage notification and aid clinical stroke decision making.     COMPARISON:  None     FINDINGS:  Mild prominence of the ventricles with compensatory enlargement of the sulci, in keeping with central volume loss.  No hydrocephalus.  No extra-axial blood or fluid collections.     Scattered patchy and confluent regions of subcortical and periventricular hypoattenuation, overall nonspecific, but can be seen in setting of microvascular ischemic change.  No parenchymal mass effect, edema, acute  hemorrhage or acute major vascular distribution infarct.     Skull/extracranial contents (limited evaluation): No displaced calvarial fracture. Mastoid air cells and paranasal sinuses are essentially clear.     Diminished enhancement of bilateral thyroid glands.  Partially imaged mild linear atelectasis within superior segment of right lower lobe.  Few scattered pulmonary micro nodules, for example in the right lung measuring approximately 3 mm (axial series 5, image 69).  Biapical fibronodular scarring.        CTA:     Mild calcification about the aortic arch with 3 vessels.     Subclavian arteries are patent without evidence for significant stenosis or occlusion.     Origin of the left cervical internal carotid artery is patent with less than 50% stenosis per NASCET criteria.     Calcific and atheromatous atherosclerosis at the origin of the right cervical internal carotid artery with less than 50% stenosis per NASCET criteria.     Remainder of the cervical internal carotid arteries are unremarkable.     Origins of the vertebral arteries are patent without evidence for significant stenosis or occlusion.  Extracranial vertebral arteries are unremarkable.     The petrous portions of the internal carotid arteries are unremarkable.  Calcific atherosclerosis of the cavernous and supraclinoid portions of the internal carotid arteries without significant stenosis or occlusion.     Hypoplastic left A1 segment.  Anterior cerebral arteries are patent without evidence for significant stenosis or occlusion.     Middle cerebral arteries are patent without evidence for significant stenosis or occlusion.     Mild diffuse narrowing about the intracranial vertebral arteries and basilar artery without evidence for focal occlusion.  Fetal circulation left PCA.  Posterior cerebral arteries are patent without evidence for significant stenosis or occlusion.     No intracranial aneurysm.     Venous structures demonstrate no evidence for  sinus thrombosis or significant stenosis.     Impression:     No acute intracranial pathology.  If concern persists for acute ischemic event, consider MRI brain for further evaluation.     No evidence for large vessel occlusion or significant stenosis about the intracranial vasculature.  Mild diffuse diminutive caliber of about the intracranial vertebral arteries and basilar artery without evidence for focal occlusion.     Sequela of chronic microvascular ischemic change.     Atherosclerosis of at the right carotid bulb with less than 50% stenosis per NASCET criteria.     Few scattered pulmonary micro nodules.  For multiple solid nodules all <6 mm, Fleischner Society 2017 guidelines recommend no routine follow up for a low risk patient, or follow up with non-contrast chest CT at 12 months after discovery in a high risk patient.  EXAMINATION:  XR CHEST AP PORTABLE     CLINICAL HISTORY:  fluid overload;     TECHNIQUE:  Single frontal view of the chest was performed.     COMPARISON:  None     FINDINGS:  No confluent consolidation or pulmonary edema.  Cardiomediastinal contours normal.  No blunting of the right costophrenic angle.  Mild blunting of the left costophrenic angle either representing pleural thickening or a small pleural effusion.     Impression:     Clear lungs.     Blunting of the left costophrenic angle representing pleural thickening or a small pleural effusion.        ASSESSMENT/PLAN:     Active Hospital Problems    Diagnosis  POA    *Dehydration [E86.0]  Yes    Acute encephalopathy [G93.40]  No    EKG, abnormal [R94.31]  No    History of pulmonary embolism [Z86.711]  Yes    Malignant neoplasm of ovary [C56.9]  Yes    Type 2 diabetes mellitus, with long-term current use of insulin [E11.9, Z79.4]  Not Applicable    Hypothyroidism [E03.9]  Yes    Pure hypercholesterolemia [E78.00]  Yes    Hypertension [I10]  Yes      Resolved Hospital Problems   No resolved problems to display.       Assessment: 74 y.o.   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     Plan:   1. Post-op   - Routine post-op advances  - CBC stable  - Continue PRN pain medications. Dilaudid held.   - Discontinued continuous fluids due to concern for overload. Advance diet as tolerated. Regular diet today.  - Antiemetics prn nausea/vomiting.  - Encourage IS  - D/c acosta follow with passive void trial.  - Consider therapeutic Lovenox this AM.  - SCDs in place    2. Acute encephalopathy  - AAO x3 this AM   - EKG showed sinus tachycardiac w/ PVC, left axis deviation. Possible anterior infarct  - Troponin neg  - BNP 44  - Lactate 2.4  - Cardiology consulted to evaluate cardiac. Reported AMS likely result of surgical interventions and recovery.  - CTA Stroke wnl. Neurology reports likely medication induced as patient responded to narcan.   - Held dilaudid and continuing Toradol / Tylenol scheduled w/ Oxy 5/10 for pain control.  - Hold home gabapentin and lexapro to minimize medications that may effect mental status    3. HGSOC  - See oncology information in H&P  - S/p neodjuvant chemotherapy  - POD2 interval debulking, see post op care above   - Home gabapentin held     4. Hypothyroidism  - Continue home synthroid     5. Type II Diabetes  - Will transition to home Lantus this AM w/ SSI for meals.   - Discontinue drip two hours after Lantus administered  - POCT BG q1 while on drip > q 6 hr until tolerating meals.  - 's this AM.      6. HTN  - Holding home amlodipine   - Continue home metoprolol   - Goal BP at least >100/>60   - Hydral PRN ordered for SBP > 180     7. HLD  - Hold home statin     8. History of PE  - PE diagnosed on 2024  - Home regimen: eliquis 5 BID, completed Lovenox bridge with last dose   - Post op heparin held as platelets < 100  - CBC stable,  platelets 104   - Consider therapeutic Lovenox.     9. Depression  - Held home lexapro    Dispo: As patient meets appropriate post-op milestones, plan for discharge to home    Valerie Basilio MD  Obstetrics and Gynecology, PGY-2

## 2024-09-04 NOTE — ASSESSMENT & PLAN NOTE
Bella Meyer is a 74 y.o. female with a hx of Stage 3 ovarian cancer, HTN, HLD, hypothyroidism, and DM2 presented to the hospital for weakness and pre-syncopal episode prior to scheduled surgery. Patient original EKG showed normal sinus rhythm. Post op EKG showed sinus tachycardia with heart rate  of 127. Concern for cardiac involvement in mental status change. We are being consulted for EKG abnormalities.    Recommendations:  - sinus tachycardia likely result of surgical interventions and recovery  - troponin negative  - no concern for cardiac involvement in altered mental status change  - At this moment we feel as though the patient is stable from a cardiac standpoint, and we will be signing off. Please do not hesitate to call us if you need us for anything else concerning the patient. Thank you for the consult

## 2024-09-04 NOTE — CARE UPDATE
Afternoon Assessment:    Resident to bedside for afternoon assessment. Patient resting in bed. Alert and oriented. Much more conversive than this morning. Reports pain is moderately well controlled on current pain medications. Up out of bed with PT today. Voiding via acosta. -Flatus/-BM. Tolerating clears without nausea/vomiting    Temp:  [97.8 °F (36.6 °C)-98.8 °F (37.1 °C)] 98.5 °F (36.9 °C)  Pulse:  [] 89  Resp:  [18-20] 18  SpO2:  [92 %-99 %] 94 %  BP: (109-168)/(56-90) 118/73    PE:  General: lying in bed in no acute distress  Lungs: normal work of breathing  Abdomen: soft, non-distended, appropriately tender to palpation without rebound or guarding, bandage in place with a few small areas of shadowing    Labs:  Recent Labs   Lab 09/04/24  0538   WBC 14.81*   RBC 3.23*   HGB 10.4*   HCT 28.8*   *   MCV 89   MCH 32.2*   MCHC 36.1*       A/P:  Lexapro, gabapentin and dilaudid held due to acute mental status change this AM  S/p vascular neurology, neurology and cardiology consults. Patient appears back to baseline and no further workup indicated for acute mental status change from earlier at this time. Likely secondary to sedative medications  Will plan to perform SVT in AM if UOP adequate overnight. UOP today 0.71 cc/kg/hr. Encouraged po hydration. Will avoid IV hydration if possible due to likely volume overload  Tachycardia improved after given home dose of metoprolol  Will advance diet tomorrow as tolerated  Will continue toradol for another 24 hours for pain control  Repeat labs in AM  RN to continue neuro checks q4  Will plan to transition off of insulin drip in AM and restart home long acting insulin    Herlinda Bowers MD  PGY-4 OB/GYN

## 2024-09-04 NOTE — PHYSICIAN QUERY
Please specify the diagnosis or diagnoses that correspond(s) to the indicators listed in the query message:  Hypocalcemia  Hypokalemia  Hypomagnesemia

## 2024-09-04 NOTE — PLAN OF CARE
Problem: Adult Inpatient Plan of Care  Goal: Plan of Care Review  Outcome: Progressing  Goal: Patient-Specific Goal (Individualized)  Outcome: Progressing  Goal: Absence of Hospital-Acquired Illness or Injury  Outcome: Progressing  Goal: Optimal Comfort and Wellbeing  Outcome: Progressing  Goal: Readiness for Transition of Care  Outcome: Progressing     Problem: Diabetes Comorbidity  Goal: Blood Glucose Level Within Targeted Range  Outcome: Progressing     Problem: Wound  Goal: Improved Oral Intake  Outcome: Progressing  Goal: Skin Health and Integrity  Outcome: Progressing     Problem: Surgery Nonspecified  Goal: Fluid and Electrolyte Balance  Outcome: Progressing  Goal: Blood Glucose Level Within Targeted Range  Outcome: Progressing  Goal: Effective Urinary Elimination  Outcome: Progressing  Goal: Effective Oxygenation and Ventilation  Outcome: Progressing     Problem: Infection  Goal: Absence of Infection Signs and Symptoms  Outcome: Progressing

## 2024-09-04 NOTE — PHYSICIAN QUERY
Please clarify the nature/etiology of the patient's altered mental status/encephalopathy:    Encephalopathy, unspecified

## 2024-09-04 NOTE — PT/OT/SLP EVAL
"Occupational Therapy   Evaluation    Name: Bella Meyer  MRN: [id number]  Admitting Diagnosis: Dehydration  Recent Surgery: Procedure(s) (LRB):  LAPAROTOMY, EXPLORATORY (N/A)  HYSTERECTOMY, TOTAL, ABDOMINAL (N/A)  SALPINGO-OOPHORECTOMY, BILATERAL (N/A)  OMENTECTOMY (N/A)  DEBULKING, NEOPLASM (N/A)  RESECTION, COLON, LOW ANTERIOR (N/A)  SIGMOIDOSCOPY, FLEXIBLE (N/A)  ILEOCOLECTOMY (N/A)  MOBILIZATION, SPLENIC FLEXURE  URETEROLYSIS (Bilateral) 1 Day Post-Op    Recommendations:     Discharge Recommendations: Moderate Intensity Therapy  Discharge Equipment Recommendations:  none  Barriers to discharge:  None    Assessment:     Bella Meyer is a 74 y.o. female with a medical diagnosis of Dehydration.  She presents with the following performance deficits affecting function: weakness, impaired endurance, impaired self care skills, impaired functional mobility, gait instability, impaired balance, pain, decreased safety awareness, decreased lower extremity function, decreased upper extremity function, edema, impaired cardiopulmonary response to activity.  Pt tolerated session fairly well, limited by pain and generalized weakness. Sister present throughout and reports pt was mobilizing prior to this admission, however required some assistance for ADLs and utilized AD for mobility 2/2 "unsteadiness." Today, she required assistance fro all mobility, but was able to perform sit to stand with Min A x2 and took several lateral side steps along the bed with Min A x2. Pt would benefit from continued skilled acute OT services in order to maximize (I) and with ADLs and functional mobility to ensure safe return to PLOF in the least restrictive environment. OT recommending moderate intensity therapy once pt is medically appropriate for d/c.        Rehab Prognosis: Good; patient would benefit from acute skilled OT services to address these deficits and reach maximum level of function.       Plan:     Patient to be seen 4 x/week to address " "the above listed problems via self-care/home management, therapeutic activities, therapeutic exercises, neuromuscular re-education  Plan of Care Expires: 10/04/24  Plan of Care Reviewed with: patient, sibling    Subjective   "Not today"  Chief Complaint: pain   Patient/Family Comments/goals: family present     Occupational Profile:  Living Environment: pt lives with sister with 2 SH and 0 RALEIGH. Pt has a WIS with shower chair.   Previous level of function: utilized a RW/Rolaltor and required (A) fro safety with ADLs.   Roles and Routines: no driving ;enjoys car shows and TV  Equipment Used at Home: walker, rolling, rollator, shower chair, bedside commode, bath bench, wheelchair  Assistance upon Discharge: family     Pain/Comfort:  Pain Rating 1: other (see comments) (not rated)  Location - Orientation 1: generalized  Pain Rating Post-Intervention 1: other (see comments) (not rated)    Patients cultural, spiritual, Baptism conflicts given the current situation: no    Objective:     Communicated with: RN prior to session.  Patient found HOB elevated with telemetry, pulse ox (continuous), peripheral IV, acosta catheter upon OT entry to room.    General Precautions: Standard, fall  Orthopedic Precautions: N/A  Braces: N/A  Respiratory Status: Room air    Occupational Performance:    Bed Mobility:    Patient completed Scooting/Bridging with maximal assistance  Patient completed Supine to Sit with moderate assistance  Patient completed Sit to Supine with maximal assistance    Functional Mobility/Transfers:  Patient completed Sit <> Stand Transfer with minimum assistance and of 2 persons  with  no assistive device   Functional Mobility: Pt was able to take several lateral side steps with Min A x2 and No AD.     Activities of Daily Living:  Toileting: (D)- acosta present      Cognitive  Cognitive/Psychosocial Skills:     -       Oriented to: Person, Place, Time, and Situation   -       Follows Commands/attention:Follows " multistep  commands    Physical Exam:  Balance:    -           Static sitting balance: SBA-Min A   - Static standing balance: Min A x2   - Dynamic standing balance:  Min A x2  Postural examination/scapula alignment:    -       Rounded shoulders  Skin integrity: Visible skin intact  Edema:  Mild in B hands   Upper Extremity Range of Motion:     -       Right Upper Extremity: WFL  -       Left Upper Extremity: WFL  Upper Extremity Strength:    -       Right Upper Extremity: Fair   -       Left Upper Extremity: Fair    Strength:  Fair     AMPAC 6 Click ADL:  AMPAC Total Score: 18    Treatment & Education:   Pt and family educated on:   Role of OT, POC, and d/c planning.   Safe transfer techniques and proper body mechanics for fall prevention and improved independence with functional transfers   Importance of OOB activities to increase endurance and tolerance for increased participation in daily ADLs.   Utilizing the call bell to request for assistance with all functional mobility to ensure safety during hospital stay.      Pt and family verbalized understanding and all questions were addressed within the scope of OT.     Patient left left sidelying with all lines intact, call button in reach, RN notified, and MD and lizbeth present    GOALS:   Multidisciplinary Problems       Occupational Therapy Goals          Problem: Occupational Therapy    Goal Priority Disciplines Outcome Interventions   Occupational Therapy Goal     OT, PT/OT Progressing    Description: Goals to be met by: 9/18/2024     Patient will increase functional independence with ADLs by performing:    UE Dressing with Supervision.  LE Dressing with Contact Guard Assistance.  Grooming while seated with Stand-by Assistance.  Toileting from bedside commode with Contact Guard Assistance for hygiene and clothing management.   Supine to sit with Contact Guard Assistance.  Step transfer with Contact Guard Assistance  Toilet transfer to bedside commode with  Contact Guard Assistance.                         History:     Past Medical History:   Diagnosis Date    Cancer     Diabetes mellitus, type 2     Hyperlipidemia     Hypertension     Hypothyroidism     Pulmonary embolism          Past Surgical History:   Procedure Laterality Date    BILATERAL SALPINGO-OOPHORECTOMY (BSO) N/A 9/3/2024    Procedure: SALPINGO-OOPHORECTOMY, BILATERAL;  Surgeon: Maliha Crawford MD;  Location: [facility] OR 2ND FLR;  Service: Gynecology Oncology;  Laterality: N/A;    DEBULKING OF TUMOR N/A 9/3/2024    Procedure: DEBULKING, NEOPLASM;  Surgeon: Maliha Crawford MD;  Location: [facility] OR 2ND FLR;  Service: Gynecology Oncology;  Laterality: N/A;    FLEXIBLE SIGMOIDOSCOPY N/A 9/3/2024    Procedure: SIGMOIDOSCOPY, FLEXIBLE;  Surgeon: Ruby Caicedo MD;  Location: [facility] OR 2ND FLR;  Service: Colon and Rectal;  Laterality: N/A;    ILEOCOLECTOMY N/A 9/3/2024    Procedure: ILEOCOLECTOMY;  Surgeon: Ruby Caicedo MD;  Location: [facility] OR 2ND FLR;  Service: Colon and Rectal;  Laterality: N/A;    LAPAROTOMY, EXPLORATORY N/A 9/3/2024    Procedure: LAPAROTOMY, EXPLORATORY;  Surgeon: Maliha Crawford MD;  Location: [facility] OR 2ND FLR;  Service: Gynecology Oncology;  Laterality: N/A;  4 hr case    LOW ANTERIOR RESECTION OF COLON N/A 9/3/2024    Procedure: RESECTION, COLON, LOW ANTERIOR;  Surgeon: Ruby Caicedo MD;  Location: [facility] OR [facility] FLR;  Service: Colon and Rectal;  Laterality: N/A;  4 hr case    LYSIS OF ADHESIONS OF URETER Bilateral 9/3/2024    Procedure: URETEROLYSIS;  Surgeon: Maliha Crawford MD;  Location: [facility] OR 2ND FLR;  Service: Gynecology Oncology;  Laterality: Bilateral;    MOBILIZATION OF SPLENIC FLEXURE  9/3/2024    Procedure: MOBILIZATION, SPLENIC FLEXURE;  Surgeon: Ruby Caicedo MD;  Location: [facility] OR [facility] FLR;  Service: Colon and Rectal;;    OMENTECTOMY N/A 9/3/2024    Procedure: OMENTECTOMY;  Surgeon: Maliha Crawford MD;  Location: [facility] OR [facility] FLR;  Service: Gynecology Oncology;  Laterality: N/A;     TOTAL ABDOMINAL HYSTERECTOMY N/A 9/3/2024    Procedure: HYSTERECTOMY, TOTAL, ABDOMINAL;  Surgeon: Maliha Crawford MD;  Location: [facility] OR [address] [place] [address];  Service: Gynecology Oncology;  Laterality: N/A;    TUBAL LIGATION      at age 32's       Time Tracking:     OT Date of Treatment: 09/04/24  OT Start Time: 1347  OT Stop Time: 1406  OT Total Time (min): 19 min    Billable Minutes:Evaluation 10  Therapeutic Activity 9    9/4/2024  Co-evaluation/treatment performed due to patient's multiple deficits requiring two skilled therapists to appropriately and safely assess patient's strength, endurance, functional mobility, and ADL performance while facilitating functional tasks in addition to accommodating for patient's activity tolerance and medical acuity.

## 2024-09-04 NOTE — SUBJECTIVE & OBJECTIVE
Past Medical History:   Diagnosis Date    Cancer     Diabetes mellitus, type 2     Hyperlipidemia     Hypertension     Hypothyroidism     Pulmonary embolism        Past Surgical History:   Procedure Laterality Date    BILATERAL SALPINGO-OOPHORECTOMY (BSO) N/A 9/3/2024    Procedure: SALPINGO-OOPHORECTOMY, BILATERAL;  Surgeon: Maliha Crawford MD;  Location: [facility] OR 2ND FLR;  Service: Gynecology Oncology;  Laterality: N/A;    DEBULKING OF TUMOR N/A 9/3/2024    Procedure: DEBULKING, NEOPLASM;  Surgeon: Maliha Crawford MD;  Location: [facility] OR 2ND FLR;  Service: Gynecology Oncology;  Laterality: N/A;    FLEXIBLE SIGMOIDOSCOPY N/A 9/3/2024    Procedure: SIGMOIDOSCOPY, FLEXIBLE;  Surgeon: Ruby Caicedo MD;  Location: [facility] OR 2ND FLR;  Service: Colon and Rectal;  Laterality: N/A;    ILEOCOLECTOMY N/A 9/3/2024    Procedure: ILEOCOLECTOMY;  Surgeon: Ruby Caicedo MD;  Location: [facility] OR 2ND FLR;  Service: Colon and Rectal;  Laterality: N/A;    LAPAROTOMY, EXPLORATORY N/A 9/3/2024    Procedure: LAPAROTOMY, EXPLORATORY;  Surgeon: Maliha Crawford MD;  Location: [facility] OR 2ND FLR;  Service: Gynecology Oncology;  Laterality: N/A;  4 hr case    LOW ANTERIOR RESECTION OF COLON N/A 9/3/2024    Procedure: RESECTION, COLON, LOW ANTERIOR;  Surgeon: Ruby Caicedo MD;  Location: [facility] OR 2ND FLR;  Service: Colon and Rectal;  Laterality: N/A;  4 hr case    LYSIS OF ADHESIONS OF URETER Bilateral 9/3/2024    Procedure: URETEROLYSIS;  Surgeon: Maliha Crawford MD;  Location: [facility] OR 2ND FLR;  Service: Gynecology Oncology;  Laterality: Bilateral;    MOBILIZATION OF SPLENIC FLEXURE  9/3/2024    Procedure: MOBILIZATION, SPLENIC FLEXURE;  Surgeon: Ruby Caicedo MD;  Location: [facility] OR 2ND FLR;  Service: Colon and Rectal;;    OMENTECTOMY N/A 9/3/2024    Procedure: OMENTECTOMY;  Surgeon: Maliha Crawford MD;  Location: [facility] OR 2ND FLR;  Service: Gynecology Oncology;  Laterality: N/A;    TOTAL ABDOMINAL HYSTERECTOMY N/A 9/3/2024    Procedure:  HYSTERECTOMY, TOTAL, ABDOMINAL;  Surgeon: Maliha Crawford MD;  Location: [facility] OR [address] [place] [address];  Service: Gynecology Oncology;  Laterality: N/A;    TUBAL LIGATION      at age 32's       Review of patient's allergies indicates:  No Known Allergies    No current facility-administered medications on file prior to encounter.     Current Outpatient Medications on File Prior to Encounter   Medication Sig    adhesive remover (UNISOLVE ADHESIVE REMOVER WIPE) Misc One every 10 days    amLODIPine (NORVASC) 5 MG tablet Take 1 tablet by mouth every morning.    apixaban (ELIQUIS) 5 mg Tab Take 1 tablet (5 mg total) by mouth 2 (two) times daily.    blood-glucose meter,continuous (DEXCOM G7 ) Misc Use daily    blood-glucose sensor (DEXCOM G7 SENSOR) Charito Use every 10 days    ciprofloxacin HCl (CIPRO) 500 MG tablet Take 1 tablet (500 mg total) by mouth On call Procedure. take 1 tablespoons of at 9 pm and 1 tablespoons at 11 pm the night before surgery    coenzyme Q10 (CO Q-10) 100 mg capsule Take 100 mg by mouth nightly.    dexAMETHasone (DECADRON) 4 MG Tab Take 8 mg (2 tablets) by mouth daily on days 2-4 of each chemotherapy cycle.    docusate sodium (COLACE ORAL) Take 1 tablet by mouth every evening.    DULoxetine (CYMBALTA) 30 MG capsule Take 1 capsule (30 mg total) by mouth once daily.    EScitalopram oxalate (LEXAPRO) 10 MG tablet Take 1 tablet (10 mg total) by mouth once daily.    gabapentin (NEURONTIN) 100 MG capsule Take 2 capsules (200 mg total) by mouth every evening. (Patient taking differently: Take 200 mg by mouth every evening. 100 mg in the morning 100 mg noon and 300 mg in the evening)    HYDROcodone-acetaminophen (NORCO) 7.5-325 mg per tablet Take 1 tablet by mouth every 4 (four) hours as needed for Pain.    levothyroxine (SYNTHROID) 75 MCG tablet Take 1 tablet by mouth every morning.    HASEEB LOZANO U-100 INSULIN 100 unit/mL pen Inject 2-10 Units into the skin 3 (three) times daily with meals.    metFORMIN  (GLUCOPHAGE) 1000 MG tablet Take 1,000 mg by mouth 2 (two) times daily with meals.    metoprolol succinate (TOPROL-XL) 50 MG 24 hr tablet Take 1 tablet by mouth every morning.    metroNIDAZOLE (FLAGYL) 500 MG tablet take 1 tab at 9 pm and 1 tab at 11 pm the night before surgery    naloxone (NARCAN) 4 mg/actuation Spry 1 spray (4 mg total) by Nasal route 1 (one) time if needed (for emergency opioid reversal).    OLANZapine (ZYPREXA) 5 MG tablet Take 1 tablet by mouth nightly on days 1-4 of each chemotherapy cycle.    ondansetron (ZOFRAN-ODT) 4 MG TbDL DISSOLVE ONE TABLET BY MOUTH EVERY 4 TO 6 HOURS AS NEEDED FOR NAUSEA    oxyCODONE (ROXICODONE) 20 mg Tab immediate release tablet Take 1 tablet (20 mg total) by mouth every 4 (four) hours as needed for Pain.    polyethylene glycol 3350 (MIRALAX ORAL) Take by mouth daily as needed. Constipation    pravastatin (PRAVACHOL) 10 MG tablet Take 1 tablet by mouth every evening.    prochlorperazine (COMPAZINE) 5 MG tablet Take 1 tablet (5 mg total) by mouth every 6 (six) hours as needed for Nausea.    TOUJEO SOLOSTAR U-300 INSULIN 300 unit/mL (1.5 mL) InPn pen Inject 10 Units into the skin once daily.     Family History       Problem Relation (Age of Onset)    Cancer Brother          Tobacco Use    Smoking status: Never     Passive exposure: Past    Smokeless tobacco: Never   Substance and Sexual Activity    Alcohol use: Never    Drug use: Never    Sexual activity: Yes     Partners: Male     Birth control/protection: Post-menopausal     Review of Systems   Constitutional: Positive for malaise/fatigue. Negative for diaphoresis and fever.   HENT:  Negative for congestion, ear pain and sore throat.    Eyes: Negative.    Cardiovascular:  Positive for leg swelling. Negative for chest pain, cyanosis, irregular heartbeat, palpitations and syncope.   Respiratory:  Negative for cough, shortness of breath and wheezing.    Skin: Negative.    Musculoskeletal: Negative.    Gastrointestinal:   Negative for bloating, abdominal pain, constipation, diarrhea, dysphagia, nausea and vomiting.   Genitourinary:  Negative for hematuria.   Neurological:  Positive for tremors. Negative for headaches and light-headedness.   Psychiatric/Behavioral: Negative.       Objective:     Vital Signs (Most Recent):  Temp: 97.8 °F (36.6 °C) (09/04/24 0730)  Pulse: 106 (09/04/24 0807)  Resp: 20 (09/04/24 0345)  BP: 121/62 (09/04/24 0730)  SpO2: 98 % (09/04/24 0807) Vital Signs (24h Range):  Temp:  [97.3 °F (36.3 °C)-98.8 °F (37.1 °C)] 97.8 °F (36.6 °C)  Pulse:  [] 106  Resp:  [13-21] 20  SpO2:  [92 %-100 %] 98 %  BP: ()/(56-90) 121/62     Weight: 54.4 kg (119 lb 14.9 oz)  Body mass index is 22.66 kg/m².    SpO2: 98 %         Intake/Output Summary (Last 24 hours) at 9/4/2024 1008  Last data filed at 9/4/2024 0917  Gross per 24 hour   Intake 7698.08 ml   Output 2295 ml   Net 5403.08 ml       Lines/Drains/Airways       Drain  Duration                  Urethral Catheter 09/03/24 0750 16 Fr. 1 day              Peripheral Intravenous Line  Duration                  Peripheral IV - Single Lumen 09/03/24 1750 20 G Right Wrist <1 day         Peripheral IV - Single Lumen 09/03/24 1800 18 G Right Upper Arm <1 day                     Physical Exam  Constitutional:       General: She is sleeping. She is in acute distress.      Appearance: She is ill-appearing.   HENT:      Head: Normocephalic.      Nose: Nose normal.   Cardiovascular:      Rate and Rhythm: Normal rate and regular rhythm.      Pulses: Normal pulses.      Heart sounds: Normal heart sounds. No murmur heard.     No friction rub. No gallop.   Pulmonary:      Effort: Pulmonary effort is normal.      Breath sounds: Normal breath sounds.   Abdominal:      General: Abdomen is flat. Bowel sounds are normal. There is no distension.      Palpations: Abdomen is soft.      Tenderness: There is abdominal tenderness. There is no guarding.   Musculoskeletal:         General:  Swelling present. Normal range of motion.      Cervical back: Normal range of motion and neck supple.      Right lower leg: Edema present.      Left lower leg: Edema present.   Skin:     General: Skin is warm.   Neurological:      Mental Status: She is oriented to person, place, and time. She is lethargic.      GCS: GCS eye subscore is 4. GCS verbal subscore is 5. GCS motor subscore is 6.      Motor: Weakness and tremor present.   Psychiatric:         Mood and Affect: Mood normal.          Significant Labs: CMP   Recent Labs   Lab 09/03/24  1407 09/04/24  0124 09/04/24  0538   * 133* 133*   K 3.9 4.1 3.8    105 104   CO2 18* 18* 21*   * 199* 99   BUN 4* 9 8   CREATININE 0.6 0.7 0.7   CALCIUM 6.8* 8.3* 7.9*   PROT 3.1* 4.2* 4.0*   ALBUMIN 1.9* 2.5* 2.4*   BILITOT 0.5 0.3 0.3   ALKPHOS 18* 22* 22*   AST 21 41* 40   ALT 10 15 15   ANIONGAP 12 10 8   , CBC   Recent Labs   Lab 09/03/24  1407 09/04/24  0230 09/04/24  0538   WBC 7.10 14.10* 14.81*   HGB 10.9* 11.0* 10.4*   HCT 31.6* 29.5* 28.8*   PLT 93* 113* 111*   , and Troponin   Recent Labs   Lab 09/04/24  0538   TROPONINI <0.006       Significant Imaging:  No new imaging

## 2024-09-04 NOTE — ASSESSMENT & PLAN NOTE
Bella Meyer is a 74 y.o. 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 Stroke code activated 9/4 AM for lethargy and altered mental status. LKN around midnight, ~7.5hrs prior. Patient was given dilauded at 0022 for pain, rapid response called at 0530 for decreased responsiveness. Patient was given narcan x 2 with mild improvement in mentation, however mentation did not return to baseline prompting stroke code activation. Exam significant for generalized weakness and lethargy, but no focal neuro deficits appreciated. Of note patient intermittently with brief extremity spasm/twitching however no rhythmic jerking or overt seizure like activity observed.     -CTH/CTA: no acute intracranial abnormalities, vascular occlusion, or critical stenosis      Recommendations:  --Etiology of clinical presentation unclear at this time, acute stroke felt less likely given lack of focal deficits on exam  --Gen Neuro consulted per primary team  --SBP goal <180, maintain MAP >65  --Avoid hypotension or drastic fluctuations in BP to prevent hypoperfusion  --Continue home eliquis for AC once stable from medical standpoint given increased stroke risk in setting of hypercoagulability due to malignancy.  --Management of modifiable risk factors: HTN, HLD, DM  --Ongoing management and care per primary team  --No further stroke workup indicated at this time, VN will sign off  --Please contact stroke team with any questions/concerns

## 2024-09-04 NOTE — RESPIRATORY THERAPY
RAPID RESPONSE RESPIRATORY THERAPY STROKE CODE NOTE             Code Status: Full Code   : 1950  Bed: 844/844 A:   MRN: [id number]  Time page Received: 720   Time Rapid Response RT at Bedside: 722  Time Rapid Response RT left Bedside: 815    SITUATION    Evaluated patient for: Stroke Code     BACKGROUND    Why is the patient in the hospital?: Dehydration    Patient has a past medical history of Cancer, Diabetes mellitus, type 2, Hyperlipidemia, Hypertension, Hypothyroidism, and Pulmonary embolism.    24 Hours Vitals Range:  Temp:  [97.3 °F (36.3 °C)-98.8 °F (37.1 °C)]   Pulse:  []   Resp:  [13-21]   BP: ()/(56-90)   SpO2:  [92 %-100 %]     Labs:    Recent Labs     24  1407 24  0124 24  0538   * 133* 133*   K 3.9 4.1 3.8    105 104   CO2 18* 18* 21*   BUN 4* 9 8   CREATININE 0.6 0.7 0.7   * 199* 99   PHOS 3.0 2.6* 3.1   MG 1.9 2.0 2.0        Recent Labs     24  1220 24  0802   PH 7.271* 7.301*   PCO2 38.5 49.7*   PO2 217* 28*   HCO3 17.7* 24.5   POCSATURATED 100 46   BE -9* -2       ASSESSMENT/INTERVENTIONS    Upon arrival, patient resting comfortably in bed on room air with no signs or symptoms of respiratory distress or SOB. Pt taken to CT scan and upon return to room, VBG and lactate obtained. Stroke provider at bedside. No other resp interventions indicated at this time.     Last VS   Temp: 97.8 °F (36.6 °C) (730)  Pulse: 106 (807)  Resp: 20 (345)  BP: 121/62 (730)  SpO2: 98 % (807)    Level of Consciousness: Level of Consciousness (AVPU): responds to voice  Respiratory Effort: Respiratory Effort: Normal  Expansion/Accessory Muscle Usage: Expansion/Accessory Muscles/Retractions: no retractions, no use of accessory muscles  All Lung Field Breath Sounds:    O2 Device/Concentration: room air  NIPPV: No  Surgical airway: No  ETCO2 monitored:    Ambu at bedside:      Active Orders   Respiratory Care    Incentive  spirometry     Frequency: Q4H     Number of Occurrences: Until Specified     Order Comments: Q4 while awake until discharge.  Educate patient in use; Keep incentive spirometer within reach; and Document incentive spirometer volume every 4 hours while awake.    ((10 sets per hour (3-5 inspirations per set)) on original order)      Oxygen Continuous     Frequency: Continuous     Number of Occurrences: Until Specified     Order Questions:      Device type: Low flow      Device: Nasal Cannula (1- 5 Liters)      LPM: 2      Titrate O2 per Oxygen Titration Protocol: Yes      To maintain SpO2 goal of: >= 90%      Notify MD of: Inability to achieve desired SpO2; Sudden change in patient status and requires 20% increase in FiO2; Patient requires >60% FiO2       RECOMMENDATIONS    We recommend: RRT Recs: Continue POC per primary team.    ESCALATION    Orders received and case discussed with Dr. Basilio.    FOLLOW-UP    Please call back the Rapid Response RT, Neha Alegre RRT at x [phone] for any questions or concerns.

## 2024-09-05 LAB
ANION GAP SERPL CALC-SCNC: 8 MMOL/L (ref 8–16)
BASOPHILS # BLD AUTO: 0.02 K/UL (ref 0–0.2)
BASOPHILS NFR BLD: 0.2 % (ref 0–1.9)
BUN SERPL-MCNC: 11 MG/DL (ref 8–23)
CALCIUM SERPL-MCNC: 8.2 MG/DL (ref 8.7–10.5)
CHLORIDE SERPL-SCNC: 103 MMOL/L (ref 95–110)
CO2 SERPL-SCNC: 25 MMOL/L (ref 23–29)
CREAT SERPL-MCNC: 0.7 MG/DL (ref 0.5–1.4)
DIFFERENTIAL METHOD BLD: ABNORMAL
EOSINOPHIL # BLD AUTO: 0 K/UL (ref 0–0.5)
EOSINOPHIL NFR BLD: 0.1 % (ref 0–8)
ERYTHROCYTE [DISTWIDTH] IN BLOOD BY AUTOMATED COUNT: 16.9 % (ref 11.5–14.5)
EST. GFR  (NO RACE VARIABLE): >60 ML/MIN/1.73 M^2
GLUCOSE SERPL-MCNC: 123 MG/DL (ref 70–110)
GLUCOSE SERPL-MCNC: 128 MG/DL (ref 70–110)
GLUCOSE SERPL-MCNC: 129 MG/DL (ref 70–110)
GLUCOSE SERPL-MCNC: 137 MG/DL (ref 70–110)
GLUCOSE SERPL-MCNC: 144 MG/DL (ref 70–110)
GLUCOSE SERPL-MCNC: 145 MG/DL (ref 70–110)
GLUCOSE SERPL-MCNC: 147 MG/DL (ref 70–110)
GLUCOSE SERPL-MCNC: 149 MG/DL (ref 70–110)
HCT VFR BLD AUTO: 30.2 % (ref 37–48.5)
HGB BLD-MCNC: 11.9 G/DL (ref 12–16)
IMM GRANULOCYTES # BLD AUTO: 0.17 K/UL (ref 0–0.04)
IMM GRANULOCYTES NFR BLD AUTO: 1.4 % (ref 0–0.5)
LYMPHOCYTES # BLD AUTO: 1.2 K/UL (ref 1–4.8)
LYMPHOCYTES NFR BLD: 9.8 % (ref 18–48)
MAGNESIUM SERPL-MCNC: 2 MG/DL (ref 1.6–2.6)
MCH RBC QN AUTO: 34.1 PG (ref 27–31)
MCHC RBC AUTO-ENTMCNC: 39.4 G/DL (ref 32–36)
MCV RBC AUTO: 87 FL (ref 82–98)
MONOCYTES # BLD AUTO: 0.4 K/UL (ref 0.3–1)
MONOCYTES NFR BLD: 3.7 % (ref 4–15)
NEUTROPHILS # BLD AUTO: 10 K/UL (ref 1.8–7.7)
NEUTROPHILS NFR BLD: 84.8 % (ref 38–73)
NRBC BLD-RTO: 0 /100 WBC
PHOSPHATE SERPL-MCNC: 4.3 MG/DL (ref 2.7–4.5)
PLATELET # BLD AUTO: 104 K/UL (ref 150–450)
PMV BLD AUTO: 9.5 FL (ref 9.2–12.9)
POTASSIUM SERPL-SCNC: 4.2 MMOL/L (ref 3.5–5.1)
RBC # BLD AUTO: 3.49 M/UL (ref 4–5.4)
SODIUM SERPL-SCNC: 136 MMOL/L (ref 136–145)
WBC # BLD AUTO: 11.76 K/UL (ref 3.9–12.7)

## 2024-09-05 PROCEDURE — 97535 SELF CARE MNGMENT TRAINING: CPT | Mod: CO

## 2024-09-05 PROCEDURE — 20600001 HC STEP DOWN PRIVATE ROOM

## 2024-09-05 PROCEDURE — 80048 BASIC METABOLIC PNL TOTAL CA: CPT

## 2024-09-05 PROCEDURE — 83735 ASSAY OF MAGNESIUM: CPT

## 2024-09-05 PROCEDURE — 63600175 PHARM REV CODE 636 W HCPCS

## 2024-09-05 PROCEDURE — 25000003 PHARM REV CODE 250

## 2024-09-05 PROCEDURE — 36415 COLL VENOUS BLD VENIPUNCTURE: CPT

## 2024-09-05 PROCEDURE — 84100 ASSAY OF PHOSPHORUS: CPT

## 2024-09-05 PROCEDURE — 85025 COMPLETE CBC W/AUTO DIFF WBC: CPT

## 2024-09-05 RX ORDER — INSULIN ASPART 100 [IU]/ML
0-5 INJECTION, SOLUTION INTRAVENOUS; SUBCUTANEOUS
Status: DISCONTINUED | OUTPATIENT
Start: 2024-09-05 | End: 2024-09-08 | Stop reason: HOSPADM

## 2024-09-05 RX ORDER — OXYCODONE HYDROCHLORIDE 5 MG/1
5 TABLET ORAL EVERY 4 HOURS PRN
Status: DISCONTINUED | OUTPATIENT
Start: 2024-09-05 | End: 2024-09-05

## 2024-09-05 RX ORDER — GLUCAGON 1 MG
1 KIT INJECTION
Status: DISCONTINUED | OUTPATIENT
Start: 2024-09-05 | End: 2024-09-08 | Stop reason: HOSPADM

## 2024-09-05 RX ORDER — INSULIN GLARGINE 100 [IU]/ML
8 INJECTION, SOLUTION SUBCUTANEOUS DAILY
Status: DISCONTINUED | OUTPATIENT
Start: 2024-09-05 | End: 2024-09-07

## 2024-09-05 RX ORDER — ENOXAPARIN SODIUM 100 MG/ML
1 INJECTION SUBCUTANEOUS EVERY 12 HOURS
Status: DISCONTINUED | OUTPATIENT
Start: 2024-09-05 | End: 2024-09-06

## 2024-09-05 RX ORDER — IBUPROFEN 200 MG
16 TABLET ORAL
Status: DISCONTINUED | OUTPATIENT
Start: 2024-09-05 | End: 2024-09-08 | Stop reason: HOSPADM

## 2024-09-05 RX ORDER — IBUPROFEN 200 MG
24 TABLET ORAL
Status: DISCONTINUED | OUTPATIENT
Start: 2024-09-05 | End: 2024-09-08 | Stop reason: HOSPADM

## 2024-09-05 RX ORDER — ACETAMINOPHEN 500 MG
1000 TABLET ORAL
Status: DISCONTINUED | OUTPATIENT
Start: 2024-09-05 | End: 2024-09-08 | Stop reason: HOSPADM

## 2024-09-05 RX ORDER — OXYCODONE HYDROCHLORIDE 5 MG/1
5 TABLET ORAL
Status: DISCONTINUED | OUTPATIENT
Start: 2024-09-05 | End: 2024-09-08 | Stop reason: HOSPADM

## 2024-09-05 RX ORDER — OXYCODONE HYDROCHLORIDE 10 MG/1
10 TABLET ORAL
Status: DISCONTINUED | OUTPATIENT
Start: 2024-09-05 | End: 2024-09-08 | Stop reason: HOSPADM

## 2024-09-05 RX ORDER — IBUPROFEN 600 MG/1
600 TABLET ORAL EVERY 6 HOURS
Status: DISCONTINUED | OUTPATIENT
Start: 2024-09-06 | End: 2024-09-08 | Stop reason: HOSPADM

## 2024-09-05 RX ADMIN — MUPIROCIN: 20 OINTMENT TOPICAL at 08:09

## 2024-09-05 RX ADMIN — LEVOTHYROXINE SODIUM 75 MCG: 75 TABLET ORAL at 06:09

## 2024-09-05 RX ADMIN — ENOXAPARIN SODIUM 50 MG: 40 INJECTION SUBCUTANEOUS at 01:09

## 2024-09-05 RX ADMIN — INSULIN GLARGINE 8 UNITS: 100 INJECTION, SOLUTION SUBCUTANEOUS at 08:09

## 2024-09-05 RX ADMIN — OXYCODONE HYDROCHLORIDE 10 MG: 10 TABLET ORAL at 06:09

## 2024-09-05 RX ADMIN — OXYCODONE 5 MG: 5 TABLET ORAL at 10:09

## 2024-09-05 RX ADMIN — OXYCODONE 5 MG: 5 TABLET ORAL at 03:09

## 2024-09-05 RX ADMIN — ACETAMINOPHEN 1000 MG: 500 TABLET ORAL at 11:09

## 2024-09-05 RX ADMIN — METOPROLOL SUCCINATE 50 MG: 50 TABLET, EXTENDED RELEASE ORAL at 06:09

## 2024-09-05 RX ADMIN — ONDANSETRON 4 MG: 2 INJECTION INTRAMUSCULAR; INTRAVENOUS at 06:09

## 2024-09-05 RX ADMIN — KETOROLAC TROMETHAMINE 15 MG: 15 INJECTION, SOLUTION INTRAMUSCULAR; INTRAVENOUS at 05:09

## 2024-09-05 RX ADMIN — OXYCODONE 5 MG: 5 TABLET ORAL at 01:09

## 2024-09-05 RX ADMIN — ALVIMOPAN 12 MG: 12 CAPSULE ORAL at 09:09

## 2024-09-05 RX ADMIN — ACETAMINOPHEN 1000 MG: 500 TABLET ORAL at 06:09

## 2024-09-05 RX ADMIN — KETOROLAC TROMETHAMINE 15 MG: 15 INJECTION, SOLUTION INTRAMUSCULAR; INTRAVENOUS at 06:09

## 2024-09-05 RX ADMIN — OXYCODONE HYDROCHLORIDE 10 MG: 10 TABLET ORAL at 05:09

## 2024-09-05 RX ADMIN — KETOROLAC TROMETHAMINE 15 MG: 15 INJECTION, SOLUTION INTRAMUSCULAR; INTRAVENOUS at 12:09

## 2024-09-05 RX ADMIN — ONDANSETRON 4 MG: 2 INJECTION INTRAMUSCULAR; INTRAVENOUS at 08:09

## 2024-09-05 RX ADMIN — KETOROLAC TROMETHAMINE 15 MG: 15 INJECTION, SOLUTION INTRAMUSCULAR; INTRAVENOUS at 11:09

## 2024-09-05 RX ADMIN — OXYCODONE HYDROCHLORIDE 10 MG: 10 TABLET ORAL at 10:09

## 2024-09-05 RX ADMIN — ACETAMINOPHEN 1000 MG: 500 TABLET ORAL at 08:09

## 2024-09-05 NOTE — PT/OT/SLP PROGRESS
Physical Therapy      Patient Name:  Bella Meyer   MRN:  [id number]    Patient not seen today secondary to Other (Comment), Pain (2 attempts for tx session: 1. Lunch at 12:58 pm; 2. Pt with c/o 8/10 pain at 4:04 pm). Will follow-up on next scheduled visit.

## 2024-09-05 NOTE — CARE UPDATE
Afternoon Assessment:    Patient resting. Has not ambulated or voided. Multiple bowel movements. Reports pain 9/10.      Temp:  [97.5 °F (36.4 °C)-98.7 °F (37.1 °C)] 97.5 °F (36.4 °C)  Pulse:  [79-94] 94  Resp:  [16-20] 18  SpO2:  [93 %-96 %] 95 %  BP: (100-148)/(57-82) 138/82    PE:  Abdomen: Soft, non distended  SCDs in place     Labs:  Recent Labs   Lab 09/05/24  0320   WBC 11.76   RBC 3.49*   HGB 11.9*   HCT 30.2*   *   MCV 87   MCH 34.1*   MCHC 39.4*       A/P:  - Adjusted pain regimen for better pain control  - Encouraged ambulation with PT  - F/u void trial    aVlerie Basilio MD  Obstetrics and Gynecology, PGY-2

## 2024-09-05 NOTE — PLAN OF CARE
Problem: Adult Inpatient Plan of Care  Goal: Plan of Care Review  Outcome: Progressing  Goal: Patient-Specific Goal (Individualized)  Outcome: Progressing  Goal: Absence of Hospital-Acquired Illness or Injury  Outcome: Progressing  Goal: Optimal Comfort and Wellbeing  Outcome: Progressing  Goal: Readiness for Transition of Care  Outcome: Progressing     Problem: Diabetes Comorbidity  Goal: Blood Glucose Level Within Targeted Range  Outcome: Progressing     Problem: Wound  Goal: Improved Oral Intake  Outcome: Progressing  Goal: Skin Health and Integrity  Outcome: Progressing     Problem: Surgery Nonspecified  Goal: Fluid and Electrolyte Balance  Outcome: Progressing  Goal: Blood Glucose Level Within Targeted Range  Outcome: Progressing  Goal: Effective Urinary Elimination  Outcome: Progressing  Goal: Effective Oxygenation and Ventilation  Outcome: Progressing     Problem: Infection  Goal: Absence of Infection Signs and Symptoms  Outcome: Progressing     Problem: Skin Injury Risk Increased  Goal: Skin Health and Integrity  Outcome: Progressing     Problem: Fall Injury Risk  Goal: Absence of Fall and Fall-Related Injury  Outcome: Progressing

## 2024-09-05 NOTE — PT/OT/SLP PROGRESS
Occupational Therapy  OT/[facility] Client Care Conference Note    Patient Name:  Bella Meyer   MRN:  [id number]    A client care conference was completed by the OTR and the CARPENTER prior to treatment by the CARPENTER to discuss the patient's POC and current status.      9/5/2024

## 2024-09-05 NOTE — PT/OT/SLP PROGRESS
Occupational Therapy   Treatment    Name: Bella Meyer  MRN: [id number]  Admitting Diagnosis:  Dehydration  2 Days Post-Op    Recommendations:     Discharge Recommendations: Moderate Intensity Therapy  Discharge Equipment Recommendations:  none  Barriers to discharge:  None    Assessment:     Bella Meyer is a 74 y.o. female with a medical diagnosis of Dehydration.  She presents with the following performance deficits affecting function are weakness, impaired endurance, impaired self care skills, impaired functional mobility, gait instability, impaired balance, decreased upper extremity function, decreased lower extremity function, decreased safety awareness, pain.  Pt limited by stomach ache but demonstrated great participation this session. Pt made great progress towards functional transfer goals. Patient continues to demonstrate the need for moderate intensity therapy on a daily basis post acute exhibited by decreased independence with self-care and functional mobility           Rehab Prognosis:  Good; patient would benefit from acute skilled OT services to address these deficits and reach maximum level of function.       Plan:     Patient to be seen 4 x/week to address the above listed problems via self-care/home management, therapeutic activities, therapeutic exercises, neuromuscular re-education  Plan of Care Expires: 10/04/24  Plan of Care Reviewed with: patient, sibling    Subjective     Chief Complaint: stomach pain   Patient/Family Comments/goals: get out the bed   Pain/Comfort:  Pain Rating 1: 8/10  Location - Orientation 1: generalized  Location 1: abdomen (stomach ache)  Pain Addressed 1: Reposition, Distraction  Pain Rating Post-Intervention 1:  (unrated)    Objective:     Communicated with: nurse caraballo prior to session.  Patient found HOB elevated with SCD, telemetry upon OT entry to room.    General Precautions: Standard, fall    Orthopedic Precautions:N/A  Braces: N/A  Respiratory Status: Room air      Occupational Performance:     Bed Mobility:    Patient completed Scooting/Bridging with contact guard assistance  Patient completed Supine to Sit with contact guard assistance and with side rail , HOB elevated   Patient completed Sit to Supine with contact guard assistance     Functional Mobility/Transfers:  Patient completed Sit <> Stand Transfer with moderate assistance  with  rolling walker   Patient completed Bed <> Toilet Transfer using Step Transfer technique with contact guard assistance with rolling walker and grab bars(s)  Functional Mobility: pt ambulated 12 ft + 12 ft CGA with RW, to/from toilet with prolonged seated rest break     Activities of Daily Living:  Upper Body Dressing: moderate assistance to don gown as robe seated EOB  Toileting: moderate assistance for pericare and gown management in standing with RW and grab bar   Grooming: set up for face hygiene in the bed , HOB elevated     [facility] 6 Click ADL: 17    Treatment & Education:  Pt edu on goals, progress, importance of OOB activities, and fall prevention strategies. Pt had loose BM while preforming  sit to sand from EOB requiring  full linen changing.Addressed all pt questions/concerns with in the CARPENTER scope of practice.     Patient left HOB elevated with all lines intact, call button in reach, nurse notified, and daughters  present    GOALS:   Multidisciplinary Problems       Occupational Therapy Goals          Problem: Occupational Therapy    Goal Priority Disciplines Outcome Interventions   Occupational Therapy Goal     OT, PT/OT Progressing    Description: Goals to be met by: 9/18/2024     Patient will increase functional independence with ADLs by performing:    UE Dressing with Supervision.  LE Dressing with Contact Guard Assistance.  Grooming while seated with Stand-by Assistance.  Toileting from bedside commode with Contact Guard Assistance for hygiene and clothing management.   Supine to sit with Contact Guard Assistance.  Step  transfer with Contact Guard Assistance  Toilet transfer to bedside commode with Contact Guard Assistance.                         Time Tracking:     OT Date of Treatment: 09/05/24  OT Start Time: 0256  OT Stop Time: 0321  OT Total Time (min): 25 min    Billable Minutes:Self Care/Home Management 25    OT/[facility]: [place]     Number of E[facility] visits since last OT visit: 1    9/5/2024

## 2024-09-05 NOTE — NURSING
Contacted Dr. Bowers in regards to pt insulin drip algorithm. Current bgl is 138. Pt is on 0.2 units/ hr. Algorithm is instructing to decrease down to less than 0. Dr. Bowers states to pause gtt and recheck bgl hourly and restart when appropriate in algorithm.

## 2024-09-05 NOTE — PROGRESS NOTES
Progress Note  Gynecology Oncology     Admit Date: 2024  LOS: 4    Reason for Admission:  Dehydration    SUBJECTIVE:     Bella Meyer is a 74 y.o.  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 Surgery was complicated by intraoperative blood loss of 1L and received 2u pRBCs intra-op.     Post operative course was complicated by BG in 300's in PACU on POD#0 and patient went to the floor with an insulin drip. On POD1 patient was tachycardic and had AMS. A rapid response was called and a code stroke. After a full work up including consults from cardiology and neurology tachycardia was stated to be sinus tachycardia likely due to post op changes and AMS due to medication (narcotics)    Overnight patient reports one episode of nausea w/o vomiting while trying to take ibuprofen and pain control issues. Patient tolerating AAO x3. Patient reports pain is 9/10, requiring x 2 oxy 10. She described this as cramping. She did not receive scheduled ibuprofen or tylenol, she reports trouble swallowing the ibuprofen. She is tolerating small bites of solid food without N/V. Patient had multiple bowel movements that she was able to ambulate to the bathroom for but had leaking on the way. Patient ambulated with PT and family. She is voiding spontaneously.     OBJECTIVE:     Vital Signs   Temp:  [97.5 °F (36.4 °C)-98.7 °F (37.1 °C)] 97.7 °F (36.5 °C)  Pulse:  [71-95] 77  Resp:  [17-19] 17  SpO2:  [93 %-97 %] 97 %  BP: (138-167)/(76-82) 167/81      Intake/Output Summary (Last 24 hours) at 2024 0624  Last data filed at 2024 1811  Gross per 24 hour   Intake 542.87 ml   Output 320 ml   Net 222.87 ml       Physical Exam:  Gen: A&Ox3, NSR  CV: RRR  Pulm: Normal respiratory effort  Abd: soft, minimally-distended, appropriately-tender to palpation without  rebound or guarding  Inc: VML C/D/I  Ext: SCDs in place   : Deferred     Laboratory:  Lactate: 2.5  Troponin: < 0.006    BNP: 35    Recent Labs   Lab 09/04/24  0538 09/05/24  0320 09/06/24  0510   WBC 14.81* 11.76 10.52   HGB 10.4* 11.9* 8.5*   HCT 28.8* 30.2* 23.8*   MCV 89 87 92   * 104* 141*      Recent Labs   Lab 09/03/24  1407 09/04/24  0124 09/04/24  0538 09/05/24  0320   * 133* 133* 136   K 3.9 4.1 3.8 4.2    105 104 103   CO2 18* 18* 21* 25   BUN 4* 9 8 11   CREATININE 0.6 0.7 0.7 0.7   * 199* 99 137*   PROT 3.1* 4.2* 4.0*  --    BILITOT 0.5 0.3 0.3  --    ALKPHOS 18* 22* 22*  --    ALT 10 15 15  --    AST 21 41* 40  --    MG 1.9 2.0 2.0 2.0   PHOS 3.0 2.6* 3.1 4.3      Blood Sugars (AccuCheck):    Recent Labs     09/03/24  1405 09/03/24  1601 09/03/24  1805 09/03/24  1958 09/04/24  0731 09/04/24  0801   POCTGLUCOSE 243* 324* 315* 346* 97 104     Imaging:  EXAMINATION:  CTA STROKE MULTI-PHASE     CLINICAL HISTORY:  Neuro deficit, acute, stroke suspected;     TECHNIQUE:  Axial CT images obtained throughout the region of the head before and after the administration of intravenous contrast.  CT angiogram was performed through the cervical and intracranial vasculature during the IV bolus administration of 100mL of Omnipaque 350.  Two additional phases through the intracranial vasculature via multiphase technique.  Multiplanar MPR and MIP reformats were performed.     CT source data was analyzed using artificial intelligence software for detection of a large vessel occlusions (LVO) in order to enable computer assisted triage notification and aid clinical stroke decision making.     COMPARISON:  None     FINDINGS:  Mild prominence of the ventricles with compensatory enlargement of the sulci, in keeping with central volume loss.  No hydrocephalus.  No extra-axial blood or fluid collections.     Scattered patchy and confluent regions of subcortical and periventricular hypoattenuation,  overall nonspecific, but can be seen in setting of microvascular ischemic change.  No parenchymal mass effect, edema, acute hemorrhage or acute major vascular distribution infarct.     Skull/extracranial contents (limited evaluation): No displaced calvarial fracture. Mastoid air cells and paranasal sinuses are essentially clear.     Diminished enhancement of bilateral thyroid glands.  Partially imaged mild linear atelectasis within superior segment of right lower lobe.  Few scattered pulmonary micro nodules, for example in the right lung measuring approximately 3 mm (axial series 5, image 69).  Biapical fibronodular scarring.        CTA:     Mild calcification about the aortic arch with 3 vessels.     Subclavian arteries are patent without evidence for significant stenosis or occlusion.     Origin of the left cervical internal carotid artery is patent with less than 50% stenosis per NASCET criteria.     Calcific and atheromatous atherosclerosis at the origin of the right cervical internal carotid artery with less than 50% stenosis per NASCET criteria.     Remainder of the cervical internal carotid arteries are unremarkable.     Origins of the vertebral arteries are patent without evidence for significant stenosis or occlusion.  Extracranial vertebral arteries are unremarkable.     The petrous portions of the internal carotid arteries are unremarkable.  Calcific atherosclerosis of the cavernous and supraclinoid portions of the internal carotid arteries without significant stenosis or occlusion.     Hypoplastic left A1 segment.  Anterior cerebral arteries are patent without evidence for significant stenosis or occlusion.     Middle cerebral arteries are patent without evidence for significant stenosis or occlusion.     Mild diffuse narrowing about the intracranial vertebral arteries and basilar artery without evidence for focal occlusion.  Fetal circulation left PCA.  Posterior cerebral arteries are patent without  evidence for significant stenosis or occlusion.     No intracranial aneurysm.     Venous structures demonstrate no evidence for sinus thrombosis or significant stenosis.     Impression:     No acute intracranial pathology.  If concern persists for acute ischemic event, consider MRI brain for further evaluation.     No evidence for large vessel occlusion or significant stenosis about the intracranial vasculature.  Mild diffuse diminutive caliber of about the intracranial vertebral arteries and basilar artery without evidence for focal occlusion.     Sequela of chronic microvascular ischemic change.     Atherosclerosis of at the right carotid bulb with less than 50% stenosis per NASCET criteria.     Few scattered pulmonary micro nodules.  For multiple solid nodules all <6 mm, Fleischner Society 2017 guidelines recommend no routine follow up for a low risk patient, or follow up with non-contrast chest CT at 12 months after discovery in a high risk patient.  EXAMINATION:  XR CHEST AP PORTABLE     CLINICAL HISTORY:  fluid overload;     TECHNIQUE:  Single frontal view of the chest was performed.     COMPARISON:  None     FINDINGS:  No confluent consolidation or pulmonary edema.  Cardiomediastinal contours normal.  No blunting of the right costophrenic angle.  Mild blunting of the left costophrenic angle either representing pleural thickening or a small pleural effusion.     Impression:     Clear lungs.     Blunting of the left costophrenic angle representing pleural thickening or a small pleural effusion.        ASSESSMENT/PLAN:     Active Hospital Problems    Diagnosis  POA    *Dehydration [E86.0]  Yes    Acute encephalopathy [G93.40]  No    EKG, abnormal [R94.31]  No    History of pulmonary embolism [Z86.711]  Yes    Malignant neoplasm of ovary [C56.9]  Yes    Type 2 diabetes mellitus, with long-term current use of insulin [E11.9, Z79.4]  Not Applicable    Hypothyroidism [E03.9]  Yes    Pure hypercholesterolemia  [E78.00]  Yes    Hypertension [I10]  Yes      Resolved Hospital Problems   No resolved problems to display.       Assessment: 74 y.o.  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     Plan:   1. Post-op   - Routine post-op advances  - CBC 11.9/30.1 > 8.5/23.8  - Continue PRN pain medications. Dilaudid held.   - Tolerating regular diet  - Antiemetics prn nausea/vomiting.  - Encourage IS  - Voiding spontaneously  - Therapeutic Lovenox  - SCDs in place    2. Acute encephalopathy  - AAO x3 this AM   - Cardiology consulted to evaluate cardiac. Reported AMS likely result of surgical interventions and recovery.  - CTA Stroke wnl. Neurology reports likely medication induced as patient responded to narcan.   - Held dilaudid and continuing ibuprofen / Tylenol scheduled w/ Oxy 5/10 for pain control.  - Hold home gabapentin and lexapro to minimize medications that may effect mental status    3. HGSOC  - See oncology information in H&P  - S/p neodjuvant chemotherapy  - POD3 interval debulking, see post op care above   - Home gabapentin held     4. Hypothyroidism  - Continue home synthroid     5. Type II Diabetes  - Home Lantus this AM   - SSI   - POCT BG pre meal and QHS  - 's.     6. HTN  - Holding home amlodipine   - Continue home metoprolol   - Goal BP at least >100/>60   - Hydral PRN ordered for SBP > 180     7. HLD  - Hold home statin     8. History of PE  - PE diagnosed on 2024  - Home regimen: eliquis 5 BID, completed Lovenox bridge with last dose   - Post op heparin held as platelets < 100  - CBC platelets 141   - Therapeutic Lovenox, will d/c on Eliquis      9. Depression  - Held home lexapro    Dispo: As patient meets appropriate post-op milestones, plan for discharge to home    Valerie Basilio MD  Obstetrics and Gynecology,  PGY-2

## 2024-09-05 NOTE — PROGRESS NOTES
[facility] - Oncology ([facility])  Colorectal Surgery  Progress Note    Patient Name: Bella Meyer  MRN: [id number]  Admission Date: 9/2/2024  Hospital Length of Stay: 3 days  Attending Physician: Maliha Crawford MD    Subjective:     Interval History: NAEO, pt feels very weak and sleepy this am, having liquid bowel function, no vomiting, pain tolerable, diet advanced this am by primary     Post-Op Info:  Procedure(s) (LRB):  LAPAROTOMY, EXPLORATORY (N/A)  HYSTERECTOMY, TOTAL, ABDOMINAL (N/A)  SALPINGO-OOPHORECTOMY, BILATERAL (N/A)  OMENTECTOMY (N/A)  DEBULKING, NEOPLASM (N/A)  RESECTION, COLON, LOW ANTERIOR (N/A)  SIGMOIDOSCOPY, FLEXIBLE (N/A)  ILEOCOLECTOMY (N/A)  MOBILIZATION, SPLENIC FLEXURE  URETEROLYSIS (Bilateral)   2 Days Post-Op      Medications:  Continuous Infusions:   insulin regular 1 units/mL infusion orderable (DKA)  0-52 Units/hr Intravenous Continuous 0.2 mL/hr at 09/05/24 0019 0.2 Units/hr at 09/05/24 0019     Scheduled Meds:   acetaminophen  1,000 mg Oral Q6H    alvimopan  12 mg Oral BID    insulin glargine U-100  8 Units Subcutaneous Daily    ketorolac  15 mg Intravenous Q6H    levothyroxine  75 mcg Oral QAM    metoprolol succinate  50 mg Oral QAM    mupirocin   Nasal BID     PRN Meds:   acetaminophen tablet 1,000 mg    alvimopan capsule 12 mg    insulin glargine U-100 (Lantus) pen 8 Units    ketorolac injection 15 mg    levothyroxine tablet 75 mcg    metoprolol succinate (TOPROL-XL) 24 hr tablet 50 mg    mupirocin 2 % ointment        Objective:     Vital Signs (Most Recent):  Temp: 98.1 °F (36.7 °C) (09/05/24 0342)  Pulse: 86 (09/05/24 0614)  Resp: 20 (09/05/24 0613)  BP: (!) 148/72 (09/05/24 0614)  SpO2: (!) 94 % (09/05/24 0342) Vital Signs (24h Range):  Temp:  [97.8 °F (36.6 °C)-98.8 °F (37.1 °C)] 98.1 °F (36.7 °C)  Pulse:  [] 86  Resp:  [16-20] 20  SpO2:  [92 %-98 %] 94 %  BP: (100-148)/(57-77) 148/72     Intake/Output - Last 3 Shifts         09/03 0700  09/04 0659 09/04 0700  09/05 0659  "09/05 0700  09/06 0659    P.O. 100 720     I.V. (mL/kg) 1794.8 (33) 204.7 (3.5)     Blood 700      IV Piggyback 9203.3 10.2     Total Intake(mL/kg) 09434.1 (216.9) 934.9 (16.1)     Urine (mL/kg/hr) 1420 (1.1) 998 (0.7)     Stool 0 0     Blood 1000      Total Output 2420 998     Net +9378.1 -63.1            Stool Occurrence 0 x 3 x             Physical Exam  Gen: WD/WN in NAD  HEENT: MMM, Sclera anicteric   Chest: Normocardic, normotensive, bilateral chest rise  Abd: Soft, AT, mod D, midline dressing with minimal strikethrough   Ext: No pitting edema noted   Significant Labs:  BMP (Last 3 Results):   Recent Labs   Lab 09/04/24  0124 09/04/24  0538 09/05/24  0320   * 99 137*   * 133* 136   K 4.1 3.8 4.2    104 103   CO2 18* 21* 25   BUN 9 8 11   CREATININE 0.7 0.7 0.7   CALCIUM 8.3* 7.9* 8.2*   MG 2.0 2.0 2.0     CBC (Last 3 Results):   Recent Labs   Lab 09/04/24  0230 09/04/24  0538 09/05/24  0320   WBC 14.10* 14.81* 11.76   RBC 3.38* 3.23* 3.49*   HGB 11.0* 10.4* 11.9*   HCT 29.5* 28.8* 30.2*   * 111* 104*   MCV 87 89 87   MCH 32.5* 32.2* 34.1*   MCHC 37.3* 36.1* 39.4*     CRP (Last 3 Results): No results for input(s): "CRP" in the last 168 hours.      Assessment/Plan:     Active Diagnoses:    Diagnosis Date Noted POA    PRINCIPAL PROBLEM:  Dehydration [E86.0] 09/02/2024 Yes    Acute encephalopathy [G93.40] 09/04/2024 No    EKG, abnormal [R94.31] 09/04/2024 No    History of pulmonary embolism [Z86.711] 08/06/2024 Yes    Malignant neoplasm of ovary [C56.9] 04/29/2024 Yes    Type 2 diabetes mellitus, with long-term current use of insulin [E11.9, Z79.4] 03/24/2024 Not Applicable    Hypothyroidism [E03.9] 03/24/2024 Yes    Pure hypercholesterolemia [E78.00] 01/23/2013 Yes    Hypertension [I10] 01/23/2013 Yes      Problems Resolved During this Admission:       74F with ovarian cancer s/p ex lap, debulking by gyn onc, ileocecectomy and LAR by CRS    -ADAT  -Analgesia PRN  -OOBAT  -Remainder of " care per primary team  -Discussed with attending, Dr. Marifer Clifford MD  Colorectal Surgery  [facility] - Oncology ([facility])

## 2024-09-05 NOTE — NURSING
Pt involved in plan of care and communicating needs throughout shift. AAOx4. Afebrile. She is tolerating small sips of fluids without N/V. Patient had multiple loose green bowel movements that she was unable to control and did not feel coming on. Regular insulin infusion and Jackson D/C per MD orders. ACHS. Up in room and to bathroom with 2 person assist. PRN Oxy 5mg PO given x2 with minimal relief. PRN Oxy 10mg PO given x1 with mild relief.No appetite. All VSS; no acute events so far this shift.  Pt remaining free from falls or injury throughout shift; bed locked and in lowest position; call light within reach.  Bed alarm on. Family at bedside. Pt instructed to call for assistance as needed.  Q1H rounding done on pt.

## 2024-09-06 PROBLEM — Z90.79 S/P TAH-BSO (TOTAL ABDOMINAL HYSTERECTOMY AND BILATERAL SALPINGO-OOPHORECTOMY): Status: ACTIVE | Noted: 2024-09-06

## 2024-09-06 PROBLEM — Z90.710 S/P TAH-BSO (TOTAL ABDOMINAL HYSTERECTOMY AND BILATERAL SALPINGO-OOPHORECTOMY): Status: ACTIVE | Noted: 2024-09-06

## 2024-09-06 PROBLEM — G93.40 ACUTE ENCEPHALOPATHY: Status: RESOLVED | Noted: 2024-09-04 | Resolved: 2024-09-06

## 2024-09-06 PROBLEM — Z90.722 S/P TAH-BSO (TOTAL ABDOMINAL HYSTERECTOMY AND BILATERAL SALPINGO-OOPHORECTOMY): Status: ACTIVE | Noted: 2024-09-06

## 2024-09-06 PROBLEM — E86.0 DEHYDRATION: Status: RESOLVED | Noted: 2024-09-02 | Resolved: 2024-09-06

## 2024-09-06 LAB
BASOPHILS # BLD AUTO: 0.02 K/UL (ref 0–0.2)
BASOPHILS NFR BLD: 0.2 % (ref 0–1.9)
BLD PROD TYP BPU: NORMAL
BLD PROD TYP BPU: NORMAL
BLOOD UNIT EXPIRATION DATE: NORMAL
BLOOD UNIT EXPIRATION DATE: NORMAL
BLOOD UNIT TYPE CODE: 6200
BLOOD UNIT TYPE CODE: 6200
BLOOD UNIT TYPE: NORMAL
BLOOD UNIT TYPE: NORMAL
CODING SYSTEM: NORMAL
CODING SYSTEM: NORMAL
CROSSMATCH INTERPRETATION: NORMAL
CROSSMATCH INTERPRETATION: NORMAL
DIFFERENTIAL METHOD BLD: ABNORMAL
DISPENSE STATUS: NORMAL
DISPENSE STATUS: NORMAL
EOSINOPHIL # BLD AUTO: 0 K/UL (ref 0–0.5)
EOSINOPHIL NFR BLD: 0.1 % (ref 0–8)
ERYTHROCYTE [DISTWIDTH] IN BLOOD BY AUTOMATED COUNT: 16.1 % (ref 11.5–14.5)
GLUCOSE SERPL-MCNC: 140 MG/DL (ref 70–110)
GLUCOSE SERPL-MCNC: 177 MG/DL (ref 70–110)
HCT VFR BLD AUTO: 23.8 % (ref 37–48.5)
HGB BLD-MCNC: 8.5 G/DL (ref 12–16)
HGB BLD-MCNC: 8.7 G/DL (ref 12–16)
IMM GRANULOCYTES # BLD AUTO: 0.08 K/UL (ref 0–0.04)
IMM GRANULOCYTES NFR BLD AUTO: 0.8 % (ref 0–0.5)
LYMPHOCYTES # BLD AUTO: 2.1 K/UL (ref 1–4.8)
LYMPHOCYTES NFR BLD: 20.3 % (ref 18–48)
MCH RBC QN AUTO: 32.7 PG (ref 27–31)
MCHC RBC AUTO-ENTMCNC: 35.7 G/DL (ref 32–36)
MCV RBC AUTO: 92 FL (ref 82–98)
MONOCYTES # BLD AUTO: 0.4 K/UL (ref 0.3–1)
MONOCYTES NFR BLD: 4 % (ref 4–15)
NEUTROPHILS # BLD AUTO: 7.9 K/UL (ref 1.8–7.7)
NEUTROPHILS NFR BLD: 74.6 % (ref 38–73)
NRBC BLD-RTO: 0 /100 WBC
NUM UNITS TRANS PACKED RBC: NORMAL
NUM UNITS TRANS PACKED RBC: NORMAL
PLATELET # BLD AUTO: 141 K/UL (ref 150–450)
PMV BLD AUTO: 9.5 FL (ref 9.2–12.9)
RBC # BLD AUTO: 2.6 M/UL (ref 4–5.4)
WBC # BLD AUTO: 10.52 K/UL (ref 3.9–12.7)

## 2024-09-06 PROCEDURE — 97530 THERAPEUTIC ACTIVITIES: CPT | Mod: CQ

## 2024-09-06 PROCEDURE — 20600001 HC STEP DOWN PRIVATE ROOM

## 2024-09-06 PROCEDURE — 36415 COLL VENOUS BLD VENIPUNCTURE: CPT

## 2024-09-06 PROCEDURE — 97535 SELF CARE MNGMENT TRAINING: CPT | Mod: CO

## 2024-09-06 PROCEDURE — 99024 POSTOP FOLLOW-UP VISIT: CPT | Mod: POP,,, | Performed by: OBSTETRICS & GYNECOLOGY

## 2024-09-06 PROCEDURE — 25000003 PHARM REV CODE 250

## 2024-09-06 PROCEDURE — 63600175 PHARM REV CODE 636 W HCPCS

## 2024-09-06 PROCEDURE — 85018 HEMOGLOBIN: CPT

## 2024-09-06 PROCEDURE — 85025 COMPLETE CBC W/AUTO DIFF WBC: CPT

## 2024-09-06 PROCEDURE — 97116 GAIT TRAINING THERAPY: CPT | Mod: CQ

## 2024-09-06 RX ORDER — IBUPROFEN 600 MG/1
600 TABLET ORAL EVERY 6 HOURS
Qty: 30 TABLET | Refills: 3 | Status: SHIPPED | OUTPATIENT
Start: 2024-09-06

## 2024-09-06 RX ORDER — ACETAMINOPHEN 500 MG
1000 TABLET ORAL EVERY 6 HOURS
Qty: 30 TABLET | Refills: 3 | Status: SHIPPED | OUTPATIENT
Start: 2024-09-06

## 2024-09-06 RX ORDER — ENOXAPARIN SODIUM 100 MG/ML
1 INJECTION SUBCUTANEOUS EVERY 12 HOURS
Status: DISCONTINUED | OUTPATIENT
Start: 2024-09-06 | End: 2024-09-06

## 2024-09-06 RX ORDER — AMLODIPINE BESYLATE 5 MG/1
5 TABLET ORAL EVERY MORNING
Status: DISCONTINUED | OUTPATIENT
Start: 2024-09-06 | End: 2024-09-08 | Stop reason: HOSPADM

## 2024-09-06 RX ORDER — OXYCODONE HYDROCHLORIDE 5 MG/1
5 TABLET ORAL EVERY 4 HOURS PRN
Qty: 30 TABLET | Refills: 0 | Status: SHIPPED | OUTPATIENT
Start: 2024-09-06 | End: 2024-09-08 | Stop reason: HOSPADM

## 2024-09-06 RX ORDER — ENOXAPARIN SODIUM 100 MG/ML
1 INJECTION SUBCUTANEOUS EVERY 12 HOURS
Status: DISCONTINUED | OUTPATIENT
Start: 2024-09-07 | End: 2024-09-08 | Stop reason: HOSPADM

## 2024-09-06 RX ORDER — ADHESIVE BANDAGE
30 BANDAGE TOPICAL DAILY PRN
Qty: 354 ML | Refills: 1 | Status: SHIPPED | OUTPATIENT
Start: 2024-09-06 | End: 2024-09-08 | Stop reason: HOSPADM

## 2024-09-06 RX ADMIN — LEVOTHYROXINE SODIUM 75 MCG: 75 TABLET ORAL at 07:09

## 2024-09-06 RX ADMIN — IBUPROFEN 600 MG: 600 TABLET, FILM COATED ORAL at 12:09

## 2024-09-06 RX ADMIN — OXYCODONE HYDROCHLORIDE 10 MG: 10 TABLET ORAL at 05:09

## 2024-09-06 RX ADMIN — ENOXAPARIN SODIUM 50 MG: 100 INJECTION SUBCUTANEOUS at 01:09

## 2024-09-06 RX ADMIN — PROCHLORPERAZINE EDISYLATE 5 MG: 5 INJECTION INTRAMUSCULAR; INTRAVENOUS at 12:09

## 2024-09-06 RX ADMIN — METOPROLOL SUCCINATE 50 MG: 50 TABLET, EXTENDED RELEASE ORAL at 07:09

## 2024-09-06 RX ADMIN — OXYCODONE HYDROCHLORIDE 10 MG: 10 TABLET ORAL at 01:09

## 2024-09-06 RX ADMIN — ACETAMINOPHEN 1000 MG: 500 TABLET ORAL at 07:09

## 2024-09-06 RX ADMIN — ACETAMINOPHEN 1000 MG: 500 TABLET ORAL at 03:09

## 2024-09-06 RX ADMIN — AMLODIPINE BESYLATE 5 MG: 5 TABLET ORAL at 01:09

## 2024-09-06 RX ADMIN — IBUPROFEN 600 MG: 600 TABLET, FILM COATED ORAL at 05:09

## 2024-09-06 RX ADMIN — ACETAMINOPHEN 1000 MG: 500 TABLET ORAL at 08:09

## 2024-09-06 RX ADMIN — OXYCODONE HYDROCHLORIDE 10 MG: 10 TABLET ORAL at 10:09

## 2024-09-06 RX ADMIN — INSULIN GLARGINE 8 UNITS: 100 INJECTION, SOLUTION SUBCUTANEOUS at 10:09

## 2024-09-06 RX ADMIN — IBUPROFEN 600 MG: 600 TABLET, FILM COATED ORAL at 11:09

## 2024-09-06 RX ADMIN — IBUPROFEN 600 MG: 600 TABLET, FILM COATED ORAL at 06:09

## 2024-09-06 RX ADMIN — ENOXAPARIN SODIUM 50 MG: 40 INJECTION SUBCUTANEOUS at 12:09

## 2024-09-06 NOTE — PLAN OF CARE
[facility] - Oncology ([facility])    HOME HEALTH ORDERS  FACE TO FACE ENCOUNTER    Patient Name: Bella Meyer  YOB: 1950    PCP: Rosita, Primary Doctor   PCP Address: None  PCP Phone Number: None  PCP Fax: None    Encounter Date: 9/2/24    Admit to Home Health    Diagnoses:  Active Hospital Problems    Diagnosis  POA    *S/P Ex Lap/FANNY/BSO/Omentectomy/Debulking/LAR/Ileocecetomy/Ureterolysis/FlexSig/Cystoscopy [Z90.710, Z90.722, Z90.79]  No    EKG, abnormal [R94.31]  No    History of pulmonary embolism [Z86.711]  Yes    Malignant neoplasm of ovary [C56.9]  Yes    Type 2 diabetes mellitus, with long-term current use of insulin [E11.9, Z79.4]  Not Applicable    Hypothyroidism [E03.9]  Yes    Pure hypercholesterolemia [E78.00]  Yes    Hypertension [I10]  Yes      Resolved Hospital Problems    Diagnosis Date Resolved POA    Acute encephalopathy [G93.40] 09/06/2024 No    Dehydration [E86.0] 09/06/2024 Yes       Follow Up Appointments:  Future Appointments   Date Time Provider Department Center   9/10/2024 10:30 AM Kristen Blum DO [facility] OPP[facility] [place]   10/1/2024 10:00 AM Maliha Crawford MD [facility]   1/6/2025 10:00 AM Karla Herrera FNP-C [facility] INTONC [facility]       Allergies:Review of patient's allergies indicates:  No Known Allergies    Medications: Review discharge medications with patient and family and provide education.    Current Facility-Administered Medications   Medication Dose Route Frequency Provider Last Rate Last Admin    acetaminophen tablet 1,000 mg  1,000 mg Oral Q6H Valerie Sullivan MD   1,000 mg at 09/08/24 0335    amLODIPine tablet 5 mg  5 mg Oral QAM Valerie Sullivan MD   5 mg at 09/08/24 0757    enoxaparin injection 60 mg  1 mg/kg Subcutaneous Q12H (treatment, non-standard time) Herlinda Bowers MD   60 mg at 09/08/24 0045    glucagon (human recombinant) injection 1 mg  1 mg Intramuscular PRN Valerie Sullivan MD        glucose chewable tablet 16 g  16 g Oral PRN  Valerie Sullivan MD        glucose chewable tablet 24 g  24 g Oral PRN Valerie Sullivan MD        hydrALAZINE injection 10 mg  10 mg Intravenous Q6H PRN Valerie Sullivan MD        ibuprofen tablet 600 mg  600 mg Oral Q6H Herlinda Bowers MD   600 mg at 09/08/24 0531    insulin aspart U-100 pen 0-5 Units  0-5 Units Subcutaneous QID (AC + HS) PRN Valerie Sullivan MD   2 Units at 09/07/24 1554    insulin glargine U-100 (Lantus) pen 10 Units  10 Units Subcutaneous Daily Herlinda Bowers MD   10 Units at 09/07/24 0844    levothyroxine tablet 75 mcg  75 mcg Oral Valerie Casas MD   75 mcg at 09/08/24 0757    melatonin tablet 6 mg  6 mg Oral Nightly PRN Valerie Sullivan MD        metoprolol succinate (TOPROL-XL) 24 hr tablet 50 mg  50 mg Oral Valerie Casas MD   50 mg at 09/08/24 0757    naloxone 0.4 mg/mL injection 0.02 mg  0.02 mg Intravenous PRN Valerie Sullivan MD   0.02 mg at 09/04/24 0553    ondansetron disintegrating tablet 4 mg  4 mg Oral Q6H PRN Herlinda Bowers MD   4 mg at 09/07/24 1549    oxyCODONE immediate release tablet 5 mg  5 mg Oral Q3H PRN Valerie Sullivan MD   5 mg at 09/05/24 1321    oxyCODONE immediate release tablet Tab 10 mg  10 mg Oral Q3H PRN Margret Fernandez MD   10 mg at 09/08/24 0718    prochlorperazine tablet 5 mg  5 mg Oral Q6H PRN Herlinda Bowers MD            Medication List        START taking these medications      acetaminophen 500 MG tablet  Commonly known as: TYLENOL  Take 2 tablets (1,000 mg total) by mouth every 6 (six) hours.     ibuprofen 600 MG tablet  Commonly known as: ADVIL,MOTRIN  Take 1 tablet every 6 hours. Alternate between ibuprofen & tylenol every 3 hours. For example: @0800: ibuprofen 600mg @1100: tylenol 1000mg @1400: ibuprofen 600mg @1700: tylenol 1000 mg @2000: ibuprofen 600mg. Can take two of the 300 mg over the counter ibuprofen is smaller pill is easier.            CHANGE how you take these medications      oxyCODONE 5 MG immediate release  tablet  Commonly known as: ROXICODONE  Take 1 tablet (5 mg total) by mouth every 3 (three) hours as needed for Pain. Take 1 tablet (5 mg) for pain scale 4-6. Take 2 tablets (10 mg) for pain scale 7-10. Do not take more than 2 tablets (10 mg) every 3 hours.  What changed:   medication strength  how much to take  when to take this  additional instructions     polyethylene glycol 17 gram/dose powder  Commonly known as: GLYCOLAX  Mix 1 capful (17 g) in liquid and drink by mouth once daily.  What changed:   medication strength  how much to take  when to take this  reasons to take this  additional instructions            CONTINUE taking these medications      amLODIPine 5 MG tablet  Commonly known as: NORVASC  Take 1 tablet by mouth every morning.     apixaban 5 mg Tab  Commonly known as: ELIQUIS  Take 1 tablet (5 mg total) by mouth 2 (two) times daily.     CO Q-10 100 mg capsule  Generic drug: coenzyme Q10  Take 100 mg by mouth nightly.     dexAMETHasone 4 MG Tab  Commonly known as: DECADRON  Take 8 mg (2 tablets) by mouth daily on days 2-4 of each chemotherapy cycle.     DEXCOM G7  Misc  Generic drug: blood-glucose meter,continuous  Use daily     DEXCOM G7 SENSOR Charito  Generic drug: blood-glucose sensor  Use every 10 days     EScitalopram oxalate 10 MG tablet  Commonly known as: LEXAPRO  Take 1 tablet (10 mg total) by mouth once daily.     gabapentin 100 MG capsule  Commonly known as: NEURONTIN  Take 2 capsules (200 mg total) by mouth every evening.     levothyroxine 75 MCG tablet  Commonly known as: SYNTHROID  Take 1 tablet by mouth every morning.     HASEEB LOZANO U-100 INSULIN 100 unit/mL pen  Generic drug: insulin lispro-aabc  Inject 2-10 Units into the skin 3 (three) times daily with meals.     metFORMIN 1000 MG tablet  Commonly known as: GLUCOPHAGE  Take 1,000 mg by mouth 2 (two) times daily with meals.     metoprolol succinate 50 MG 24 hr tablet  Commonly known as: TOPROL-XL  Take 1 tablet by mouth  every morning.     naloxone 4 mg/actuation Spry  Commonly known as: NARCAN  1 spray (4 mg total) by Nasal route 1 (one) time if needed (for emergency opioid reversal).     OLANZapine 5 MG tablet  Commonly known as: ZyPREXA  Take 1 tablet by mouth nightly on days 1-4 of each chemotherapy cycle.     ondansetron 4 MG Tbdl  Commonly known as: ZOFRAN-ODT  DISSOLVE ONE TABLET BY MOUTH EVERY 4 TO 6 HOURS AS NEEDED FOR NAUSEA     pravastatin 10 MG tablet  Commonly known as: PRAVACHOL  Take 1 tablet by mouth every evening.     prochlorperazine 5 MG tablet  Commonly known as: COMPAZINE  Take 1 tablet (5 mg total) by mouth every 6 (six) hours as needed for Nausea.     TOUJEO SOLOSTAR U-300 INSULIN 300 unit/mL (1.5 mL) Inpn pen  Generic drug: insulin glargine (TOUJEO)  Inject 10 Units into the skin once daily.            STOP taking these medications      ciprofloxacin HCl 500 MG tablet  Commonly known as: CIPRO     COLACE ORAL     HYDROcodone-acetaminophen 7.5-325 mg per tablet  Commonly known as: NORCO     LOVENOX 60 mg/0.6 mL Syrg  Generic drug: enoxaparin     metroNIDAZOLE 500 MG tablet  Commonly known as: FLAGYL                I have seen and examined this patient within the last 30 days. My clinical findings that support the need for the home health skilled services and home bound status are the following:no   Weakness/numbness causing balance and gait disturbance due to Malignancy/Cancer and Surgery making it taxing to leave home.     Diet:   diabetic diet 2000 calorie    Labs:  PRN    Referrals/ Consults  Physical Therapy to evaluate and treat. Evaluate for home safety and equipment needs; Establish/upgrade home exercise program. Perform / instruct on therapeutic exercises, gait training, transfer training, and Range of Motion.  Occupational Therapy to evaluate and treat. Evaluate home environment for safety and equipment needs. Perform/Instruct on transfers, ADL training, ROM, and therapeutic exercises.    Activities:    activity as tolerated and no lifting for 6 weeks    Nursing:   Agency to admit patient within 24 hours of hospital discharge unless specified on physician order or at patient request    SN to complete comprehensive assessment including routine vital signs. Instruct on disease process and s/s of complications to report to MD. Review/verify medication list sent home with the patient at time of discharge  and instruct patient/caregiver as needed. Frequency may be adjusted depending on start of care date.     Skilled nurse to perform up to 3 visits PRN for symptoms related to diagnosis    Notify MD if SBP > 160 or < 90; DBP > 90 or < 50; HR > 120 or < 50; Temp > 101; O2 < 88%;     Ok to schedule additional visits based on staff availability and patient request on consecutive days within the home health episode.    When multiple disciplines ordered:    Start of Care occurs on Sunday - Wednesday schedule remaining discipline evaluations as ordered on separate consecutive days following the start of care.    Thursday SOC -schedule subsequent evaluations Friday and Monday the following week.     Friday - Saturday SOC - schedule subsequent discipline evaluations on consecutive days starting Monday of the following week.    For all post-discharge communication and subsequent orders please contact patient's primary care physician. If unable to reach primary care physician or do not receive response within 30 minutes, please contact GYN ONC PAGER [phone] for clinical staff order clarification    Miscellaneous   Diabetic Care:   SN to perform and educate Diabetic management with blood glucose monitoring: and Report CBG < 60 or > 350 to physician.    Home Health Aide:  Physical Therapy Three times weekly and Occupational Therapy Three times weekly    Wound Care Orders  Patient had midline vertical skin incision on abdomen. No dressing or wound care needed.     I certify that this patient is confined to her home and needs  intermittent skilled nursing care, physical therapy, and occupational therapy.    Herlinda Bowers MD  PGY-4 OB/GYN

## 2024-09-06 NOTE — PROGRESS NOTES
Oncology Social Worker following patient's case. Secure Chat message updates from the Gyn Onc team. Met with patient and her daughter Eugenia Campbell this afternoon, and later called her daughter with update on the recommendations from PT and the team - moderate intensity therapy/SNF. She put her phone on speaker so that patient could hear me as well. Explained SNF options ([facility], other hospital based, nursing home facilities) and insurance coverage, how to search and compare/see ratings in Medicare's website. Explained different post acute options for therapy and how SNF is different from home health. Encouraged for them to think about and discuss as a family. Will assist with referrals for the agencies they choose.     Contacted the Admitting Dept., ext. [phone], and spoke with Kia to provide patient's secondary/supplement insurance information that patient's daughter sent to me via email. Patient's admission record only listed Medicare. Kia to input the information, verify the coverage, and update patient's chart.     Will follow up on Monday.

## 2024-09-06 NOTE — PT/OT/SLP PROGRESS
"Occupational Therapy   Treatment    Name: Bella Meyer  MRN: [id number]  Admitting Diagnosis:  S/P FANNY-BSO (total abdominal hysterectomy and bilateral salpingo-oophorectomy)  3 Days Post-Op    Recommendations:     Discharge Recommendations: Moderate Intensity Therapy  Discharge Equipment Recommendations:  none  Barriers to discharge:  None    Assessment:     Bella Meyer is a 74 y.o. female with a medical diagnosis of S/P FANNY-BSO (total abdominal hysterectomy and bilateral salpingo-oophorectomy).  She presents with following performance deficits affecting function are weakness, impaired endurance, impaired self care skills, impaired functional mobility, gait instability, impaired balance, decreased lower extremity function, decreased upper extremity function. Pt limited by generalized deconditioning but showed great effort in today's session. Patient continues to demonstrate the need for moderate intensity therapy on a daily basis post acute exhibited by decreased independence with self-care and functional mobility        Rehab Prognosis:  Good; patient would benefit from acute skilled OT services to address these deficits and reach maximum level of function.       Plan:     Patient to be seen 4 x/week to address the above listed problems via self-care/home management, therapeutic activities, therapeutic exercises, neuromuscular re-education  Plan of Care Expires: 10/04/24  Plan of Care Reviewed with: patient, daughter    Subjective     Chief Complaint: no appetite   Patient/Family Comments/goals: "I finally ate some gumbo today"  Pain/Comfort:  Pain Rating 1: 0/10    Objective:     Communicated with: nurse luis prior to session.  Patient found up in chair with pulse ox (continuous), telemetry upon OT entry to room.    General Precautions: Standard, fall    Orthopedic Precautions:N/A  Braces: N/A  Respiratory Status: Room air     Occupational Performance:     Bed Mobility:    Pt found OOB    Functional " Mobility/Transfers:  Patient completed Sit <> Stand Transfer with minimum assistance  with  rolling walker   From toilet CGA with RW and grab bar   Patient completed  Chair Transfer using Step Transfer technique with contact guard assistance with rolling walker  Patient completed Toilet Transfer Step Transfer technique with contact guard assistance with  rolling walker and grab bars  Functional Mobility: pt stood x5 minutes sink side CGA with RW, to work on standing tolerance   Pt walk 15 ft +15 ft CGA with RW for functional toilet transfers     Activities of Daily Living:  Grooming: contact guard assistance for oral hygiene standing sink side with RW  Toileting: supervision for pericare seated on toilet using grab bar       [facility] 6 Click ADL: 18    Treatment & Education:  Pt edu on goals, progress, and importance of OOB activities . Pt edu on RW management, hand placement , step length, and sequencing when preforming functional transfers. Addressed all pt questions/concerns with in the [facility] scope of practice.     Patient left up in chair with all lines intact, call button in reach, and daugther present    GOALS:   Multidisciplinary Problems       Occupational Therapy Goals          Problem: Occupational Therapy    Goal Priority Disciplines Outcome Interventions   Occupational Therapy Goal     OT, PT/OT Progressing    Description: Goals to be met by: 9/18/2024     Patient will increase functional independence with ADLs by performing:    UE Dressing with Supervision.  LE Dressing with Contact Guard Assistance.  Grooming while seated with Stand-by Assistance.  Toileting from bedside commode with Contact Guard Assistance for hygiene and clothing management.   Supine to sit with Contact Guard Assistance.  Step transfer with Contact Guard Assistance  Toilet transfer to bedside commode with Contact Guard Assistance.                         Time Tracking:     OT Date of Treatment: 09/06/24  OT Start Time: 1504  OT Stop  Time: 1520  OT Total Time (min): 16 min    Billable Minutes:Self Care/Home Management 16    OT/ENRICO: ENRICO     Number of ENRICO visits since last OT visit: 2    9/6/2024

## 2024-09-06 NOTE — PLAN OF CARE
Problem: Adult Inpatient Plan of Care  Goal: Plan of Care Review  Outcome: Progressing  Flowsheets (Taken 9/6/2024 8951)  Plan of Care Reviewed With: patient  Goal: Patient-Specific Goal (Individualized)  Outcome: Progressing  Goal: Absence of Hospital-Acquired Illness or Injury  Outcome: Progressing  Goal: Optimal Comfort and Wellbeing  Outcome: Progressing  Goal: Readiness for Transition of Care  Outcome: Progressing   Pt AAOX4, prn oxy given for pain and discomfort during this shift, VS stable, BP remains elevated, scheduled meds given. Pt worked with PT/OT.  Educated pt /family on the use of call light  instructed the pt to call for assitance if needed, call light within reach, bed alarm set and bed is in the lowest position. No acute events noted during this shift. Family remains at bedside. Plan of care ongoing.

## 2024-09-06 NOTE — PLAN OF CARE
"AAOX4, verbal an able to make needs know. Afebrile. 2 loose BM. Gave PRN oxy 10mg for pain. Pt up in chair, x1 assist. C/o nausea, gave PRN zofran and compazine. Up x1 assist to bathroom. Abdominal incision open to air.  Rounded in the AM & expressed pt should really try and take the ibuprofen and Tylenol instead of oxy. Pt stated "oxy helps better with my pain" Family at bedside. Bed in lowest position, side rails up x2, call light in reach.   "

## 2024-09-06 NOTE — NURSING
Nurses Note -- 4 Eyes      9/6/2024   3:47 PM      Skin assessed during: Daily Assessment      [] No Altered Skin Integrity Present    []Prevention Measures Documented      [x] Yes- Altered Skin Integrity Present or Discovered   [] LDA Added if Not in Epic (Describe Wound)   [] New Altered Skin Integrity was Present on Admit and Documented in LDA   [] Wound Image Taken    Wound Care Consulted? Yes    Attending Nurse:  Zak Ashford RN/Staff Member:  FILI Bob      Incision site on pt abdomen

## 2024-09-06 NOTE — PT/OT/SLP PROGRESS
Physical Therapy Treatment    Patient Name:  Bella Meyer   MRN:  [id number]    Recommendations:     Discharge Recommendations: Moderate Intensity Therapy  Discharge Equipment Recommendations: none  Barriers to discharge: None    Assessment:     Bella Meyer is a 74 y.o. female admitted with a medical diagnosis of S/P FANNY-BSO (total abdominal hysterectomy and bilateral salpingo-oophorectomy).  She presents with the following impairments/functional limitations: weakness, impaired endurance, impaired self care skills, impaired functional mobility, gait instability, impaired balance, decreased coordination, decreased upper extremity function, decreased lower extremity function, decreased safety awareness, pain, impaired coordination, impaired cardiopulmonary response to activity requiring mod/min assistance and verbal cues for bed mob, sit < > stand transitions, and gait to prevent falls due to weakness, instability, fatigue, and pain.   In light of pt's current functional level and deficits, it is anticipated that pt will need to participate in a moderate intensity rehab program consisting of PT and OT in order to achieve full rehab potential to return to previous level of function and roles.  Pt remains motivated to participate in PT session and will cont to benefit from skilled PT intervention..    Rehab Prognosis: Good; patient would benefit from acute skilled PT services to address these deficits and reach maximum level of function.    Recent Surgery: Procedure(s) (LRB):  LAPAROTOMY, EXPLORATORY (N/A)  HYSTERECTOMY, TOTAL, ABDOMINAL (N/A)  SALPINGO-OOPHORECTOMY, BILATERAL (N/A)  OMENTECTOMY (N/A)  DEBULKING, NEOPLASM (N/A)  RESECTION, COLON, LOW ANTERIOR (N/A)  SIGMOIDOSCOPY, FLEXIBLE (N/A)  ILEOCOLECTOMY (N/A)  MOBILIZATION, SPLENIC FLEXURE  URETEROLYSIS (Bilateral) 3 Days Post-Op    Plan:     During this hospitalization, patient to be seen 4 x/week to address the identified rehab impairments via gait training,  "therapeutic activities, therapeutic exercises, neuromuscular re-education and progress toward the following goals:    Plan of Care Expires:  10/04/24    Subjective     Chief Complaint: fatigue, pain, weakness  Pain/Comfort:  Pain Rating 1:  (no rating provided)  Location - Side 1: Bilateral  Location - Orientation 1: generalized  Location 1: abdomen  Pain Addressed 1: Pre-medicate for activity, Reposition, Distraction, Cessation of Activity  Pain Rating Post-Intervention 1:  (no rating provided)      Objective:     2 attempts for tx session due to pt with c/o 7/10 pain level at 10:36 am "I just got pain medication about 20 min ago"    Communicated with nurse (Zak) prior to session.  Patient found up in chair with pulse ox (continuous), telemetry (3 sisters and daughter present) upon PT entry to room.     General Precautions: Standard, fall  Orthopedic Precautions: N/A  Braces: N/A  Respiratory Status: Room air     Functional Mobility:  Transfers:     Sit to Stand:  from BS chair with min A of 1 helper for hip elevation and balance, with increased time required, with rolling walker  Gait: RW CGA for balance and safety 35ft within room (including turns to the R and to the L).  Pt demonstrates slow scar, decreased B step length, decreased B foot clearance      AM-PAC 6 CLICK MOBILITY  Turning over in bed (including adjusting bedclothes, sheets and blankets)?: 2  Sitting down on and standing up from a chair with arms (e.g., wheelchair, bedside commode, etc.): 3  Moving from lying on back to sitting on the side of the bed?: 2  Moving to and from a bed to a chair (including a wheelchair)?: 3  Need to walk in hospital room?: 3  Climbing 3-5 steps with a railing?: 2  Basic Mobility Total Score: 15       Treatment & Education:  Patient provided with daily orientation and goals of this PT session. They were educated to call for assistance and to transfer with hospital staff only.  Also, pt was educated on the effects " of prolonged immobility and the importance of performing OOB activity and exercises to promote healing and reduce recovery time    Patient left up in chair with all lines intact, call button in reach, nurse notified, and family present..    GOALS:   Multidisciplinary Problems       Physical Therapy Goals          Problem: Physical Therapy    Goal Priority Disciplines Outcome Goal Variances Interventions   Physical Therapy Goal     PT, PT/OT Progressing     Description: Goals to be met by: 10/04/2024     Patient will increase functional independence with mobility by performin. Supine to sit with MInimal Assistance  2. Sit to supine with MInimal Assistance  3. Sit to stand transfer with Supervision  4. Bed to chair transfer with Supervision using LRAD  5. Gait  x 100 feet with Supervision using LRAD.                          Time Tracking:     PT Received On: 24  PT Start Time: 1108 (1036)     PT Stop Time: 1128 (1039)  PT Total Time (min): 23 min     Billable Minutes: Gait Training 15 and Therapeutic Activity 8    Treatment Type: Treatment  PT/PTA: PTA     Number of PTA visits since last PT visit: 1     2024

## 2024-09-06 NOTE — NURSING
Pt/family refused BG check, stated that are doing it on their own with the dexcom machine. WHITNEY Crawford made aware.

## 2024-09-06 NOTE — CARE UPDATE
Afternoon Assessment:    Resident to bedside for afternoon assessment. Patient resting in bed. Reports pain is moderately well controlled on po pain medications. Ambulating and voiding spontaneously. Walked with PT today. Tolerating po without nausea/vomiting. + Flatus. Bowel movements improved.     Temp:  [97.7 °F (36.5 °C)-98.7 °F (37.1 °C)] 97.7 °F (36.5 °C)  Pulse:  [] 75  Resp:  [16-19] 18  SpO2:  [95 %-99 %] 99 %  BP: (154-177)/(67-82) 177/74    PE:  General: lying in bed in no distress  Lungs: normal work of breathing  Abdomen: soft, non-distended, appropriately tender to palpation, VML C/d/I    Labs:  Recent Labs   Lab 09/06/24  0510 09/06/24  1004   WBC 10.52  --    RBC 2.60*  --    HGB 8.5* 8.7*   HCT 23.8*  --    *  --    MCV 92  --    MCH 32.7*  --    MCHC 35.7  --        A/P:  Discussed PT/OT recommendation for SNF after discharge. Patient declines. Reports she has home health PT/OT set up already.   Discussed that we will continue to monitor her in the hospital and see how she progresses    Herlinda Bowers MD  PGY-4 OB/GYN

## 2024-09-06 NOTE — PROGRESS NOTES
[facility] - Oncology ([facility])  Colorectal Surgery  Progress Note    Patient Name: Bella Meyer  MRN: [id number]  Admission Date: 9/2/2024  Hospital Length of Stay: 4 days  Attending Physician: Maliha Crawford MD    Subjective:     Interval History: NAEO, pain an issue this am, got behind after sleeping for ~5 hrs, having liquid bowel function - decreasing somewhat in frequency and increasing in control, no vomiting, tolerated small amounts of food     Post-Op Info:  Procedure(s) (LRB):  LAPAROTOMY, EXPLORATORY (N/A)  HYSTERECTOMY, TOTAL, ABDOMINAL (N/A)  SALPINGO-OOPHORECTOMY, BILATERAL (N/A)  OMENTECTOMY (N/A)  DEBULKING, NEOPLASM (N/A)  RESECTION, COLON, LOW ANTERIOR (N/A)  SIGMOIDOSCOPY, FLEXIBLE (N/A)  ILEOCOLECTOMY (N/A)  MOBILIZATION, SPLENIC FLEXURE  URETEROLYSIS (Bilateral)   3 Days Post-Op      Medications:  Continuous Infusions:      Scheduled Meds:   acetaminophen  1,000 mg Oral Q6H    enoxparin  1 mg/kg (Dosing Weight) Subcutaneous Q12H (treatment, non-standard time)    ibuprofen  600 mg Oral Q6H    insulin glargine U-100  8 Units Subcutaneous Daily    levothyroxine  75 mcg Oral QAM    metoprolol succinate  50 mg Oral QAM     PRN Meds:   acetaminophen tablet 1,000 mg    enoxaparin injection 50 mg    ibuprofen tablet 600 mg    insulin glargine U-100 (Lantus) pen 8 Units    levothyroxine tablet 75 mcg    metoprolol succinate (TOPROL-XL) 24 hr tablet 50 mg        Objective:     Vital Signs (Most Recent):  Temp: 97.7 °F (36.5 °C) (09/06/24 0400)  Pulse: 77 (09/06/24 0400)  Resp: 17 (09/06/24 0518)  BP: (!) 167/81 (09/06/24 0400)  SpO2: 97 % (09/06/24 0400) Vital Signs (24h Range):  Temp:  [97.5 °F (36.4 °C)-98.7 °F (37.1 °C)] 97.7 °F (36.5 °C)  Pulse:  [71-95] 77  Resp:  [17-19] 17  SpO2:  [93 %-97 %] 97 %  BP: (138-167)/(76-82) 167/81     Intake/Output - Last 3 Shifts         09/04 0700 09/05 0659 09/05 0700 09/06 0659 09/06 0700 09/07 0659    P.O. 720 540     I.V. (mL/kg) 204.7 (3.5) 2.9 (0)     Blood     "   IV Piggyback 10.2      Total Intake(mL/kg) 934.9 (16.1) 542.9 (9.2)     Urine (mL/kg/hr) 998 (0.7) 320 (0.2)     Stool 0 0     Blood       Total Output 998 320     Net -63.1 +222.9            Urine Occurrence  2 x     Stool Occurrence 3 x 4 x             Physical Exam  Gen: WD/WN in NAD  HEENT: MMM, Sclera anicteric   Chest: Normocardic, normotensive, bilateral chest rise  Abd: Soft, AT, mod D, midline c/d/i  Ext: No pitting edema noted   Significant Labs:  BMP (Last 3 Results):   Recent Labs   Lab 09/04/24  0124 09/04/24  0538 09/05/24  0320   * 99 137*   * 133* 136   K 4.1 3.8 4.2    104 103   CO2 18* 21* 25   BUN 9 8 11   CREATININE 0.7 0.7 0.7   CALCIUM 8.3* 7.9* 8.2*   MG 2.0 2.0 2.0     CBC (Last 3 Results):   Recent Labs   Lab 09/04/24  0538 09/05/24  0320 09/06/24  0510   WBC 14.81* 11.76 10.52   RBC 3.23* 3.49* 2.60*   HGB 10.4* 11.9* 8.5*   HCT 28.8* 30.2* 23.8*   * 104* 141*   MCV 89 87 92   MCH 32.2* 34.1* 32.7*   MCHC 36.1* 39.4* 35.7     CRP (Last 3 Results): No results for input(s): "CRP" in the last 168 hours.      Assessment/Plan:     Active Diagnoses:    Diagnosis Date Noted POA    PRINCIPAL PROBLEM:  Dehydration [E86.0] 09/02/2024 Yes    Acute encephalopathy [G93.40] 09/04/2024 No    EKG, abnormal [R94.31] 09/04/2024 No    History of pulmonary embolism [Z86.711] 08/06/2024 Yes    Malignant neoplasm of ovary [C56.9] 04/29/2024 Yes    Type 2 diabetes mellitus, with long-term current use of insulin [E11.9, Z79.4] 03/24/2024 Not Applicable    Hypothyroidism [E03.9] 03/24/2024 Yes    Pure hypercholesterolemia [E78.00] 01/23/2013 Yes    Hypertension [I10] 01/23/2013 Yes      Problems Resolved During this Admission:       74F with ovarian cancer s/p ex lap, debulking by gyn onc, ileocecectomy and LAR by CRS    -Low res as tolerated, may consider adding fiber if loose Bms continue, although would hold off given intermittent nausea currently   -Analgesia PRN  -OOBAT  -Remainder " of care per primary team  -Discussed with attending, Dr. Marifer Clifford MD  Colorectal Surgery  [facility] - Oncology ([facility])

## 2024-09-07 LAB
ABO + RH BLD: NORMAL
BLD GP AB SCN CELLS X3 SERPL QL: NORMAL
CRP SERPL-MCNC: 74.8 MG/L (ref 0–8.2)
GLUCOSE SERPL-MCNC: 127 MG/DL (ref 70–110)
GLUCOSE SERPL-MCNC: 147 MG/DL (ref 70–110)
GLUCOSE SERPL-MCNC: 236 MG/DL (ref 70–110)
SPECIMEN OUTDATE: NORMAL

## 2024-09-07 PROCEDURE — 63600175 PHARM REV CODE 636 W HCPCS

## 2024-09-07 PROCEDURE — 86140 C-REACTIVE PROTEIN: CPT | Performed by: SURGERY

## 2024-09-07 PROCEDURE — 25000003 PHARM REV CODE 250

## 2024-09-07 PROCEDURE — 86850 RBC ANTIBODY SCREEN: CPT | Performed by: OBSTETRICS & GYNECOLOGY

## 2024-09-07 PROCEDURE — 36415 COLL VENOUS BLD VENIPUNCTURE: CPT | Performed by: OBSTETRICS & GYNECOLOGY

## 2024-09-07 PROCEDURE — 20600001 HC STEP DOWN PRIVATE ROOM

## 2024-09-07 RX ORDER — INSULIN GLARGINE 100 [IU]/ML
10 INJECTION, SOLUTION SUBCUTANEOUS DAILY
Status: DISCONTINUED | OUTPATIENT
Start: 2024-09-07 | End: 2024-09-08 | Stop reason: HOSPADM

## 2024-09-07 RX ORDER — POLYETHYLENE GLYCOL 3350 17 G/17G
17 POWDER, FOR SOLUTION ORAL DAILY
Qty: 510 G | Refills: 1 | Status: SHIPPED | OUTPATIENT
Start: 2024-09-07

## 2024-09-07 RX ORDER — PROCHLORPERAZINE MALEATE 5 MG
5 TABLET ORAL EVERY 6 HOURS PRN
Status: DISCONTINUED | OUTPATIENT
Start: 2024-09-07 | End: 2024-09-08 | Stop reason: HOSPADM

## 2024-09-07 RX ORDER — ONDANSETRON 4 MG/1
4 TABLET, ORALLY DISINTEGRATING ORAL EVERY 6 HOURS PRN
Status: DISCONTINUED | OUTPATIENT
Start: 2024-09-07 | End: 2024-09-08 | Stop reason: HOSPADM

## 2024-09-07 RX ADMIN — IBUPROFEN 600 MG: 600 TABLET, FILM COATED ORAL at 05:09

## 2024-09-07 RX ADMIN — METOPROLOL SUCCINATE 50 MG: 50 TABLET, EXTENDED RELEASE ORAL at 06:09

## 2024-09-07 RX ADMIN — ENOXAPARIN SODIUM 60 MG: 100 INJECTION SUBCUTANEOUS at 12:09

## 2024-09-07 RX ADMIN — ONDANSETRON 4 MG: 2 INJECTION INTRAMUSCULAR; INTRAVENOUS at 02:09

## 2024-09-07 RX ADMIN — AMLODIPINE BESYLATE 5 MG: 5 TABLET ORAL at 06:09

## 2024-09-07 RX ADMIN — OXYCODONE HYDROCHLORIDE 10 MG: 10 TABLET ORAL at 07:09

## 2024-09-07 RX ADMIN — OXYCODONE HYDROCHLORIDE 10 MG: 10 TABLET ORAL at 05:09

## 2024-09-07 RX ADMIN — IBUPROFEN 600 MG: 600 TABLET, FILM COATED ORAL at 06:09

## 2024-09-07 RX ADMIN — INSULIN GLARGINE 10 UNITS: 100 INJECTION, SOLUTION SUBCUTANEOUS at 08:09

## 2024-09-07 RX ADMIN — OXYCODONE HYDROCHLORIDE 10 MG: 10 TABLET ORAL at 02:09

## 2024-09-07 RX ADMIN — OXYCODONE HYDROCHLORIDE 10 MG: 10 TABLET ORAL at 10:09

## 2024-09-07 RX ADMIN — OXYCODONE HYDROCHLORIDE 10 MG: 10 TABLET ORAL at 01:09

## 2024-09-07 RX ADMIN — ACETAMINOPHEN 1000 MG: 500 TABLET ORAL at 03:09

## 2024-09-07 RX ADMIN — OXYCODONE HYDROCHLORIDE 10 MG: 10 TABLET ORAL at 08:09

## 2024-09-07 RX ADMIN — ACETAMINOPHEN 1000 MG: 500 TABLET ORAL at 08:09

## 2024-09-07 RX ADMIN — ACETAMINOPHEN 1000 MG: 500 TABLET ORAL at 02:09

## 2024-09-07 RX ADMIN — LEVOTHYROXINE SODIUM 75 MCG: 75 TABLET ORAL at 06:09

## 2024-09-07 RX ADMIN — INSULIN ASPART 2 UNITS: 100 INJECTION, SOLUTION INTRAVENOUS; SUBCUTANEOUS at 03:09

## 2024-09-07 RX ADMIN — ONDANSETRON 4 MG: 4 TABLET, ORALLY DISINTEGRATING ORAL at 03:09

## 2024-09-07 RX ADMIN — IBUPROFEN 600 MG: 600 TABLET, FILM COATED ORAL at 12:09

## 2024-09-07 NOTE — CARE UPDATE
"Afternoon Assessment:    Resident to bedside for afternoon assessment. Patient resting comfortably in bed. Ambulated multiple times today and spent her lunch time in the chair eating. Tolerating po without nausea/vomiting. Pain under control this afternoon with regular pain medications.     Temp:  [97.5 °F (36.4 °C)-98.4 °F (36.9 °C)] 98.2 °F (36.8 °C)  Pulse:  [73-81] 73  Resp:  [16-18] 18  SpO2:  [97 %-98 %] 97 %  BP: (136-170)/(65-81) 145/81    PE:  General: lying in bed in no acute distress  Lungs: normal work of breathing  Abd: soft, nondistended, appropriately-tender to palpation without rebound or guarding  Inc: L C/D/I    Labs:  No results for input(s): "WBC", "RBC", "HGB", "HCT", "PLT", "MCV", "MCH", "MCHC" in the last 24 hours.    A/P:  Continue post op care  Plan for discharge tomorrow  Home health POC placed    Herlinda Bowers MD  PGY-4 OB/GYN     "

## 2024-09-07 NOTE — PLAN OF CARE
AAOX4, verbal an able to make needs know. Post op Day 5. Afebrile. Gave scheduled Ibuprofen and tylenol. Pt continues to cry in pain, gave PRN oxy 10mg for pain. Pt up in chair and ambulates to bathroom x1 assist. Abdominal incision open to air. Tele d/c'd. Family at bedside. Bed in lowest position, side rails up x2, call light in reach.

## 2024-09-07 NOTE — PROGRESS NOTES
[facility] - Oncology ([facility])  General Surgery  Progress Note    Subjective:     History of Present Illness:  Patient is a 74 y.o. female  with stage IIIC ovarian cancer being treated by Dr. Crawford. She initially presented to an outside hospital 3/2024 with abdominal pain.  Was noted to have a thickened sigmoid at that time and concern for possible diverticular disease with ? Colorectal fistula. Had attempted colonoscopy at that time which was aborted due to a fixed sigmoid colon at approximately 20 cm.  Patient had a reportedly negative colonoscopy 1 year prior.  Subsequent work up revealed ovarian cancer.  Currently undergoing chemotherapy.  Repeat imaging showed improved peritoneal disease and persistent thickening of the sigmoid colon.     Post-Op Info:  Procedure(s) (LRB):  LAPAROTOMY, EXPLORATORY (N/A)  HYSTERECTOMY, TOTAL, ABDOMINAL (N/A)  SALPINGO-OOPHORECTOMY, BILATERAL (N/A)  OMENTECTOMY (N/A)  DEBULKING, NEOPLASM (N/A)  RESECTION, COLON, LOW ANTERIOR (N/A)  SIGMOIDOSCOPY, FLEXIBLE (N/A)  ILEOCOLECTOMY (N/A)  MOBILIZATION, SPLENIC FLEXURE  URETEROLYSIS (Bilateral)   4 Days Post-Op     Interval History: NAEON, endorses continued pain this am. Had liquid BM yesterday morning, none since then. Passing gas. No vomiting, tolerated small amounts of food, low appetite.  CRP 74.8 this am.    Medications:  Continuous Infusions:  Scheduled Meds:   acetaminophen  1,000 mg Oral Q6H    amLODIPine  5 mg Oral QAM    enoxparin  1 mg/kg Subcutaneous Q12H (treatment, non-standard time)    ibuprofen  600 mg Oral Q6H    insulin glargine U-100  10 Units Subcutaneous Daily    levothyroxine  75 mcg Oral QAM    metoprolol succinate  50 mg Oral QAM     PRN Meds:  Current Facility-Administered Medications:     glucagon (human recombinant), 1 mg, Intramuscular, PRN    glucose, 16 g, Oral, PRN    glucose, 24 g, Oral, PRN    hydrALAZINE, 10 mg, Intravenous, Q6H PRN    insulin aspart U-100, 0-5 Units, Subcutaneous, QID (AC + HS)  PRN    melatonin, 6 mg, Oral, Nightly PRN    naloxone, 0.02 mg, Intravenous, PRN    ondansetron, 4 mg, Intravenous, Q6H PRN    oxyCODONE, 5 mg, Oral, Q3H PRN    oxyCODONE, 10 mg, Oral, Q3H PRN    prochlorperazine, 5 mg, Intravenous, Q6H PRN     Review of patient's allergies indicates:  No Known Allergies  Objective:     Vital Signs (Most Recent):  Temp: 97.5 °F (36.4 °C) (09/07/24 0719)  Pulse: 73 (09/07/24 0719)  Resp: 16 (09/07/24 0719)  BP: (!) 161/70 (09/07/24 0719)  SpO2: 98 % (09/07/24 0719) Vital Signs (24h Range):  Temp:  [97.5 °F (36.4 °C)-98.4 °F (36.9 °C)] 97.5 °F (36.4 °C)  Pulse:  [] 73  Resp:  [16-19] 16  SpO2:  [97 %-99 %] 98 %  BP: (146-177)/(65-74) 161/70     Weight: 59 kg (130 lb 1.1 oz)  Body mass index is 24.58 kg/m².    Intake/Output - Last 3 Shifts         09/05 0700 09/06 0659 09/06 0700 09/07 0659 09/07 0700 09/08 0659    P.O. 540 480     I.V. (mL/kg) 2.9 (0)      IV Piggyback       Total Intake(mL/kg) 542.9 (9.2) 480 (8.1)     Urine (mL/kg/hr) 320 (0.2)      Stool 0      Total Output 320      Net +222.9 +480            Urine Occurrence 2 x 3 x     Stool Occurrence 4 x 0 x              Physical Exam  Gen: WD/WN in NAD  HEENT: MMM, Sclera anicteric   Chest: Normocardic, normotensive, bilateral chest rise  Abd: Soft, AT, mod D, midline c/d/i  Ext: No pitting edema noted        Significant Labs:  I have reviewed all pertinent lab results within the past 24 hours.    Significant Diagnostics:  I have reviewed all pertinent imaging results/findings within the past 24 hours.  Assessment/Plan:     * S/P Ex Lap/FANNY/BSO/Omentectomy/Debulking/LAR/Ileocecetomy/Ureterolysis/FlexSig/Cystoscopy  74F with ovarian cancer s/p ex lap, debulking by gyn onc, ileocecectomy and LAR by CRS     -Diet as tolerated, will avoid adding fiber today  -Analgesia PRN  -OOBAT  -Remainder of care per primary team. CRS will continue to follow at this time.          Christ Huizar MD  General Surgery  [facility] -  Oncology (Hospital)

## 2024-09-07 NOTE — ASSESSMENT & PLAN NOTE
74F with ovarian cancer s/p ex lap, debulking by gyn onc, ileocecectomy and LAR by CRS     -Low res as tolerated, may consider adding fiber if loose BMs continue   -Analgesia PRN  -OOBAT  -Remainder of care per primary team

## 2024-09-07 NOTE — PROGRESS NOTES
Progress Note  Gynecology Oncology     Admit Date: 2024  LOS: 5    Reason for Admission:  S/P FANNY-BSO (total abdominal hysterectomy and bilateral salpingo-oophorectomy)    SUBJECTIVE:     Bella Meyer is a 74 y.o.  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 Surgery was complicated by intraoperative blood loss of 1L and received 2u pRBCs intra-op.     Post operative course was complicated by BG in 300's in PACU on POD#0 and patient went to the floor with an insulin drip. On POD1 patient was tachycardic and had AMS. A rapid response was called and a code stroke. After a full work up including consults from cardiology and neurology tachycardia was stated to be sinus tachycardia likely due to post op changes and AMS due to medication (narcotics)    NAEON. Patient reports that she tried to go without narcotics overnight but the pain got ahead of her in the middle of the night. She is ambulating with a walker and voiding spontaneously. + Flatus. Did not have a bowel movement yesterday. Tolerating po without nausea/vomiting.     OBJECTIVE:     Vital Signs   Temp:  [97.7 °F (36.5 °C)-98.4 °F (36.9 °C)] 98.3 °F (36.8 °C)  Pulse:  [] 74  Resp:  [16-19] 16  SpO2:  [97 %-99 %] 97 %  BP: (146-177)/(65-74) 170/72      Intake/Output Summary (Last 24 hours) at 2024 0707  Last data filed at 2024 1842  Gross per 24 hour   Intake 480 ml   Output --   Net 480 ml       Physical Exam:   Gen: A&Ox3, in no acute distress  CV: regular rate  Pulm: Normal respiratory effort  Abd: soft, nondistended, appropriately-tender to palpation without rebound or guarding  Inc: VML C/D/I  Ext: SCDs in place   : Deferred       Recent Labs   Lab 24  0538 24  0320 24  0510 24  1004   WBC 14.81* 11.76 10.52  --    HGB 10.4* 11.9* 8.5* 8.7*    HCT 28.8* 30.2* 23.8*  --    MCV 89 87 92  --    * 104* 141*  --       Recent Labs   Lab 24  1407 24  0124 24  0538 24  0320   * 133* 133* 136   K 3.9 4.1 3.8 4.2    105 104 103   CO2 18* 18* 21* 25   BUN 4* 9 8 11   CREATININE 0.6 0.7 0.7 0.7   * 199* 99 137*   PROT 3.1* 4.2* 4.0*  --    BILITOT 0.5 0.3 0.3  --    ALKPHOS 18* 22* 22*  --    ALT 10 15 15  --    AST 21 41* 40  --    MG 1.9 2.0 2.0 2.0   PHOS 3.0 2.6* 3.1 4.3      Blood Sugars (AccuCheck):    Recent Labs     24  0731 24  0801   POCTGLUCOSE 97 104         ASSESSMENT/PLAN:     Active Hospital Problems    Diagnosis  POA    *S/P Ex Lap/FANNY/BSO/Omentectomy/Debulking/LAR/Ileocecetomy/Ureterolysis/FlexSig/Cystoscopy [Z90.710, Z90.722, Z90.79]  No    EKG, abnormal [R94.31]  No    History of pulmonary embolism [Z86.711]  Yes    Malignant neoplasm of ovary [C56.9]  Yes    Type 2 diabetes mellitus, with long-term current use of insulin [E11.9, Z79.4]  Not Applicable    Hypothyroidism [E03.9]  Yes    Pure hypercholesterolemia [E78.00]  Yes    Hypertension [I10]  Yes      Resolved Hospital Problems    Diagnosis Date Resolved POA    Acute encephalopathy [G93.40] 2024 No    Dehydration [E86.0] 2024 Yes       Assessment: 74 y.o.  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     Plan:   1. Post-op   - Routine post-op advances  - CBC 11.9/30.1 > 8.5/23.8  - Pain medications: Ibuprofen > tylenol, oxy 5/10 PRN . Dilaudid held.   - Tolerating regular diet  - Antiemetics prn nausea/vomiting.  - Encourage IS  - Voiding spontaneously  - Therapeutic Lovenox  - SCDs in place  - PT/OT recommended SNF, however patient declines at this time. She is amendable to home health PT/OT    2. Acute encephalopathy  - AAO x3 this AM   -  Cardiology consulted to evaluate tachycardia on POD#1. Reported AMS likely result of surgical interventions and recovery.  - CTA Stroke wnl. Neurology reports likely medication induced as patient responded to narcan.   - Held dilaudid and continuing ibuprofen / Tylenol scheduled w/ Oxy 5/10 for pain control.  - Hold home gabapentin and lexapro to minimize medications that may effect mental status    3. HGSOC  - See oncology information in H&P  - S/p neodjuvant chemotherapy  - POD4 interval debulking, see post op care above   - Home gabapentin held     4. Hypothyroidism  - Continue home synthroid     5. Type II Diabetes  - Will increase Lantus to 10 units this AM (Patient's home dose)  - SSI   - POCT BG pre meal and QHS  - B-189 yesterday     6. HTN  - Continue home metoprolol and amlodipine  - Goal BP at least >100/>60   - Hydral PRN ordered for SBP > 180     7. HLD  - Hold home statin     8. History of PE  - PE diagnosed on 2024  - Continue therapeutic Lovenox while inpatient, will d/c on home Eliquis 5 BID     9. Depression  - Held home lexapro as above    Dispo: As patient meets appropriate post-op milestones, plan for discharge to home     Herlinda Bowers MD  PGY-4 OB/GYN

## 2024-09-07 NOTE — PLAN OF CARE
Plan of care assumed from [id number]    Plan of care reviewed with patient and family. Post up day 5 of s/p lap ex Lap/FANNY/BSO/omnectomy/Debulking/LAR     - A&O X 4   - Room air   - Ambulates in room and bathroom with standby assist.  - Comnplained of pain given PRN and scheduled pain medicine  - Administered PRN nausea medicne.  - PIV removed,Patient with no PIV,Nursing communication order placed by    MD saying that ok to keep patient without IV.  - Tolerating diet with fair intake.  - Midline incision wound open to air,C/D/I .      Frequent round performed.Bed in low and locked position.call light and personal items within a reach.No acute event this shift.Remaining free from falls and injury throughout the shift.Blood sugar monitored ACHS with dexacom.

## 2024-09-07 NOTE — HPI
Patient is a 74 y.o. female  with stage IIIC ovarian cancer being treated by Dr. Crawford. She initially presented to an outside hospital 3/2024 with abdominal pain.  Was noted to have a thickened sigmoid at that time and concern for possible diverticular disease with ? Colorectal fistula. Had attempted colonoscopy at that time which was aborted due to a fixed sigmoid colon at approximately 20 cm.  Patient had a reportedly negative colonoscopy 1 year prior.  Subsequent work up revealed ovarian cancer.  Currently undergoing chemotherapy.  Repeat imaging showed improved peritoneal disease and persistent thickening of the sigmoid colon.

## 2024-09-07 NOTE — SUBJECTIVE & OBJECTIVE
Interval History: MANDEEP, endorses continued pain this am. Had liquid BM yesterday morning, none since then. Passing gas. No vomiting, tolerated small amounts of food, low appetite.  CRP 74.8 this am.    Medications:  Continuous Infusions:  Scheduled Meds:   acetaminophen  1,000 mg Oral Q6H    amLODIPine  5 mg Oral QAM    enoxparin  1 mg/kg Subcutaneous Q12H (treatment, non-standard time)    ibuprofen  600 mg Oral Q6H    insulin glargine U-100  10 Units Subcutaneous Daily    levothyroxine  75 mcg Oral QAM    metoprolol succinate  50 mg Oral QAM     PRN Meds:  Current Facility-Administered Medications:     glucagon (human recombinant), 1 mg, Intramuscular, PRN    glucose, 16 g, Oral, PRN    glucose, 24 g, Oral, PRN    hydrALAZINE, 10 mg, Intravenous, Q6H PRN    insulin aspart U-100, 0-5 Units, Subcutaneous, QID (AC + HS) PRN    melatonin, 6 mg, Oral, Nightly PRN    naloxone, 0.02 mg, Intravenous, PRN    ondansetron, 4 mg, Intravenous, Q6H PRN    oxyCODONE, 5 mg, Oral, Q3H PRN    oxyCODONE, 10 mg, Oral, Q3H PRN    prochlorperazine, 5 mg, Intravenous, Q6H PRN     Review of patient's allergies indicates:  No Known Allergies  Objective:     Vital Signs (Most Recent):  Temp: 97.5 °F (36.4 °C) (09/07/24 0719)  Pulse: 73 (09/07/24 0719)  Resp: 16 (09/07/24 0719)  BP: (!) 161/70 (09/07/24 0719)  SpO2: 98 % (09/07/24 0719) Vital Signs (24h Range):  Temp:  [97.5 °F (36.4 °C)-98.4 °F (36.9 °C)] 97.5 °F (36.4 °C)  Pulse:  [] 73  Resp:  [16-19] 16  SpO2:  [97 %-99 %] 98 %  BP: (146-177)/(65-74) 161/70     Weight: 59 kg (130 lb 1.1 oz)  Body mass index is 24.58 kg/m².    Intake/Output - Last 3 Shifts         09/05 0700 09/06 0659 09/06 0700 09/07 0659 09/07 0700 09/08 0659    P.O. 540 480     I.V. (mL/kg) 2.9 (0)      IV Piggyback       Total Intake(mL/kg) 542.9 (9.2) 480 (8.1)     Urine (mL/kg/hr) 320 (0.2)      Stool 0      Total Output 320      Net +222.9 +480            Urine Occurrence 2 x 3 x     Stool Occurrence 4 x  0 x              Physical Exam  Gen: WD/WN in NAD  HEENT: MMM, Sclera anicteric   Chest: Normocardic, normotensive, bilateral chest rise  Abd: Soft, AT, mod D, midline c/d/i  Ext: No pitting edema noted        Significant Labs:  I have reviewed all pertinent lab results within the past 24 hours.    Significant Diagnostics:  I have reviewed all pertinent imaging results/findings within the past 24 hours.

## 2024-09-08 ENCOUNTER — NURSE TRIAGE (OUTPATIENT)
Dept: ADMINISTRATIVE | Facility: CLINIC | Age: 74
End: 2024-09-08
Payer: MEDICARE

## 2024-09-08 ENCOUNTER — TELEPHONE (OUTPATIENT)
Dept: OBSTETRICS AND GYNECOLOGY | Facility: HOSPITAL | Age: 74
End: 2024-09-08
Payer: MEDICARE

## 2024-09-08 VITALS
HEIGHT: 61 IN | RESPIRATION RATE: 18 BRPM | OXYGEN SATURATION: 98 % | BODY MASS INDEX: 24.55 KG/M2 | WEIGHT: 130.06 LBS | TEMPERATURE: 98 F | DIASTOLIC BLOOD PRESSURE: 66 MMHG | HEART RATE: 75 BPM | SYSTOLIC BLOOD PRESSURE: 148 MMHG

## 2024-09-08 LAB
CRP SERPL-MCNC: 41.5 MG/L (ref 0–8.2)
GLUCOSE SERPL-MCNC: 152 MG/DL (ref 70–110)
GLUCOSE SERPL-MCNC: 207 MG/DL (ref 70–110)

## 2024-09-08 PROCEDURE — 86140 C-REACTIVE PROTEIN: CPT | Performed by: SURGERY

## 2024-09-08 PROCEDURE — 25000003 PHARM REV CODE 250

## 2024-09-08 PROCEDURE — 63600175 PHARM REV CODE 636 W HCPCS

## 2024-09-08 PROCEDURE — 36415 COLL VENOUS BLD VENIPUNCTURE: CPT | Performed by: SURGERY

## 2024-09-08 RX ORDER — OXYCODONE HYDROCHLORIDE 5 MG/1
5 TABLET ORAL
Qty: 30 TABLET | Refills: 0 | Status: SHIPPED | OUTPATIENT
Start: 2024-09-08 | End: 2024-09-10

## 2024-09-08 RX ORDER — AMOXICILLIN 250 MG
1 CAPSULE ORAL DAILY
Qty: 30 TABLET | Refills: 1 | Status: SHIPPED | OUTPATIENT
Start: 2024-09-08 | End: 2024-09-08 | Stop reason: HOSPADM

## 2024-09-08 RX ADMIN — ACETAMINOPHEN 1000 MG: 500 TABLET ORAL at 03:09

## 2024-09-08 RX ADMIN — OXYCODONE HYDROCHLORIDE 10 MG: 10 TABLET ORAL at 03:09

## 2024-09-08 RX ADMIN — OXYCODONE HYDROCHLORIDE 10 MG: 10 TABLET ORAL at 07:09

## 2024-09-08 RX ADMIN — IBUPROFEN 600 MG: 600 TABLET, FILM COATED ORAL at 05:09

## 2024-09-08 RX ADMIN — METOPROLOL SUCCINATE 50 MG: 50 TABLET, EXTENDED RELEASE ORAL at 07:09

## 2024-09-08 RX ADMIN — OXYCODONE HYDROCHLORIDE 10 MG: 10 TABLET ORAL at 10:09

## 2024-09-08 RX ADMIN — INSULIN GLARGINE 10 UNITS: 100 INJECTION, SOLUTION SUBCUTANEOUS at 09:09

## 2024-09-08 RX ADMIN — ENOXAPARIN SODIUM 60 MG: 100 INJECTION SUBCUTANEOUS at 12:09

## 2024-09-08 RX ADMIN — LEVOTHYROXINE SODIUM 75 MCG: 75 TABLET ORAL at 07:09

## 2024-09-08 RX ADMIN — AMLODIPINE BESYLATE 5 MG: 5 TABLET ORAL at 07:09

## 2024-09-08 RX ADMIN — IBUPROFEN 600 MG: 600 TABLET, FILM COATED ORAL at 12:09

## 2024-09-08 RX ADMIN — OXYCODONE HYDROCHLORIDE 10 MG: 10 TABLET ORAL at 12:09

## 2024-09-08 RX ADMIN — ACETAMINOPHEN 1000 MG: 500 TABLET ORAL at 09:09

## 2024-09-08 NOTE — PROGRESS NOTES
Progress Note  Gynecology Oncology     Admit Date: 2024  LOS: 6    Reason for Admission:  S/P FANNY-BSO (total abdominal hysterectomy and bilateral salpingo-oophorectomy)    SUBJECTIVE:     Bella Meyer is a 74 y.o.  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 Surgery was complicated by intraoperative blood loss of 1L and received 2u pRBCs intra-op.     Post operative course was complicated by BG in 300's in PACU on POD#0 and patient went to the floor with an insulin drip. On POD1 patient was tachycardic and had AMS. A rapid response was called and a code stroke. After a full work up including consults from cardiology and neurology tachycardia was stated to be sinus tachycardia likely due to post op changes and AMS due to medication (narcotics)    NAEON. Pain better controlled overnight when taking oxy more frequently. She is ambulating with a walker and voiding spontaneously. + Flatus. Did not have a bowel movement yesterday. Tolerating po without nausea/vomiting.     OBJECTIVE:     Vital Signs   Temp:  [97.5 °F (36.4 °C)-98.2 °F (36.8 °C)] 98.1 °F (36.7 °C)  Pulse:  [73-96] 96  Resp:  [16-18] 18  SpO2:  [97 %-98 %] 97 %  BP: (136-161)/(65-81) 157/71      Intake/Output Summary (Last 24 hours) at 2024 0708  Last data filed at 2024 0410  Gross per 24 hour   Intake 800 ml   Output 870 ml   Net -70 ml       Physical Exam:   Gen: A&Ox3, in no acute distress  CV: regular rate  Pulm: Normal respiratory effort  Abd: soft, nondistended, appropriately-tender to palpation without rebound or guarding  Inc: VML C/D/I  Ext: SCDs in place   : Deferred       Recent Labs   Lab 24  0538 24  0320 24  0510 24  1004   WBC 14.81* 11.76 10.52  --    HGB 10.4* 11.9* 8.5* 8.7*   HCT 28.8* 30.2* 23.8*  --    MCV 89 87 92  --   "  * 104* 141*  --       Recent Labs   Lab 24  1407 24  0124 24  0538 24  0320   * 133* 133* 136   K 3.9 4.1 3.8 4.2    105 104 103   CO2 18* 18* 21* 25   BUN 4* 9 8 11   CREATININE 0.6 0.7 0.7 0.7   * 199* 99 137*   PROT 3.1* 4.2* 4.0*  --    BILITOT 0.5 0.3 0.3  --    ALKPHOS 18* 22* 22*  --    ALT 10 15 15  --    AST 21 41* 40  --    MG 1.9 2.0 2.0 2.0   PHOS 3.0 2.6* 3.1 4.3      Blood Sugars (AccuCheck):    No results for input(s): "POCTGLUCOSE" in the last 72 hours.        ASSESSMENT/PLAN:     Active Hospital Problems    Diagnosis  POA    *S/P Ex Lap/FANNY/BSO/Omentectomy/Debulking/LAR/Ileocecetomy/Ureterolysis/FlexSig/Cystoscopy [Z90.710, Z90.722, Z90.79]  No    EKG, abnormal [R94.31]  No    History of pulmonary embolism [Z86.711]  Yes    Malignant neoplasm of ovary [C56.9]  Yes    Type 2 diabetes mellitus, with long-term current use of insulin [E11.9, Z79.4]  Not Applicable    Hypothyroidism [E03.9]  Yes    Pure hypercholesterolemia [E78.00]  Yes    Hypertension [I10]  Yes      Resolved Hospital Problems    Diagnosis Date Resolved POA    Acute encephalopathy [G93.40] 2024 No    Dehydration [E86.0] 2024 Yes       Assessment: 74 y.o.  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     Plan:   1. Post-op   - Routine post-op advances  - CBC 11.9/30.1 > 8.5/23.8  - Pain medications: Ibuprofen > tylenol, oxy 5/10 PRN . Dilaudid held.   - Tolerating regular diet  - Antiemetics prn nausea/vomiting.  - Encourage IS  - Voiding spontaneously  - Therapeutic Lovenox  - SCDs in place  - PT/OT recommended SNF, however patient declines at this time. She is amendable to home health PT/OT    2. Acute encephalopathy  - AAO x3 this AM   - Cardiology consulted to evaluate tachycardia on POD#1. " Reported AMS likely result of surgical interventions and recovery.  - CTA Stroke wnl. Neurology reports likely medication induced as patient responded to narcan.   - Held dilaudid and continuing ibuprofen / Tylenol scheduled w/ Oxy 5/10 for pain control.  - Hold home gabapentin and lexapro to minimize medications that may effect mental status    3. HGSOC  - See oncology information in H&P  - S/p neodjuvant chemotherapy  - Home gabapentin held     4. Hypothyroidism  - Continue home synthroid     5. Type II Diabetes  - Continue Lantus 10 units daily  - SSI   - POCT BG pre meal and QHS  - B-236     6. HTN  - Continue home metoprolol and amlodipine  - Goal BP at least >100/>60   - Hydral PRN ordered for SBP > 180     7. HLD  - Hold home statin     8. History of PE  - PE diagnosed on 2024  - Continue therapeutic Lovenox while inpatient, will d/c on home Eliquis 5 BID     9. Depression  - Held home lexapro as above    Dispo: Patient desires discharge home today.      Herlinda Bowers MD  PGY-4 OB/GYN

## 2024-09-08 NOTE — PROGRESS NOTES
Received call to on call  around 8 am. Patient will be discharging today and will be in need of home health services. Reviewed the chart and looks as if patient has been on services with [facility] in the past. Called the patient to discuss. She would like a referral back to [facility]. Address on file says [facility]. She is requesting [facility] [place]. Confirmed that she  is staying at [facility] [address] [facility] [place]. Sent order via [facility] to [facility]. Followed up around 8:30 am but orders have not been viewed. Called [facility] [phone]. Received a call back from the on call nurse Kerrie around 9;16am. She said she does not have access to [facility] [place]. Obtained the fax number [phone]. Electronically faxed the orders to her, She will call me back if she does not receive.

## 2024-09-08 NOTE — PLAN OF CARE
No acute events this shift, VSS, afebrile, pt aaox4, daughter at bedside, pt is day 6 post opp hysterectomy, wound care completed, midline incision is clean dry intact and open to air, blood glucose monitored (103mg/dl) no insulin given, pt ambulates with standby assist, 10mg of oxy given around the clock, daily weight refused, frequent rounds performed, I/O recorded, pt in bed resting, call light in reach, pt instructed to call for assistance, NAD, safety maintained.

## 2024-09-08 NOTE — SUBJECTIVE & OBJECTIVE
Interval History:   NAEON, endorses continued pain this am. No recent BMs. Passing gas. No nausea/vomiting, tolerated small amounts of food, low appetite.  CRP 41.5 from 74.8.    Medications:  Continuous Infusions:  Scheduled Meds:   acetaminophen  1,000 mg Oral Q6H    amLODIPine  5 mg Oral QAM    enoxparin  1 mg/kg Subcutaneous Q12H (treatment, non-standard time)    ibuprofen  600 mg Oral Q6H    insulin glargine U-100  10 Units Subcutaneous Daily    levothyroxine  75 mcg Oral QAM    metoprolol succinate  50 mg Oral QAM     PRN Meds:  Current Facility-Administered Medications:     glucagon (human recombinant), 1 mg, Intramuscular, PRN    glucose, 16 g, Oral, PRN    glucose, 24 g, Oral, PRN    hydrALAZINE, 10 mg, Intravenous, Q6H PRN    insulin aspart U-100, 0-5 Units, Subcutaneous, QID (AC + HS) PRN    melatonin, 6 mg, Oral, Nightly PRN    naloxone, 0.02 mg, Intravenous, PRN    ondansetron, 4 mg, Oral, Q6H PRN    oxyCODONE, 5 mg, Oral, Q3H PRN    oxyCODONE, 10 mg, Oral, Q3H PRN    prochlorperazine, 5 mg, Oral, Q6H PRN     Review of patient's allergies indicates:  No Known Allergies  Objective:     Vital Signs (Most Recent):  Temp: 98.1 °F (36.7 °C) (09/08/24 0531)  Pulse: 96 (09/08/24 0334)  Resp: 17 (09/08/24 0718)  BP: (!) 157/71 (09/08/24 0334)  SpO2: 97 % (09/08/24 0334) Vital Signs (24h Range):  Temp:  [98.1 °F (36.7 °C)-98.2 °F (36.8 °C)] 98.1 °F (36.7 °C)  Pulse:  [73-96] 96  Resp:  [17-18] 17  SpO2:  [97 %] 97 %  BP: (136-157)/(65-81) 157/71     Weight: 59 kg (130 lb 1.1 oz)  Body mass index is 24.58 kg/m².    Intake/Output - Last 3 Shifts         09/06 0700 09/07 0659 09/07 0700 09/08 0659 09/08 0700 09/09 0659    P.O. 480 800     I.V. (mL/kg)       Total Intake(mL/kg) 480 (8.1) 800 (13.6)     Urine (mL/kg/hr)  870 (0.6)     Stool       Total Output  870     Net +480 -70            Urine Occurrence 3 x 3 x     Stool Occurrence 0 x               Physical Exam  Gen: WD/WN in NAD  HEENT: MMM, Sclera  anicteric   Chest: Normocardic, normotensive, bilateral chest rise  Abd: Soft, AT, mod D, midline c/d/i  Ext: No pitting edema noted       Significant Labs:  I have reviewed all pertinent lab results within the past 24 hours.    Significant Diagnostics:  I have reviewed all pertinent imaging results/findings within the past 24 hours.

## 2024-09-08 NOTE — ASSESSMENT & PLAN NOTE
74F with ovarian cancer s/p ex lap, debulking by gyn onc, ileocecectomy and LAR by CRS     -Recommend adding fiber or Miralax to diet. Avoid senna/docusate due to potential bowel irritation.  -Analgesia PRN  -OOBAT  -Remainder of care per primary team.

## 2024-09-08 NOTE — DISCHARGE SUMMARY
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 Patient reports having some nausea with vomiting around 3 am this morning. She then began her colon prep for her upcoming surgery tomorrow around 9 am. After taking the ducolax, she had multiple bouts of diarrhea. On her drive into [place] to spend the night prior to her scheduled surgery tomorrow, she needed to use the restroom. When attempting to get out of the car she felt extremely weak and had a pre-syncopal episode per her family member. She did not suffer LOC or hit her head. Since then she endorses continuing to feel weak and has had several more episodes of loose stools.     Hospital Course:   2024: Admitted for dehydration secondary to bowel prep for surgery  9/3/2024: Underwent surgery as below. Blood sugars elevated postoperatively, started on insulin drip.  2024: Episode of altered mental status concerning for stroke. Stroke work up negative. Acute encephalopathy thought to be medication induced. Dilaudid held. Work up otherwise WNL. Insulin drip continued. Clear liquid diet.   2024: Jackson discontinued, voiding spontaneously. Advanced to regular diet. Insulin drip discontinued. Transitioned to therapeutic Lovenox. Working on pain control.   2024: Pain improved. Tolerating po. Multiple episodes of diarrhea, stool softeners stopped. PT/OT recommending SNF.  2024: Pain not controlled. PRN pain medications ordered q3 with improved pain. Ambulating with assistance/walker. Patient  declines SNF but amenable to home health  9/8/2024: Pain controlled on po medications. Home health orders placed. Patient deemed stable for discharge. Return precautions given.       Procedure(s) (LRB):  LAPAROTOMY, EXPLORATORY (N/A)  HYSTERECTOMY, TOTAL, ABDOMINAL (N/A)  SALPINGO-OOPHORECTOMY, BILATERAL (N/A)  OMENTECTOMY (N/A)  DEBULKING, NEOPLASM (N/A)  RESECTION, COLON, LOW ANTERIOR (N/A)  SIGMOIDOSCOPY, FLEXIBLE (N/A)  ILEOCOLECTOMY (N/A)  MOBILIZATION, SPLENIC FLEXURE  URETEROLYSIS (Bilateral)     Consults (From admission, onward)          Status Ordering Provider     Inpatient consult to Registered Dietitian/Nutritionist  Once        Provider:  (Not yet assigned)    Completed ROYER NGUYEN     Inpatient consult to Neurology  Once        Provider:  (Not yet assigned)    Completed ROYER NGUYEN     Inpatient consult to Cardiology  Once        Provider:  (Not yet assigned)    Completed VÍCTOR KABA     Inpatient consult to Registered Dietitian/Nutritionist  Once        Provider:  (Not yet assigned)    Completed ROYER NGUYEN     Inpatient consult to Gynecologic Oncology  Once        Provider:  (Not yet assigned)    Completed DAYANARA KARIMI            Significant Diagnostic Studies:   X-Ray Chest AP Portable  Order: [id number]  Status: Final result       Visible to patient: Yes (seen)       Next appt: 09/10/2024 at 10:30 AM in Palliative Medicine (Kristen Blum DO)    0 Result Notes  Details    Reading Physician Reading Date Result Priority   Anshul Cordero MD  [phone] 9/4/2024 STAT     Narrative & Impression  EXAMINATION:  XR CHEST AP PORTABLE     CLINICAL HISTORY:  fluid overload;     TECHNIQUE:  Single frontal view of the chest was performed.     COMPARISON:  None     FINDINGS:  No confluent consolidation or pulmonary edema.  Cardiomediastinal contours normal.  No blunting of the right costophrenic angle.  Mild blunting of the left costophrenic angle either representing pleural thickening or a  small pleural effusion.     Impression:     Clear lungs.     Blunting of the left costophrenic angle representing pleural thickening or a small pleural effusion.        Electronically signed by:Anshul Cordero  Date:                                            09/04/2024  Time:                                           08:13     CTA STROKE MULTI-PHASE  Order: [id number]  Status: Final result       Visible to patient: Yes (seen)       Next appt: 09/10/2024 at 10:30 AM in Palliative Medicine (Peterson Ali, DO)    0 Result Notes  Details    Reading Physician Reading Date Result Priority   Giacomo Talley MD  [phone] 9/4/2024 STAT   Nasreen Shay MD  [phone] 9/4/2024      Narrative & Impression  EXAMINATION:  CTA STROKE MULTI-PHASE     CLINICAL HISTORY:  Neuro deficit, acute, stroke suspected;     TECHNIQUE:  Axial CT images obtained throughout the region of the head before and after the administration of intravenous contrast.  CT angiogram was performed through the cervical and intracranial vasculature during the IV bolus administration of 100mL of Omnipaque 350.  Two additional phases through the intracranial vasculature via multiphase technique.  Multiplanar MPR and MIP reformats were performed.     CT source data was analyzed using artificial intelligence software for detection of a large vessel occlusions (LVO) in order to enable computer assisted triage notification and aid clinical stroke decision making.     COMPARISON:  None     FINDINGS:  Mild prominence of the ventricles with compensatory enlargement of the sulci, in keeping with central volume loss.  No hydrocephalus.  No extra-axial blood or fluid collections.     Scattered patchy and confluent regions of subcortical and periventricular hypoattenuation, overall nonspecific, but can be seen in setting of microvascular ischemic change.  No parenchymal mass effect, edema, acute hemorrhage or acute major vascular distribution infarct.     Skull/extracranial contents  (limited evaluation): No displaced calvarial fracture. Mastoid air cells and paranasal sinuses are essentially clear.     Diminished enhancement of bilateral thyroid glands.  Partially imaged mild linear atelectasis within superior segment of right lower lobe.  Few scattered pulmonary micro nodules, for example in the right lung measuring approximately 3 mm (axial series 5, image 69).  Biapical fibronodular scarring.        CTA:     Mild calcification about the aortic arch with 3 vessels.     Subclavian arteries are patent without evidence for significant stenosis or occlusion.     Origin of the left cervical internal carotid artery is patent with less than 50% stenosis per NASCET criteria.     Calcific and atheromatous atherosclerosis at the origin of the right cervical internal carotid artery with less than 50% stenosis per NASCET criteria.     Remainder of the cervical internal carotid arteries are unremarkable.     Origins of the vertebral arteries are patent without evidence for significant stenosis or occlusion.  Extracranial vertebral arteries are unremarkable.     The petrous portions of the internal carotid arteries are unremarkable.  Calcific atherosclerosis of the cavernous and supraclinoid portions of the internal carotid arteries without significant stenosis or occlusion.     Hypoplastic left A1 segment.  Anterior cerebral arteries are patent without evidence for significant stenosis or occlusion.     Middle cerebral arteries are patent without evidence for significant stenosis or occlusion.     Mild diffuse narrowing about the intracranial vertebral arteries and basilar artery without evidence for focal occlusion.  Fetal circulation left PCA.  Posterior cerebral arteries are patent without evidence for significant stenosis or occlusion.     No intracranial aneurysm.     Venous structures demonstrate no evidence for sinus thrombosis or significant stenosis.     Impression:     No acute intracranial  pathology.  If concern persists for acute ischemic event, consider MRI brain for further evaluation.     No evidence for large vessel occlusion or significant stenosis about the intracranial vasculature.  Mild diffuse diminutive caliber of about the intracranial vertebral arteries and basilar artery without evidence for focal occlusion.     Sequela of chronic microvascular ischemic change.     Atherosclerosis of at the right carotid bulb with less than 50% stenosis per NASCET criteria.     Few scattered pulmonary micro nodules.  For multiple solid nodules all <6 mm, Fleischner Society 2017 guidelines recommend no routine follow up for a low risk patient, or follow up with non-contrast chest CT at 12 months after discovery in a high risk patient.     Electronically signed by resident: Nasreen Shay  Date:                                            09/04/2024  Time:                                           08:06     Electronically signed by:Giacomo Talley  Date:                                            09/04/2024  Time:                                           09:17     Pending Diagnostic Studies:       Procedure Component Value Units Date/Time    Specimen to Pathology, Surgery Gynecology and Obstetrics [[id number]] Collected: 09/03/24 1327    Order Status: Sent Lab Status: In process Updated: 09/03/24 6794    Specimen: Tissue           Final Active Diagnoses:    Diagnosis Date Noted POA    PRINCIPAL PROBLEM:  S/P Ex Lap/FANNY/BSO/Omentectomy/Debulking/LAR/Ileocecetomy/Ureterolysis/FlexSig/Cystoscopy [Z90.710, Z90.722, Z90.79] 09/06/2024 No    EKG, abnormal [R94.31] 09/04/2024 No    History of pulmonary embolism [Z86.711] 08/06/2024 Yes    Malignant neoplasm of ovary [C56.9] 04/29/2024 Yes    Type 2 diabetes mellitus, with long-term current use of insulin [E11.9, Z79.4] 03/24/2024 Not Applicable    Hypothyroidism [E03.9] 03/24/2024 Yes    Pure hypercholesterolemia [E78.00] 01/23/2013 Yes    Hypertension [I10] 01/23/2013 Yes       Problems Resolved During this Admission:    Diagnosis Date Noted Date Resolved POA    Acute encephalopathy [G93.40] 09/04/2024 09/06/2024 No    Dehydration [E86.0] 09/02/2024 09/06/2024 Yes            Discharged Condition: good    Disposition: Home or Self Care    Follow Up:   Follow-up Information       Maliha Crawford MD. Go on 10/1/2024.    Specialty: Gynecologic Oncology  Contact information:  [address]  [facility] [address]  [phone]                           Patient Instructions:      Diet general     Lifting restrictions   Order Comments: No lifting greater than 15 pounds for six weeks.     Other restrictions (specify):   Order Comments: PELVIC REST (IF YOU HAD A HYSTERECTOMY):  No douching, tampons, or intercourse for 6 weeks.    If prescribed, vaginal estrogen cream may be used during the postoperative period.     DRIVING:  No driving while on narcotics. Driving may be resumed initially with a competent passenger one to two weeks after surgery if no longer taking narcotics.     EXERCISE:  For six weeks your exercise should be limited to walking. You may walk as far as you wish, as long as you increase your level of exertion gradually and avoid slippery surfaces. You may climb stairs as needed to get around, but should not use stair climbing for exercise.     Remove dressing in 24 hours   Order Comments: If you have a bandage on wound, you may remove it the day after dismissal.  If you had steri-strips remove them once they begin to peel off (usually 2 weeks).  If your steri-strips still haven't come off in 2 weeks, please remove them.  Keep incision clean and dry.  Inspect the incision daily for signs and symptoms of infection.     Wound care routine (specify)   Order Comments: WOUND CARE:  If you have a band-aid or bandage on your wound, you may remove it the day after dismissal.  You may wash the wound with mild soap and water.   You may shower at any time but should avoid immersing any  abdominal incisions in water for at least two weeks after surgery or until the wound is completely healed. If given, please shower with Hibiclens soap until bottle is completely finished. Keep your wound clean and dry.  You should observe your incision for signs of infection which include redness, warmth, drainage or fever.     Call MD for:  temperature >100.4     Call MD for:  persistent nausea and vomiting     Call MD for:  severe uncontrolled pain     Call MD for:  difficulty breathing, headache or visual disturbances     Call MD for:  redness, tenderness, or signs of infection (pain, swelling, redness, odor or green/yellow discharge around incision site)     Call MD for:  hives     Call MD for:   Order Comments: inability to void,urine is ketchup colored or you have large clots, vaginal bleeding is heavier than a period.    VAGINAL DISCHARGE: You may develop a vaginal discharge and intermittent vaginal spotting after surgery and up to 6 weeks postoperatively.  The discharge may have an odor and may change in color but it is normal.  This is due to dissolving stiches.  Contact your surgical team if you develop vaginal or vulvar irritation along with a discharge.  Also contact your surgical team if you have vaginal discharge that smells like urine or stool.    CONSTIPATION REMEDIES: Patients are often constipated after surgery or with use of oral narcotic medicine. You should continue to take the stool softener, Senokot-S during the next six weeks, and consume adequate amounts of water.  If you have not had a bowel movement for 3 days after dismissal, or are uncomfortable and unable to pass stool, please try one or all of the following measures:  1.  Milk of Magnesia - 30 cc by mouth every 12 hours   2.  Metamucil, Fibercon or other bulk former - use as directed    PAIN MEDICATIONS:   Take your pain medications as instructed. It is best to take pain medications before your pain becomes severe. This will allow you  to take less medication yet have better pain relief. For the first 2 or 3 days it may be helpful to take your pain medications on a regular schedule (e.g. every 4 to 6 hours). This will help you to keep your pain under better control. You should then begin to take fewer medications each day until you no longer need them. Do not take pain medication on an empty stomach. This may lead to nausea and vomiting.     Activity as tolerated   Order Comments: PELVIC REST:  No douching, tampons, or intercourse for 6 weeks.    If prescribed, vaginal estrogen cream may be used during the postoperative period.     DRIVING:  No driving while on narcotics. Driving may be resumed initially with a competent passenger one to two weeks after surgery if no longer taking narcotics.     EXERCISE:  For six weeks your exercise should be limited to walking. You may walk as far as you wish, as long as you increase your level of exertion gradually and avoid slippery surfaces. You may climb stairs as needed to get around, but should not use stair climbing for exercise.     Shower on day dressing removed (No bath)   Order Comments: You may shower at any time but should avoid immersing any abdominal incisions in water for at least 2 weeks after surgery or until the wound is completely healed.  If given, please shower with Hibaclens soap until bottle is completely finish     Medications:  Reconciled Home Medications:      Medication List        START taking these medications      acetaminophen 500 MG tablet  Commonly known as: TYLENOL  Take 2 tablets (1,000 mg total) by mouth every 6 (six) hours.     ibuprofen 600 MG tablet  Commonly known as: ADVIL,MOTRIN  Take 1 tablet every 6 hours. Alternate between ibuprofen & tylenol every 3 hours. For example: @0800: ibuprofen 600mg @1100: tylenol 1000mg @1400: ibuprofen 600mg @1700: tylenol 1000 mg @2000: ibuprofen 600mg. Can take two of the 300 mg over the counter ibuprofen is smaller pill is easier.             CHANGE how you take these medications      oxyCODONE 5 MG immediate release tablet  Commonly known as: ROXICODONE  Take 1 tablet (5 mg total) by mouth every 3 (three) hours as needed for Pain. Take 1 tablet (5 mg) for pain scale 4-6. Take 2 tablets (10 mg) for pain scale 7-10. Do not take more than 2 tablets (10 mg) every 3 hours.  What changed:   medication strength  how much to take  when to take this  additional instructions     polyethylene glycol 17 gram/dose powder  Commonly known as: GLYCOLAX  Mix 1 capful (17 g) in liquid and drink by mouth once daily.  What changed:   medication strength  how much to take  when to take this  reasons to take this  additional instructions            CONTINUE taking these medications      amLODIPine 5 MG tablet  Commonly known as: NORVASC  Take 1 tablet by mouth every morning.     apixaban 5 mg Tab  Commonly known as: ELIQUIS  Take 1 tablet (5 mg total) by mouth 2 (two) times daily.     CO Q-10 100 mg capsule  Generic drug: coenzyme Q10  Take 100 mg by mouth nightly.     dexAMETHasone 4 MG Tab  Commonly known as: DECADRON  Take 8 mg (2 tablets) by mouth daily on days 2-4 of each chemotherapy cycle.     DEXCOM G7  Misc  Generic drug: blood-glucose meter,continuous  Use daily     DEXCOM G7 SENSOR Charito  Generic drug: blood-glucose sensor  Use every 10 days     EScitalopram oxalate 10 MG tablet  Commonly known as: LEXAPRO  Take 1 tablet (10 mg total) by mouth once daily.     gabapentin 100 MG capsule  Commonly known as: NEURONTIN  Take 2 capsules (200 mg total) by mouth every evening.     levothyroxine 75 MCG tablet  Commonly known as: SYNTHROID  Take 1 tablet by mouth every morning.     HASEEB LOZANO U-100 INSULIN 100 unit/mL pen  Generic drug: insulin lispro-aabc  Inject 2-10 Units into the skin 3 (three) times daily with meals.     metFORMIN 1000 MG tablet  Commonly known as: GLUCOPHAGE  Take 1,000 mg by mouth 2 (two) times daily with meals.     metoprolol  succinate 50 MG 24 hr tablet  Commonly known as: TOPROL-XL  Take 1 tablet by mouth every morning.     naloxone 4 mg/actuation Spry  Commonly known as: NARCAN  1 spray (4 mg total) by Nasal route 1 (one) time if needed (for emergency opioid reversal).     OLANZapine 5 MG tablet  Commonly known as: ZyPREXA  Take 1 tablet by mouth nightly on days 1-4 of each chemotherapy cycle.     ondansetron 4 MG Tbdl  Commonly known as: ZOFRAN-ODT  DISSOLVE ONE TABLET BY MOUTH EVERY 4 TO 6 HOURS AS NEEDED FOR NAUSEA     pravastatin 10 MG tablet  Commonly known as: PRAVACHOL  Take 1 tablet by mouth every evening.     prochlorperazine 5 MG tablet  Commonly known as: COMPAZINE  Take 1 tablet (5 mg total) by mouth every 6 (six) hours as needed for Nausea.     TOUJEO SOLOSTAR U-300 INSULIN 300 unit/mL (1.5 mL) Inpn pen  Generic drug: insulin glargine (TOUJEO)  Inject 10 Units into the skin once daily.            STOP taking these medications      ciprofloxacin HCl 500 MG tablet  Commonly known as: CIPRO     COLACE ORAL     HYDROcodone-acetaminophen 7.5-325 mg per tablet  Commonly known as: NORCO     LOVENOX 60 mg/0.6 mL Syrg  Generic drug: enoxaparin     metroNIDAZOLE 500 MG tablet  Commonly known as: SUMMER Bowers MD  Gynecologic Oncology  [facility] \[facility]\"")

## 2024-09-08 NOTE — PROGRESS NOTES
[facility] - Oncology ([facility])  General Surgery  Progress Note    Subjective:     History of Present Illness:  Patient is a 74 y.o. female  with stage IIIC ovarian cancer being treated by Dr. Crawford. She initially presented to an outside hospital 3/2024 with abdominal pain.  Was noted to have a thickened sigmoid at that time and concern for possible diverticular disease with ? Colorectal fistula. Had attempted colonoscopy at that time which was aborted due to a fixed sigmoid colon at approximately 20 cm.  Patient had a reportedly negative colonoscopy 1 year prior.  Subsequent work up revealed ovarian cancer.  Currently undergoing chemotherapy.  Repeat imaging showed improved peritoneal disease and persistent thickening of the sigmoid colon.     Post-Op Info:  Procedure(s) (LRB):  LAPAROTOMY, EXPLORATORY (N/A)  HYSTERECTOMY, TOTAL, ABDOMINAL (N/A)  SALPINGO-OOPHORECTOMY, BILATERAL (N/A)  OMENTECTOMY (N/A)  DEBULKING, NEOPLASM (N/A)  RESECTION, COLON, LOW ANTERIOR (N/A)  SIGMOIDOSCOPY, FLEXIBLE (N/A)  ILEOCOLECTOMY (N/A)  MOBILIZATION, SPLENIC FLEXURE  URETEROLYSIS (Bilateral)   5 Days Post-Op     Interval History:   NAEON, endorses continued pain this am. No recent BMs. Passing gas. No nausea/vomiting, tolerated small amounts of food, low appetite.  CRP 41.5 from 74.8.    Medications:  Continuous Infusions:  Scheduled Meds:   acetaminophen  1,000 mg Oral Q6H    amLODIPine  5 mg Oral QAM    enoxparin  1 mg/kg Subcutaneous Q12H (treatment, non-standard time)    ibuprofen  600 mg Oral Q6H    insulin glargine U-100  10 Units Subcutaneous Daily    levothyroxine  75 mcg Oral QAM    metoprolol succinate  50 mg Oral QAM     PRN Meds:  Current Facility-Administered Medications:     glucagon (human recombinant), 1 mg, Intramuscular, PRN    glucose, 16 g, Oral, PRN    glucose, 24 g, Oral, PRN    hydrALAZINE, 10 mg, Intravenous, Q6H PRN    insulin aspart U-100, 0-5 Units, Subcutaneous, QID (AC + HS) PRN    melatonin, 6 mg,  Oral, Nightly PRN    naloxone, 0.02 mg, Intravenous, PRN    ondansetron, 4 mg, Oral, Q6H PRN    oxyCODONE, 5 mg, Oral, Q3H PRN    oxyCODONE, 10 mg, Oral, Q3H PRN    prochlorperazine, 5 mg, Oral, Q6H PRN     Review of patient's allergies indicates:  No Known Allergies  Objective:     Vital Signs (Most Recent):  Temp: 98.1 °F (36.7 °C) (09/08/24 0531)  Pulse: 96 (09/08/24 0334)  Resp: 17 (09/08/24 0718)  BP: (!) 157/71 (09/08/24 0334)  SpO2: 97 % (09/08/24 0334) Vital Signs (24h Range):  Temp:  [98.1 °F (36.7 °C)-98.2 °F (36.8 °C)] 98.1 °F (36.7 °C)  Pulse:  [73-96] 96  Resp:  [17-18] 17  SpO2:  [97 %] 97 %  BP: (136-157)/(65-81) 157/71     Weight: 59 kg (130 lb 1.1 oz)  Body mass index is 24.58 kg/m².    Intake/Output - Last 3 Shifts         09/06 0700 09/07 0659 09/07 0700 09/08 0659 09/08 0700 09/09 0659    P.O. 480 800     I.V. (mL/kg)       Total Intake(mL/kg) 480 (8.1) 800 (13.6)     Urine (mL/kg/hr)  870 (0.6)     Stool       Total Output  870     Net +480 -70            Urine Occurrence 3 x 3 x     Stool Occurrence 0 x               Physical Exam  Gen: WD/WN in NAD  HEENT: MMM, Sclera anicteric   Chest: Normocardic, normotensive, bilateral chest rise  Abd: Soft, AT, mod D, midline c/d/i  Ext: No pitting edema noted       Significant Labs:  I have reviewed all pertinent lab results within the past 24 hours.    Significant Diagnostics:  I have reviewed all pertinent imaging results/findings within the past 24 hours.  Assessment/Plan:     * S/P Ex Lap/FANNY/BSO/Omentectomy/Debulking/LAR/Ileocecetomy/Ureterolysis/FlexSig/Cystoscopy  74F with ovarian cancer s/p ex lap, debulking by gyn onc, ileocecectomy and LAR by CRS     -Recommend adding fiber or Miralax to diet. Avoid senna/docusate due to potential bowel irritation.  -Analgesia PRN  -OOBAT  -Remainder of care per primary team. Okay to discharge from colorectal team perspective.          Christ Huizar MD  General Surgery  [facility] - [facility] ([facility])

## 2024-09-08 NOTE — PLAN OF CARE
Discharge instructions and prescriptions given and explained to pt. MD confirmed patient could take ibuprofen along wit eliquis. MD alerted of right leg pain and confirmed patient could take gabapentin at home. Discussed upcoming appointments. Gave wound care instructions. Educated on opioid use at home. Educated pt when to seek medical attention. Pt. verbalized understanding with no further questions. No PIV upon discharge to be removed. Void spontaneously. Educated pt when to notify provider if constipation persists. Fair oral intake. VS WDL. Pt can ambulate standby assist with a walker. Patient is awaiting ride home by sister. Wheelchair provided.

## 2024-09-09 ENCOUNTER — PATIENT MESSAGE (OUTPATIENT)
Dept: SURGERY | Facility: CLINIC | Age: 74
End: 2024-09-09
Payer: MEDICARE

## 2024-09-09 NOTE — TELEPHONE ENCOUNTER
"Sister Nova calling on behalf of pt. States that pt was discharged today from hospital. States that pt has Tylenol and Ibuprofen scheduled as ordered in chart with Oxycodone as ordered. States that pt only ate crackers and 2 spoons of peaches with meds. Now c/o pt vomited once. States that Zofran was given, but pt vomited before med got in her system per sister. Nova asking should she cont to give pain meds and regular scheduled around the clock.  States that pt is post abdominal surgery on  9/3/24 for cancer. Onc GYN resident paged per protocol. Dr. Herlinda Bowers returned call. Attempted to transfer and call disconnected.     Call returned to Nova- No answer-VM left. Dr. Bowers also paged again.     Dr. Bowers returned call. States that she spoke with family. Encounter routed to provider.     Reason for Disposition   Recent abdominal injury (within last 3 days)     Abdominal surgery   [1] Follow-up call from patient regarding patient's clinical status AND [2] information urgent    Additional Information   Negative: Shock suspected (e.g., cold/pale/clammy skin, too weak to stand, low BP, rapid pulse)   Negative: Difficult to awaken or acting confused (e.g., disoriented, slurred speech)   Negative: Sounds like a life-threatening emergency to the triager   Negative: [1] Vomiting AND [2] contains red blood or black ("coffee ground") material  (Exception: Few red streaks in vomit that only happened once.)     Did not look   Negative: Severe pain in one eye   Negative: Recent head injury (within last 3 days)    Protocols used: Vomiting-A-AH, PCP Call - No Triage-A-AH    "

## 2024-09-09 NOTE — TELEPHONE ENCOUNTER
Received call from RN on call. Patient and her sister called with medication questions. Connected with patient and her sister. Patient had an episode of vomiting earlier and they were wondering when it was appropriate to give her next dose of pain medications as her tylenol and ibuprofen were due 1 hour ago. Patient's nausea improved following a dose of zofran. Discussed that if she was able to tolerate some water then it would be okay to take her scheduled pain medications. Discussed that it would be okay to hold her metformin and statin tonight if continued nausea. Discussed dosing of patient's home zofran and compazine for nausea as needed. Patient and family inquired about when they should be concerned about nausea/vomiting. Discussed that if she was unable to tolerate any po intake for 12-24 hours that would be a reason to return to the ER. Patient and family stated understanding. All questions answered    Herlinda Bowers MD  PGY-4 OB/GYN

## 2024-09-11 ENCOUNTER — TELEPHONE (OUTPATIENT)
Dept: SURGERY | Facility: CLINIC | Age: 74
End: 2024-09-11
Payer: MEDICARE

## 2024-09-11 ENCOUNTER — TELEPHONE (OUTPATIENT)
Dept: OBSTETRICS AND GYNECOLOGY | Facility: HOSPITAL | Age: 74
End: 2024-09-11
Payer: MEDICARE

## 2024-09-11 NOTE — TELEPHONE ENCOUNTER
Patient called nurse on call, did not share what call was concerning. Attempted to return phone call; however, went to patient's voicemail. Left voicemail instructing to call back.     Piedad Wharton MD/MPH  OB/GYN PGY-4  [facility]

## 2024-09-11 NOTE — TELEPHONE ENCOUNTER
Called patient and daughter to inform of phone appointment for tomorrow with Dr. Caicedo. Unable to leave voicemails on either number. Portal message sent.

## 2024-09-12 ENCOUNTER — TELEPHONE (OUTPATIENT)
Dept: GYNECOLOGIC ONCOLOGY | Facility: CLINIC | Age: 74
End: 2024-09-12
Payer: MEDICARE

## 2024-09-12 ENCOUNTER — OFFICE VISIT (OUTPATIENT)
Dept: SURGERY | Facility: CLINIC | Age: 74
End: 2024-09-12
Payer: MEDICARE

## 2024-09-12 ENCOUNTER — TELEPHONE (OUTPATIENT)
Dept: SURGERY | Facility: CLINIC | Age: 74
End: 2024-09-12

## 2024-09-12 ENCOUNTER — HOSPITAL ENCOUNTER (EMERGENCY)
Facility: HOSPITAL | Age: 74
Discharge: HOME | End: 2024-09-12
Attending: EMERGENCY MEDICINE
Payer: MEDICARE

## 2024-09-12 ENCOUNTER — PATIENT MESSAGE (OUTPATIENT)
Dept: SURGERY | Facility: CLINIC | Age: 74
End: 2024-09-12

## 2024-09-12 VITALS
OXYGEN SATURATION: 100 % | DIASTOLIC BLOOD PRESSURE: 80 MMHG | SYSTOLIC BLOOD PRESSURE: 163 MMHG | RESPIRATION RATE: 20 BRPM | WEIGHT: 115.88 LBS | TEMPERATURE: 98 F | BODY MASS INDEX: 21.88 KG/M2 | HEART RATE: 81 BPM | HEIGHT: 61 IN

## 2024-09-12 DIAGNOSIS — R53.1 WEAKNESS: ICD-10-CM

## 2024-09-12 DIAGNOSIS — R53.83 FATIGUE, UNSPECIFIED TYPE: ICD-10-CM

## 2024-09-12 DIAGNOSIS — E87.6 HYPOKALEMIA: Primary | ICD-10-CM

## 2024-09-12 DIAGNOSIS — N83.8 OVARIAN MASS: Primary | ICD-10-CM

## 2024-09-12 LAB
ALBUMIN SERPL BCP-MCNC: 3.2 G/DL (ref 3.5–5.2)
ALP SERPL-CCNC: 40 U/L (ref 55–135)
ALT SERPL W/O P-5'-P-CCNC: 9 U/L (ref 10–44)
ANION GAP SERPL CALC-SCNC: 14 MMOL/L (ref 8–16)
AST SERPL-CCNC: 15 U/L (ref 10–40)
BASOPHILS # BLD AUTO: 0.03 K/UL (ref 0–0.2)
BASOPHILS NFR BLD: 0.4 % (ref 0–1.9)
BILIRUB SERPL-MCNC: 0.3 MG/DL (ref 0.1–1)
BILIRUB UR QL STRIP: NEGATIVE
BUN SERPL-MCNC: 5 MG/DL (ref 8–23)
CALCIUM SERPL-MCNC: 8.7 MG/DL (ref 8.7–10.5)
CHLORIDE SERPL-SCNC: 97 MMOL/L (ref 95–110)
CLARITY UR: ABNORMAL
CO2 SERPL-SCNC: 24 MMOL/L (ref 23–29)
COLOR UR: COLORLESS
CREAT SERPL-MCNC: 0.6 MG/DL (ref 0.5–1.4)
DIFFERENTIAL METHOD BLD: ABNORMAL
EOSINOPHIL # BLD AUTO: 0 K/UL (ref 0–0.5)
EOSINOPHIL NFR BLD: 0.5 % (ref 0–8)
ERYTHROCYTE [DISTWIDTH] IN BLOOD BY AUTOMATED COUNT: 17.2 % (ref 11.5–14.5)
EST. GFR  (NO RACE VARIABLE): >60 ML/MIN/1.73 M^2
GLUCOSE SERPL-MCNC: 113 MG/DL (ref 70–110)
GLUCOSE UR QL STRIP: NEGATIVE
HCT VFR BLD AUTO: 25.9 % (ref 37–48.5)
HCV AB SERPL QL IA: NEGATIVE
HEP C VIRUS HOLD SPECIMEN: NORMAL
HGB BLD-MCNC: 8.9 G/DL (ref 12–16)
HGB UR QL STRIP: NEGATIVE
HIV 1+2 AB+HIV1 P24 AG SERPL QL IA: NEGATIVE
IMM GRANULOCYTES # BLD AUTO: 0.08 K/UL (ref 0–0.04)
IMM GRANULOCYTES NFR BLD AUTO: 1.1 % (ref 0–0.5)
KETONES UR QL STRIP: NEGATIVE
LEUKOCYTE ESTERASE UR QL STRIP: NEGATIVE
LYMPHOCYTES # BLD AUTO: 2.1 K/UL (ref 1–4.8)
LYMPHOCYTES NFR BLD: 27.1 % (ref 18–48)
MCH RBC QN AUTO: 32.7 PG (ref 27–31)
MCHC RBC AUTO-ENTMCNC: 34.4 G/DL (ref 32–36)
MCV RBC AUTO: 95 FL (ref 82–98)
MONOCYTES # BLD AUTO: 0.6 K/UL (ref 0.3–1)
MONOCYTES NFR BLD: 7.5 % (ref 4–15)
NEUTROPHILS # BLD AUTO: 4.8 K/UL (ref 1.8–7.7)
NEUTROPHILS NFR BLD: 63.4 % (ref 38–73)
NITRITE UR QL STRIP: NEGATIVE
NRBC BLD-RTO: 0 /100 WBC
PH UR STRIP: 7 [PH] (ref 5–8)
PLATELET # BLD AUTO: 289 K/UL (ref 150–450)
PMV BLD AUTO: 9.1 FL (ref 9.2–12.9)
POTASSIUM SERPL-SCNC: 3 MMOL/L (ref 3.5–5.1)
PROT SERPL-MCNC: 6.3 G/DL (ref 6–8.4)
PROT UR QL STRIP: NEGATIVE
RBC # BLD AUTO: 2.72 M/UL (ref 4–5.4)
SODIUM SERPL-SCNC: 135 MMOL/L (ref 136–145)
SP GR UR STRIP: <1.005 (ref 1–1.03)
URN SPEC COLLECT METH UR: ABNORMAL
UROBILINOGEN UR STRIP-ACNC: NEGATIVE EU/DL
WBC # BLD AUTO: 7.59 K/UL (ref 3.9–12.7)

## 2024-09-12 PROCEDURE — 99024 POSTOP FOLLOW-UP VISIT: CPT | Mod: 95,POP,, | Performed by: SURGERY

## 2024-09-12 PROCEDURE — 86803 HEPATITIS C AB TEST: CPT | Performed by: EMERGENCY MEDICINE

## 2024-09-12 PROCEDURE — 25000003 PHARM REV CODE 250: Performed by: EMERGENCY MEDICINE

## 2024-09-12 PROCEDURE — 99284 EMERGENCY DEPT VISIT MOD MDM: CPT

## 2024-09-12 PROCEDURE — 80053 COMPREHEN METABOLIC PANEL: CPT | Performed by: EMERGENCY MEDICINE

## 2024-09-12 PROCEDURE — 85025 COMPLETE CBC W/AUTO DIFF WBC: CPT | Performed by: EMERGENCY MEDICINE

## 2024-09-12 PROCEDURE — 87389 HIV-1 AG W/HIV-1&-2 AB AG IA: CPT | Performed by: EMERGENCY MEDICINE

## 2024-09-12 PROCEDURE — 63600175 PHARM REV CODE 636 W HCPCS: Performed by: EMERGENCY MEDICINE

## 2024-09-12 PROCEDURE — 81003 URINALYSIS AUTO W/O SCOPE: CPT | Performed by: EMERGENCY MEDICINE

## 2024-09-12 PROCEDURE — 96361 HYDRATE IV INFUSION ADD-ON: CPT

## 2024-09-12 PROCEDURE — 96365 THER/PROPH/DIAG IV INF INIT: CPT

## 2024-09-12 PROCEDURE — 96375 TX/PRO/DX INJ NEW DRUG ADDON: CPT

## 2024-09-12 RX ORDER — POTASSIUM CHLORIDE 750 MG/1
10 TABLET, EXTENDED RELEASE ORAL 2 TIMES DAILY
Qty: 30 TABLET | Refills: 0 | Status: SHIPPED | OUTPATIENT
Start: 2024-09-12

## 2024-09-12 RX ORDER — ONDANSETRON HYDROCHLORIDE 2 MG/ML
4 INJECTION, SOLUTION INTRAVENOUS
Status: COMPLETED | OUTPATIENT
Start: 2024-09-12 | End: 2024-09-12

## 2024-09-12 RX ORDER — ACETAMINOPHEN 325 MG/1
650 TABLET ORAL
Status: COMPLETED | OUTPATIENT
Start: 2024-09-12 | End: 2024-09-12

## 2024-09-12 RX ORDER — SODIUM CHLORIDE 9 MG/ML
1000 INJECTION, SOLUTION INTRAVENOUS
Status: COMPLETED | OUTPATIENT
Start: 2024-09-12 | End: 2024-09-12

## 2024-09-12 RX ORDER — POTASSIUM CHLORIDE 20 MEQ/1
40 TABLET, EXTENDED RELEASE ORAL ONCE
Status: COMPLETED | OUTPATIENT
Start: 2024-09-12 | End: 2024-09-12

## 2024-09-12 RX ORDER — POTASSIUM CHLORIDE 7.45 MG/ML
10 INJECTION INTRAVENOUS
Status: COMPLETED | OUTPATIENT
Start: 2024-09-12 | End: 2024-09-12

## 2024-09-12 RX ADMIN — ONDANSETRON 4 MG: 2 INJECTION INTRAMUSCULAR; INTRAVENOUS at 08:09

## 2024-09-12 RX ADMIN — SODIUM CHLORIDE 1000 ML: 9 INJECTION, SOLUTION INTRAVENOUS at 08:09

## 2024-09-12 RX ADMIN — ACETAMINOPHEN 650 MG: 325 TABLET ORAL at 10:09

## 2024-09-12 RX ADMIN — POTASSIUM CHLORIDE 40 MEQ: 1500 TABLET, EXTENDED RELEASE ORAL at 10:09

## 2024-09-12 RX ADMIN — POTASSIUM CHLORIDE 10 MEQ: 7.46 INJECTION, SOLUTION INTRAVENOUS at 10:09

## 2024-09-12 NOTE — TELEPHONE ENCOUNTER
"Spoke with pt's sister, Katie, regarding pt status following AV with provider. Katie states, "her sister is not doing better and is not eating. She is still having problems with vomiting and withdrawals from Oxycodone." She was able to eat a biscuit this morning but has since not been able to eat or drink. Katie believes she may need medical assistance to come off of Oxycodone completely. Informed that provider will be updated for next recommended steps. Advised to contact office tomorrow if not contacted by 10 AM. Katie verbalized understanding. Denies further questions at this time. Message sent to provider.   "

## 2024-09-12 NOTE — PROGRESS NOTES
Established Patient - Audio Only Telehealth Visit     The patient location is: [place]   The chief complaint leading to consultation is: Post-op nausea and vomiting  Visit type: Virtual visit with audio only (telephone)  Total time spent with patient: 15       The reason for the audio only service rather than synchronous audio and video virtual visit was related to technical difficulties or patient preference/necessity.     Each patient to whom I provide medical services by telemedicine is:  (1) informed of the relationship between the physician and patient and the respective role of any other health care provider with respect to management of the patient; and (2) notified that they may decline to receive medical services by telemedicine and may withdraw from such care at any time. Patient verbally consented to receive this service via voice-only telephone call.       HPI: Ms. Meyer is a 73 yo F with stage IIIC ovarian cancer who presents for follow up.     Since returning home has had issues with nausea and vomiting.  Her sister reports that she is passing gas and had multiple bowel movements after miralax the other day.  Reports distaste for food.  Has been able to keep down some liquids.  Was taking alternating tylenol and ibuprofen.  Has only taken a few oxycodone.  Her sister thinks she may have had an opiod withdrawal as symptoms improved once she took oxycodone.       Assessment and plan:        - pathology pending   - okay to decrease frequency of tylenol and ibuprofen as patient is not sleeping well due to frequency of schedule and is also not requiring oxycodone scheduled.    - Okay to take prilosec   - agree with 1/2 cap of miralax over other day or as needed for constipation   - boost/ensure if unable to tolerate solids  - return precautions reviewed    Ruby Caicedo MD  Staff Surgeon   Colon & Rectal Surgery                          This service was not originating from a related E/M service provided  within the previous 7 days nor will  to an E/M service or procedure within the next 24 hours or my soonest available appointment.  Prevailing standard of care was able to be met in this audio-only visit.

## 2024-09-12 NOTE — TELEPHONE ENCOUNTER
Called to check in on Ms. Meyer. No answer. Called and discussed care with Dr. Bello. She is having some intermittent nausea but tolerating liquids. Recommend she Continue miralax. Can try javy tea for nausea as she is trying to avoid medications.  Has a virtual with Dr. Caicedo today. Seeing me 10/1 but to call if any issues before.      Maliha Crawford MD  Gynecologic Oncology

## 2024-09-12 NOTE — PROGRESS NOTES
Called patients sister after multiple messages and discussion with Dr. Caicedo. They are struggling with nausea and worry about her being over sedated after oxycodone. They are also worried she is dehydrated. Denying any fever. She is having bowel function. I recommend ER evaluation as difficult to assess over the phone. She needs an exam ,  labs and likely IV hydration. They voice understanding and will take her to [facility] Tonight.

## 2024-09-13 ENCOUNTER — PATIENT MESSAGE (OUTPATIENT)
Dept: SURGERY | Facility: CLINIC | Age: 74
End: 2024-09-13
Payer: MEDICARE

## 2024-09-13 ENCOUNTER — TELEPHONE (OUTPATIENT)
Dept: GYNECOLOGIC ONCOLOGY | Facility: CLINIC | Age: 74
End: 2024-09-13
Payer: MEDICARE

## 2024-09-13 ENCOUNTER — PATIENT MESSAGE (OUTPATIENT)
Dept: GYNECOLOGIC ONCOLOGY | Facility: CLINIC | Age: 74
End: 2024-09-13
Payer: MEDICARE

## 2024-09-13 LAB
COMMENT: ABNORMAL
FINAL PATHOLOGIC DIAGNOSIS: ABNORMAL
GROSS: ABNORMAL
Lab: ABNORMAL

## 2024-09-13 NOTE — DISCHARGE INSTRUCTIONS
Continue to use ondansetron as directed for nausea     As discussed here make sure and drink fluids and eat as you continue to recover from cancer surgery     Take potassium tablets as directed     Follow up with your doctor tomorrow   
Imaging Studies

## 2024-09-13 NOTE — ED PROVIDER NOTES
SCRIBE #1 NOTE: I, Ten Betts, am scribing for, and in the presence of, Pipo Menchaca Jr., MD. I have scribed the entire note.       History     Chief Complaint   Patient presents with    Dehydration     Had surg last Tuesday and now comingto ER because unable to keep food/liquids down and feels dehydrated.      Review of patient's allergies indicates:  No Known Allergies      History of Present Illness     HPI    9/12/2024, 8:29 PM  History obtained from the daughter and patient      History of Present Illness: Bella Meyer is a 74 y.o. female patient with a PMHx of pulmonary embolism, cancer, DM Type 2, HTN, HLD, and hypothyroidism who presents to the Emergency Department for evaluation of dehydration which onset gradually since having surgery on 9/3/2024. Pt had a total hysterectomy done during the surgery. Since the surgery, pt has been unable to keep food and liquids down and feels dehydrated. Per pt's daughter, pt was able to eat a biscuit this morning. Symptoms are constant and moderate in severity. No mitigating or exacerbating factors reported. Associated sxs include fatigue, weakness, diarrhea, vomiting, nausea, and weight loss. Patient denies any chills, CP, SOB, dysuria, and all other sxs at this time. No prior Tx reported. No further complaints or concerns at this time.       Arrival mode: Personal vehicle      PCP: Rosita, Primary Doctor        Past Medical History:  Past Medical History:   Diagnosis Date    Cancer     Diabetes mellitus, type 2     Hyperlipidemia     Hypertension     Hypothyroidism     Pulmonary embolism        Past Surgical History:  Past Surgical History:   Procedure Laterality Date    BILATERAL SALPINGO-OOPHORECTOMY (BSO) N/A 9/3/2024    Procedure: SALPINGO-OOPHORECTOMY, BILATERAL;  Surgeon: Maliha Crawford MD;  Location: [facility] OR [address] [place] [address];  Service: Gynecology Oncology;  Laterality: N/A;    DEBULKING OF TUMOR N/A 9/3/2024    Procedure: DEBULKING, NEOPLASM;  Surgeon: Maliha Crawford,  MD;  Location: [facility] OR 2ND FLR;  Service: Gynecology Oncology;  Laterality: N/A;    FLEXIBLE SIGMOIDOSCOPY N/A 9/3/2024    Procedure: SIGMOIDOSCOPY, FLEXIBLE;  Surgeon: Ruby Caicedo MD;  Location: [facility] OR 2ND FLR;  Service: Colon and Rectal;  Laterality: N/A;    ILEOCOLECTOMY N/A 9/3/2024    Procedure: ILEOCOLECTOMY;  Surgeon: Ruby Caicedo MD;  Location: [facility] OR 2ND FLR;  Service: Colon and Rectal;  Laterality: N/A;    LAPAROTOMY, EXPLORATORY N/A 9/3/2024    Procedure: LAPAROTOMY, EXPLORATORY;  Surgeon: Maliha Crawford MD;  Location: [facility] OR 2ND FLR;  Service: Gynecology Oncology;  Laterality: N/A;  4 hr case    LOW ANTERIOR RESECTION OF COLON N/A 9/3/2024    Procedure: RESECTION, COLON, LOW ANTERIOR;  Surgeon: Ruby Caicedo MD;  Location: [facility] OR 2ND FLR;  Service: Colon and Rectal;  Laterality: N/A;  4 hr case    LYSIS OF ADHESIONS OF URETER Bilateral 9/3/2024    Procedure: URETEROLYSIS;  Surgeon: Maliha Crawford MD;  Location: [facility] OR 2ND FLR;  Service: Gynecology Oncology;  Laterality: Bilateral;    MOBILIZATION OF SPLENIC FLEXURE  9/3/2024    Procedure: MOBILIZATION, SPLENIC FLEXURE;  Surgeon: Ruby Caicedo MD;  [facility];  Service: Colon and Rectal;;    OMENTECTOMY N/A 9/3/2024    Procedure: OMENTECTOMY;  Surgeon: Maliha Crawford MD;  [facility];  Service: Gynecology Oncology;  Laterality: N/A;    TOTAL ABDOMINAL HYSTERECTOMY N/A 9/3/2024    Procedure: HYSTERECTOMY, TOTAL, ABDOMINAL;  Surgeon: Maliha Crawford MD;  [facility];  Service: Gynecology Oncology;  Laterality: N/A;    TUBAL LIGATION      at age 32's         Family History:  Family History   Problem Relation Name Age of Onset    Cancer Brother      Breast cancer Neg Hx      Colon cancer Neg Hx      Ovarian cancer Neg Hx      Uterine cancer Neg Hx      Cervical cancer Neg Hx         Social History:  Social History     Tobacco Use    Smoking status: Never     Passive exposure: Past    Smokeless  tobacco: Never   Substance and Sexual Activity    Alcohol use: Never    Drug use: Never    Sexual activity: Yes     Partners: Male     Birth control/protection: Post-menopausal        Review of Systems     Review of Systems   Constitutional:  Positive for fatigue and unexpected weight change. Negative for chills and fever.   HENT:  Negative for sore throat.    Respiratory:  Negative for cough and shortness of breath.    Cardiovascular:  Negative for chest pain.   Gastrointestinal:  Positive for diarrhea, nausea and vomiting.   Genitourinary:  Negative for dysuria.   Musculoskeletal:  Negative for back pain.   Skin:  Negative for rash.   Neurological:  Positive for weakness. Negative for headaches.   Hematological:  Does not bruise/bleed easily.   All other systems reviewed and are negative.     Physical Exam     Initial Vitals [09/12/24 1913]   BP Pulse Resp Temp SpO2   125/60 86 12 98 °F (36.7 °C) 98 %      MAP       --          Physical Exam  Nursing Notes and Vital Signs Reviewed.  Constitutional: Patient is in no acute distress. Well-developed and well-nourished. Elderly.   Head: Atraumatic. Normocephalic.  Eyes: PERRL. EOM intact. Conjunctivae are not pale. No scleral icterus.  ENT: Mucous membranes are dry. Oropharynx is clear and symmetric.    Neck: Supple. Full ROM. No lymphadenopathy.  Cardiovascular: Regular rate. Regular rhythm. No murmurs, rubs, or gallops. Distal pulses are 2+ and symmetric.  Pulmonary/Chest: No respiratory distress. Clear to auscultation bilaterally. No wheezing or rales.  Abdominal: Soft and non-distended.  Abdominal scars on lower abdomen. There is no focal tenderness.  No rebound, guarding, or rigidity. Good bowel sounds.  Genitourinary: No CVA tenderness  Musculoskeletal: Moves all extremities. No obvious deformities. No edema. No calf tenderness.  Skin: Warm and dry.  Neurological:  Alert, awake, and appropriate.  Normal speech.  No acute focal neurological deficits are  "appreciated.  Psychiatric: Normal affect. Good eye contact. Appropriate in content.     ED Course   Procedures  ED Vital Signs:  Vitals:    09/12/24 1913 09/12/24 1935 09/12/24 1945 09/12/24 2000   BP: 125/60  (!) 165/69 (!) 169/73   Pulse: 86 75 72 70   Resp: 12 12 12   Temp: 98 °F (36.7 °C)  98 °F (36.7 °C)    TempSrc: Oral  Oral    SpO2: 98%  99% 99%   Weight:       Height:        09/12/24 2030 09/12/24 2100 09/12/24 2130 09/12/24 2200   BP: (!) 165/74 (!) 159/72 (!) 160/76 (!) 163/74   Pulse: 81 72 75 77   Resp: 12 13 13 11   Temp:       TempSrc:       SpO2: 100% 98% 100% 99%   Weight:   52.6 kg (115 lb 14.4 oz)    Height:   5' 1" (1.549 m)        Abnormal Lab Results:  Labs Reviewed   CBC W/ AUTO DIFFERENTIAL - Abnormal       Result Value    WBC 7.59      RBC 2.72 (*)     Hemoglobin 8.9 (*)     Hematocrit 25.9 (*)     MCV 95      MCH 32.7 (*)     MCHC 34.4      RDW 17.2 (*)     Platelets 289      MPV 9.1 (*)     Immature Granulocytes 1.1 (*)     Gran # (ANC) 4.8      Immature Grans (Abs) 0.08 (*)     Lymph # 2.1      Mono # 0.6      Eos # 0.0      Baso # 0.03      nRBC 0      Gran % 63.4      Lymph % 27.1      Mono % 7.5      Eosinophil % 0.5      Basophil % 0.4      Differential Method Automated     COMPREHENSIVE METABOLIC PANEL - Abnormal    Sodium 135 (*)     Potassium 3.0 (*)     Chloride 97      CO2 24      Glucose 113 (*)     BUN 5 (*)     Creatinine 0.6      Calcium 8.7      Total Protein 6.3      Albumin 3.2 (*)     Total Bilirubin 0.3      Alkaline Phosphatase 40 (*)     AST 15      ALT 9 (*)     eGFR >60      Anion Gap 14     URINALYSIS - Abnormal    Specimen UA Urine, Clean Catch      Color, UA Colorless (*)     Appearance, UA Hazy (*)     pH, UA 7.0      Specific Gravity, UA <1.005 (*)     Protein, UA Negative      Glucose, UA Negative      Ketones, UA Negative      Bilirubin (UA) Negative      Occult Blood UA Negative      Nitrite, UA Negative      Urobilinogen, UA Negative      Leukocytes, UA " Negative     HIV 1 / 2 ANTIBODY    HIV 1/2 Ag/Ab Negative      Narrative:     Release to patient->Immediate   HEPATITIS C ANTIBODY    Hepatitis C Ab Negative      Narrative:     Release to patient->Immediate   HEP C VIRUS HOLD SPECIMEN    HEP C Virus Hold Specimen Hold for HCV sendout      Narrative:     Release to patient->Immediate        All Lab Results:  Results for orders placed or performed during the hospital encounter of 09/12/24   HIV 1/2 Ag/Ab (4th Gen)   Result Value Ref Range    HIV 1/2 Ag/Ab Negative Negative   Hepatitis C Antibody   Result Value Ref Range    Hepatitis C Ab Negative Negative   HCV Virus Hold Specimen   Result Value Ref Range    HEP C Virus Hold Specimen Hold for HCV sendout    CBC auto differential   Result Value Ref Range    WBC 7.59 3.90 - 12.70 K/uL    RBC 2.72 (L) 4.00 - 5.40 M/uL    Hemoglobin 8.9 (L) 12.0 - 16.0 g/dL    Hematocrit 25.9 (L) 37.0 - 48.5 %    MCV 95 82 - 98 fL    MCH 32.7 (H) 27.0 - 31.0 pg    MCHC 34.4 32.0 - 36.0 g/dL    RDW 17.2 (H) 11.5 - 14.5 %    Platelets 289 150 - 450 K/uL    MPV 9.1 (L) 9.2 - 12.9 fL    Immature Granulocytes 1.1 (H) 0.0 - 0.5 %    Gran # (ANC) 4.8 1.8 - 7.7 K/uL    Immature Grans (Abs) 0.08 (H) 0.00 - 0.04 K/uL    Lymph # 2.1 1.0 - 4.8 K/uL    Mono # 0.6 0.3 - 1.0 K/uL    Eos # 0.0 0.0 - 0.5 K/uL    Baso # 0.03 0.00 - 0.20 K/uL    nRBC 0 0 /100 WBC    Gran % 63.4 38.0 - 73.0 %    Lymph % 27.1 18.0 - 48.0 %    Mono % 7.5 4.0 - 15.0 %    Eosinophil % 0.5 0.0 - 8.0 %    Basophil % 0.4 0.0 - 1.9 %    Differential Method Automated    Comprehensive metabolic panel   Result Value Ref Range    Sodium 135 (L) 136 - 145 mmol/L    Potassium 3.0 (L) 3.5 - 5.1 mmol/L    Chloride 97 95 - 110 mmol/L    CO2 24 23 - 29 mmol/L    Glucose 113 (H) 70 - 110 mg/dL    BUN 5 (L) 8 - 23 mg/dL    Creatinine 0.6 0.5 - 1.4 mg/dL    Calcium 8.7 8.7 - 10.5 mg/dL    Total Protein 6.3 6.0 - 8.4 g/dL    Albumin 3.2 (L) 3.5 - 5.2 g/dL    Total Bilirubin 0.3 0.1 - 1.0 mg/dL     Alkaline Phosphatase 40 (L) 55 - 135 U/L    AST 15 10 - 40 U/L    ALT 9 (L) 10 - 44 U/L    eGFR >60 >60 mL/min/1.73 m^2    Anion Gap 14 8 - 16 mmol/L   Urinalysis   Result Value Ref Range    Specimen UA Urine, Clean Catch     Color, UA Colorless (A) Yellow, Straw, Vicki    Appearance, UA Hazy (A) Clear    pH, UA 7.0 5.0 - 8.0    Specific Gravity, UA <1.005 (A) 1.005 - 1.030    Protein, UA Negative Negative    Glucose, UA Negative Negative    Ketones, UA Negative Negative    Bilirubin (UA) Negative Negative    Occult Blood UA Negative Negative    Nitrite, UA Negative Negative    Urobilinogen, UA Negative <2.0 EU/dL    Leukocytes, UA Negative Negative         Imaging Results:  Imaging Results    None                   The Emergency Provider reviewed the vital signs and test results, which are outlined above.     ED Discussion     11:20 PM: Reassessed pt at this time. Discussed with pt all pertinent ED information and results. Discussed pt dx and plan of tx. Gave pt all f/u and return to the ED instructions. All questions and concerns were addressed at this time. Pt expresses understanding of information and instructions, and is comfortable with plan to discharge. Pt is stable for discharge.    I discussed with patient and/or family/caretaker that evaluation in the ED does not suggest any emergent or life threatening medical conditions requiring immediate intervention beyond what was provided in the ED, and I believe patient is safe for discharge.  Regardless, an unremarkable evaluation in the ED does not preclude the development or presence of a serious of life threatening condition. As such, patient was instructed to return immediately for any worsening or change in current symptoms.         Medical Decision Making  75 y/o female here 2 weeks post ovarian cancer debulking surgery with decreased appetite and fatigue per caregiver her daughter and her  at bedside . Ddx uti, dehydration , acute renal failure  ., opiod withdrawal - pt was prescribed oxycodone post surgery which she has been weaning off per daughter     Amount and/or Complexity of Data Reviewed  Independent Historian: caregiver and spouse     Details: Daughter at bedside,  at bedside   External Data Reviewed:      Details: Notes of previous ovarian cancer surgery reviewed   Labs: ordered. Decision-making details documented in ED Course.    Risk  OTC drugs.  Prescription drug management.  Decision regarding hospitalization.       Additional MDM:   Abdominal Pain: Medication(s) given for nausea: Zofran. Response to nausea medication: decreased.   The lab was independently reviewed and confirms the diagnosis. The abnormal lab results showed: hypokaelmia , anemia post surgery.   Re-evaluation of symptoms: Improved. Re-evaluation of the physical exam: Unchanged. Disposition: Discharged. The patient's condition was felt to be stable. Pt discharged with instructions to start using zofran for nausea and to take potassium to replace potassium losses due to poor po intake with instructions to follow up with Dr Caicedo surgeon who performed cancer debulking surgery 2 weeks ago as well with her pcp           ED Medication(s):  Medications   ondansetron injection 4 mg (4 mg Intravenous Given 9/12/24 2044)   0.9%  NaCl infusion (0 mLs Intravenous Stopped 9/12/24 2302)   acetaminophen tablet 650 mg (650 mg Oral Given 9/12/24 2230)   potassium chloride SA CR tablet 40 mEq (40 mEq Oral Given 9/12/24 2229)   potassium chloride 10 mEq in 100 mL IVPB (0 mEq Intravenous Stopped 9/12/24 2302)       New Prescriptions    POTASSIUM CHLORIDE (KLOR-CON) 10 MEQ TBSR    Take 1 tablet (10 mEq total) by mouth 2 (two) times daily.               Scribe Attestation:   Scribe #1: I performed the above scribed service and the documentation accurately describes the services I performed. I attest to the accuracy of the note.     Attending:   Physician Attestation Statement for Scribe #1: I,  Pipo Menchaca Jr., MD, personally performed the services described in this documentation, as scribed by Ten Betts, in my presence, and it is both accurate and complete.           Clinical Impression       ICD-10-CM ICD-9-CM   1. Hypokalemia  E87.6 276.8   2. Weakness  R53.1 780.79   3. Fatigue, unspecified type  R53.83 780.79       Disposition:   Disposition: Discharged  Condition: Stable         Pipo Menchaca Jr., MD  09/13/24 0042

## 2024-09-13 NOTE — TELEPHONE ENCOUNTER
Spoke with pt sister, Aaliyah. Feels that pt is better off of Oxy and a lot of her symptoms were related to withdrawal. Eating small bites of food and protein drink this morning, powerade. Taking tylenol and zofran but not waking her up to do ATC tyl/ibuprofen. She does not want to add K supplement to her regimen as she feels it will improve once she eats more and I concur but advised I'd check with Dr. Crawford about supplement and need for follow labs.  Valerie Gan, ANNALISEP-C, AOP  Gynecologic Oncology    ----- Message from Maliha Crawford MD sent at 9/13/2024  9:58 AM CDT -----  Thanks! Yes please give her a call to see how she's doing. I would recommend zofran ODT for nausea and continued PO hydration. Told her sisters, its ok for her to eat light and expected after such a big surgery. I talked to them last night before she went to ER    Jose Roberto Jett  ----- Message -----  From: Valerie Gan NP  Sent: 9/13/2024   8:26 AM CDT  To: Maliha Crawford MD    Hey she ended up going to the ED for dehydration, N&V. K was low and a little anemic but nothing crazy. I told her we'd call later to check on her. I saw you tried to call yesterday.  SIMON

## 2024-09-16 ENCOUNTER — TELEPHONE (OUTPATIENT)
Dept: GYNECOLOGIC ONCOLOGY | Facility: CLINIC | Age: 74
End: 2024-09-16
Payer: MEDICARE

## 2024-09-16 NOTE — TELEPHONE ENCOUNTER
Returned call to patient's sister, Katie. She reports patient has had 2 falls off the bedside commode; most recently this morning. Denies injury. She is also concerned about her sisters appetite. Reports she is eating some, is able to drink protein shakes, and is drinking a couple Gatorades a day.     She questions the need for SNF. Will consult SW through [facility] for evaluation and placement if recommended.

## 2024-09-16 NOTE — TELEPHONE ENCOUNTER
"Katie's sister,has concerns regarding her health. Reports she has poor appetite. Will not eat anything, she will gag or have nausea episodes.  Reports she has fell x 2 out of the bed and fainted our of the bedside commode this weekend. No injury reported. States she is very weak at night. Reports she has "good" BMs.. but recently a few episodes of diarrhea. Ms. Meyer reports no pain. She is wondering if she would benefit a rehab or skilled facility. Reports her blood pressure  was in the 150's. Discussed that I will report to the provider and will give her a call back. Listened and provided emotional support.   "

## 2024-09-16 NOTE — TELEPHONE ENCOUNTER
----- Message from Yazmin Dominguez sent at 9/16/2024  8:14 AM CDT -----  Regarding: Consult/Advisory  Contact: Katie dillon     Consult/Advisory     Name Of Caller:Katie dillon        Contact Preference::[phone]     Nature of call:Patients sister is calling to speak to doctor , to find out next step from surgery. Requesting a call back

## 2024-09-18 ENCOUNTER — OFFICE VISIT (OUTPATIENT)
Dept: PALLIATIVE MEDICINE | Facility: CLINIC | Age: 74
End: 2024-09-18
Payer: MEDICARE

## 2024-09-18 ENCOUNTER — PATIENT MESSAGE (OUTPATIENT)
Dept: GYNECOLOGIC ONCOLOGY | Facility: CLINIC | Age: 74
End: 2024-09-18
Payer: MEDICARE

## 2024-09-18 DIAGNOSIS — C56.1 MALIGNANT NEOPLASM OF RIGHT OVARY: ICD-10-CM

## 2024-09-18 DIAGNOSIS — T45.1X5A CHEMOTHERAPY-INDUCED PERIPHERAL NEUROPATHY: ICD-10-CM

## 2024-09-18 DIAGNOSIS — Z51.5 PALLIATIVE CARE BY SPECIALIST: Primary | ICD-10-CM

## 2024-09-18 DIAGNOSIS — C78.6 PERITONEAL CARCINOMATOSIS: ICD-10-CM

## 2024-09-18 DIAGNOSIS — G89.3 CANCER RELATED PAIN: ICD-10-CM

## 2024-09-18 DIAGNOSIS — G62.0 CHEMOTHERAPY-INDUCED PERIPHERAL NEUROPATHY: ICD-10-CM

## 2024-09-18 DIAGNOSIS — R64 CACHEXIA: ICD-10-CM

## 2024-09-18 NOTE — PROGRESS NOTES
The patient location is: [place]    Visit type: audiovisual    Face to Face time with patient: 20  40 minutes of total time spent on the encounter, which includes face to face time and non-face to face time preparing to see the patient (eg, review of tests), Obtaining and/or reviewing separately obtained history, Documenting clinical information in the electronic or other health record, Independently interpreting results (not separately reported) and communicating results to the patient/family/caregiver, or Care coordination (not separately reported).         Each patient to whom he or she provides medical services by telemedicine is:  (1) informed of the relationship between the physician and patient and the respective role of any other health care provider with respect to management of the patient; and (2) notified that he or she may decline to receive medical services by telemedicine and may withdraw from such care at any time.    Notes:     Office Visit  [facility]      Reason for Consult: pain and symptom management      ASSESSMENT/PLAN:     Plan/Recommendations:  Diagnoses and all orders for this visit:    Palliative care by specialist    Malignant neoplasm of right ovary  -     Ambulatory referral/consult to Hematology/Oncology/Nutrition; Future    Peritoneal carcinomatosis    Cancer related pain    Chemotherapy-induced peripheral neuropathy    Cachexia        # Stage III Ovarian Cancer  # Palliative care by specialist  # Peritoneal carcinomatosis  Surgery completed at [facility] [place] on 9/3/24--total hysterectomy with BLO, partial colectomy with tumor debulking as well. Feeling weak and tired. Some issues with blood sugar management post op. Encouraged protein intake for wound healing and weakness.   - Follow up gyn onc 10/1/24    # Cancer related pain  Post operative pain improving with tylenol. Found gabapentin makes her very drowsy. Concerned about withdrawal symptoms in the hospital and poor oral  intake with oxycodone therefore discontinued use.   - Continue tylenol 500mg every 8h prn  - Continue miralax prn for bowel regimen  - Discussed use of graded regimen including tylenol for mild pain and oxycodone for pain greater than 6-7/10    # Chemotherapy induced peripheral neuropathy  Numbness and tingling of hands and feet. This has been bothersome.   - Hold gabapentin 200mg night, see above    # Cancer cachexia  BMI 21. Poor appetite. Concerned about ability to prevent falls and continue to gain strength post op since she is not eating. Discussed utility of appetite stimulants in the future but at this time she is already too drowsy and we would like to avoid this side effect.   - Refer to nutrition    Follow up: 2 weeks      SUBJECTIVE:     History of Present Illness:  Patient is a 74 y.o. year old female presenting with recent diagnosis of Stage IIIC ovarian cancer. She is a patient of Dr. Maliha Crawford. She presents via virtual visit for follow up.    9/18/24:  Pain is better but still feels tired. Feeling weak as well and fell. Fell three times yesterday and hit her head. Was trying to get out of bed to get to bedside commode and lost balance. If she gets a small stomach pain she takes tylenol and it works well.     Sister reports was not eating due to drowsiness with taking oxycodone. Lead to weight loss. This occurred in the hospital. This lead to choice to avoid oxycodone 5 and 10mg.     Concern for withdrawal while in the hospital including clamminess.     Having a bowel movement helped. Think she may be lactose intolerant.     Considering admission to acute rehab facility near their home for more intensive rehab and nutrition.    Has home health.    Taking two tylenol twice per day. Not taking ibuprofen or gabapentin at this time.     Biggest concern is eating.       9/10/24:    Alternating tylenol and ibuprofen every 3 hours. Cannot sleep. Feeling weak after surgery. Blood sugar has been going all  over the place due to poor oral intake. Holding gabapentin because it is making her too drowsy.     Family feels she needs more protein to gain strength. Has home health now. Previously taking oxycodone on empty stomach. Taking zofran for nausea. Requesting zofran that does not dissolve.    For pain, feel that tylenol and ibuprofen has been helping well for pain. Needs a refill for oxycodone 5mg. Using sparingly twice per day usually.     Having easy bowel movements with miralax.     8/20/24:  Feels neuropathy is the worst and wants to increase gabapentin dose. Neuropathy is in right thigh down into leg. Describes as throbbing pain.    Went up to 200mg at night starting last night.     The patient's sister was also present during the encounter and raised concerns regarding constipation and not wanting to have some any medications related to the colon prior to surgery.  We discussed the high probability of constipation with hydrocodone and with oxycodone use.  She has been using laxatives which they are fearful of.  Discussed nonpharmacological options including increased water intake and prune juice consumption for more natural ways of preventing constipation.  The patient has found greater benefit from oxycodone at this time.  And has been taking Tylenol intermittently for pain as well.  They are aware to stay less than 2000 mg per day.  They are anticipating surgery soon and are aware that pain management needs may change postoperatively.    8/13/24:  Pain right now is a 5-6/10. Talked to Dr. Crawford about gabapentin.  got frustrated on morphine 30mg due to patient due to sleeping a lot and feeling foggy. Delay in answering. Weaned off of morphine. CT scan is Monday to rule out kidney stone. Taking oxycodone every 4-6 hours.     Working on constipation at night even though she's not constipated.     7/12/24:  Currently using Norco 2-3 times per day. Got scared of morphine so she stopped taking it and  requested Norco reinitiation prior to this appointment. Discussed use of morphine and stereotyped fears. Having daily bms. Saw colorectal surgery, planning surgery for September. Delayed due to recent clot.    She endorses numbness and tingling of her hands and feet. Discussed neuropathy. She is open to try treatment.    6/12/24: She states she is doing really well. She had her treatment yesterday and tolerated it well. No pain at all after being on the half tablet of the morphine. She denies any breakthrough at all. She has been able to resume her housework. She feels almost back to normal. She gets tired. Takes a nap once a day. She is having daily bowel movements. She takes senna daily.    5/29/24:Overall pain is a little better. She feels stronger pain pill is lasting a bit longer and quicker onset. Still having pain though. She takes pain medication every 4-5 hours.     She plans to start PT. Her PCP is managing her care overall but her and her family are happy to keep all pain medications with palliative care. She is currently still in [place] with family.     She is having bowel movements every day. She is taking senna everyday.     5/15/24:  Pain has worsened. She has had excruciating abdominal pain. She could not sleep after the pain medication took effect. Then had sweats. She has been taking the Norco 5mg every 6 hours but it takes 45-60 minutes to take effect.     Has started taking two senna s to prevent constipation. Started this yesterday. She had a good BM today.     Current meds for nausea have been helping.     Very groggy days after chemotherapy.       Initial visit:    Introduced the role of palliative care including symptom management and advance care planning through the cancer journey.    She reports pain rated 10/10. When she takes a pain pill it drops down to a 4-5/10. She takes hydrocodone. Pain is located in her right side of the abdomen. Describes this pain as shooting and throbbing.  "    She reports daily bowel movements. Denies nausea, vomiting. She had her first infusion yesterday. She had no side effects.     Her daughter states that her mother hates pain medications. Family has noted grunting. Family has noticed movement would exacerbate pain. Would be hard to find a comfortable position to sit in. Patient wants to "take the edge off" and rest comfortably. Benadryl knocks her out.     The increased dose of hydrocodone at 7.5 helped better. She takes one dose in the morning and one at night. She sometimes take an additional dose if she takes a long car ride.     She does not feel strong. She was in the recliner all day.       Past Medical History:   Diagnosis Date    Cancer     Diabetes mellitus, type 2     Hyperlipidemia     Hypertension     Hypothyroidism     Pulmonary embolism      Past Surgical History:   Procedure Laterality Date    BILATERAL SALPINGO-OOPHORECTOMY (BSO) N/A 9/3/2024    Procedure: SALPINGO-OOPHORECTOMY, BILATERAL;  Surgeon: Maliha Crawford MD;  Location: [address];  Service: Gynecology Oncology;  Laterality: N/A;    DEBULKING OF TUMOR N/A 9/3/2024    Procedure: DEBULKING, NEOPLASM;  Surgeon: Maliha Crawford MD;  Location: [address];  Service: Gynecology Oncology;  Laterality: N/A;    FLEXIBLE SIGMOIDOSCOPY N/A 9/3/2024    Procedure: SIGMOIDOSCOPY, FLEXIBLE;  Surgeon: Ruby Caicedo MD;  Location: [address];  Service: Colon and Rectal;  Laterality: N/A;    ILEOCOLECTOMY N/A 9/3/2024    Procedure: ILEOCOLECTOMY;  Surgeon: Ruby Caicedo MD;  [address];  Service: Colon and Rectal;  Laterality: N/A;    LAPAROTOMY, EXPLORATORY N/A 9/3/2024    Procedure: LAPAROTOMY, EXPLORATORY;  Surgeon: Maliha Crawford MD;  Location: [address];  Service: Gynecology Oncology;  Laterality: N/A;  4 hr case    LOW ANTERIOR RESECTION OF COLON N/A 9/3/2024    Procedure: RESECTION, COLON, LOW ANTERIOR;  Surgeon: Ruby Caicedo MD;  Location: [address]" FLR;  Service: Colon and Rectal;  Laterality: N/A;  4 hr case    LYSIS OF ADHESIONS OF URETER Bilateral 9/3/2024    Procedure: URETEROLYSIS;  Surgeon: Maliha Crawford MD;  Location: [facility] OR 2ND FLR;  Service: Gynecology Oncology;  Laterality: Bilateral;    MOBILIZATION OF SPLENIC FLEXURE  9/3/2024    Procedure: MOBILIZATION, SPLENIC FLEXURE;  Surgeon: Ruby Caicedo MD;  Location: [facility] 2[facility] F[facility];  Service: Colon and Rectal;;    OMENTECTOMY N/A 9/3/2024    Procedure: OMENTECTOMY;  Surgeon: Maliha Crawford MD;  Location: [facility] OR 2ND FLR;  Service: Gynecology Oncology;  Laterality: N/A;    TOTAL ABDOMINAL HYSTERECTOMY N/A 9/3/2024    Procedure: HYSTERECTOMY, TOTAL, ABDOMINAL;  Surgeon: Maliha Crawford MD;  Location: [facility] OR 2ND FLR;  Service: Gynecology Oncology;  Laterality: N/A;    TUBAL LIGATION      at age 32's     Family History   Problem Relation Name Age of Onset    Cancer Brother      Breast cancer Neg Hx      Colon cancer Neg Hx      Ovarian cancer Neg Hx      Uterine cancer Neg Hx      Cervical cancer Neg Hx       Review of patient's allergies indicates:  No Known Allergies  Social Determinants of Health     Tobacco Use: Low Risk  (9/12/2024)    Patient History     Smoking Tobacco Use: Never     Smokeless Tobacco Use: Never     Passive Exposure: Past   Alcohol Use: Not At Risk (7/5/2024)    AUDIT-C     Frequency of Alcohol Consumption: Never     Average Number of Drinks: Patient does not drink     Frequency of Binge Drinking: Never   Financial Resource Strain: Patient Unable To Answer (9/4/2024)    Overall Financial Resource Strain (CARDIA)     Difficulty of Paying Living Expenses: Patient unable to answer   Food Insecurity: Patient Unable To Answer (9/4/2024)    Hunger Vital Sign     Worried About Running Out of Food in the Last Year: Patient unable to answer     Ran Out of Food in the Last Year: Patient unable to answer   Transportation Needs: Patient Unable To Answer (9/4/2024)    TRANSPORTATION NEEDS      Transportation : Patient unable to answer   Physical Activity: Inactive (7/5/2024)    Exercise Vital Sign     Days of Exercise per Week: 0 days     Minutes of Exercise per Session: 20 min   Stress: Patient Unable To Answer (9/4/2024)    [employer] - Occupational Stress Questionnaire     Feeling of Stress : Patient unable to answer   Housing Stability: Patient Unable To Answer (9/4/2024)    Housing Stability Vital Sign     Unable to Pay for Housing in the Last Year: Patient unable to answer     Homeless in the Last Year: Patient unable to answer   Depression: Low Risk  (8/13/2024)    Depression     Last PHQ-4: Flowsheet Data: 0   Utilities: Patient Unable To Answer (9/4/2024)    [facility] Utilities     Threatened with loss of utilities: Patient unable to answer   Health Literacy: Patient Unable To Answer (9/4/2024)     Health Literacy     Frequency of need for help with medical instructions: Patient unable to respond   Recent Concern: Health Literacy - Inadequate Health Literacy (7/5/2024)     Health Literacy     Frequency of need for help with medical instructions: Sometimes   Social Isolation: Not on file          Medications:    Current Outpatient Medications:     acetaminophen (TYLENOL) 500 MG tablet, Take 2 tablets (1,000 mg total) by mouth every 6 (six) hours., Disp: 30 tablet, Rfl: 3    amLODIPine (NORVASC) 5 MG tablet, Take 1 tablet by mouth every morning., Disp: , Rfl:     apixaban (ELIQUIS) 5 mg Tab, Take 1 tablet (5 mg total) by mouth 2 (two) times daily., Disp: 60 tablet, Rfl: 2    blood-glucose meter,continuous (DEXCOM G7 ) Misc, Use daily, Disp: , Rfl:     blood-glucose sensor (DEXCOM G7 SENSOR) [place], Use every 10 days, Disp: , Rfl:     coenzyme Q10 (CO Q-10) 100 mg capsule, Take 100 mg by mouth nightly., Disp: , Rfl:     dexAMETHasone (DECADRON) 4 MG Tab, Take 8 mg (2 tablets) by mouth daily on days 2-4 of each chemotherapy cycle., Disp: 6 tablet, Rfl: 11     EScitalopram oxalate (LEXAPRO) 10 MG tablet, Take 1 tablet (10 mg total) by mouth once daily., Disp: 90 tablet, Rfl: 3    gabapentin (NEURONTIN) 100 MG capsule, Take 2 capsules (200 mg total) by mouth every evening. (Patient taking differently: Take 200 mg by mouth every evening. 100 mg in the morning 100 mg noon and 300 mg in the evening), Disp: 60 capsule, Rfl: 0    ibuprofen (ADVIL,MOTRIN) 600 MG tablet, Take 1 tablet every 6 hours. Alternate between ibuprofen & tylenol every 3 hours. For example: @0800: ibuprofen 600mg @1100: tylenol 1000mg @1400: ibuprofen 600mg @1700: tylenol 1000 mg @2000: ibuprofen 600mg. Can take two of the 300 mg over the counter ibuprofen is smaller pill is easier., Disp: 30 tablet, Rfl: 3    levothyroxine (SYNTHROID) 75 MCG tablet, Take 1 tablet by mouth every morning., Disp: , Rfl:     LYUMJEV KWIKPEN U-100 INSULIN 100 unit/mL pen, Inject 2-10 Units into the skin 3 (three) times daily with meals., Disp: , Rfl:     metFORMIN (GLUCOPHAGE) 1000 MG tablet, Take 1,000 mg by mouth 2 (two) times daily with meals., Disp: , Rfl:     metoprolol succinate (TOPROL-XL) 50 MG 24 hr tablet, Take 1 tablet by mouth every morning., Disp: , Rfl:     naloxone (NARCAN) 4 mg/actuation Spry, 1 spray (4 mg total) by Nasal route 1 (one) time if needed (for emergency opioid reversal)., Disp: 1 each, Rfl: 0    OLANZapine (ZYPREXA) 5 MG tablet, Take 1 tablet by mouth nightly on days 1-4 of each chemotherapy cycle., Disp: 4 tablet, Rfl: 11    ondansetron (ZOFRAN) 4 MG tablet, Take 1 tablet (4 mg total) by mouth every 6 (six) hours as needed for Nausea., Disp: 120 tablet, Rfl: 2    ondansetron (ZOFRAN-ODT) 4 MG TbDL, DISSOLVE ONE TABLET BY MOUTH EVERY 4 TO 6 HOURS AS NEEDED FOR NAUSEA, Disp: , Rfl:     oxyCODONE (ROXICODONE) 5 MG immediate release tablet, Take 1 tablet (5 mg total) by mouth every 3 (three) hours as needed for Pain. Take 1 tablet (5 mg) for pain scale 4-6. Take 2 tablets (10 mg) for pain scale 7-10.  Do not take more than 2 tablets (10 mg) every 3 hours., Disp: 60 tablet, Rfl: 0    polyethylene glycol (GLYCOLAX) 17 gram/dose powder, Mix 1 capful (17 g) in liquid and drink by mouth once daily., Disp: 510 g, Rfl: 1    potassium chloride (KLOR-CON) 10 MEQ TbSR, Take 1 tablet (10 mEq total) by mouth 2 (two) times daily., Disp: 30 tablet, Rfl: 0    pravastatin (PRAVACHOL) 10 MG tablet, Take 1 tablet by mouth every evening., Disp: , Rfl:     prochlorperazine (COMPAZINE) 5 MG tablet, Take 1 tablet (5 mg total) by mouth every 6 (six) hours as needed for Nausea., Disp: 20 tablet, Rfl: 5    TOUJEO SOLOSTAR U-300 INSULIN 300 unit/mL (1.5 mL) InPn pen, Inject 10 Units into the skin once daily., Disp: , Rfl:     OBJECTIVE:       ROS:  Review of Systems   Constitutional:  Positive for appetite change and fatigue. Negative for unexpected weight change.   HENT:  Negative for mouth sores.    Eyes:  Negative for visual disturbance.   Respiratory:  Negative for cough and shortness of breath.    Cardiovascular:  Negative for chest pain.   Gastrointestinal:  Negative for abdominal pain and diarrhea.   Genitourinary:  Negative for frequency.   Musculoskeletal:  Negative for back pain.   Skin:  Negative for rash.   Neurological:  Positive for numbness. Negative for headaches.   Hematological:  Negative for adenopathy.   Psychiatric/Behavioral:  The patient is not nervous/anxious.        Review of Symptoms      Symptom Assessment (ESAS 0-10 Scale)  Pain:  0  Dyspnea:  0  Anxiety:  0  Nausea:  0  Depression:  0  Anorexia:  0  Fatigue:  0  Insomnia:  0  Restlessness:  0  Agitation:  0       Anxiety:  Is not nervous/anxious    ECOG Performance Status ndGndrndanddndend:nd nd2nd Living Arrangements:  Lives with family    Psychosocial/Cultural:   See Palliative Psychosocial Note: Yes  **Primary  to Follow**  Palliative Care  Consult: Yes     Time-Based Charting:  Yes  Chart Review: 20 minutes  Face to Face: 20 minutes    Total  Time Spent: 40 minutes      Advance Care Planning   Advance Directives:     Decision Making:  Patient answered questions  Goals of Care: The patient endorses that what is most important right now is to focus on symptom/pain control    Accordingly, we have decided that the best plan to meet the patient's goals includes continuing with treatment              Physical Exam:  Vitals:    Physical Exam  Constitutional:       General: She is not in acute distress.     Appearance: Normal appearance. She is ill-appearing. She is not toxic-appearing.   Musculoskeletal:      Cervical back: Normal range of motion.   Skin:     Coloration: Skin is not jaundiced or pale.   Neurological:      Mental Status: She is alert and oriented to person, place, and time.   Psychiatric:         Mood and Affect: Mood normal.         Behavior: Behavior normal.         Thought Content: Thought content normal.         Judgment: Judgment normal.         Labs:  CBC:   WBC   Date Value Ref Range Status   09/12/2024 7.59 3.90 - 12.70 K/uL Final     Hemoglobin   Date Value Ref Range Status   09/12/2024 8.9 (L) 12.0 - 16.0 g/dL Final     POC Hematocrit   Date Value Ref Range Status   09/03/2024 27 (L) 36 - 54 %PCV Final     Hematocrit   Date Value Ref Range Status   09/12/2024 25.9 (L) 37.0 - 48.5 % Final     MCV   Date Value Ref Range Status   09/12/2024 95 82 - 98 fL Final     Platelets   Date Value Ref Range Status   09/12/2024 289 150 - 450 K/uL Final       LFT:   Lab Results   Component Value Date    AST 15 09/12/2024    ALKPHOS 40 (L) 09/12/2024    BILITOT 0.3 09/12/2024       Albumin:   Albumin   Date Value Ref Range Status   09/12/2024 3.2 (L) 3.5 - 5.2 g/dL Final     Protein:   Total Protein   Date Value Ref Range Status   09/12/2024 6.3 6.0 - 8.4 g/dL Final       Radiology:  CT abd pelvis with IV contrast 8/19/24:  FINDINGS:     Lower chest: Unremarkable.  Liver: 9 x 7 mm hypodensity in left hepatic lobe (series 5, image 23).  This was present  in April 2024 and may represent a cyst.  Gallbladder: Unremarkable.  Biliary: No significant biliary ductal dilatation.  Pancreas: Unremarkable.  Spleen: Unremarkable.  Adrenal glands: Unremarkable.  Kidneys/ureters: Unremarkable.  Bladder: Unremarkable.  Reproductive: Uterus is unremarkable.  Persistent cystic area in the expected location of the right ovary measures approximately 3.4 x 2.5 cm.  Other previously demonstrated cystic lesions in the pelvis are no longer present.  There is some persistent thickening along the left pelvic wall as seen on series 5, image 126.  Gastrointestinal: No bowel obstruction or inflammation. No intraperitoneal free air or significant free fluid.  Retroperitoneum: No adenopathy.  Vascular: Atherosclerotic disease.  No abdominal aortic aneurysm.  Osseous: No acute fracture or aggressive appearing osseous lesion.        Impression:     1.    Significant improvement in the previous cystic lesions in the pelvis.  With some residual in the right pelvis and along the pelvic wall and the left.  2.    Similar appearance of the subcentimeter hypodensity left hepatic lobe which statistically represents a cyst; however, continued attention on follow-up is recommended.    40 minutes of total time spent on the encounter, which includes face to face time and non-face to face time preparing to see the patient (eg, review of tests), Obtaining and/or reviewing separately obtained history, Documenting clinical information in the electronic or other health record, Independently interpreting results (not separately reported) and communicating results to the patient/family/caregiver, or Care coordination (not separately reported).    Signature: Kristen Blum DO

## 2024-09-20 ENCOUNTER — HOSPITAL ENCOUNTER (EMERGENCY)
Facility: HOSPITAL | Age: 74
Discharge: HOME | End: 2024-09-20
Attending: EMERGENCY MEDICINE
Payer: MEDICARE

## 2024-09-20 VITALS
RESPIRATION RATE: 14 BRPM | WEIGHT: 108 LBS | TEMPERATURE: 98 F | SYSTOLIC BLOOD PRESSURE: 140 MMHG | BODY MASS INDEX: 20.41 KG/M2 | DIASTOLIC BLOOD PRESSURE: 63 MMHG | HEART RATE: 81 BPM | OXYGEN SATURATION: 100 %

## 2024-09-20 DIAGNOSIS — R55 NEAR SYNCOPE: ICD-10-CM

## 2024-09-20 DIAGNOSIS — E87.1 HYPONATREMIA: ICD-10-CM

## 2024-09-20 DIAGNOSIS — S09.90XA HEAD INJURY: ICD-10-CM

## 2024-09-20 DIAGNOSIS — R79.0 LOW MAGNESIUM LEVEL: Primary | ICD-10-CM

## 2024-09-20 LAB
ALBUMIN SERPL BCP-MCNC: 3.3 G/DL (ref 3.5–5.2)
ALP SERPL-CCNC: 34 U/L (ref 55–135)
ALT SERPL W/O P-5'-P-CCNC: 15 U/L (ref 10–44)
ANION GAP SERPL CALC-SCNC: 12 MMOL/L (ref 8–16)
AST SERPL-CCNC: 20 U/L (ref 10–40)
BASOPHILS # BLD AUTO: 0.03 K/UL (ref 0–0.2)
BASOPHILS NFR BLD: 0.5 % (ref 0–1.9)
BILIRUB SERPL-MCNC: 0.4 MG/DL (ref 0.1–1)
BILIRUB UR QL STRIP: NEGATIVE
BUN SERPL-MCNC: 5 MG/DL (ref 8–23)
CALCIUM SERPL-MCNC: 8.9 MG/DL (ref 8.7–10.5)
CHLORIDE SERPL-SCNC: 97 MMOL/L (ref 95–110)
CLARITY UR REFRACT.AUTO: CLEAR
CO2 SERPL-SCNC: 24 MMOL/L (ref 23–29)
COLOR UR AUTO: YELLOW
CREAT SERPL-MCNC: 0.6 MG/DL (ref 0.5–1.4)
DIFFERENTIAL METHOD BLD: ABNORMAL
EOSINOPHIL # BLD AUTO: 0 K/UL (ref 0–0.5)
EOSINOPHIL NFR BLD: 0.2 % (ref 0–8)
ERYTHROCYTE [DISTWIDTH] IN BLOOD BY AUTOMATED COUNT: 17.5 % (ref 11.5–14.5)
EST. GFR  (NO RACE VARIABLE): >60 ML/MIN/1.73 M^2
GLUCOSE SERPL-MCNC: 120 MG/DL (ref 70–110)
GLUCOSE UR QL STRIP: NEGATIVE
HCT VFR BLD AUTO: 27.1 % (ref 37–48.5)
HGB BLD-MCNC: 9.4 G/DL (ref 12–16)
HGB UR QL STRIP: NEGATIVE
IMM GRANULOCYTES # BLD AUTO: 0.04 K/UL (ref 0–0.04)
IMM GRANULOCYTES NFR BLD AUTO: 0.7 % (ref 0–0.5)
KETONES UR QL STRIP: ABNORMAL
LEUKOCYTE ESTERASE UR QL STRIP: NEGATIVE
LYMPHOCYTES # BLD AUTO: 1.6 K/UL (ref 1–4.8)
LYMPHOCYTES NFR BLD: 26.1 % (ref 18–48)
MAGNESIUM SERPL-MCNC: 1.5 MG/DL (ref 1.6–2.6)
MCH RBC QN AUTO: 33.7 PG (ref 27–31)
MCHC RBC AUTO-ENTMCNC: 34.7 G/DL (ref 32–36)
MCV RBC AUTO: 97 FL (ref 82–98)
MONOCYTES # BLD AUTO: 0.7 K/UL (ref 0.3–1)
MONOCYTES NFR BLD: 10.8 % (ref 4–15)
NEUTROPHILS # BLD AUTO: 3.8 K/UL (ref 1.8–7.7)
NEUTROPHILS NFR BLD: 61.7 % (ref 38–73)
NITRITE UR QL STRIP: NEGATIVE
NRBC BLD-RTO: 0 /100 WBC
PH UR STRIP: 7 [PH] (ref 5–8)
PLATELET # BLD AUTO: 414 K/UL (ref 150–450)
PMV BLD AUTO: 8.2 FL (ref 9.2–12.9)
POTASSIUM SERPL-SCNC: 3.6 MMOL/L (ref 3.5–5.1)
PROT SERPL-MCNC: 6.4 G/DL (ref 6–8.4)
PROT UR QL STRIP: NEGATIVE
RBC # BLD AUTO: 2.79 M/UL (ref 4–5.4)
SODIUM SERPL-SCNC: 133 MMOL/L (ref 136–145)
SP GR UR STRIP: 1.01 (ref 1–1.03)
URN SPEC COLLECT METH UR: ABNORMAL
UROBILINOGEN UR STRIP-ACNC: NEGATIVE EU/DL
WBC # BLD AUTO: 6.12 K/UL (ref 3.9–12.7)

## 2024-09-20 PROCEDURE — 99285 EMERGENCY DEPT VISIT HI MDM: CPT | Mod: 25,ER

## 2024-09-20 PROCEDURE — 93005 ELECTROCARDIOGRAM TRACING: CPT | Mod: ER

## 2024-09-20 PROCEDURE — 85025 COMPLETE CBC W/AUTO DIFF WBC: CPT | Mod: ER | Performed by: EMERGENCY MEDICINE

## 2024-09-20 PROCEDURE — 96361 HYDRATE IV INFUSION ADD-ON: CPT | Mod: ER

## 2024-09-20 PROCEDURE — 96365 THER/PROPH/DIAG IV INF INIT: CPT | Mod: ER

## 2024-09-20 PROCEDURE — 83735 ASSAY OF MAGNESIUM: CPT | Mod: ER | Performed by: EMERGENCY MEDICINE

## 2024-09-20 PROCEDURE — 25000003 PHARM REV CODE 250: Mod: ER | Performed by: EMERGENCY MEDICINE

## 2024-09-20 PROCEDURE — 63600175 PHARM REV CODE 636 W HCPCS: Mod: ER | Performed by: EMERGENCY MEDICINE

## 2024-09-20 PROCEDURE — 80053 COMPREHEN METABOLIC PANEL: CPT | Mod: ER | Performed by: EMERGENCY MEDICINE

## 2024-09-20 PROCEDURE — 93010 ELECTROCARDIOGRAM REPORT: CPT | Mod: ,,, | Performed by: INTERNAL MEDICINE

## 2024-09-20 PROCEDURE — 96366 THER/PROPH/DIAG IV INF ADDON: CPT | Mod: ER

## 2024-09-20 PROCEDURE — 81003 URINALYSIS AUTO W/O SCOPE: CPT | Mod: ER | Performed by: EMERGENCY MEDICINE

## 2024-09-20 PROCEDURE — P9612 CATHETERIZE FOR URINE SPEC: HCPCS | Mod: ER

## 2024-09-20 RX ORDER — POTASSIUM CHLORIDE 20 MEQ/1
20 TABLET, EXTENDED RELEASE ORAL
Status: DISCONTINUED | OUTPATIENT
Start: 2024-09-20 | End: 2024-09-20 | Stop reason: HOSPADM

## 2024-09-20 RX ORDER — MAGNESIUM SULFATE HEPTAHYDRATE 40 MG/ML
2 INJECTION, SOLUTION INTRAVENOUS
Status: COMPLETED | OUTPATIENT
Start: 2024-09-20 | End: 2024-09-20

## 2024-09-20 RX ADMIN — MAGNESIUM SULFATE HEPTAHYDRATE 2 G: 40 INJECTION, SOLUTION INTRAVENOUS at 04:09

## 2024-09-20 RX ADMIN — SODIUM CHLORIDE 1000 ML: 9 INJECTION, SOLUTION INTRAVENOUS at 02:09

## 2024-09-20 NOTE — ED PROVIDER NOTES
"Emergency Medicine Provider Note - 9/20/2024       History     Chief Complaint   Patient presents with    Fall     Stood up, legs gave out and pt fell backwards. Hit back of head. Takes a blood thinner. Unsure of LOC but pt does not remember falling. Pt had surgery at the beginning of the month and has been weak since, not eating much, family inquiring about rehab admission.        Allergies:  Review of patient's allergies indicates:  No Known Allergies     History of Present Illness   HPI    9/20/2024, 1:22 PM  The history is provided by the  Sisters (Aaliyah and Swathi)  and patient    Bella Meyer is a 74 y.o. female presenting to the ED for fall.  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  Patient has been having multiple falls within the past couple of weeks.  Patient is on Eliquis secondary to pulmonary embolism.    According to her sisters, patient has been sitting outside proximally 4-1/2 hour.  Ambient temperature is hot outside.  Patient had attempted to go back inside when patient fell backwards hitting her head after she stood up from sitting in a chair.  Possible brief loss of consciousness.  No nausea, vomiting, seizure activity, loss of control of bowel or bladder.  Last chemotherapy was around the 11th through 13th of August. Patient has been having general decline over the past several weeks. There is a plan to put her in [facility] this coming Tuesday, September 24th.  They note that it is hard for her to eat or drink secondary to "food having no taste."  Patient reportedly did have diarrhea yesterday.  But had a normal formed bowel movement today.  No blood.    According to the patient, patient was sitting outside.  She decided to stand up to walk into the house to try to get herself " strength in the legs.  Patient felt lightheaded.  There was no preceding headache, chest pain, chest pressure, shortness of breath, cough, abdominal pain, back pain, numbness or weakness to 1 side.  Patient felt generally weak. Patient fell backwards.  Patient denies any neck pain, paresthesia of the hands, chest pain, chest pressure, shortness of breath, abdominal pain, dysuria, urgency, frequency, severe headache.      Arrival mode: Private Vehicle     PCP: Rosita, Primary Doctor     Past Medical History:  Past Medical History:   Diagnosis Date    Cancer     Diabetes mellitus, type 2     Hyperlipidemia     Hypertension     Hypothyroidism     Pulmonary embolism        Past Surgical History:  Past Surgical History:   Procedure Laterality Date    BILATERAL SALPINGO-OOPHORECTOMY (BSO) N/A 9/3/2024    Procedure: SALPINGO-OOPHORECTOMY, BILATERAL;  Surgeon: Maliha Crawford MD;  Location: [facility] OR [address] [place] [address];  Service: Gynecology Oncology;  Laterality: N/A;    DEBULKING OF TUMOR N/A 9/3/2024    Procedure: DEBULKING, NEOPLASM;  Surgeon: Maliha Crawford MD;  Location: [facility] OR [address] [place] [address];  Service: Gynecology Oncology;  Laterality: N/A;    FLEXIBLE SIGMOIDOSCOPY N/A 9/3/2024    Procedure: SIGMOIDOSCOPY, FLEXIBLE;  Surgeon: Ruby Caicedo MD;  Location: [facility] OR [address] [place] [address];  Service: Colon and Rectal;  Laterality: N/A;    ILEOCOLECTOMY N/A 9/3/2024    Procedure: ILEOCOLECTOMY;  Surgeon: Ruby Caicedo MD;  [facility];  Service: Colon and Rectal;  Laterality: N/A;    LAPAROTOMY, EXPLORATORY N/A 9/3/2024    Procedure: LAPAROTOMY, EXPLORATORY;  Surgeon: Maliha Crawford MD;  Location: [facility] OR [address] [place] [address];  Service: Gynecology Oncology;  Laterality: N/A;  4 hr case    LOW ANTERIOR RESECTION OF COLON N/A 9/3/2024    Procedure: RESECTION, COLON, LOW ANTERIOR;  Surgeon: Ruby Caicedo MD;  Location: [address];  Service: Colon and Rectal;  Laterality: N/A;  4 hr case    LYSIS OF ADHESIONS OF URETER Bilateral 9/3/2024     Procedure: URETEROLYSIS;  Surgeon: Maliha Crawford MD;  Location: [facility] OR 2ND FLR;  Service: Gynecology Oncology;  Laterality: Bilateral;    MOBILIZATION OF SPLENIC FLEXURE  9/3/2024    Procedure: MOBILIZATION, SPLENIC FLEXURE;  Surgeon: Ruby Caicedo MD;  Location: [facility] OR 2ND FLR;  Service: Colon and Rectal;;    OMENTECTOMY N/A 9/3/2024    Procedure: OMENTECTOMY;  Surgeon: Maliha Crawford MD;  Location: [facility] OR 2ND FLR;  Service: Gynecology Oncology;  Laterality: N/A;    TOTAL ABDOMINAL HYSTERECTOMY N/A 9/3/2024    Procedure: HYSTERECTOMY, TOTAL, ABDOMINAL;  Surgeon: Maliha Crawford MD;  [facility]: [facility] 2ND FLR;  Service: Gynecology Oncology;  Laterality: N/A;    TUBAL LIGATION      at age 32's         Family History:  Family History   Problem Relation Name Age of Onset    Cancer Brother      Breast cancer Neg Hx      Colon cancer Neg Hx      Ovarian cancer Neg Hx      Uterine cancer Neg Hx      Cervical cancer Neg Hx         Social History:  Social History     Tobacco Use    Smoking status: Never     Passive exposure: Past    Smokeless tobacco: Never   Substance and Sexual Activity    Alcohol use: Never    Drug use: Never    Sexual activity: Yes     Partners: Male     Birth control/protection: Post-menopausal       The Past Medical History, Social History, Surgical History and Family History was reviewed as documented above.     Review of Systems   Review of Systems   Constitutional:  Positive for appetite change (Decreased appetite, food has no taste) and fatigue. Negative for fever.   HENT:  Negative for sore throat.    Respiratory:  Negative for shortness of breath.    Cardiovascular:  Negative for chest pain.   Gastrointestinal:  Positive for diarrhea and vomiting. Negative for nausea.   Genitourinary:  Negative for dysuria.   Musculoskeletal:  Negative for back pain, neck pain and neck stiffness.   Skin:  Negative for rash.   Neurological:  Negative for facial asymmetry, speech difficulty, weakness  (Generalized weakness), numbness and headaches.   Hematological:  Does not bruise/bleed easily.          Physical Exam     Initial Vitals [09/20/24 1315]   BP Pulse Resp Temp SpO2   139/64 90 18 98.3 °F (36.8 °C) 98 %      MAP       --          Physical Exam  Nursing Notes and Vital Signs Reviewed.  Constitutional: Patient is in no apparent distress.  Chronically ill-appearing.  Alopecia present.  Head: Atraumatic. Normocephalic.  Round hematomas noted to the posterior scalp and left temporal area.  Eyes: PERRL. EOM intact. Conjunctivae are pale. No scleral icterus.  No periorbital contusion.  ENT: Mucous membranes are moist. Oropharynx is clear and symmetric.  No hemotympanum.  No septal hematoma.No malocclusion.  No posterior auricular hematoma.  Neck: Supple. Full ROM. No lymphadenopathy.  No midline C-spine tenderness.  Patient able to rotate neck 45° without pain.  Cardiovascular: Regular rate. Regular rhythm. No murmurs, rubs, or gallops. Distal pulses are 2+ and symmetric.  Pulmonary/Chest: No respiratory distress. Clear to auscultation bilaterally. No wheezing or rales.  No bruising or seatbelt sign.  No crepitance.  Abdominal: Soft and non-distended.  There is no tenderness.  No rebound, guarding, or rigidity. Good bowel sounds.  No bruising or seatbelt sign.  Genitourinary: No CVA tenderness  Musculoskeletal: Moves all extremities. No obvious deformities. No edema. No calf tenderness.  .Intact median, ulnar, and radial nerves both motor and sensory.  Patient able lift arms above head.  Skin: Warm and dry.  Neurological:  Alert, awake, and appropriate.  Normal speech.  No acute focal neurological deficits are appreciated.  Cranial nerves 2-12 intact.  GCS 15.   Psychiatric: Normal affect. Good eye contact. Appropriate in content.       ED Course   ED Procedures:  Procedures    ED Vital Signs:  Vitals:    09/20/24 1314 09/20/24 1315 09/20/24 1449 09/20/24 1702   BP:  139/64 (!) 144/65 135/62   Pulse:  90 77  84   Resp:  18 20 15   Temp:  98.3 °F (36.8 °C)     TempSrc:  Oral     SpO2:  98% 100% 100%   Weight: 49 kg (108 lb)       09/20/24 1724 09/20/24 1739 09/20/24 1759 09/20/24 1824   BP: (!) 126/55 (!) 141/64 (!) 144/65 (!) 140/63   Pulse: 91 83 81 81   Resp: 18 15 18 14   Temp:       TempSrc:       SpO2: 100% 100% 100% 100%   Weight:           All Lab Results:  Results for orders placed or performed during the hospital encounter of 09/20/24   CBC Auto Differential   Result Value Ref Range    WBC 6.12 3.90 - 12.70 K/uL    RBC 2.79 (L) 4.00 - 5.40 M/uL    Hemoglobin 9.4 (L) 12.0 - 16.0 g/dL    Hematocrit 27.1 (L) 37.0 - 48.5 %    MCV 97 82 - 98 fL    MCH 33.7 (H) 27.0 - 31.0 pg    MCHC 34.7 32.0 - 36.0 g/dL    RDW 17.5 (H) 11.5 - 14.5 %    Platelets 414 150 - 450 K/uL    MPV 8.2 (L) 9.2 - 12.9 fL    Immature Granulocytes 0.7 (H) 0.0 - 0.5 %    Gran # (ANC) 3.8 1.8 - 7.7 K/uL    Immature Grans (Abs) 0.04 0.00 - 0.04 K/uL    Lymph # 1.6 1.0 - 4.8 K/uL    Mono # 0.7 0.3 - 1.0 K/uL    Eos # 0.0 0.0 - 0.5 K/uL    Baso # 0.03 0.00 - 0.20 K/uL    nRBC 0 0 /100 WBC    Gran % 61.7 38.0 - 73.0 %    Lymph % 26.1 18.0 - 48.0 %    Mono % 10.8 4.0 - 15.0 %    Eosinophil % 0.2 0.0 - 8.0 %    Basophil % 0.5 0.0 - 1.9 %    Differential Method Automated    Comprehensive Metabolic Panel   Result Value Ref Range    Sodium 133 (L) 136 - 145 mmol/L    Potassium 3.6 3.5 - 5.1 mmol/L    Chloride 97 95 - 110 mmol/L    CO2 24 23 - 29 mmol/L    Glucose 120 (H) 70 - 110 mg/dL    BUN 5 (L) 8 - 23 mg/dL    Creatinine 0.6 0.5 - 1.4 mg/dL    Calcium 8.9 8.7 - 10.5 mg/dL    Total Protein 6.4 6.0 - 8.4 g/dL    Albumin 3.3 (L) 3.5 - 5.2 g/dL    Total Bilirubin 0.4 0.1 - 1.0 mg/dL    Alkaline Phosphatase 34 (L) 55 - 135 U/L    AST 20 10 - 40 U/L    ALT 15 10 - 44 U/L    eGFR >60.0 >60 mL/min/1.73 m^2    Anion Gap 12 8 - 16 mmol/L   Magnesium   Result Value Ref Range    Magnesium 1.5 (L) 1.6 - 2.6 mg/dL   Urinalysis, Reflex to Urine Culture Urine,  Catheterized    Specimen: Urine   Result Value Ref Range    Specimen UA Urine, Catheterized     Color, UA Yellow Yellow, Straw, Vicki    Appearance, UA Clear Clear    pH, UA 7.0 5.0 - 8.0    Specific Gravity, UA 1.010 1.005 - 1.030    Protein, UA Negative Negative    Glucose, UA Negative Negative    Ketones, UA Trace (A) Negative    Bilirubin (UA) Negative Negative    Occult Blood UA Negative Negative    Nitrite, UA Negative Negative    Urobilinogen, UA Negative <2.0 EU/dL    Leukocytes, UA Negative Negative         Reviewed Prior Labs:   Latest Reference Range & Units 09/12/24 20:45 09/20/24 14:37   Hemoglobin 12.0 - 16.0 g/dL 8.9 (L) 9.4 (L)   Hematocrit 37.0 - 48.5 % 25.9 (L) 27.1 (L)   (L): Data is abnormally low    The EKG was ordered, reviewed, and independently interpreted by the ED provider:      ECG Results              EKG 12-lead (Preliminary result)  Result time 09/20/24 13:53:59      Wet Read by Swathi Gauthier DO (09/20/24 13:53:59, [facility] Emergency Dept, Emergency Medicine)    Normal sinus rhythm.  Rate of 82 beats per minute.  Left axis deviation.  Left anterior fascicular block.  No ST segment elevation.  No STEMI.  Unchanged from September 4, 2024                                    Imaging Results:  Imaging Results              X-Ray Chest AP Portable (Final result)  Result time 09/20/24 14:15:56      Final result by Valerie Stewart MD (09/20/24 14:15:56)                   Impression:      No acute cardiopulmonary abnormality.      Electronically signed by: Valerie Stewart  Date:    09/20/2024  Time:    14:15               Narrative:    EXAMINATION:  XR CHEST AP PORTABLE    CLINICAL HISTORY:  Syncope and collapse    TECHNIQUE:  Single frontal view of the chest was performed.    COMPARISON:  Chest radiograph 09/04/2024    FINDINGS:  ECG monitoring leads overlie the chest.The lungs are clear, with normal appearance of pulmonary vasculature and no pleural effusion or  pneumothorax.    The cardiac silhouette is normal in size. There is aortic calcification.    Bones are intact.                                       CT Head Without Contrast (Final result)  Result time 09/20/24 14:12:06      Final result by Gurdeep Oro MD (09/20/24 14:12:06)                   Impression:      Chronic microvascular ischemic changes.      Electronically signed by: Gurdeep Oro MD  Date:    09/20/2024  Time:    14:12               Narrative:    EXAMINATION:  CT HEAD WITHOUT CONTRAST    CLINICAL HISTORY:  Head trauma, minor (Age >= 65y); Unspecified injury of head, initial encounter    TECHNIQUE:  Low dose axial CT images obtained throughout the head without intravenous contrast. Sagittal and coronal reconstructions were performed.  All CT scans at this facility use dose modulation, iterative reconstruction and/or weight based dosing when appropriate to reduce radiation dose to as low as reasonably achievable.    COMPARISON:  None.    FINDINGS:  Intracranial compartment:    The brain parenchyma demonstrates areas of decreased attenuation with mild to moderate periventricular white matter consistent with chronic microvascular ischemic changes..  No parenchymal mass, hemorrhage, edema or major vascular distribution infarct.  Vascular calcifications are noted.    Mild prominence of the sulci and ventricles are consistent with age-related involutional changes.    No extra-axial blood or fluid collections.    Skull/extracranial contents (limited evaluation): No fracture. Mastoid air cells and paranasal sinuses are essentially clear.                                            The Emergency Provider reviewed the vital signs and test results, which are outlined above.     ED Discussion   ED Medication(s):  Medications   sodium chloride 0.9% bolus 1,000 mL 1,000 mL (0 mLs Intravenous Stopped 9/20/24 1538)   magnesium sulfate 2g in water 50mL IVPB (premix) (0 g Intravenous Stopped 9/20/24 1803)       ED  Course as of 09/21/24 0651   Fri Sep 20, 2024   1537 Patient updated on results.  Hemoglobin/hematocrit stable [LB]   1754 Patient feels stronger.  Patient was able to eat crackers and drink fluids. [LB]      ED Course User Index  [LB] Swathi Gauthier, DO                  18:19  Reassessment: Dr. Gauthier reassessed the pt.  The pt is resting comfortably and is NAD.  Pt states their sx have improved. Discussed test results, shared treatment plan, specific conditions for return, and the need for f/u.  Answered their questions at this time.  Pt understands and agrees to the plan.  The pt has remained hemodynamically stable through ED course and is stable for discharge.    I discussed with patient and/or family/caretaker that evaluation in the ED does not suggest any emergent or life threatening medical conditions requiring immediate intervention beyond what was provided in the ED, and I believe patient is safe for discharge.  Regardless, an unremarkable evaluation in the ED does not preclude the development or presence of a serious of life threatening condition. As such, patient was instructed to return immediately for any worsening or change in current symptoms.    Regarding SYNCOPE, although patient presents with a syncopal or near-syncopal episode, I have no suspicion that the event is secondary to an arrhythmia such as WPW, prolonged QT syndrome, or ventricular tachycardia. I have no suspicion for a seizure episode, intracranial hemorrhage, pulmonary embolus, myocardial infarction, sepsis, ectopic pregnancy, hemorrhagic shock, or hypoglycemia. Based on my evaluation, there is no emergent medical condition. Nonetheless, syncope precautions were discussed with the patient and/or caretaker, specifically not to swim or bathe unattended, not to operate motor vehicles or other machinery, and to avoid heights or other areas where falls may occur until cleared by primary care physician. Patient is safe for discharge.  I explained to patient possible reasons for a syncopal episode including: coughing very hard; straining while having a bowel movement; have been standing in one place for too long; experiencing emotional distress or fear; in severe pain; taking certain medications (anxiety, depression, high blood pressure, and allergies); drug or alcohol use; hyperventilation; hypoglycemia; seizures; dehydration; or standing up suddenly from a lying position. Encouraged pt to drink plenty of water and eat healthy diet.    The patient has family members that will observe him/her over the next 24 hours and that are competent to bring him/her back to the Emergency Department if concerning signs or symptoms develop. The family members are comfortable with this responsibility.  I have given detailed written and verbal instructions to the family to bring the patient back to the ED should any concerning signs such as excessive sleepiness, lethargy, confusion, unequal pupils, recurrent vomiting, seizure activity, loss of consciousness, or focal weakness develop.       MIPS Measures     Smoker? No     Hypertension: Patient has a known history of hypertension.    MIPS Measure #415:  Emergency Department Utilization of CT for Minor Blunt Head Trauma for Patients age 18 Years  and  Older.    Measure exclusions:  The patient has a ventricular shunt? No  The patient has a brain tumor? No  The patient has multi-system trauma? No  The patient is pregnant? No  The patient is taking anti platelet medication excluding aspirin?  Yes  Age 65 years and older?  Patient is 74 y.o.    A head CT was ordered? Yes:   The CT was ordered by the emergency provider?  Yes         Medical Decision Making                 Medical Decision Making  Differential diagnosis: Symptomatic anemia, arrhythmia, urinary tract infection, pneumonia, intracranial hemorrhage, acute kidney injury    Cardiac Monitor Interpretation:  Independent ED physician interpretation of cardiac  monitoring.   Rate: 90  Rhythm: Sinus  Indication: Generalized weakness      ED course: Patient placed on monitor.  EKG no STEMI.  No arrhythmia.  Urinalysis negative for nitrites/leukocyte esterase.  WBC 6.12.  H/H 9.4/27.1.  Previous 8.9/25.99 days prior.  No left shift.  Sodium 133.  BUN 5.  Creatinine 0.6.  Blood glucose 120.  Mag 1.5.  Patient was given 2 g magnesium sulfate IV piggyback as well as 1 L normal saline.  Chest x-ray showed no acute pulmonary disease.  CT head showed chronic microvascular ischemic changes.  Patient remained awake alert oriented in the emergency room.  Patient tolerated p.o. challenge.  Patient has plan to in her [facility] rehab this coming Tuesday.  Head injury precautions given.  Return precautions given.  All questions answered.    Amount and/or Complexity of Data Reviewed  Independent Historian: caregiver     Details: See HPI.  External Data Reviewed: labs.     Details: See ED course.  Labs: ordered. Decision-making details documented in ED Course.  Radiology: ordered. Decision-making details documented in ED Course.  ECG/medicine tests: ordered and independent interpretation performed. Decision-making details documented in ED Course.    Risk  Prescription drug management.        Coding    Referrals:  No orders of the defined types were placed in this encounter.      Prescription Management: I performed a review of the patient's current Rx medication list as input by nursing staff.    Discharge Medication List as of 9/20/2024  6:19 PM        CONTINUE these medications which have NOT CHANGED    Details   acetaminophen (TYLENOL) 500 MG tablet Take 2 tablets (1,000 mg total) by mouth every 6 (six) hours., Starting Fri 9/6/2024, Normal      amLODIPine (NORVASC) 5 MG tablet Take 1 tablet by mouth every morning., Historical Med      apixaban (ELIQUIS) 5 mg Tab Take 1 tablet (5 mg total) by mouth 2 (two) times daily., Starting Wed 7/24/2024, Print      blood-glucose meter,continuous  (DEXCOM G7 ) Misc Use daily, Historical Med      blood-glucose sensor (DEXCOM G7 SENSOR) Charito Use every 10 days, Historical Med      coenzyme Q10 (CO Q-10) 100 mg capsule Take 100 mg by mouth nightly., Historical Med      EScitalopram oxalate (LEXAPRO) 10 MG tablet Take 1 tablet (10 mg total) by mouth once daily., Starting Tue 8/20/2024, Until Wed 8/20/2025, Normal      ibuprofen (ADVIL,MOTRIN) 600 MG tablet Take 1 tablet every 6 hours. Alternate between ibuprofen & tylenol every 3 hours. For example: @0800: ibuprofen 600mg @1100: tylenol 1000mg @1400: ibuprofen 600mg @1700: tylenol 1000 mg @2000: ibuprofen 600mg. Can take two of the 300 mg over the counter  ibuprofen is smaller pill is easier., Starting Fri 9/6/2024, Normal      levothyroxine (SYNTHROID) 75 MCG tablet Take 1 tablet by mouth every morning., Historical Med      LYUMJEV KWIKPEN U-100 INSULIN 100 unit/mL pen Inject 2-10 Units into the skin 3 (three) times daily with meals., Starting Fri 6/14/2024, Historical Med      metoprolol succinate (TOPROL-XL) 50 MG 24 hr tablet Take 1 tablet by mouth every morning., Historical Med      naloxone (NARCAN) 4 mg/actuation Spry 1 spray (4 mg total) by Nasal route 1 (one) time if needed (for emergency opioid reversal)., Starting Fri 8/9/2024, Normal      ondansetron (ZOFRAN) 4 MG tablet Take 1 tablet (4 mg total) by mouth every 6 (six) hours as needed for Nausea., Starting Tue 9/10/2024, Normal      ondansetron (ZOFRAN-ODT) 4 MG TbDL DISSOLVE ONE TABLET BY MOUTH EVERY 4 TO 6 HOURS AS NEEDED FOR NAUSEA, Historical Med      polyethylene glycol (GLYCOLAX) 17 gram/dose powder Mix 1 capful (17 g) in liquid and drink by mouth once daily., Starting Sat 9/7/2024, Normal      pravastatin (PRAVACHOL) 10 MG tablet Take 1 tablet by mouth every evening., Historical Med      TOUJEO SOLOSTAR U-300 INSULIN 300 unit/mL (1.5 mL) InPn pen Inject 10 Units into the skin once daily., Historical Med      dexAMETHasone (DECADRON) 4  "MG Tab Take 8 mg (2 tablets) by mouth daily on days 2-4 of each chemotherapy cycle., Normal      gabapentin (NEURONTIN) 100 MG capsule Take 2 capsules (200 mg total) by mouth every evening., Starting Tue 8/20/2024, Until Thu 9/19/2024, Normal      metFORMIN (GLUCOPHAGE) 1000 MG tablet Take 1,000 mg by mouth 2 (two) times daily with meals., Historical Med      OLANZapine (ZYPREXA) 5 MG tablet Take 1 tablet by mouth nightly on days 1-4 of each chemotherapy cycle., Normal      oxyCODONE (ROXICODONE) 5 MG immediate release tablet Take 1 tablet (5 mg total) by mouth every 3 (three) hours as needed for Pain. Take 1 tablet (5 mg) for pain scale 4-6. Take 2 tablets (10 mg) for pain scale 7-10. Do not take more than 2 tablets (10 mg) every 3 hours., Starting Tue 9/10/2024, Normal      potassium chloride (KLOR-CON) 10 MEQ TbSR Take 1 tablet (10 mEq total) by mouth 2 (two) times daily., Starting Thu 9/12/2024, Normal      prochlorperazine (COMPAZINE) 5 MG tablet Take 1 tablet (5 mg total) by mouth every 6 (six) hours as needed for Nausea., Starting Fri 4/26/2024, Normal              Discussed case verbally with: N/A      Portions of this note may have been created with voice recognition software. Occasional "wrong-word" or "sound-a-like" substitutions may have occurred due to the inherent limitations of voice recognition software. Please, read the note carefully and recognize, using context, where substitutions have occurred.          Clinical Impression       ICD-10-CM ICD-9-CM   1. Low magnesium level  R79.0 790.6   2. Near syncope  R55 780.2   3. Head injury  S09.90XA 959.01   4. Hyponatremia  E87.1 276.1        Disposition        Disposition: Discharge to home  Patient condition: Stable    ED Follow-up      Follow-up Information       Maliha Crawford MD In 2 days.    Specialty: Gynecologic Oncology  Why: Return to emergency department for numbness or weakness to 1 side, slurred speech, severe headache, lethargy, fever, " chest pain, chest pressure, difficulty breathing, or other concerns  Contact information:  [address]  [facility] [address]  [phone]                                          Swathi Gauthier,   09/21/24 0652

## 2024-09-21 LAB
OHS QRS DURATION: 82 MS
OHS QTC CALCULATION: 441 MS

## 2024-09-24 ENCOUNTER — TELEPHONE (OUTPATIENT)
Dept: GYNECOLOGIC ONCOLOGY | Facility: CLINIC | Age: 74
End: 2024-09-24
Payer: MEDICARE

## 2024-09-24 DIAGNOSIS — C56.1 MALIGNANT NEOPLASM OF RIGHT OVARY: Primary | ICD-10-CM

## 2024-09-24 DIAGNOSIS — F33.2 SEVERE EPISODE OF RECURRENT MAJOR DEPRESSIVE DISORDER, WITHOUT PSYCHOTIC FEATURES: ICD-10-CM

## 2024-09-24 RX ORDER — LANOLIN ALCOHOL/MO/W.PET/CERES
400 CREAM (GRAM) TOPICAL DAILY
Qty: 200 TABLET | Refills: 1 | Status: SHIPPED | OUTPATIENT
Start: 2024-09-24 | End: 2025-10-29

## 2024-09-24 NOTE — TELEPHONE ENCOUNTER
Long discussion with sister Katie today. Ms. Blanco will be entering rehab center for failure to thrive and PT needs. I agree with this and this was our recommendation when she was discharged from the hospital post operatively but her family refused and took her home. I recommend she complete a rehab program and then we can see her in clinic to follow up cancer care. At this point, any anti cancer directed treatment would be counter productive if she is weak and having poor PO intake. Treatment is contraindicated in this state and would likely land her in the hospital. She understands.     There is also concern on recent CT head for microvascular changes and we discussed she could have a component of vascular dementia contributing to her failure to thrive. I recommend neurology evaluation. Family also worried she is depressed, I recommend psychiatry evaluation.  We can coordinate these outpatient for after her discharge from the rehab center.     All questions were welcomed and answered.   Family to update us on how rehab is going. We can delay visit with me until she is discharged from facility.    Maliha Crawford MD  Gynecologic Oncology

## 2024-09-24 NOTE — TELEPHONE ENCOUNTER
----- Message from Cristy Aguilar sent at 9/24/2024  8:11 AM CDT -----  Regarding: call back request  Name of caller: Katie ( sister)       What is the requesting detail: pt is requesting a call back regarding complications from the surgery. States the matter is urgent. Please give her a call back as soon as possible.       Can the clinic reply by [facility]:       What number to call back:[phone]

## 2024-09-26 ENCOUNTER — PATIENT MESSAGE (OUTPATIENT)
Dept: GYNECOLOGIC ONCOLOGY | Facility: CLINIC | Age: 74
End: 2024-09-26
Payer: MEDICARE

## 2024-09-26 ENCOUNTER — TELEPHONE (OUTPATIENT)
Dept: SURGERY | Facility: OTHER | Age: 74
End: 2024-09-26
Payer: MEDICARE

## 2024-09-26 ENCOUNTER — TELEPHONE (OUTPATIENT)
Dept: PSYCHIATRY | Facility: CLINIC | Age: 74
End: 2024-09-26
Payer: MEDICARE

## 2024-09-26 NOTE — TELEPHONE ENCOUNTER
Called patient for virtual appointment.  No answer, left voicemail.  Patient is in rehab due to failure to thrive.  Will coordinate follow up with Dr. Crawford when patient is discharged.     Ruby Caicedo MD  Staff Surgeon   Colon & Rectal Surgery

## 2024-09-26 NOTE — TELEPHONE ENCOUNTER
LVM to schedule new patient appointment for medication management.   RX: Encourage HBV protein and diet adherence, monitor weights and labs

## 2024-10-04 ENCOUNTER — TELEPHONE (OUTPATIENT)
Dept: NEUROLOGY | Facility: CLINIC | Age: 74
End: 2024-10-04
Payer: MEDICARE

## 2024-10-04 NOTE — TELEPHONE ENCOUNTER
Called patient to schedule appointment from referral for memory changes. Patient does not wish to schedule

## 2024-10-06 ENCOUNTER — EXTERNAL HOME HEALTH (OUTPATIENT)
Dept: HOME HEALTH SERVICES | Facility: HOSPITAL | Age: 74
End: 2024-10-06
Payer: MEDICARE

## 2024-10-08 ENCOUNTER — PATIENT MESSAGE (OUTPATIENT)
Dept: GYNECOLOGIC ONCOLOGY | Facility: CLINIC | Age: 74
End: 2024-10-08
Payer: MEDICARE

## 2024-10-09 ENCOUNTER — OFFICE VISIT (OUTPATIENT)
Dept: PALLIATIVE MEDICINE | Facility: CLINIC | Age: 74
End: 2024-10-09
Payer: MEDICARE

## 2024-10-09 DIAGNOSIS — C56.1 MALIGNANT NEOPLASM OF RIGHT OVARY: ICD-10-CM

## 2024-10-09 DIAGNOSIS — G62.0 CHEMOTHERAPY-INDUCED PERIPHERAL NEUROPATHY: ICD-10-CM

## 2024-10-09 DIAGNOSIS — R64 CACHEXIA: ICD-10-CM

## 2024-10-09 DIAGNOSIS — T45.1X5A CHEMOTHERAPY-INDUCED PERIPHERAL NEUROPATHY: ICD-10-CM

## 2024-10-09 DIAGNOSIS — Z51.5 PALLIATIVE CARE BY SPECIALIST: Primary | ICD-10-CM

## 2024-10-09 DIAGNOSIS — G89.3 CANCER RELATED PAIN: ICD-10-CM

## 2024-10-09 DIAGNOSIS — C78.6 PERITONEAL CARCINOMATOSIS: ICD-10-CM

## 2024-10-09 PROCEDURE — 99215 OFFICE O/P EST HI 40 MIN: CPT | Mod: 95,,, | Performed by: STUDENT IN AN ORGANIZED HEALTH CARE EDUCATION/TRAINING PROGRAM

## 2024-10-09 RX ORDER — HYDROCODONE BITARTRATE AND ACETAMINOPHEN 10; 325 MG/1; MG/1
1 TABLET ORAL EVERY 6 HOURS PRN
Qty: 120 TABLET | Refills: 0 | Status: SHIPPED | OUTPATIENT
Start: 2024-10-09

## 2024-10-09 RX ORDER — DICYCLOMINE HYDROCHLORIDE 20 MG/1
20 TABLET ORAL EVERY 6 HOURS PRN
Qty: 120 TABLET | Refills: 0 | Status: SHIPPED | OUTPATIENT
Start: 2024-10-09 | End: 2024-11-08

## 2024-10-09 NOTE — PROGRESS NOTES
The patient location is: [place], [place]    Visit type: audiovisual    Face to Face time with patient: 20  40 minutes of total time spent on the encounter, which includes face to face time and non-face to face time preparing to see the patient (eg, review of tests), Obtaining and/or reviewing separately obtained history, Documenting clinical information in the electronic or other health record, Independently interpreting results (not separately reported) and communicating results to the patient/family/caregiver, or Care coordination (not separately reported).         Each patient to whom he or she provides medical services by telemedicine is:  (1) informed of the relationship between the physician and patient and the respective role of any other health care provider with respect to management of the patient; and (2) notified that he or she may decline to receive medical services by telemedicine and may withdraw from such care at any time.    Notes:     Office Visit  [facility]      Reason for Consult: pain and symptom management      ASSESSMENT/PLAN:     Plan/Recommendations:  Diagnoses and all orders for this visit:    Palliative care by specialist    Malignant neoplasm of right ovary  -     HYDROcodone-acetaminophen (NORCO)  mg per tablet; Take 1 tablet by mouth every 6 (six) hours as needed for Pain.    Cancer related pain  -     HYDROcodone-acetaminophen (NORCO)  mg per tablet; Take 1 tablet by mouth every 6 (six) hours as needed for Pain.    Chemotherapy-induced peripheral neuropathy    Peritoneal carcinomatosis    Cachexia    Other orders  -     dicyclomine (BENTYL) 20 mg tablet; Take 1 tablet (20 mg total) by mouth every 6 (six) hours as needed.          # Stage III Ovarian Cancer  # Palliative care by specialist  # Peritoneal carcinomatosis  Surgery completed at [facility] on 9/3/24--total hysterectomy with BLO, partial colectomy with tumor debulking as well. S/p two weeks rehab with some  improvement in PS. Now walking with walker.   - Follow up gyn onc    # Cancer related pain  Post operative pain improving with tylenol. Found gabapentin makes her very drowsy. Concerned about withdrawal symptoms in the hospital and poor oral intake with oxycodone therefore discontinued use. Does not have enough pain control with tramadol. Would like to resume Norco.   - Start Norco 10-325mg PO q6h prn  - Discontinue tramadol  - Continue miralax prn for bowel regimen  - Discussed use of graded regimen including tylenol for mild pain and oxycodone for pain greater than 6-7/10    # Chemotherapy induced peripheral neuropathy  Numbness and tingling of hands and feet. This has been bothersome.   - Hold gabapentin 200mg night, see above    # Cancer cachexia  BMI 21. Poor appetite. Concerned about ability to prevent falls and continue to gain strength post op since she is not eating. Discussed utility of appetite stimulants in the future but at this time she is already too drowsy and we would like to avoid this side effect.   - Refer to nutrition    Follow up: 1 month      SUBJECTIVE:     History of Present Illness:  Patient is a 74 y.o. year old female presenting with recent diagnosis of Stage IIIC ovarian cancer. She is a patient of Dr. Maliha Crawford. She presents via virtual visit for follow up.    10/9/24:  A little stronger since going to rehab. Legs are still weak. Aching. Was at rehab for 2 weeks. Walking well with a walker now. Appetite is poor but she is eating more than before. For pain, she is taking tramadol 50mg twice per day. Does not feel like it is helping her pain. Wants to try [facility] again. Daughter states it is hard to do rehab because of pain.     Has abdominal pain and is taking bentyl for it.     Seeing Dr. Crawford soon.     9/18/24:  Pain is better but still feels tired. Feeling weak as well and fell. Fell three times yesterday and hit her head. Was trying to get out of bed to get to bedside commode and  lost balance. If she gets a small stomach pain she takes tylenol and it works well.     Sister reports was not eating due to drowsiness with taking oxycodone. Lead to weight loss. This occurred in the hospital. This lead to choice to avoid oxycodone 5 and 10mg.     Concern for withdrawal while in the hospital including clamminess.     Having a bowel movement helped. Think she may be lactose intolerant.     Considering admission to acute rehab facility near their home for more intensive rehab and nutrition.    Has home health.    Taking two tylenol twice per day. Not taking ibuprofen or gabapentin at this time.     Biggest concern is eating.       9/10/24:    Alternating tylenol and ibuprofen every 3 hours. Cannot sleep. Feeling weak after surgery. Blood sugar has been going all over the place due to poor oral intake. Holding gabapentin because it is making her too drowsy.     Family feels she needs more protein to gain strength. Has home health now. Previously taking oxycodone on empty stomach. Taking zofran for nausea. Requesting zofran that does not dissolve.    For pain, feel that tylenol and ibuprofen has been helping well for pain. Needs a refill for oxycodone 5mg. Using sparingly twice per day usually.     Having easy bowel movements with miralax.     8/20/24:  Feels neuropathy is the worst and wants to increase gabapentin dose. Neuropathy is in right thigh down into leg. Describes as throbbing pain.    Went up to 200mg at night starting last night.     The patient's sister was also present during the encounter and raised concerns regarding constipation and not wanting to have some any medications related to the colon prior to surgery.  We discussed the high probability of constipation with hydrocodone and with oxycodone use.  She has been using laxatives which they are fearful of.  Discussed nonpharmacological options including increased water intake and prune juice consumption for more natural ways of  preventing constipation.  The patient has found greater benefit from oxycodone at this time.  And has been taking Tylenol intermittently for pain as well.  They are aware to stay less than 2000 mg per day.  They are anticipating surgery soon and are aware that pain management needs may change postoperatively.    8/13/24:  Pain right now is a 5-6/10. Talked to Dr. Crawford about gabapentin.  got frustrated on morphine 30mg due to patient due to sleeping a lot and feeling foggy. Delay in answering. Weaned off of morphine. CT scan is Monday to rule out kidney stone. Taking oxycodone every 4-6 hours.     Working on constipation at night even though she's not constipated.     7/12/24:  Currently using Norco 2-3 times per day. Got scared of morphine so she stopped taking it and requested Norco reinitiation prior to this appointment. Discussed use of morphine and stereotyped fears. Having daily bms. Saw colorectal surgery, planning surgery for September. Delayed due to recent clot.    She endorses numbness and tingling of her hands and feet. Discussed neuropathy. She is open to try treatment.    6/12/24: She states she is doing really well. She had her treatment yesterday and tolerated it well. No pain at all after being on the half tablet of the morphine. She denies any breakthrough at all. She has been able to resume her housework. She feels almost back to normal. She gets tired. Takes a nap once a day. She is having daily bowel movements. She takes senna daily.    5/29/24:Overall pain is a little better. She feels stronger pain pill is lasting a bit longer and quicker onset. Still having pain though. She takes pain medication every 4-5 hours.     She plans to start PT. Her PCP is managing her care overall but her and her family are happy to keep all pain medications with palliative care. She is currently still in [place] with family.     She is having bowel movements every day. She is taking senna everyday.  "    5/15/24:  Pain has worsened. She has had excruciating abdominal pain. She could not sleep after the pain medication took effect. Then had sweats. She has been taking the Norco 5mg every 6 hours but it takes 45-60 minutes to take effect.     Has started taking two senna s to prevent constipation. Started this yesterday. She had a good BM today.     Current meds for nausea have been helping.     Very groggy days after chemotherapy.       Initial visit:    Introduced the role of palliative care including symptom management and advance care planning through the cancer journey.    She reports pain rated 10/10. When she takes a pain pill it drops down to a 4-5/10. She takes hydrocodone. Pain is located in her right side of the abdomen. Describes this pain as shooting and throbbing.     She reports daily bowel movements. Denies nausea, vomiting. She had her first infusion yesterday. She had no side effects.     Her daughter states that her mother hates pain medications. Family has noted grunting. Family has noticed movement would exacerbate pain. Would be hard to find a comfortable position to sit in. Patient wants to "take the edge off" and rest comfortably. Benadryl knocks her out.     The increased dose of hydrocodone at 7.5 helped better. She takes one dose in the morning and one at night. She sometimes take an additional dose if she takes a long car ride.     She does not feel strong. She was in the recliner all day.       Past Medical History:   Diagnosis Date    Cancer     Diabetes mellitus, type 2     Hyperlipidemia     Hypertension     Hypothyroidism     Pulmonary embolism      Past Surgical History:   Procedure Laterality Date    BILATERAL SALPINGO-OOPHORECTOMY (BSO) N/A 9/3/2024    Procedure: SALPINGO-OOPHORECTOMY, BILATERAL;  Surgeon: Maliha Crawford MD;  Location: [facility] OR [address] [place] [address];  Service: Gynecology Oncology;  Laterality: N/A;    DEBULKING OF TUMOR N/A 9/3/2024    Procedure: DEBULKING, NEOPLASM;  " Surgeon: Maliha Crawford MD;  Location: [facility] OR 2ND FLR;  Service: Gynecology Oncology;  Laterality: N/A;    FLEXIBLE SIGMOIDOSCOPY N/A 9/3/2024    Procedure: SIGMOIDOSCOPY, FLEXIBLE;  Surgeon: Ruby Caicedo MD;  Location: [facility] OR 2ND FLR;  Service: Colon and Rectal;  Laterality: N/A;    ILEOCOLECTOMY N/A 9/3/2024    Procedure: ILEOCOLECTOMY;  Surgeon: Ruby Caicedo MD;  Location: [facility] OR 2ND FLR;  Service: Colon and Rectal;  Laterality: N/A;    LAPAROTOMY, EXPLORATORY N/A 9/3/2024    Procedure: LAPAROTOMY, EXPLORATORY;  Surgeon: Malhia Crawford MD;  Location: [facility] OR 2ND FLR;  Service: Gynecology Oncology;  Laterality: N/A;  4 hr case    LOW ANTERIOR RESECTION OF COLON N/A 9/3/2024    Procedure: RESECTION, COLON, LOW ANTERIOR;  Surgeon: Ruby Caicedo MD;  Location: [facility] OR 2ND FLR;  Service: Colon and Rectal;  Laterality: N/A;  4 hr case    LYSIS OF ADHESIONS OF URETER Bilateral 9/3/2024    Procedure: URETEROLYSIS;  Surgeon: Maliha Crawford MD;  Location: [facility] OR 2ND FLR;  Service: Gynecology Oncology;  Laterality: Bilateral;    MOBILIZATION OF SPLENIC FLEXURE  9/3/2024    Procedure: MOBILIZATION, SPLENIC FLEXURE;  Surgeon: Ruby Caicedo MD;  Location: [facility] OR 2ND FLR;  Service: Colon and Rectal;;    OMENTECTOMY N/A 9/3/2024    Procedure: OMENTECTOMY;  Surgeon: Maliha Crawford MD;  Location: [facility] OR 2ND FLR;  Service: Gynecology Oncology;  Laterality: N/A;    TOTAL ABDOMINAL HYSTERECTOMY N/A 9/3/2024    Procedure: HYSTERECTOMY, TOTAL, ABDOMINAL;  Surgeon: Maliha Crawford MD;  Location: [facility] OR 2ND FLR;  Service: Gynecology Oncology;  Laterality: N/A;    TUBAL LIGATION      at age 32's     Family History   Problem Relation Name Age of Onset    Cancer Brother      Breast cancer Neg Hx      Colon cancer Neg Hx      Ovarian cancer Neg Hx      Uterine cancer Neg Hx      Cervical cancer Neg Hx       Review of patient's allergies indicates:  No Known Allergies  Social Drivers of Health     Tobacco Use: Low Risk   (9/20/2024)    Patient History     Smoking Tobacco Use: Never     Smokeless Tobacco Use: Never     Passive Exposure: Past   Alcohol Use: Not At Risk (7/5/2024)    AUDIT-C     Frequency of Alcohol Consumption: Never     Average Number of Drinks: Patient does not drink     Frequency of Binge Drinking: Never   Financial Resource Strain: Patient Unable To Answer (9/4/2024)    Overall Financial Resource Strain (CARDIA)     Difficulty of Paying Living Expenses: Patient unable to answer   Food Insecurity: Patient Unable To Answer (9/4/2024)    Hunger Vital Sign     Worried About Running Out of Food in the Last Year: Patient unable to answer     Ran Out of Food in the Last Year: Patient unable to answer   Transportation Needs: Patient Unable To Answer (9/4/2024)    TRANSPORTATION NEEDS     Transportation : Patient unable to answer   Physical Activity: Inactive (7/5/2024)    Exercise Vital Sign     Days of Exercise per Week: 0 days     Minutes of Exercise per Session: 20 min   Stress: Patient Unable To Answer (9/4/2024)    [employer] - Occupational Stress Questionnaire     Feeling of Stress : Patient unable to answer   Housing Stability: Patient Unable To Answer (9/4/2024)    Housing Stability Vital Sign     Unable to Pay for Housing in the Last Year: Patient unable to answer     Homeless in the Last Year: Patient unable to answer   Depression: Low Risk  (8/13/2024)    Depression     Last PHQ-4: Flowsheet Data: 0   Utilities: Patient Unable To Answer (9/4/2024)    [facility] Utilities     Threatened with loss of utilities: Patient unable to answer   Health Literacy: Patient Unable To Answer (9/4/2024)     Health Literacy     Frequency of need for help with medical instructions: Patient unable to respond   Recent Concern: Health Literacy - Inadequate Health Literacy (7/5/2024)     Health Literacy     Frequency of need for help with medical instructions: Sometimes   Social Isolation: Not on  file          Medications:    Current Outpatient Medications:     acetaminophen (TYLENOL) 500 MG tablet, Take 2 tablets (1,000 mg total) by mouth every 6 (six) hours., Disp: 30 tablet, Rfl: 3    amLODIPine (NORVASC) 5 MG tablet, Take 1 tablet by mouth every morning., Disp: , Rfl:     apixaban (ELIQUIS) 5 mg Tab, Take 1 tablet (5 mg total) by mouth 2 (two) times daily., Disp: 60 tablet, Rfl: 2    blood-glucose meter,continuous (DEXCOM G7 ) Misc, Use daily, Disp: , Rfl:     blood-glucose sensor (DEXCOM G7 SENSOR) [place], Use every 10 days, Disp: , Rfl:     coenzyme Q10 (CO Q-10) 100 mg capsule, Take 100 mg by mouth nightly., Disp: , Rfl:     dexAMETHasone (DECADRON) 4 MG Tab, Take 8 mg (2 tablets) by mouth daily on days 2-4 of each chemotherapy cycle., Disp: 6 tablet, Rfl: 11    dicyclomine (BENTYL) 20 mg tablet, Take 1 tablet (20 mg total) by mouth every 6 (six) hours as needed., Disp: 120 tablet, Rfl: 0    EScitalopram oxalate (LEXAPRO) 10 MG tablet, Take 1 tablet (10 mg total) by mouth once daily., Disp: 90 tablet, Rfl: 3    gabapentin (NEURONTIN) 100 MG capsule, Take 2 capsules (200 mg total) by mouth every evening. (Patient taking differently: Take 200 mg by mouth every evening. 100 mg in the morning 100 mg noon and 300 mg in the evening), Disp: 60 capsule, Rfl: 0    HYDROcodone-acetaminophen (NORCO)  mg per tablet, Take 1 tablet by mouth every 6 (six) hours as needed for Pain., Disp: 120 tablet, Rfl: 0    ibuprofen (ADVIL,MOTRIN) 600 MG tablet, Take 1 tablet every 6 hours. Alternate between ibuprofen & tylenol every 3 hours. For example: @0800: ibuprofen 600mg @1100: tylenol 1000mg @1400: ibuprofen 600mg @1700: tylenol 1000 mg @2000: ibuprofen 600mg. Can take two of the 300 mg over the counter ibuprofen is smaller pill is easier., Disp: 30 tablet, Rfl: 3    levothyroxine (SYNTHROID) 75 MCG tablet, Take 1 tablet by mouth every morning., Disp: , Rfl:     LYUMJEV KWIKPEN U-100 INSULIN 100 unit/mL pen,  Inject 2-10 Units into the skin 3 (three) times daily with meals., Disp: , Rfl:     magnesium oxide (MAGOX) 400 mg (241.3 mg magnesium) tablet, Take 1 tablet (400 mg total) by mouth once daily., Disp: 200 tablet, Rfl: 1    metFORMIN (GLUCOPHAGE) 1000 MG tablet, Take 1,000 mg by mouth 2 (two) times daily with meals., Disp: , Rfl:     metoprolol succinate (TOPROL-XL) 50 MG 24 hr tablet, Take 1 tablet by mouth every morning., Disp: , Rfl:     naloxone (NARCAN) 4 mg/actuation [place], 1 spray (4 mg total) by Nasal route 1 (one) time if needed (for emergency opioid reversal)., Disp: 1 each, Rfl: 0    OLANZapine (ZYPREXA) 5 MG tablet, Take 1 tablet by mouth nightly on days 1-4 of each chemotherapy cycle., Disp: 4 tablet, Rfl: 11    ondansetron (ZOFRAN) 4 MG tablet, Take 1 tablet (4 mg total) by mouth every 6 (six) hours as needed for Nausea., Disp: 120 tablet, Rfl: 2    ondansetron (ZOFRAN-ODT) 4 MG TbDL, DISSOLVE ONE TABLET BY MOUTH EVERY 4 TO 6 HOURS AS NEEDED FOR NAUSEA, Disp: , Rfl:     oxyCODONE (ROXICODONE) 5 MG immediate release tablet, Take 1 tablet (5 mg total) by mouth every 3 (three) hours as needed for Pain. Take 1 tablet (5 mg) for pain scale 4-6. Take 2 tablets (10 mg) for pain scale 7-10. Do not take more than 2 tablets (10 mg) every 3 hours., Disp: 60 tablet, Rfl: 0    polyethylene glycol (GLYCOLAX) 17 gram/dose powder, Mix 1 capful (17 g) in liquid and drink by mouth once daily., Disp: 510 g, Rfl: 1    potassium chloride (KLOR-CON) 10 MEQ TbSR, Take 1 tablet (10 mEq total) by mouth 2 (two) times daily., Disp: 30 tablet, Rfl: 0    pravastatin (PRAVACHOL) 10 MG tablet, Take 1 tablet by mouth every evening., Disp: , Rfl:     prochlorperazine (COMPAZINE) 5 MG tablet, Take 1 tablet (5 mg total) by mouth every 6 (six) hours as needed for Nausea., Disp: 20 tablet, Rfl: 5    TOUJEO SOLOSTAR U-300 INSULIN 300 unit/mL (1.5 mL) InPn pen, Inject 10 Units into the skin once daily., Disp: , Rfl:     OBJECTIVE:        ROS:  Review of Systems   Constitutional:  Positive for appetite change and fatigue. Negative for unexpected weight change.   HENT:  Negative for mouth sores.    Eyes:  Negative for visual disturbance.   Respiratory:  Negative for cough and shortness of breath.    Cardiovascular:  Negative for chest pain.   Gastrointestinal:  Negative for abdominal pain and diarrhea.   Genitourinary:  Negative for frequency.   Musculoskeletal:  Negative for back pain.   Skin:  Negative for rash.   Neurological:  Positive for numbness. Negative for headaches.   Hematological:  Negative for adenopathy.   Psychiatric/Behavioral:  The patient is not nervous/anxious.        Review of Symptoms      Symptom Assessment (ESAS 0-10 Scale)  Pain:  0  Dyspnea:  0  Anxiety:  0  Nausea:  0  Depression:  0  Anorexia:  0  Fatigue:  0  Insomnia:  0  Restlessness:  0  Agitation:  0       Anxiety:  Is not nervous/anxious    ECOG Performance Status ndGndrndanddndend:nd nd2nd Living Arrangements:  Lives with family    Psychosocial/Cultural:   See Palliative Psychosocial Note: Yes  **Primary  to Follow**  Palliative Care  Consult: Yes     Time-Based Charting:  Yes  Chart Review: 20 minutes  Face to Face: 20 minutes    Total Time Spent: 40 minutes      Advance Care Planning   Advance Directives:     Decision Making:  Patient answered questions  Goals of Care: The patient endorses that what is most important right now is to focus on symptom/pain control    Accordingly, we have decided that the best plan to meet the patient's goals includes continuing with treatment              Physical Exam:  Vitals:    Physical Exam  Constitutional:       General: She is not in acute distress.     Appearance: Normal appearance. She is ill-appearing. She is not toxic-appearing.   Musculoskeletal:      Cervical back: Normal range of motion.   Skin:     Coloration: Skin is not jaundiced or pale.   Neurological:      Mental Status: She is alert and oriented  to person, place, and time.   Psychiatric:         Mood and Affect: Mood normal.         Behavior: Behavior normal.         Thought Content: Thought content normal.         Judgment: Judgment normal.         Labs:  CBC:   WBC   Date Value Ref Range Status   09/20/2024 6.12 3.90 - 12.70 K/uL Final     Hemoglobin   Date Value Ref Range Status   09/20/2024 9.4 (L) 12.0 - 16.0 g/dL Final     POC Hematocrit   Date Value Ref Range Status   09/03/2024 27 (L) 36 - 54 %PCV Final     Hematocrit   Date Value Ref Range Status   09/20/2024 27.1 (L) 37.0 - 48.5 % Final     MCV   Date Value Ref Range Status   09/20/2024 97 82 - 98 fL Final     Platelets   Date Value Ref Range Status   09/20/2024 414 150 - 450 K/uL Final       LFT:   Lab Results   Component Value Date    AST 20 09/20/2024    ALKPHOS 34 (L) 09/20/2024    BILITOT 0.4 09/20/2024       Albumin:   Albumin   Date Value Ref Range Status   09/20/2024 3.3 (L) 3.5 - 5.2 g/dL Final     Protein:   Total Protein   Date Value Ref Range Status   09/20/2024 6.4 6.0 - 8.4 g/dL Final       Radiology:  CT abd pelvis with IV contrast 8/19/24:  FINDINGS:     Lower chest: Unremarkable.  Liver: 9 x 7 mm hypodensity in left hepatic lobe (series 5, image 23).  This was present in April 2024 and may represent a cyst.  Gallbladder: Unremarkable.  Biliary: No significant biliary ductal dilatation.  Pancreas: Unremarkable.  Spleen: Unremarkable.  Adrenal glands: Unremarkable.  Kidneys/ureters: Unremarkable.  Bladder: Unremarkable.  Reproductive: Uterus is unremarkable.  Persistent cystic area in the expected location of the right ovary measures approximately 3.4 x 2.5 cm.  Other previously demonstrated cystic lesions in the pelvis are no longer present.  There is some persistent thickening along the left pelvic wall as seen on series 5, image 126.  Gastrointestinal: No bowel obstruction or inflammation. No intraperitoneal free air or significant free fluid.  Retroperitoneum: No  adenopathy.  Vascular: Atherosclerotic disease.  No abdominal aortic aneurysm.  Osseous: No acute fracture or aggressive appearing osseous lesion.        Impression:     1.    Significant improvement in the previous cystic lesions in the pelvis.  With some residual in the right pelvis and along the pelvic wall and the left.  2.    Similar appearance of the subcentimeter hypodensity left hepatic lobe which statistically represents a cyst; however, continued attention on follow-up is recommended.    40 minutes of total time spent on the encounter, which includes face to face time and non-face to face time preparing to see the patient (eg, review of tests), Obtaining and/or reviewing separately obtained history, Documenting clinical information in the electronic or other health record, Independently interpreting results (not separately reported) and communicating results to the patient/family/caregiver, or Care coordination (not separately reported).    Signature: Kristen Blum DO

## 2024-10-12 ENCOUNTER — PATIENT MESSAGE (OUTPATIENT)
Dept: GYNECOLOGIC ONCOLOGY | Facility: CLINIC | Age: 74
End: 2024-10-12
Payer: MEDICARE

## 2024-10-14 NOTE — PROGRESS NOTES
SUBJECTIVE     Chief complaint:  Ovarian Cancer Post-op; Treatment Planning; Pre-chemo    History of present Illness:  Bella Meyer is a 74 y.o.  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 Postoperatively required 2 weeks of rehab. She is now doing well. Reports she is tolerating PO without issues. Having normal bowel and bladder function. Denies any abnormal vaginal bleeding, discharge, or odor. Walking around the house. Working with PT and OT. Eating min 1200 calories per day. Oncology history below.    Oncology History   Peritoneal carcinomatosis   2024 Initial Diagnosis    Peritoneal carcinomatosis     2024 -  Chemotherapy    Treatment Summary   Plan Name: OP GYN paclitaxel CARBOplatin (AUC 6) Q3W  Treatment Goal: Curative  Status: Active  Start Date: 2024  End Date: 2025 (Planned)  Provider: Maliha Crawford MD  Chemotherapy: CARBOplatin (PARAPLATIN) 410 mg in sodium chloride 0.9% 326 mL chemo infusion, 410 mg (100 % of original dose 411 mg), Intravenous, Clinic/HOD 1 time, 6 of 17 cycles  Dose modification:   (original dose 411 mg, Cycle 1),   (original dose 379.5 mg, Cycle 2),   (original dose 411 mg, Cycle 3, Reason: Dose not tolerated),   (original dose 452 mg, Cycle 4)  Administration: 410 mg (2024), 380 mg (2024), 410 mg (2024), 450 mg (2024), 450 mg (2024), 450 mg (2024)  PACLitaxeL (TAXOL) 175 mg/m2 = 276 mg in sodium chloride 0.9% 250 mL chemo infusion, 175 mg/m2 = 276 mg (100 % of original dose 175 mg/m2), Intravenous, Clinic/HOD 1 time, 6 of 17 cycles  Dose modification: 135 mg/m2 (original dose 175 mg/m2, Cycle 1, Reason: MD Discretion), 175 mg/m2 (original dose 175 mg/m2, Cycle 1)  Administration: 276 mg (2024), 210 mg (2024), 210 mg (2024), 210 mg (2024), 210 mg (2024), 210 mg (2024)      Malignant neoplasm of ovary   4/23/2024 Initial Diagnosis    Malignant neoplasm of ovary  Referred by Dr. Evans for evaluation of a pelvic mass, peritoneal carcinomatosis and elevated .     3/24/24 CT AP- Abnormal inflammatory changes in the lower pelvis associated with the distal sigmoid colon. Tubular or channel-like structure along the posterior margin of the lower descending colon possibly connecting between the sigmoid and rectal regions. Underlying neoplasm and/or fistula not excluded. Mildly enlarged left iliac bifurcation lymph nodes noted. No drainable abscess. There is also abnormal appearance of the descending colon and several small soft tissue nodules in the pericolonic tissues of the descending colon. Malignant neoplasm should be considered.     4/9/24 TVUS- Grossly unremarkable appearance of the uterus. The left ovary is not seen sonographically. Enlarged right ovary at 6.6 cm with several cysts the largest 3.4 cm with some thin septations and blood flow to the ovary. Trace fluid adjacent to the ovary. No gross ascitic fluid.     4/9/24 - 188    4/18/24 CT CAP- Diffuse peritoneal carcinomatosis. Right adnexal mass (6 cm) which could be the primary neoplasm/ovarian cancer. Sigmoid colon thickening which could be the primary neoplasm/colon cancer.   Index implant left anterior abdomen 2 cm.   Index left pelvic sidewall implant 1.5 cm.    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  Positive pulmonary emboli within the right lower lobe segmental branches, nonocclusive.  No right heart strain.  Persistent sigmoid wall thickening.     8/12/2024 Tumor Markers    =16     9/3/2024 Surgery    Ex Lap, FANNY, BSO, OMX, Debulking, LAR, ileocolectomy     FINDINGS:  At case conclusion, there was no gross residual disease (RD=0mm).   PATHOLOGY:   1. ABDOMINAL WALL FAT, EXCISION:  - Negative for malignancy.  - Benign fibroadipose tisue with scar.    2. OMENTUM, OMENTECTOMY:  - Positive for malignancy.  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  - Size of largest nodule: 10 MM.    3. 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  - Unremarkable fallopian tube and fimbriated end.  - Tumor involves ovarian capsule.  - Tumor extends to pericolonic fat without mucosal involvement by tumor.  - Benign small bowel, negative for tumor.    4. 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  - Size of tumor: 3.5 CM.  - Tumor involves left tubal serosa.  - Tubal high-grade serous carcinoma involves left ovarian capsule.    UTERUS:  - Negative for tumor.  - Complex endometrial hyperplasia without atypia.  - Benign endometrial polyp.  - Background inactive endometrium.  - Serosal adhesions.    CERVIX:  - Negative for tumor.    - Benign cervix with reactive changes and paracervical adhesions.    5. COLON, SIGMOID, EXCISION:  - High-grade serous carcinoma of tubal origin involving pericolonic fat.  - Colonic mucosa is negative for tumor.    6. COLON, ANASTOMOTIC RINGS, EXCISION:  - Negative for tumor.  - Focal mucosal changes suggestive of ischemic changes are present.      10/14/2024 Tumor Markers    -15    "    Review of Systems   Constitutional:  Positive for appetite change and fatigue. Negative for fever and unexpected weight change.   HENT:  Negative.  Negative for mouth sores and nosebleeds.    Eyes: Negative.    Respiratory: Negative.  Negative for chest tightness, cough and shortness of breath.    Cardiovascular: Negative.  Negative for chest pain, leg swelling and palpitations.   Gastrointestinal:  Positive for abdominal pain. Negative for abdominal distention, constipation, diarrhea and nausea.   Endocrine: Negative.    Genitourinary:  Positive for pelvic pain. Negative for difficulty urinating, vaginal bleeding and vaginal discharge.    Skin: Negative.    Neurological: Negative.  Negative for headaches.   Hematological: Negative.  Negative for adenopathy.   Psychiatric/Behavioral: Negative.     All other systems reviewed and are negative.     OBJECTIVE     /66   Pulse 85   Temp 97.1 °F (36.2 °C) (Temporal)   Resp 16   Ht 5' 1" (1.549 m)   Wt 47.5 kg (104 lb 11.5 oz)   SpO2 98%   BMI 19.79 kg/m²     Physical Exam  Constitutional:       Appearance: Normal appearance.     Genitourinary:    Vagina normal.   Vaginal cuff intact.  Right adnexum displays no mass. Left adnexum displays no mass. Cardiovascular:      Rate and Rhythm: Normal rate.   Pulmonary:      Effort: Pulmonary effort is normal.   Abdominal:      General: Abdomen is flat.      Palpations: Abdomen is soft.      Comments: Vertical midline well healed    Neurological:      General: No focal deficit present.      Mental Status: She is alert and oriented to person, place, and time.   Skin:     Findings: No erythema or rash.   Psychiatric:         Mood and Affect: Mood normal.         Behavior: Behavior normal.   Vitals reviewed.       ASSESSMENT AND PLAN   1. Malignant neoplasm of right ovary    2. Peritoneal carcinomatosis    Healing well from surgery. Completed 2 weeks of rehab and home. Continues to work with PT and OT.   We discussed " the plan for additional adjuvant chemotherapy with carbo/taxol x 2 cycles and then maintenance with Niraparib per PRIMA trial.  Labs reviewed and patient examined today. Ok to resume chemotherapy.   RTC pre treatment with labs      Maliha Crawford MD  Gynecologic Oncology

## 2024-10-15 ENCOUNTER — OFFICE VISIT (OUTPATIENT)
Dept: GYNECOLOGIC ONCOLOGY | Facility: CLINIC | Age: 74
End: 2024-10-15
Payer: MEDICARE

## 2024-10-15 VITALS
WEIGHT: 104.75 LBS | BODY MASS INDEX: 19.78 KG/M2 | TEMPERATURE: 97 F | HEIGHT: 61 IN | OXYGEN SATURATION: 98 % | HEART RATE: 85 BPM | DIASTOLIC BLOOD PRESSURE: 66 MMHG | SYSTOLIC BLOOD PRESSURE: 122 MMHG | RESPIRATION RATE: 16 BRPM

## 2024-10-15 DIAGNOSIS — C56.1 MALIGNANT NEOPLASM OF RIGHT OVARY: Primary | ICD-10-CM

## 2024-10-15 DIAGNOSIS — C78.6 PERITONEAL CARCINOMATOSIS: ICD-10-CM

## 2024-10-15 PROCEDURE — 99215 OFFICE O/P EST HI 40 MIN: CPT | Mod: PBBFAC,PN | Performed by: OBSTETRICS & GYNECOLOGY

## 2024-10-15 PROCEDURE — 99999 PR PBB SHADOW E&M-EST. PATIENT-LVL V: CPT | Mod: PBBFAC,,, | Performed by: OBSTETRICS & GYNECOLOGY

## 2024-10-15 RX ORDER — DICYCLOMINE HYDROCHLORIDE 10 MG/1
10 CAPSULE ORAL 3 TIMES DAILY
COMMUNITY
Start: 2024-10-11

## 2024-10-15 RX ORDER — TRAMADOL HYDROCHLORIDE 50 MG/1
25 TABLET ORAL EVERY 6 HOURS PRN
COMMUNITY
Start: 2024-10-06

## 2024-10-15 RX ORDER — PREDNISONE 5 MG/1
5 TABLET ORAL
COMMUNITY
Start: 2024-10-11

## 2024-10-15 RX ORDER — MIRTAZAPINE 7.5 MG/1
7.5 TABLET, FILM COATED ORAL NIGHTLY
COMMUNITY
Start: 2024-10-08

## 2024-10-17 ENCOUNTER — DOCUMENT SCAN (OUTPATIENT)
Dept: HOME HEALTH SERVICES | Facility: HOSPITAL | Age: 74
End: 2024-10-17
Payer: MEDICARE

## 2024-10-21 NOTE — PROGRESS NOTES
SUBJECTIVE     Chief complaint:  Ovarian Cancer Pre-chemo    History of present Illness:  Bella Meyer is a 74 y.o.  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     Completed 6 cycles of Carboplatin/ Paclitaxel followed by Ex Lap/FANNY/BSO/OMX/debulking/LAR on 9/3/2024.  normalized following surgery.     She is doing well. Reports she is tolerating PO without issues. Having normal bowel and bladder function. Denies any abnormal vaginal bleeding, discharge, or odor. Walking around the house. Working with PT and OT. Eating min 1200 calories per day. Oncology history below.    Oncology History   Peritoneal carcinomatosis   2024 Initial Diagnosis    Peritoneal carcinomatosis     2024 -  Chemotherapy    Treatment Summary   Plan Name: OP GYN paclitaxel CARBOplatin (AUC 6) Q3W  Treatment Goal: Curative  Status: Active  Start Date: 2024  End Date: 2025 (Planned)  Provider: Maliha Crawford MD  Chemotherapy: CARBOplatin (PARAPLATIN) 410 mg in sodium chloride 0.9% 326 mL chemo infusion, 410 mg (100 % of original dose 411 mg), Intravenous, Clinic/HOD 1 time, 6 of 17 cycles  Dose modification:   (original dose 411 mg, Cycle 1),   (original dose 379.5 mg, Cycle 2),   (original dose 411 mg, Cycle 3, Reason: Dose not tolerated),   (original dose 452 mg, Cycle 4)  Administration: 410 mg (2024), 380 mg (2024), 410 mg (2024), 450 mg (2024), 450 mg (2024), 450 mg (2024)  PACLitaxeL (TAXOL) 175 mg/m2 = 276 mg in sodium chloride 0.9% 250 mL chemo infusion, 175 mg/m2 = 276 mg (100 % of original dose 175 mg/m2), Intravenous, Clinic/HOD 1 time, 6 of 17 cycles  Dose modification: 135 mg/m2 (original dose 175 mg/m2, Cycle 1, Reason: MD Discretion), 175 mg/m2 (original dose 175 mg/m2, Cycle 1)  Administration: 276 mg (2024), 210 mg (2024), 210  mg (6/11/2024), 210 mg (7/2/2024), 210 mg (7/23/2024), 210 mg (8/13/2024)     Malignant neoplasm of ovary   4/23/2024 Initial Diagnosis    Malignant neoplasm of ovary  Referred by Dr. Evans for evaluation of a pelvic mass, peritoneal carcinomatosis and elevated .     3/24/24 CT AP- Abnormal inflammatory changes in the lower pelvis associated with the distal sigmoid colon. Tubular or channel-like structure along the posterior margin of the lower descending colon possibly connecting between the sigmoid and rectal regions. Underlying neoplasm and/or fistula not excluded. Mildly enlarged left iliac bifurcation lymph nodes noted. No drainable abscess. There is also abnormal appearance of the descending colon and several small soft tissue nodules in the pericolonic tissues of the descending colon. Malignant neoplasm should be considered.     4/9/24 TVUS- Grossly unremarkable appearance of the uterus. The left ovary is not seen sonographically. Enlarged right ovary at 6.6 cm with several cysts the largest 3.4 cm with some thin septations and blood flow to the ovary. Trace fluid adjacent to the ovary. No gross ascitic fluid.     4/9/24 - 188    4/18/24 CT CAP- Diffuse peritoneal carcinomatosis. Right adnexal mass (6 cm) which could be the primary neoplasm/ovarian cancer. Sigmoid colon thickening which could be the primary neoplasm/colon cancer.   Index implant left anterior abdomen 2 cm.   Index left pelvic sidewall implant 1.5 cm.    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  Positive pulmonary emboli within the right lower lobe segmental branches, nonocclusive.  No right heart strain.  Persistent sigmoid wall thickening.     8/12/2024 Tumor Markers    =53     9/3/2024 Surgery    Ex Lap, FANNY, BSO, OMX, Debulking, LAR, ileocolectomy     FINDINGS:  At case conclusion, there was no gross residual disease (RD=0mm).   PATHOLOGY:   1. ABDOMINAL WALL FAT, EXCISION:  - Negative for malignancy.  - Benign fibroadipose tisue with scar.    2. OMENTUM, OMENTECTOMY:  - Positive for malignancy.  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  - Size of largest nodule: 10 MM.    3. 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  - Unremarkable fallopian tube and fimbriated end.  - Tumor involves ovarian capsule.  - Tumor extends to pericolonic fat without mucosal involvement by tumor.  - Benign small bowel, negative for tumor.    4. 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  - Size of tumor: 3.5 CM.  - Tumor involves left tubal serosa.  - Tubal high-grade serous carcinoma involves left ovarian capsule.    UTERUS:  - Negative for tumor.  - Complex endometrial hyperplasia without atypia.  - Benign endometrial polyp.  - Background inactive endometrium.  - Serosal adhesions.    CERVIX:  - Negative for tumor.    - Benign cervix with reactive changes and paracervical adhesions.    5. COLON, SIGMOID, EXCISION:  - High-grade serous carcinoma of tubal origin involving pericolonic fat.  - Colonic mucosa is negative for tumor.    6. COLON, ANASTOMOTIC RINGS, EXCISION:  - Negative for tumor.  - Focal mucosal changes suggestive of  ischemic changes are present.      10/14/2024 Tumor Markers    -15       Review of Systems   Constitutional:  Positive for appetite change and fatigue. Negative for fever and unexpected weight change.   HENT:  Negative.  Negative for mouth sores and nosebleeds.    Eyes: Negative.    Respiratory: Negative.  Negative for chest tightness, cough and shortness of breath.    Cardiovascular: Negative.  Negative for chest pain, leg swelling and palpitations.   Gastrointestinal:  Positive for abdominal pain. Negative for abdominal distention, constipation, diarrhea and nausea.   Endocrine: Negative.    Genitourinary:  Positive for pelvic pain. Negative for difficulty urinating, vaginal bleeding and vaginal discharge.    Skin: Negative.    Neurological: Negative.  Negative for headaches.   Hematological: Negative.  Negative for adenopathy.   Psychiatric/Behavioral: Negative.     All other systems reviewed and are negative.     OBJECTIVE     There were no vitals taken for this visit.    Physical Exam  Constitutional:       Appearance: Normal appearance.     Genitourinary:    Vagina normal.   Vaginal cuff intact.  Right adnexum displays no mass. Left adnexum displays no mass. Cardiovascular:      Rate and Rhythm: Normal rate.   Pulmonary:      Effort: Pulmonary effort is normal.   Abdominal:      General: Abdomen is flat.      Palpations: Abdomen is soft.      Comments: Vertical midline well healed    Neurological:      General: No focal deficit present.      Mental Status: She is alert and oriented to person, place, and time.   Skin:     Findings: No erythema or rash.   Psychiatric:         Mood and Affect: Mood normal.         Behavior: Behavior normal.   Vitals reviewed.       ASSESSMENT AND PLAN   1. Malignant neoplasm of right ovary    2. Peritoneal carcinomatosis    3. 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   Continues to heal well from surgery. Continues to work with PT and OT.   Labs reviewed. Pt examined. OK to treat cycle #7.   Post-treatment CT, labs (CBC onc, CMP, Mg, ), and exam in 4 weeks.   Plan for maintenance with Niraparib per PRIMA trial.    RTC in 4 weeks with labs and CT prior      Renee Navarro NP  Gynecologic Oncology

## 2024-10-22 ENCOUNTER — OFFICE VISIT (OUTPATIENT)
Dept: GYNECOLOGIC ONCOLOGY | Facility: CLINIC | Age: 74
End: 2024-10-22
Payer: MEDICARE

## 2024-10-22 ENCOUNTER — INFUSION (OUTPATIENT)
Dept: INFUSION THERAPY | Facility: HOSPITAL | Age: 74
End: 2024-10-22
Attending: STUDENT IN AN ORGANIZED HEALTH CARE EDUCATION/TRAINING PROGRAM
Payer: MEDICARE

## 2024-10-22 ENCOUNTER — DOCUMENTATION ONLY (OUTPATIENT)
Dept: INFUSION THERAPY | Facility: HOSPITAL | Age: 74
End: 2024-10-22
Payer: MEDICARE

## 2024-10-22 VITALS
DIASTOLIC BLOOD PRESSURE: 69 MMHG | BODY MASS INDEX: 19.78 KG/M2 | OXYGEN SATURATION: 98 % | TEMPERATURE: 98 F | HEART RATE: 87 BPM | RESPIRATION RATE: 16 BRPM | HEIGHT: 61 IN | WEIGHT: 104.75 LBS | SYSTOLIC BLOOD PRESSURE: 116 MMHG

## 2024-10-22 VITALS
DIASTOLIC BLOOD PRESSURE: 84 MMHG | BODY MASS INDEX: 19.78 KG/M2 | HEIGHT: 61 IN | SYSTOLIC BLOOD PRESSURE: 134 MMHG | HEART RATE: 86 BPM | OXYGEN SATURATION: 100 % | WEIGHT: 104.75 LBS | TEMPERATURE: 96 F | RESPIRATION RATE: 16 BRPM

## 2024-10-22 DIAGNOSIS — C78.6 PERITONEAL CARCINOMATOSIS: ICD-10-CM

## 2024-10-22 DIAGNOSIS — E83.42 HYPOMAGNESEMIA: ICD-10-CM

## 2024-10-22 DIAGNOSIS — C56.1 MALIGNANT NEOPLASM OF RIGHT OVARY: Primary | ICD-10-CM

## 2024-10-22 DIAGNOSIS — Z01.818 EXAMINATION PRIOR TO CHEMOTHERAPY: ICD-10-CM

## 2024-10-22 DIAGNOSIS — C78.6 PERITONEAL CARCINOMATOSIS: Primary | ICD-10-CM

## 2024-10-22 PROCEDURE — 96367 TX/PROPH/DG ADDL SEQ IV INF: CPT | Mod: PN

## 2024-10-22 PROCEDURE — 99999 PR PBB SHADOW E&M-EST. PATIENT-LVL III: CPT | Mod: PBBFAC,,,

## 2024-10-22 PROCEDURE — 96413 CHEMO IV INFUSION 1 HR: CPT | Mod: PN

## 2024-10-22 PROCEDURE — 99213 OFFICE O/P EST LOW 20 MIN: CPT | Mod: PBBFAC,25,PN

## 2024-10-22 PROCEDURE — 63600175 PHARM REV CODE 636 W HCPCS: Mod: PN

## 2024-10-22 PROCEDURE — 96415 CHEMO IV INFUSION ADDL HR: CPT | Mod: PN

## 2024-10-22 PROCEDURE — 25000003 PHARM REV CODE 250: Mod: PN

## 2024-10-22 PROCEDURE — 96375 TX/PRO/DX INJ NEW DRUG ADDON: CPT | Mod: PN

## 2024-10-22 PROCEDURE — 99215 OFFICE O/P EST HI 40 MIN: CPT | Mod: S$PBB,24,,

## 2024-10-22 PROCEDURE — 96417 CHEMO IV INFUS EACH ADDL SEQ: CPT | Mod: PN

## 2024-10-22 RX ORDER — SODIUM CHLORIDE 0.9 % (FLUSH) 0.9 %
10 SYRINGE (ML) INJECTION
Status: DISCONTINUED | OUTPATIENT
Start: 2024-10-22 | End: 2024-10-22 | Stop reason: HOSPADM

## 2024-10-22 RX ORDER — LORAZEPAM 2 MG/ML
0.5 INJECTION INTRAMUSCULAR
Status: DISCONTINUED | OUTPATIENT
Start: 2024-10-22 | End: 2024-10-22 | Stop reason: HOSPADM

## 2024-10-22 RX ORDER — DIPHENHYDRAMINE HYDROCHLORIDE 50 MG/ML
50 INJECTION INTRAMUSCULAR; INTRAVENOUS ONCE AS NEEDED
Status: CANCELLED | OUTPATIENT
Start: 2024-10-22

## 2024-10-22 RX ORDER — PROCHLORPERAZINE EDISYLATE 5 MG/ML
5 INJECTION INTRAMUSCULAR; INTRAVENOUS ONCE AS NEEDED
Status: CANCELLED | OUTPATIENT
Start: 2024-10-22

## 2024-10-22 RX ORDER — LORAZEPAM 2 MG/ML
0.5 INJECTION INTRAMUSCULAR
Status: CANCELLED
Start: 2024-10-22

## 2024-10-22 RX ORDER — HEPARIN 100 UNIT/ML
500 SYRINGE INTRAVENOUS
Status: CANCELLED | OUTPATIENT
Start: 2024-10-22

## 2024-10-22 RX ORDER — FAMOTIDINE 10 MG/ML
20 INJECTION INTRAVENOUS
Status: COMPLETED | OUTPATIENT
Start: 2024-10-22 | End: 2024-10-22

## 2024-10-22 RX ORDER — ACETAMINOPHEN 500 MG
1000 TABLET ORAL
Status: COMPLETED | OUTPATIENT
Start: 2024-10-22 | End: 2024-10-22

## 2024-10-22 RX ORDER — FAMOTIDINE 10 MG/ML
20 INJECTION INTRAVENOUS
Status: CANCELLED | OUTPATIENT
Start: 2024-10-22

## 2024-10-22 RX ORDER — EPINEPHRINE 0.3 MG/.3ML
0.3 INJECTION SUBCUTANEOUS ONCE AS NEEDED
Status: CANCELLED | OUTPATIENT
Start: 2024-10-22

## 2024-10-22 RX ORDER — SODIUM CHLORIDE 0.9 % (FLUSH) 0.9 %
10 SYRINGE (ML) INJECTION
Status: CANCELLED | OUTPATIENT
Start: 2024-10-22

## 2024-10-22 RX ORDER — PROCHLORPERAZINE EDISYLATE 5 MG/ML
5 INJECTION INTRAMUSCULAR; INTRAVENOUS ONCE AS NEEDED
Status: DISCONTINUED | OUTPATIENT
Start: 2024-10-22 | End: 2024-10-22 | Stop reason: HOSPADM

## 2024-10-22 RX ORDER — EPINEPHRINE 0.3 MG/.3ML
0.3 INJECTION SUBCUTANEOUS ONCE AS NEEDED
Status: DISCONTINUED | OUTPATIENT
Start: 2024-10-22 | End: 2024-10-22 | Stop reason: HOSPADM

## 2024-10-22 RX ORDER — DIPHENHYDRAMINE HYDROCHLORIDE 50 MG/ML
50 INJECTION INTRAMUSCULAR; INTRAVENOUS ONCE AS NEEDED
Status: DISCONTINUED | OUTPATIENT
Start: 2024-10-22 | End: 2024-10-22 | Stop reason: HOSPADM

## 2024-10-22 RX ORDER — ACETAMINOPHEN 500 MG
1000 TABLET ORAL
Status: CANCELLED
Start: 2024-10-22

## 2024-10-22 RX ADMIN — DEXAMETHASONE SODIUM PHOSPHATE 0.25 MG: 10 INJECTION, SOLUTION INTRAMUSCULAR; INTRAVENOUS at 11:10

## 2024-10-22 RX ADMIN — ACETAMINOPHEN 1000 MG: 500 TABLET ORAL at 10:10

## 2024-10-22 RX ADMIN — APREPITANT 130 MG: 130 INJECTION, EMULSION INTRAVENOUS at 10:10

## 2024-10-22 RX ADMIN — CARBOPLATIN 450 MG: 10 INJECTION, SOLUTION INTRAVENOUS at 02:10

## 2024-10-22 RX ADMIN — PACLITAXEL 210 MG: 6 INJECTION, SOLUTION INTRAVENOUS at 12:10

## 2024-10-22 RX ADMIN — DIPHENHYDRAMINE HYDROCHLORIDE 50 MG: 50 INJECTION, SOLUTION INTRAMUSCULAR; INTRAVENOUS at 10:10

## 2024-10-22 RX ADMIN — FAMOTIDINE 20 MG: 10 INJECTION INTRAVENOUS at 10:10

## 2024-10-22 NOTE — PROGRESS NOTES
3:49 PM    [facility] to provide a gas card.

## 2024-10-22 NOTE — PROGRESS NOTES
ONCOLOGY NUTRITION   FOLLOW UP VISIT        Bella Meyer is a 74 y.o. female.  DATE: 10/22/2024        Oncology Diagnosis: Ovarian Cancer     REFERRAL FROM:   [] Integrative Oncology   [] Med/Heme Oncology  [] Radiation Oncology  [] Surgical Oncology   [] Infusion Nurse    [x] Routine Nutrition follow up    TREATMENT PLAN:   [] Full treatment plan pending  [x] Chemotherapy  [] Immunotherapy  [] Radiation  [] Concurrent  [] Surgery  [] Treatment complete/post-treatment    ANTHROPOMETRICS:  Wt Readings from Last 10 Encounters:   10/22/24 47.5 kg (104 lb 11.5 oz)   10/22/24 47.5 kg (104 lb 11.5 oz)   10/15/24 47.5 kg (104 lb 11.5 oz)   09/20/24 49 kg (108 lb)   09/12/24 52.6 kg (115 lb 14.4 oz)   09/06/24 59 kg (130 lb 1.1 oz)   08/30/24 51.9 kg (114 lb 8 oz)   08/13/24 52.5 kg (115 lb 11.9 oz)   08/13/24 52.5 kg (115 lb 11.9 oz)   07/23/24 53.3 kg (117 lb 8.1 oz)      Weight Changes: has decreased 17 pounds over last 6 months    PHYSICAL EXAM:  Muscle Wasting Observed:  [] No Deficit   [] Mild Deficit   [x] Moderate   [] Severe    INTAKE:  [x] PO Intake [] TF Intake  Current Diet: regular diet  Dietary Patterns:  Eating meals/snacks as tolerated  [x] Oral nutritional supplements: Muscle Milk daily    SYMPTOMS/COMPLAINTS:  [] No nutritional concerns at current  [] Diarrhea                    [] Constipation           [] Nausea                 [] Vomiting                [] Indigestion                [] Reflux              [x] Poor Appetite            [] Anorexia                 [] Early Satiety         [] Gas                       [] Bloating                     [] Dry Mouth    [] Mucositis                   [] Mouth Sores           [] Poor Dentition      [] Difficulty chewing  [] Difficulty Swallowing   [] Pain with swallowing [] Change in taste      [] Change in smell   [] Pain (general)       [] Fatigue                      [] Sleep issues    [x] Weight loss  [] other, please specify    Nutrition Re-Assessment Risk:  Severe    [x] Labs reviewed   [x] Meds reviewed    Education Provided:  [] No Education Needed at this time  [] Diarrhea                                              [] Constipation                          [] Nausea/Vomiting  [] Mucositis                [] Dry Mouth    [] Dealing with changes in Taste/Smell  [x] Dealing with Poor Appetite   [] Soft/moist Diet      [x] Weight Loss/Gain     [] Weight Maintenance                           [] Indigestion/GERD                 [] Gas/Bloating          [] Foods High/ Low in specific nutrients [] Increasing Calories/Protein   [] Milkshake/Smoothies Recipes   [x] Nutrition Supplements                        [] Increasing Fluid Intakes         [] Foods that fight cancer    [] Evidence bases resources                 [] Fermented Foods/Probiotics  [] Mediterranean/Plant Based Diet     [] Other, specify                                   [] Handouts provided      [] Samples provided     RD NOTE:  RD met with patient, her  and two daughters. Pt has had ongoing poor appetite. Pt recently was hospitalized and spent some time at a rehab facility afterwards. Pt denies any significant N/V/D/C. Since initiating tx 6 months ago, patient has had a 14% weight decrease.     RD Goals:  [x] Weight stable                  [] Weight gain                      [] Weight Loss                               [] Continue adequate Kcal/protein   [x] Increase Kcal/protein      [] Adjust Tube-feeding Rx   [] Tolerate Tube Feedings             [] Increase tube feedings to goal     [] Tolerate Supplements     [] Symptom Improvement   [x] Understand nutrition Education  [] Offer supportive visits   [] other, please specify    RECOMMENDATIONS:  Eat smaller, more frequent meals  Discussed higher calorie ONS options however concerns with pt BG  Continue Muscle Milk, try to tolerate BID  Make every bite high calorie    Follow up: Next infusion or PRN throughout tx    Valerie Sebastian, MS, RD,  JOS[facility]  10/22/2024  11:57 AM

## 2024-10-22 NOTE — PLAN OF CARE
Problem: Adult Inpatient Plan of Care  Goal: Patient-Specific Goal (Individualized)  Outcome: Progressing  Flowsheets (Taken 10/22/2024 1549)  Individualized Care Needs: Recliner, blanket  Anxieties, Fears or Concerns: None     Problem: Fatigue  Goal: Improved Activity Tolerance  Intervention: Promote Improved Energy  Flowsheets (Taken 10/22/2024 1549)  Fatigue Management:   activity schedule adjusted   frequent rest breaks encouraged  Sleep/Rest Enhancement: regular sleep/rest pattern promoted  Activity Management: Up in chair - L3  Environmental Support:   calm environment promoted   environmental consistency promoted     Problem: Adult Inpatient Plan of Care  Goal: Plan of Care Review  Outcome: Progressing  Flowsheets (Taken 10/22/2024 1549)  Plan of Care Reviewed With: patient  Tolerated treatment with no known distress.  D/C from infusion center via w/c.

## 2024-11-05 ENCOUNTER — OFFICE VISIT (OUTPATIENT)
Dept: PALLIATIVE MEDICINE | Facility: CLINIC | Age: 74
End: 2024-11-05
Payer: MEDICARE

## 2024-11-05 DIAGNOSIS — C78.6 PERITONEAL CARCINOMATOSIS: ICD-10-CM

## 2024-11-05 DIAGNOSIS — G89.3 CANCER RELATED PAIN: ICD-10-CM

## 2024-11-05 DIAGNOSIS — T45.1X5A CHEMOTHERAPY-INDUCED PERIPHERAL NEUROPATHY: ICD-10-CM

## 2024-11-05 DIAGNOSIS — C56.1 MALIGNANT NEOPLASM OF RIGHT OVARY: ICD-10-CM

## 2024-11-05 DIAGNOSIS — G25.81 RESTLESS LEG SYNDROME: ICD-10-CM

## 2024-11-05 DIAGNOSIS — Z51.5 PALLIATIVE CARE BY SPECIALIST: Primary | ICD-10-CM

## 2024-11-05 DIAGNOSIS — R64 CACHEXIA: ICD-10-CM

## 2024-11-05 DIAGNOSIS — G62.0 CHEMOTHERAPY-INDUCED PERIPHERAL NEUROPATHY: ICD-10-CM

## 2024-11-05 PROCEDURE — 99215 OFFICE O/P EST HI 40 MIN: CPT | Mod: 95,,, | Performed by: STUDENT IN AN ORGANIZED HEALTH CARE EDUCATION/TRAINING PROGRAM

## 2024-11-05 RX ORDER — DICYCLOMINE HYDROCHLORIDE 10 MG/1
10 CAPSULE ORAL 2 TIMES DAILY
Qty: 60 CAPSULE | Refills: 2 | Status: SHIPPED | OUTPATIENT
Start: 2024-11-05 | End: 2025-02-03

## 2024-11-05 RX ORDER — PRAMIPEXOLE DIHYDROCHLORIDE 0.12 MG/1
0.12 TABLET ORAL NIGHTLY
Qty: 30 TABLET | Refills: 11 | Status: SHIPPED | OUTPATIENT
Start: 2024-11-05 | End: 2025-11-05

## 2024-11-05 RX ORDER — HYDROCODONE BITARTRATE AND ACETAMINOPHEN 10; 325 MG/1; MG/1
1 TABLET ORAL EVERY 6 HOURS PRN
Qty: 120 TABLET | Refills: 0 | Status: SHIPPED | OUTPATIENT
Start: 2024-11-05

## 2024-11-05 NOTE — PROGRESS NOTES
The patient location is: [place], [place]    Visit type: audiovisual    Face to Face time with patient: 20  40 minutes of total time spent on the encounter, which includes face to face time and non-face to face time preparing to see the patient (eg, review of tests), Obtaining and/or reviewing separately obtained history, Documenting clinical information in the electronic or other health record, Independently interpreting results (not separately reported) and communicating results to the patient/family/caregiver, or Care coordination (not separately reported).         Each patient to whom he or she provides medical services by telemedicine is:  (1) informed of the relationship between the physician and patient and the respective role of any other health care provider with respect to management of the patient; and (2) notified that he or she may decline to receive medical services by telemedicine and may withdraw from such care at any time.    Notes:     Office Visit  [facility]      Reason for Consult: pain and symptom management      ASSESSMENT/PLAN:     Plan/Recommendations:  Diagnoses and all orders for this visit:    Palliative care by specialist    Malignant neoplasm of right ovary  -     HYDROcodone-acetaminophen (NORCO)  mg per tablet; Take 1 tablet by mouth every 6 (six) hours as needed for Pain.    Cancer related pain  -     HYDROcodone-acetaminophen (NORCO)  mg per tablet; Take 1 tablet by mouth every 6 (six) hours as needed for Pain.    Chemotherapy-induced peripheral neuropathy    Peritoneal carcinomatosis    Cachexia    Restless leg syndrome    Other orders  -     pramipexole (MIRAPEX) 0.125 MG tablet; Take 1 tablet (0.125 mg total) by mouth every evening.  -     dicyclomine (BENTYL) 10 MG capsule; Take 1 capsule (10 mg total) by mouth 2 (two) times daily.      # Stage III Ovarian Cancer  # Palliative care by specialist  # Peritoneal carcinomatosis  Completed 6 cycles of  "Carboplatin/ Paclitaxel followed by Ex Lap/FANNY/BSO/OMX/debulking/LAR on 9/3/2024.  Currently on maintenance with Niraparib per gyn onc.   - Follow up gyn onc    # Restless leg syndrome  # Leg pain  Patient with throbbing bilateral thigh pain. No corresponding imaging present. Low concern for bone metastasis given bilateral location. Reports history of restless leg syndrome. Improvement with initiation of magnesium supplements.  - Continue Norco 10-325mg PO q6h prn (discussed max 3g/day of APAP)  - Continue miralax prn for bowel regimen  - Continue Magnesium oxide 400mg PO BID scheduled (warned of diarrhea)  - Start pramipexole 0.125mg PO QHS scheduled    # Chemotherapy induced peripheral neuropathy  Improved.   - Hold gabapentin 200mg night, see above    # Cancer cachexia  BMI 19<21. Poor appetite. Concerned about ability to prevent falls and continue to gain strength post op since she is not eating. Discussed utility of appetite stimulants in the future but at this time she is already too drowsy and we would like to avoid this side effect.   - Follow up nutrition    Follow up: 1 week      SUBJECTIVE:     History of Present Illness:  Patient is a 74 y.o. year old female presenting with recent diagnosis of Stage IIIC ovarian cancer. She is a patient of Dr. Maliha Crawford. She presents via virtual visit for follow up.    11/5/24:  Moving around slowly. Pain is still present. Legs "are killing me". Legs are weak. Still using a walker to get around.     Reports throbbing pain in legs. Can't find anything to ease it except Norco. Nothing helps but Norco. Only lasts 5 hours. Its in top part of legs in thighs. Feels neuropathy is better, less numbness/tingling.     Pain is better with walking. Hurt so bad she does not feel she wants to walk.     Has a history of restless leg syndrome in the past. Treated that with over the counter in the past and started magnesium oxide recently. Felt magnesium helped if she takes it every " day. Feel it could be restless leg syndrome. If she misses magnesium she has unbearable pain. PCP wants to monitor labs with magnesium twice per day if scheduled and not as needed.     Cannot take benadryl due to flaring up legs.     Taking Bentyl for cramps. Felt capsule form worked better at 10mg.     10/9/24:  A little stronger since going to rehab. Legs are still weak. Aching. Was at rehab for 2 weeks. Walking well with a walker now. Appetite is poor but she is eating more than before. For pain, she is taking tramadol 50mg twice per day. Does not feel like it is helping her pain. Wants to try [facility] again. Daughter states it is hard to do rehab because of pain.     Has abdominal pain and is taking bentyl for it.     Seeing Dr. Crawford soon.     9/18/24:  Pain is better but still feels tired. Feeling weak as well and fell. Fell three times yesterday and hit her head. Was trying to get out of bed to get to bedside commode and lost balance. If she gets a small stomach pain she takes tylenol and it works well.     Sister reports was not eating due to drowsiness with taking oxycodone. Lead to weight loss. This occurred in the hospital. This lead to choice to avoid oxycodone 5 and 10mg.     Concern for withdrawal while in the hospital including clamminess.     Having a bowel movement helped. Think she may be lactose intolerant.     Considering admission to acute rehab facility near their home for more intensive rehab and nutrition.    Has home health.    Taking two tylenol twice per day. Not taking ibuprofen or gabapentin at this time.     Biggest concern is eating.       9/10/24:    Alternating tylenol and ibuprofen every 3 hours. Cannot sleep. Feeling weak after surgery. Blood sugar has been going all over the place due to poor oral intake. Holding gabapentin because it is making her too drowsy.     Family feels she needs more protein to gain strength. Has home health now. Previously taking oxycodone on empty  stomach. Taking zofran for nausea. Requesting zofran that does not dissolve.    For pain, feel that tylenol and ibuprofen has been helping well for pain. Needs a refill for oxycodone 5mg. Using sparingly twice per day usually.     Having easy bowel movements with miralax.     8/20/24:  Feels neuropathy is the worst and wants to increase gabapentin dose. Neuropathy is in right thigh down into leg. Describes as throbbing pain.    Went up to 200mg at night starting last night.     The patient's sister was also present during the encounter and raised concerns regarding constipation and not wanting to have some any medications related to the colon prior to surgery.  We discussed the high probability of constipation with hydrocodone and with oxycodone use.  She has been using laxatives which they are fearful of.  Discussed nonpharmacological options including increased water intake and prune juice consumption for more natural ways of preventing constipation.  The patient has found greater benefit from oxycodone at this time.  And has been taking Tylenol intermittently for pain as well.  They are aware to stay less than 2000 mg per day.  They are anticipating surgery soon and are aware that pain management needs may change postoperatively.    8/13/24:  Pain right now is a 5-6/10. Talked to Dr. Crawford about gabapentin.  got frustrated on morphine 30mg due to patient due to sleeping a lot and feeling foggy. Delay in answering. Weaned off of morphine. CT scan is Monday to rule out kidney stone. Taking oxycodone every 4-6 hours.     Working on constipation at night even though she's not constipated.     7/12/24:  Currently using Norco 2-3 times per day. Got scared of morphine so she stopped taking it and requested Norco reinitiation prior to this appointment. Discussed use of morphine and stereotyped fears. Having daily bms. Saw colorectal surgery, planning surgery for September. Delayed due to recent clot.    She  endorses numbness and tingling of her hands and feet. Discussed neuropathy. She is open to try treatment.    6/12/24: She states she is doing really well. She had her treatment yesterday and tolerated it well. No pain at all after being on the half tablet of the morphine. She denies any breakthrough at all. She has been able to resume her housework. She feels almost back to normal. She gets tired. Takes a nap once a day. She is having daily bowel movements. She takes senna daily.    5/29/24:Overall pain is a little better. She feels stronger pain pill is lasting a bit longer and quicker onset. Still having pain though. She takes pain medication every 4-5 hours.     She plans to start PT. Her PCP is managing her care overall but her and her family are happy to keep all pain medications with palliative care. She is currently still in [place] with family.     She is having bowel movements every day. She is taking senna everyday.     5/15/24:  Pain has worsened. She has had excruciating abdominal pain. She could not sleep after the pain medication took effect. Then had sweats. She has been taking the Norco 5mg every 6 hours but it takes 45-60 minutes to take effect.     Has started taking two senna s to prevent constipation. Started this yesterday. She had a good BM today.     Current meds for nausea have been helping.     Very groggy days after chemotherapy.       Initial visit:    Introduced the role of palliative care including symptom management and advance care planning through the cancer journey.    She reports pain rated 10/10. When she takes a pain pill it drops down to a 4-5/10. She takes hydrocodone. Pain is located in her right side of the abdomen. Describes this pain as shooting and throbbing.     She reports daily bowel movements. Denies nausea, vomiting. She had her first infusion yesterday. She had no side effects.     Her daughter states that her mother hates pain medications. Family has noted  "grunting. Family has noticed movement would exacerbate pain. Would be hard to find a comfortable position to sit in. Patient wants to "take the edge off" and rest comfortably. Benadryl knocks her out.     The increased dose of hydrocodone at 7.5 helped better. She takes one dose in the morning and one at night. She sometimes take an additional dose if she takes a long car ride.     She does not feel strong. She was in the recliner all day.       Past Medical History:   Diagnosis Date    Cancer     Diabetes mellitus, type 2     Hyperlipidemia     Hypertension     Hypothyroidism     Pulmonary embolism      Past Surgical History:   Procedure Laterality Date    BILATERAL SALPINGO-OOPHORECTOMY (BSO) N/A 9/3/2024    Procedure: SALPINGO-OOPHORECTOMY, BILATERAL;  Surgeon: Maliha Crawford MD;  Location: [facility] OR [address] [place] [address];  Service: Gynecology Oncology;  Laterality: N/A;    DEBULKING OF TUMOR N/A 9/3/2024    Procedure: DEBULKING, NEOPLASM;  Surgeon: Maliha Crawford MD;  Location: [facility] OR [address] [place] [address];  Service: Gynecology Oncology;  Laterality: N/A;    FLEXIBLE SIGMOIDOSCOPY N/A 9/3/2024    Procedure: SIGMOIDOSCOPY, FLEXIBLE;  Surgeon: Ruby Caicedo MD;  Location: [facility] OR [address] [place] [address];  Service: Colon and Rectal;  Laterality: N/A;    ILEOCOLECTOMY N/A 9/3/2024    Procedure: ILEOCOLECTOMY;  Surgeon: Ruby Caicedo MD;  Location: [facility] OR [address] [place] [address];  Service: Colon and Rectal;  Laterality: N/A;    LAPAROTOMY, EXPLORATORY N/A 9/3/2024    Procedure: LAPAROTOMY, EXPLORATORY;  Surgeon: Maliha Crawford MD;  Location: [facility] OR [address] [place] [address];  Service: Gynecology Oncology;  Laterality: N/A;  4 hr case    LOW ANTERIOR RESECTION OF COLON N/A 9/3/2024    Procedure: RESECTION, COLON, LOW ANTERIOR;  Surgeon: Ruby Caicedo MD;  Location: [facility] OR [address] [place] [address];  Service: Colon and Rectal;  Laterality: N/A;  4 hr case    LYSIS OF ADHESIONS OF URETER Bilateral 9/3/2024    Procedure: URETEROLYSIS;  Surgeon: Maliha Crawford MD;  Location: [address];  Service: " Gynecology Oncology;  Laterality: Bilateral;    MOBILIZATION OF SPLENIC FLEXURE  9/3/2024    Procedure: MOBILIZATION, SPLENIC FLEXURE;  Surgeon: Ruby Caicedo MD;  [facility];  Service: Colon and Rectal;;    OMENTECTOMY N/A 9/3/2024    Procedure: OMENTECTOMY;  Surgeon: Maliha Crawford MD;  [facility];  Service: Gynecology Oncology;  Laterality: N/A;    TOTAL ABDOMINAL HYSTERECTOMY N/A 9/3/2024    Procedure: HYSTERECTOMY, TOTAL, ABDOMINAL;  Surgeon: Maliha Crawford MD;  [facility];  Service: Gynecology Oncology;  Laterality: N/A;    TUBAL LIGATION      at age 32's     Family History   Problem Relation Name Age of Onset    Cancer Brother      Breast cancer Neg Hx      Colon cancer Neg Hx      Ovarian cancer Neg Hx      Uterine cancer Neg Hx      Cervical cancer Neg Hx       Review of patient's allergies indicates:  No Known Allergies  Social Drivers of Health     Tobacco Use: Low Risk  (10/22/2024)    Patient History     Smoking Tobacco Use: Never     Smokeless Tobacco Use: Never     Passive Exposure: Past   Alcohol Use: Not At Risk (7/5/2024)    AUDIT-C     Frequency of Alcohol Consumption: Never     Average Number of Drinks: Patient does not drink     Frequency of Binge Drinking: Never   Financial Resource Strain: Patient Unable To Answer (9/4/2024)    Overall Financial Resource Strain (CARDIA)     Difficulty of Paying Living Expenses: Patient unable to answer   Food Insecurity: Patient Unable To Answer (9/4/2024)    Hunger Vital Sign     Worried About Running Out of Food in the Last Year: Patient unable to answer     Ran Out of Food in the Last Year: Patient unable to answer   Transportation Needs: Patient Unable To Answer (9/4/2024)    TRANSPORTATION NEEDS     Transportation : Patient unable to answer   Physical Activity: Inactive (7/5/2024)    Exercise Vital Sign     Days of Exercise per Week: 0 days     Minutes of Exercise per Session: 20 min   Stress: Patient  Unable To Answer (9/4/2024)    [employer] - Occupational Stress Questionnaire     Feeling of Stress : Patient unable to answer   Housing Stability: Patient Unable To Answer (9/4/2024)    Housing Stability Vital Sign     Unable to Pay for Housing in the Last Year: Patient unable to answer     Homeless in the Last Year: Patient unable to answer   Depression: Low Risk  (10/22/2024)    Depression     Last PHQ-4: Flowsheet Data: 0   Utilities: Patient Unable To Answer (9/4/2024)    [facility] Utilities     Threatened with loss of utilities: Patient unable to answer   Health Literacy: Patient Unable To Answer (9/4/2024)     Health Literacy     Frequency of need for help with medical instructions: Patient unable to respond   Recent Concern: Health Literacy - Inadequate Health Literacy (7/5/2024)     Health Literacy     Frequency of need for help with medical instructions: Sometimes   Social Isolation: Not on file          Medications:    Current Outpatient Medications:     acetaminophen (TYLENOL) 500 MG tablet, Take 2 tablets (1,000 mg total) by mouth every 6 (six) hours., Disp: 30 tablet, Rfl: 3    amLODIPine (NORVASC) 5 MG tablet, Take 1 tablet by mouth every morning., Disp: , Rfl:     apixaban (ELIQUIS) 5 mg Tab, Take 1 tablet (5 mg total) by mouth 2 (two) times daily., Disp: 60 tablet, Rfl: 2    blood-glucose meter,continuous (DEXCOM G7 ) Misc, Use daily, Disp: , Rfl:     blood-glucose sensor (DEXCOM G7 SENSOR) [place], Use every 10 days, Disp: , Rfl:     dexAMETHasone (DECADRON) 4 MG Tab, Take 8 mg (2 tablets) by mouth daily on days 2-4 of each chemotherapy cycle., Disp: 6 tablet, Rfl: 11    dicyclomine (BENTYL) 10 MG capsule, Take 1 capsule (10 mg total) by mouth 2 (two) times daily., Disp: 60 capsule, Rfl: 2    EScitalopram oxalate (LEXAPRO) 10 MG tablet, Take 1 tablet (10 mg total) by mouth once daily., Disp: 90 tablet, Rfl: 3    HYDROcodone-acetaminophen (NORCO)  mg per  tablet, Take 1 tablet by mouth every 6 (six) hours as needed for Pain., Disp: 120 tablet, Rfl: 0    ibuprofen (ADVIL,MOTRIN) 600 MG tablet, Take 1 tablet every 6 hours. Alternate between ibuprofen & tylenol every 3 hours. For example: @0800: ibuprofen 600mg @1100: tylenol 1000mg @1400: ibuprofen 600mg @1700: tylenol 1000 mg @2000: ibuprofen 600mg. Can take two of the 300 mg over the counter ibuprofen is smaller pill is easier., Disp: 30 tablet, Rfl: 3    levothyroxine (SYNTHROID) 75 MCG tablet, Take 1 tablet by mouth every morning., Disp: , Rfl:     LYUMJEV KWIKPEN U-100 INSULIN 100 unit/mL pen, Inject 2-10 Units into the skin 3 (three) times daily with meals., Disp: , Rfl:     magnesium oxide (MAGOX) 400 mg (241.3 mg magnesium) tablet, Take 1 tablet (400 mg total) by mouth once daily., Disp: 200 tablet, Rfl: 1    metFORMIN (GLUCOPHAGE) 1000 MG tablet, Take 1,000 mg by mouth 2 (two) times daily with meals., Disp: , Rfl:     metoprolol succinate (TOPROL-XL) 50 MG 24 hr tablet, Take 1 tablet by mouth every morning., Disp: , Rfl:     mirtazapine (REMERON) 7.5 MG Tab, Take 7.5 mg by mouth every evening., Disp: , Rfl:     naloxone (NARCAN) 4 mg/actuation Spry, 1 spray (4 mg total) by Nasal route 1 (one) time if needed (for emergency opioid reversal)., Disp: 1 each, Rfl: 0    OLANZapine (ZYPREXA) 5 MG tablet, Take 1 tablet by mouth nightly on days 1-4 of each chemotherapy cycle., Disp: 4 tablet, Rfl: 11    ondansetron (ZOFRAN) 4 MG tablet, Take 1 tablet (4 mg total) by mouth every 6 (six) hours as needed for Nausea., Disp: 120 tablet, Rfl: 2    ondansetron (ZOFRAN-ODT) 4 MG TbDL, DISSOLVE ONE TABLET BY MOUTH EVERY 4 TO 6 HOURS AS NEEDED FOR NAUSEA, Disp: , Rfl:     polyethylene glycol (GLYCOLAX) 17 gram/dose powder, Mix 1 capful (17 g) in liquid and drink by mouth once daily., Disp: 510 g, Rfl: 1    potassium chloride (KLOR-CON) 10 MEQ TbSR, Take 1 tablet (10 mEq total) by mouth 2 (two) times daily., Disp: 30 tablet, Rfl:  0    pramipexole (MIRAPEX) 0.125 MG tablet, Take 1 tablet (0.125 mg total) by mouth every evening., Disp: 30 tablet, Rfl: 11    pravastatin (PRAVACHOL) 10 MG tablet, Take 1 tablet by mouth every evening., Disp: , Rfl:     predniSONE (DELTASONE) 5 MG tablet, Take 5 mg by mouth., Disp: , Rfl:     prochlorperazine (COMPAZINE) 5 MG tablet, Take 1 tablet (5 mg total) by mouth every 6 (six) hours as needed for Nausea., Disp: 20 tablet, Rfl: 5    TOUJEO SOLOSTAR U-300 INSULIN 300 unit/mL (1.5 mL) InPn pen, Inject 10 Units into the skin once daily., Disp: , Rfl:     traMADoL (ULTRAM) 50 mg tablet, Take 25 mg by mouth every 6 (six) hours as needed., Disp: , Rfl:     OBJECTIVE:       ROS:  Review of Systems   Constitutional:  Positive for appetite change and fatigue. Negative for unexpected weight change.   HENT:  Negative for mouth sores.    Eyes:  Negative for visual disturbance.   Respiratory:  Negative for cough and shortness of breath.    Cardiovascular:  Negative for chest pain.   Gastrointestinal:  Negative for abdominal pain and diarrhea.   Genitourinary:  Negative for frequency.   Musculoskeletal:  Negative for back pain.   Skin:  Negative for rash.   Neurological:  Positive for numbness. Negative for headaches.   Hematological:  Negative for adenopathy.   Psychiatric/Behavioral:  The patient is not nervous/anxious.        Review of Symptoms      Symptom Assessment (ESAS 0-10 Scale)  Pain:  0  Dyspnea:  0  Anxiety:  0  Nausea:  0  Depression:  0  Anorexia:  0  Fatigue:  0  Insomnia:  0  Restlessness:  0  Agitation:  0       Anxiety:  Is not nervous/anxious    ECOG Performance Status ndGndrndanddndend:nd nd2nd Living Arrangements:  Lives with family    Psychosocial/Cultural:   See Palliative Psychosocial Note: Yes  **Primary  to Follow**  Palliative Care  Consult: Yes     Time-Based Charting:  Yes  Chart Review: 20 minutes  Face to Face: 20 minutes    Total Time Spent: 40 minutes      Advance Care Planning    Advance Directives:     Decision Making:  Patient answered questions  Goals of Care: The patient endorses that what is most important right now is to focus on symptom/pain control    Accordingly, we have decided that the best plan to meet the patient's goals includes continuing with treatment              Physical Exam:  Vitals:    Physical Exam  Constitutional:       General: She is not in acute distress.     Appearance: Normal appearance. She is ill-appearing. She is not toxic-appearing.   Musculoskeletal:      Cervical back: Normal range of motion.   Skin:     Coloration: Skin is not jaundiced or pale.   Neurological:      Mental Status: She is alert and oriented to person, place, and time.   Psychiatric:         Mood and Affect: Mood normal.         Behavior: Behavior normal.         Thought Content: Thought content normal.         Judgment: Judgment normal.         Labs:  CBC:   WBC   Date Value Ref Range Status   10/21/2024 7.32 3.90 - 12.70 K/uL Final     Hemoglobin   Date Value Ref Range Status   09/20/2024 9.4 (L) 12.0 - 16.0 g/dL Final     HGB   Date Value Ref Range Status   10/21/2024 12.1 12.0 - 16.0 gm/dL Final     POC Hematocrit   Date Value Ref Range Status   09/03/2024 27 (L) 36 - 54 %PCV Final     Hematocrit   Date Value Ref Range Status   09/20/2024 27.1 (L) 37.0 - 48.5 % Final     HCT   Date Value Ref Range Status   10/21/2024 35.7 (L) 37.0 - 48.5 % Final     MCV   Date Value Ref Range Status   10/21/2024 97 82 - 98 fL Final   09/20/2024 97 82 - 98 fL Final     Platelet Count   Date Value Ref Range Status   10/21/2024 323 150 - 450 K/uL Final     Platelets   Date Value Ref Range Status   09/20/2024 414 150 - 450 K/uL Final       LFT:   Lab Results   Component Value Date    AST 19 10/21/2024    ALKPHOS 36 (L) 10/21/2024    BILITOT 0.3 10/21/2024       Albumin:   Albumin   Date Value Ref Range Status   10/21/2024 3.9 3.5 - 5.2 g/dL Final   09/20/2024 3.3 (L) 3.5 - 5.2 g/dL Final     Protein:    Total Protein   Date Value Ref Range Status   09/20/2024 6.4 6.0 - 8.4 g/dL Final       Radiology:  CT abd pelvis with IV contrast 8/19/24:  FINDINGS:     Lower chest: Unremarkable.  Liver: 9 x 7 mm hypodensity in left hepatic lobe (series 5, image 23).  This was present in April 2024 and may represent a cyst.  Gallbladder: Unremarkable.  Biliary: No significant biliary ductal dilatation.  Pancreas: Unremarkable.  Spleen: Unremarkable.  Adrenal glands: Unremarkable.  Kidneys/ureters: Unremarkable.  Bladder: Unremarkable.  Reproductive: Uterus is unremarkable.  Persistent cystic area in the expected location of the right ovary measures approximately 3.4 x 2.5 cm.  Other previously demonstrated cystic lesions in the pelvis are no longer present.  There is some persistent thickening along the left pelvic wall as seen on series 5, image 126.  Gastrointestinal: No bowel obstruction or inflammation. No intraperitoneal free air or significant free fluid.  Retroperitoneum: No adenopathy.  Vascular: Atherosclerotic disease.  No abdominal aortic aneurysm.  Osseous: No acute fracture or aggressive appearing osseous lesion.        Impression:     1.    Significant improvement in the previous cystic lesions in the pelvis.  With some residual in the right pelvis and along the pelvic wall and the left.  2.    Similar appearance of the subcentimeter hypodensity left hepatic lobe which statistically represents a cyst; however, continued attention on follow-up is recommended.    40 minutes of total time spent on the encounter, which includes face to face time and non-face to face time preparing to see the patient (eg, review of tests), Obtaining and/or reviewing separately obtained history, Documenting clinical information in the electronic or other health record, Independently interpreting results (not separately reported) and communicating results to the patient/family/caregiver, or Care coordination (not separately  reported).    Signature: Kristen Blum DO

## 2024-11-17 ENCOUNTER — PATIENT MESSAGE (OUTPATIENT)
Dept: GYNECOLOGIC ONCOLOGY | Facility: CLINIC | Age: 74
End: 2024-11-17
Payer: MEDICARE

## 2024-11-18 ENCOUNTER — PATIENT MESSAGE (OUTPATIENT)
Dept: GYNECOLOGIC ONCOLOGY | Facility: CLINIC | Age: 74
End: 2024-11-18
Payer: MEDICARE

## 2024-11-18 ENCOUNTER — TELEPHONE (OUTPATIENT)
Dept: GYNECOLOGIC ONCOLOGY | Facility: CLINIC | Age: 74
End: 2024-11-18
Payer: MEDICARE

## 2024-11-18 NOTE — TELEPHONE ENCOUNTER
----- Message from Breezy sent at 11/18/2024 10:31 AM CST -----   Name of Who is Calling:     What is the request in detail: request call back in reference to appointment questions and concerns  Please contact to further discuss and advise      Can the clinic reply by [facility]:     What Number to Call Back [facility] not [facility]:  [phone] / adryan / felicia

## 2024-11-19 ENCOUNTER — OFFICE VISIT (OUTPATIENT)
Dept: GYNECOLOGIC ONCOLOGY | Facility: CLINIC | Age: 74
End: 2024-11-19
Payer: MEDICARE

## 2024-11-19 VITALS
DIASTOLIC BLOOD PRESSURE: 80 MMHG | HEIGHT: 61 IN | OXYGEN SATURATION: 96 % | SYSTOLIC BLOOD PRESSURE: 125 MMHG | BODY MASS INDEX: 19.48 KG/M2 | RESPIRATION RATE: 15 BRPM | WEIGHT: 103.19 LBS | HEART RATE: 88 BPM | TEMPERATURE: 98 F

## 2024-11-19 DIAGNOSIS — C56.1 MALIGNANT NEOPLASM OF RIGHT OVARY: Primary | ICD-10-CM

## 2024-11-19 PROCEDURE — 99999 PR PBB SHADOW E&M-EST. PATIENT-LVL III: CPT | Mod: PBBFAC,,, | Performed by: OBSTETRICS & GYNECOLOGY

## 2024-11-19 PROCEDURE — 99213 OFFICE O/P EST LOW 20 MIN: CPT | Mod: PBBFAC,PN | Performed by: OBSTETRICS & GYNECOLOGY

## 2024-11-19 NOTE — PROGRESS NOTES
SUBJECTIVE   Chief complaint:  Ovarian Cancer     History of present Illness:  Bella Meyer is a 74 y.o.  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 She Completed 6 cycles of Carboplatin/ Paclitaxel followed by Ex Lap/FANNY/BSO/OMX/debulking/LAR on 9/3/2024.  normalized following surgery. Received cycle 7 on 10/22/2024 and patient opted to forgo additional cycle. CT on 2024 with no evidecnd of recurrence of metastatic disease.  is normalized.     Doing OK. Ongoing leg pain that is limiting activities, described as throbbing thigh pain. Decreased appetite. No emesis. Seeing Dr. Blum in palliative.     Oncology History   Peritoneal carcinomatosis   2024 Initial Diagnosis    Peritoneal carcinomatosis     2024 -  Chemotherapy    Treatment Summary   Plan Name: OP GYN paclitaxel CARBOplatin (AUC 6) Q3W  Treatment Goal: Curative  Status: Active  Start Date: 2024  End Date: 2025 (Planned)  Provider: Maliha Crawford MD  Chemotherapy: CARBOplatin (PARAPLATIN) 410 mg in sodium chloride 0.9% 326 mL chemo infusion, 410 mg (100 % of original dose 411 mg), Intravenous, Clinic/HOD 1 time, 7 of 17 cycles  Dose modification:   (original dose 411 mg, Cycle 1),   (original dose 379.5 mg, Cycle 2),   (original dose 411 mg, Cycle 3, Reason: Dose not tolerated),   (original dose 452 mg, Cycle 4)  Administration: 410 mg (2024), 380 mg (2024), 410 mg (2024), 450 mg (2024), 450 mg (2024), 450 mg (2024), 450 mg (10/22/2024)  PACLitaxeL (TAXOL) 175 mg/m2 = 276 mg in sodium chloride 0.9% 250 mL chemo infusion, 175 mg/m2 = 276 mg (100 % of original dose 175 mg/m2), Intravenous, Clinic/HOD 1 time, 7 of 17 cycles  Dose modification: 135 mg/m2 (original dose 175 mg/m2, Cycle 1, Reason: MD Discretion), 175 mg/m2 (original dose 175 mg/m2, Cycle  1)  Administration: 276 mg (4/30/2024), 210 mg (5/21/2024), 210 mg (6/11/2024), 210 mg (7/2/2024), 210 mg (7/23/2024), 210 mg (8/13/2024), 210 mg (10/22/2024)     Malignant neoplasm of ovary   4/23/2024 Initial Diagnosis    Malignant neoplasm of ovary  Referred by Dr. Evans for evaluation of a pelvic mass, peritoneal carcinomatosis and elevated .     3/24/24 CT AP- Abnormal inflammatory changes in the lower pelvis associated with the distal sigmoid colon. Tubular or channel-like structure along the posterior margin of the lower descending colon possibly connecting between the sigmoid and rectal regions. Underlying neoplasm and/or fistula not excluded. Mildly enlarged left iliac bifurcation lymph nodes noted. No drainable abscess. There is also abnormal appearance of the descending colon and several small soft tissue nodules in the pericolonic tissues of the descending colon. Malignant neoplasm should be considered.     4/9/24 TVUS- Grossly unremarkable appearance of the uterus. The left ovary is not seen sonographically. Enlarged right ovary at 6.6 cm with several cysts the largest 3.4 cm with some thin septations and blood flow to the ovary. Trace fluid adjacent to the ovary. No gross ascitic fluid.     4/9/24 - 188    4/18/24 CT CAP- Diffuse peritoneal carcinomatosis. Right adnexal mass (6 cm) which could be the primary neoplasm/ovarian cancer. Sigmoid colon thickening which could be the primary neoplasm/colon cancer.   Index implant left anterior abdomen 2 cm.   Index left pelvic sidewall implant 1.5 cm.    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  Positive pulmonary emboli within the right lower lobe segmental branches, nonocclusive.  No right heart strain.  Persistent sigmoid wall thickening.     8/12/2024 Tumor Markers    =21     9/3/2024 Surgery    Ex Lap, FANNY, BSO, OMX, Debulking, LAR, ileocolectomy     FINDINGS:  At case conclusion, there was no gross residual disease (RD=0mm).   PATHOLOGY:   1. ABDOMINAL WALL FAT, EXCISION:  - Negative for malignancy.  - Benign fibroadipose tisue with scar.    2. OMENTUM, OMENTECTOMY:  - Positive for malignancy.  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  - Size of largest nodule: 10 MM.    3. 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  - Unremarkable fallopian tube and fimbriated end.  - Tumor involves ovarian capsule.  - Tumor extends to pericolonic fat without mucosal involvement by tumor.  - Benign small bowel, negative for tumor.    4. 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  - Size of tumor: 3.5 CM.  - Tumor involves left tubal serosa.  - Tubal high-grade serous carcinoma involves left ovarian capsule.    UTERUS:  - Negative for tumor.  - Complex endometrial hyperplasia without atypia.  - Benign endometrial polyp.  - Background inactive endometrium.  - Serosal adhesions.    CERVIX:  - Negative for tumor.    - Benign cervix with reactive changes and paracervical adhesions.    5. COLON, SIGMOID, EXCISION:  - High-grade serous carcinoma of tubal origin involving pericolonic fat.  - Colonic mucosa is negative for tumor.    6. COLON,  "ANASTOMOTIC RINGS, EXCISION:  - Negative for tumor.  - Focal mucosal changes suggestive of ischemic changes are present.      10/14/2024 Tumor Markers    -15     11/18/2024 Imaging Significant Findings    POST TREATMENT CT CAP  : NO EVIDENCE OF DISEASE      11/18/2024 Tumor Markers    =32     11/19/2024 Remission    Patient is in remission as of 11/19/2024  .          Review of Systems   Constitutional:  Positive for appetite change and fatigue. Negative for fever and unexpected weight change.   HENT:  Negative.  Negative for mouth sores and nosebleeds.    Eyes: Negative.    Respiratory: Negative.  Negative for chest tightness, cough and shortness of breath.    Cardiovascular: Negative.  Negative for chest pain, leg swelling and palpitations.   Gastrointestinal:  Positive for abdominal pain. Negative for abdominal distention, constipation, diarrhea and nausea.   Endocrine: Negative.    Genitourinary:  Positive for pelvic pain. Negative for difficulty urinating, vaginal bleeding and vaginal discharge.    Skin: Negative.    Neurological: Negative.  Negative for headaches.   Hematological: Negative.  Negative for adenopathy.   Psychiatric/Behavioral: Negative.     All other systems reviewed and are negative.     OBJECTIVE     /80 (BP Location: Left arm, Patient Position: Sitting)   Pulse 88   Temp 98.1 °F (36.7 °C) (Temporal)   Resp 15   Ht 5' 1" (1.549 m)   Wt 46.8 kg (103 lb 2.8 oz)   SpO2 96%   BMI 19.49 kg/m²     Physical Exam  Constitutional:       Appearance: Normal appearance.     Genitourinary:    Vagina normal.   Vaginal cuff intact.  Right adnexum displays no mass. Left adnexum displays no mass. Cardiovascular:      Rate and Rhythm: Normal rate.   Pulmonary:      Effort: Pulmonary effort is normal.   Abdominal:      General: Abdomen is flat.      Palpations: Abdomen is soft.      Comments: Vertical midline well healed    Neurological:      General: No focal deficit present.      Mental " Status: She is alert and oriented to person, place, and time.   Skin:     Findings: No erythema or rash.   Psychiatric:         Mood and Affect: Mood normal.         Behavior: Behavior normal.   Vitals reviewed.       ASSESSMENT AND PLAN   1. Malignant neoplasm of right ovary  - CBC ONCOLOGY; Future  - COMPREHENSIVE METABOLIC PANEL; Future  - CANCER ANTIGEN 125; Future  - CT Chest Abdomen Pelvis W W/O Contrast (XPD); Future    After completion of primary treatment Ms. Meyer has no evidence of residual disease on imaging and her tumor marker has normalized. We discussed options for care going forward, including Maintenance Niraprib per PRIMA vs close surveillance. As she is germline,somatic BRCA negative and HR proficient , we discussed the limited role of Niraparib and that unfortunately it failed to prolong overall survival.  We also discussed her residual toxicities from treatment and debility. After taking all of this in to consideration, we made the joint decision for her to enter surveillance. We plan for repeat labs and imaging as well as follow up in 3 months.       Maliha Crawford MD  Gynecologic Oncology

## 2024-12-03 ENCOUNTER — OFFICE VISIT (OUTPATIENT)
Dept: PALLIATIVE MEDICINE | Facility: CLINIC | Age: 74
End: 2024-12-03
Payer: MEDICARE

## 2024-12-03 DIAGNOSIS — G25.81 RESTLESS LEG SYNDROME: ICD-10-CM

## 2024-12-03 DIAGNOSIS — G89.3 CANCER RELATED PAIN: ICD-10-CM

## 2024-12-03 DIAGNOSIS — R64 CACHEXIA: ICD-10-CM

## 2024-12-03 DIAGNOSIS — G62.0 CHEMOTHERAPY-INDUCED PERIPHERAL NEUROPATHY: ICD-10-CM

## 2024-12-03 DIAGNOSIS — Z51.5 PALLIATIVE CARE BY SPECIALIST: Primary | ICD-10-CM

## 2024-12-03 DIAGNOSIS — C78.6 PERITONEAL CARCINOMATOSIS: ICD-10-CM

## 2024-12-03 DIAGNOSIS — C56.1 MALIGNANT NEOPLASM OF RIGHT OVARY: ICD-10-CM

## 2024-12-03 DIAGNOSIS — T45.1X5A CHEMOTHERAPY-INDUCED PERIPHERAL NEUROPATHY: ICD-10-CM

## 2024-12-03 RX ORDER — PREGABALIN 25 MG/1
25 CAPSULE ORAL DAILY
Qty: 30 CAPSULE | Refills: 0 | Status: SHIPPED | OUTPATIENT
Start: 2024-12-03 | End: 2025-01-02

## 2024-12-03 NOTE — PROGRESS NOTES
The patient location is: [place]    Visit type: audiovisual    Face to Face time with patient: 20  40 minutes of total time spent on the encounter, which includes face to face time and non-face to face time preparing to see the patient (eg, review of tests), Obtaining and/or reviewing separately obtained history, Documenting clinical information in the electronic or other health record, Independently interpreting results (not separately reported) and communicating results to the patient/family/caregiver, or Care coordination (not separately reported).         Each patient to whom he or she provides medical services by telemedicine is:  (1) informed of the relationship between the physician and patient and the respective role of any other health care provider with respect to management of the patient; and (2) notified that he or she may decline to receive medical services by telemedicine and may withdraw from such care at any time.    Notes:     Office Visit  [facility]      Reason for Consult: pain and symptom management      ASSESSMENT/PLAN:     Plan/Recommendations:  Diagnoses and all orders for this visit:    Palliative care by specialist    Restless leg syndrome  -     pregabalin (LYRICA) 25 MG capsule; Take 1 capsule (25 mg total) by mouth once daily.    Cancer related pain    Peritoneal carcinomatosis    Malignant neoplasm of right ovary    Chemotherapy-induced peripheral neuropathy    Cachexia        # Stage III Ovarian Cancer  # Palliative care by specialist  # Peritoneal carcinomatosis  Completed 6 cycles of Carboplatin/ Paclitaxel followed by Ex Lap/FANNY/BSO/OMX/debulking/LAR on 9/3/2024.  Currently on surveillance with Niraparib per gyn onc.   - Follow up gyn onc    # Restless leg syndrome  # Leg pain  Patient with throbbing bilateral thigh pain. No corresponding imaging present. Low concern for bone metastasis given bilateral location. Reports history of restless leg syndrome.  Improvement with initiation of magnesium supplements. Not adherent to Mirapex due to sedation. Open to trial of pregabalin for now. Will uptitrate as tolerated otherwise will consider ropinirole. Encouraged further evaluation of leg pain with referral and further imaging with GP. Also encouraged full GP evaluation for other causes of leg pain including vitamin deficiencies and general check up including complete lab workup now that cancer is under control thankfully. Previously had myopathy with atorvastatin however now on pravastatin which is preferred and not likely to be the cause of leg pain.   - Continue Norco 10-325mg PO q6h prn (discussed max 3g/day of APAP)  - Continue miralax prn for bowel regimen  - Continue Magnesium oxide 400mg PO BID scheduled (warned of diarrhea)  - Discontinue pramipexole 0.125mg PO TID scheduled  - Start pregabalin 25mg once daily    # Chemotherapy induced peripheral neuropathy  Improved.   - Hold gabapentin 200mg night, see above    # Cancer cachexia  BMI 19<21. Poor appetite. Emesis after eating 2-3 bites. Could be a mechanical issue. Would like further eval with speech evaluation.  - Speech therapy evaluation via [facility]    # Abdominal cramping  Morning abdominal cramping improved with bowel movements. Some improvement in pain with Bentyl.  - Continue Bentyl 10mg PO BID PRN  - Encouraged hydration, laxative use for more consistently regular bowel movements    Follow up: 1 week      SUBJECTIVE:     History of Present Illness:  Patient is a 74 y.o. year old female presenting with recent diagnosis of Stage IIIC ovarian cancer. She is a patient of Dr. Maliha Crawford. She presents via virtual visit for follow up.    12/3/24:  Has been taking Mirapex once per day but still has leg pain despite this. Using a walker. Having trouble walking. Discussed avenues for further evaluation including imaging and referral. Continued poor appetite. Eats a few bites and then coughs and vomits.  "Gags after 2-3 bites. Happening for 3-4 days.     Wakes up with stomach pain. Taking Bentyl 10mg which helps but pain comes back. Having mostly regular bowel movements.    Discussed with patient's daughter Eugenia at length. She states Ms Blanco is not adherent to Mirapex due to sedation. Discussed utility of pregabalin versus ropinirole.     11/5/24:  Moving around slowly. Pain is still present. Legs "are killing me". Legs are weak. Still using a walker to get around.     Reports throbbing pain in legs. Can't find anything to ease it except Norco. Nothing helps but Norco. Only lasts 5 hours. Its in top part of legs in thighs. Feels neuropathy is better, less numbness/tingling.     Pain is better with walking. Hurt so bad she does not feel she wants to walk.     Has a history of restless leg syndrome in the past. Treated that with over the counter in the past and started magnesium oxide recently. Felt magnesium helped if she takes it every day. Feel it could be restless leg syndrome. If she misses magnesium she has unbearable pain. PCP wants to monitor labs with magnesium twice per day if scheduled and not as needed.     Cannot take benadryl due to flaring up legs.     Taking Bentyl for cramps. Felt capsule form worked better at 10mg.     10/9/24:  A little stronger since going to rehab. Legs are still weak. Aching. Was at rehab for 2 weeks. Walking well with a walker now. Appetite is poor but she is eating more than before. For pain, she is taking tramadol 50mg twice per day. Does not feel like it is helping her pain. Wants to try [place] again. Daughter states it is hard to do rehab because of pain.     Has abdominal pain and is taking bentyl for it.     Seeing Dr. Crawford soon.     9/18/24:  Pain is better but still feels tired. Feeling weak as well and fell. Fell three times yesterday and hit her head. Was trying to get out of bed to get to bedside commode and lost balance. If she gets a small stomach pain she takes " tylenol and it works well.     Sister reports was not eating due to drowsiness with taking oxycodone. Lead to weight loss. This occurred in the hospital. This lead to choice to avoid oxycodone 5 and 10mg.     Concern for withdrawal while in the hospital including clamminess.     Having a bowel movement helped. Think she may be lactose intolerant.     Considering admission to acute rehab facility near their home for more intensive rehab and nutrition.    Has home health.    Taking two tylenol twice per day. Not taking ibuprofen or gabapentin at this time.     Biggest concern is eating.       9/10/24:    Alternating tylenol and ibuprofen every 3 hours. Cannot sleep. Feeling weak after surgery. Blood sugar has been going all over the place due to poor oral intake. Holding gabapentin because it is making her too drowsy.     Family feels she needs more protein to gain strength. Has home health now. Previously taking oxycodone on empty stomach. Taking zofran for nausea. Requesting zofran that does not dissolve.    For pain, feel that tylenol and ibuprofen has been helping well for pain. Needs a refill for oxycodone 5mg. Using sparingly twice per day usually.     Having easy bowel movements with miralax.     8/20/24:  Feels neuropathy is the worst and wants to increase gabapentin dose. Neuropathy is in right thigh down into leg. Describes as throbbing pain.    Went up to 200mg at night starting last night.     The patient's sister was also present during the encounter and raised concerns regarding constipation and not wanting to have some any medications related to the colon prior to surgery.  We discussed the high probability of constipation with hydrocodone and with oxycodone use.  She has been using laxatives which they are fearful of.  Discussed nonpharmacological options including increased water intake and prune juice consumption for more natural ways of preventing constipation.  The patient has found greater benefit  from oxycodone at this time.  And has been taking Tylenol intermittently for pain as well.  They are aware to stay less than 2000 mg per day.  They are anticipating surgery soon and are aware that pain management needs may change postoperatively.    8/13/24:  Pain right now is a 5-6/10. Talked to Dr. Crawford about gabapentin.  got frustrated on morphine 30mg due to patient due to sleeping a lot and feeling foggy. Delay in answering. Weaned off of morphine. CT scan is Monday to rule out kidney stone. Taking oxycodone every 4-6 hours.     Working on constipation at night even though she's not constipated.     7/12/24:  Currently using Norco 2-3 times per day. Got scared of morphine so she stopped taking it and requested Norco reinitiation prior to this appointment. Discussed use of morphine and stereotyped fears. Having daily bms. Saw colorectal surgery, planning surgery for September. Delayed due to recent clot.    She endorses numbness and tingling of her hands and feet. Discussed neuropathy. She is open to try treatment.    6/12/24: She states she is doing really well. She had her treatment yesterday and tolerated it well. No pain at all after being on the half tablet of the morphine. She denies any breakthrough at all. She has been able to resume her housework. She feels almost back to normal. She gets tired. Takes a nap once a day. She is having daily bowel movements. She takes senna daily.    5/29/24:Overall pain is a little better. She feels stronger pain pill is lasting a bit longer and quicker onset. Still having pain though. She takes pain medication every 4-5 hours.     She plans to start PT. Her PCP is managing her care overall but her and her family are happy to keep all pain medications with palliative care. She is currently still in [place] with family.     She is having bowel movements every day. She is taking senna everyday.     5/15/24:  Pain has worsened. She has had excruciating abdominal  "pain. She could not sleep after the pain medication took effect. Then had sweats. She has been taking the Norco 5mg every 6 hours but it takes 45-60 minutes to take effect.     Has started taking two senna s to prevent constipation. Started this yesterday. She had a good BM today.     Current meds for nausea have been helping.     Very groggy days after chemotherapy.       Initial visit:    Introduced the role of palliative care including symptom management and advance care planning through the cancer journey.    She reports pain rated 10/10. When she takes a pain pill it drops down to a 4-5/10. She takes hydrocodone. Pain is located in her right side of the abdomen. Describes this pain as shooting and throbbing.     She reports daily bowel movements. Denies nausea, vomiting. She had her first infusion yesterday. She had no side effects.     Her daughter states that her mother hates pain medications. Family has noted grunting. Family has noticed movement would exacerbate pain. Would be hard to find a comfortable position to sit in. Patient wants to "take the edge off" and rest comfortably. Benadryl knocks her out.     The increased dose of hydrocodone at 7.5 helped better. She takes one dose in the morning and one at night. She sometimes take an additional dose if she takes a long car ride.     She does not feel strong. She was in the recliner all day.       Past Medical History:   Diagnosis Date    Cancer     Diabetes mellitus, type 2     Hyperlipidemia     Hypertension     Hypothyroidism     Pulmonary embolism      Past Surgical History:   Procedure Laterality Date    BILATERAL SALPINGO-OOPHORECTOMY (BSO) N/A 9/3/2024    Procedure: SALPINGO-OOPHORECTOMY, BILATERAL;  Surgeon: Maliha Crawford MD;  Location: [facility] OR [address] [place] [address];  Service: Gynecology Oncology;  Laterality: N/A;    DEBULKING OF TUMOR N/A 9/3/2024    Procedure: DEBULKING, NEOPLASM;  Surgeon: Maliha Crawford MD;  Location: [facility] OR [address] [place] [address];  Service: " Gynecology Oncology;  Laterality: N/A;    FLEXIBLE SIGMOIDOSCOPY N/A 9/3/2024    Procedure: SIGMOIDOSCOPY, FLEXIBLE;  Surgeon: Ruby Caicedo MD;  Location: [facility] OR 2ND FLR;  Service: Colon and Rectal;  Laterality: N/A;    ILEOCOLECTOMY N/A 9/3/2024    Procedure: ILEOCOLECTOMY;  Surgeon: Ruby Caicedo MD;  Location: [facility] OR 2ND FLR;  Service: Colon and Rectal;  Laterality: N/A;    LAPAROTOMY, EXPLORATORY N/A 9/3/2024    Procedure: LAPAROTOMY, EXPLORATORY;  Surgeon: Maliha Crawford MD;  Location: [facility] OR 2ND FLR;  Service: Gynecology Oncology;  Laterality: N/A;  4 hr case    LOW ANTERIOR RESECTION OF COLON N/A 9/3/2024    Procedure: RESECTION, COLON, LOW ANTERIOR;  Surgeon: Ruby Caicedo MD;  Location: [facility] OR 2ND FLR;  Service: Colon and Rectal;  Laterality: N/A;  4 hr case    LYSIS OF ADHESIONS OF URETER Bilateral 9/3/2024    Procedure: URETEROLYSIS;  Surgeon: Maliha Crawford MD;  Location: [facility] OR 2ND FLR;  Service: Gynecology Oncology;  Laterality: Bilateral;    MOBILIZATION OF SPLENIC FLEXURE  9/3/2024    Procedure: MOBILIZATION, SPLENIC FLEXURE;  Surgeon: Ruby Caicedo MD;  Location: [facility] OR 2ND FLR;  Service: Colon and Rectal;;    OMENTECTOMY N/A 9/3/2024    Procedure: OMENTECTOMY;  Surgeon: Maliha Crawford MD;  Location: [facility] OR 2ND FLR;  Service: Gynecology Oncology;  Laterality: N/A;    TOTAL ABDOMINAL HYSTERECTOMY N/A 9/3/2024    Procedure: HYSTERECTOMY, TOTAL, ABDOMINAL;  Surgeon: Maliha Crawford MD;  Location: [facility] OR 2ND FLR;  Service: Gynecology Oncology;  Laterality: N/A;    TUBAL LIGATION      at age 32's     Family History   Problem Relation Name Age of Onset    Cancer Brother      Breast cancer Neg Hx      Colon cancer Neg Hx      Ovarian cancer Neg Hx      Uterine cancer Neg Hx      Cervical cancer Neg Hx       Review of patient's allergies indicates:  No Known Allergies  Social Drivers of Health     Tobacco Use: Low Risk  (11/19/2024)    Patient History     Smoking Tobacco Use: Never      Smokeless Tobacco Use: Never     Passive Exposure: Past   Alcohol Use: Not At Risk (7/5/2024)    AUDIT-C     Frequency of Alcohol Consumption: Never     Average Number of Drinks: Patient does not drink     Frequency of Binge Drinking: Never   Financial Resource Strain: Patient Unable To Answer (9/4/2024)    Overall Financial Resource Strain (CARDIA)     Difficulty of Paying Living Expenses: Patient unable to answer   Food Insecurity: Patient Unable To Answer (9/4/2024)    Hunger Vital Sign     Worried About Running Out of Food in the Last Year: Patient unable to answer     Ran Out of Food in the Last Year: Patient unable to answer   Transportation Needs: Patient Unable To Answer (9/4/2024)    TRANSPORTATION NEEDS     Transportation : Patient unable to answer   Physical Activity: Inactive (7/5/2024)    Exercise Vital Sign     Days of Exercise per Week: 0 days     Minutes of Exercise per Session: 20 min   Stress: Patient Unable To Answer (9/4/2024)    [employer] - Occupational Stress Questionnaire     Feeling of Stress : Patient unable to answer   Housing Stability: Patient Unable To Answer (9/4/2024)    Housing Stability Vital Sign     Unable to Pay for Housing in the Last Year: Patient unable to answer     Homeless in the Last Year: Patient unable to answer   Depression: Low Risk  (11/19/2024)    Depression     Last PHQ-4: Flowsheet Data: 0   Utilities: Patient Unable To Answer (9/4/2024)    [facility] Utilities     Threatened with loss of utilities: Patient unable to answer   Health Literacy: Patient Unable To Answer (9/4/2024)     Health Literacy     Frequency of need for help with medical instructions: Patient unable to respond   Recent Concern: Health Literacy - Inadequate Health Literacy (7/5/2024)     Health Literacy     Frequency of need for help with medical instructions: Sometimes   Social Isolation: Not on file          Medications:    Current Outpatient Medications:      acetaminophen (TYLENOL) 500 MG tablet, Take 2 tablets (1,000 mg total) by mouth every 6 (six) hours., Disp: 30 tablet, Rfl: 3    amLODIPine (NORVASC) 5 MG tablet, Take 1 tablet by mouth every morning., Disp: , Rfl:     apixaban (ELIQUIS) 5 mg Tab, Take 1 tablet (5 mg total) by mouth 2 (two) times daily., Disp: 60 tablet, Rfl: 2    blood-glucose meter,continuous (DEXCOM G7 ) Misc, Use daily, Disp: , Rfl:     blood-glucose sensor (DEXCOM G7 SENSOR) [place], Use every 10 days, Disp: , Rfl:     dexAMETHasone (DECADRON) 4 MG Tab, Take 8 mg (2 tablets) by mouth daily on days 2-4 of each chemotherapy cycle., Disp: 6 tablet, Rfl: 11    dicyclomine (BENTYL) 10 MG capsule, Take 1 capsule (10 mg total) by mouth 2 (two) times daily., Disp: 60 capsule, Rfl: 2    EScitalopram oxalate (LEXAPRO) 10 MG tablet, Take 1 tablet (10 mg total) by mouth once daily., Disp: 90 tablet, Rfl: 3    HYDROcodone-acetaminophen (NORCO)  mg per tablet, Take 1 tablet by mouth every 6 (six) hours as needed for Pain., Disp: 120 tablet, Rfl: 0    ibuprofen (ADVIL,MOTRIN) 600 MG tablet, Take 1 tablet every 6 hours. Alternate between ibuprofen & tylenol every 3 hours. For example: @0800: ibuprofen 600mg @1100: tylenol 1000mg @1400: ibuprofen 600mg @1700: tylenol 1000 mg @2000: ibuprofen 600mg. Can take two of the 300 mg over the counter ibuprofen is smaller pill is easier., Disp: 30 tablet, Rfl: 3    levothyroxine (SYNTHROID) 75 MCG tablet, Take 1 tablet by mouth every morning., Disp: , Rfl:     LYUMJEV KWIKPEN U-100 INSULIN 100 unit/mL pen, Inject 2-10 Units into the skin 3 (three) times daily with meals., Disp: , Rfl:     magnesium oxide (MAGOX) 400 mg (241.3 mg magnesium) tablet, Take 1 tablet (400 mg total) by mouth once daily., Disp: 200 tablet, Rfl: 1    metFORMIN (GLUCOPHAGE) 1000 MG tablet, Take 1,000 mg by mouth 2 (two) times daily with meals., Disp: , Rfl:     metoprolol succinate (TOPROL-XL) 50 MG 24 hr tablet, Take 1 tablet by mouth  every morning., Disp: , Rfl:     mirtazapine (REMERON) 7.5 MG Tab, Take 7.5 mg by mouth every evening., Disp: , Rfl:     naloxone (NARCAN) 4 mg/actuation Spry, 1 spray (4 mg total) by Nasal route 1 (one) time if needed (for emergency opioid reversal)., Disp: 1 each, Rfl: 0    OLANZapine (ZYPREXA) 5 MG tablet, Take 1 tablet by mouth nightly on days 1-4 of each chemotherapy cycle., Disp: 4 tablet, Rfl: 11    ondansetron (ZOFRAN) 4 MG tablet, Take 1 tablet (4 mg total) by mouth every 6 (six) hours as needed for Nausea., Disp: 120 tablet, Rfl: 2    ondansetron (ZOFRAN-ODT) 4 MG TbDL, DISSOLVE ONE TABLET BY MOUTH EVERY 4 TO 6 HOURS AS NEEDED FOR NAUSEA, Disp: , Rfl:     polyethylene glycol (GLYCOLAX) 17 gram/dose powder, Mix 1 capful (17 g) in liquid and drink by mouth once daily., Disp: 510 g, Rfl: 1    potassium chloride (KLOR-CON) 10 MEQ TbSR, Take 1 tablet (10 mEq total) by mouth 2 (two) times daily., Disp: 30 tablet, Rfl: 0    pravastatin (PRAVACHOL) 10 MG tablet, Take 1 tablet by mouth every evening., Disp: , Rfl:     predniSONE (DELTASONE) 5 MG tablet, Take 5 mg by mouth., Disp: , Rfl:     pregabalin (LYRICA) 25 MG capsule, Take 1 capsule (25 mg total) by mouth once daily., Disp: 30 capsule, Rfl: 0    prochlorperazine (COMPAZINE) 5 MG tablet, Take 1 tablet (5 mg total) by mouth every 6 (six) hours as needed for Nausea., Disp: 20 tablet, Rfl: 5    TOUJEO SOLOSTAR U-300 INSULIN 300 unit/mL (1.5 mL) InPn pen, Inject 10 Units into the skin once daily., Disp: , Rfl:     traMADoL (ULTRAM) 50 mg tablet, Take 25 mg by mouth every 6 (six) hours as needed., Disp: , Rfl:     OBJECTIVE:       ROS:  Review of Systems   Constitutional:  Positive for appetite change and fatigue. Negative for unexpected weight change.   HENT:  Negative for mouth sores.    Eyes:  Negative for visual disturbance.   Respiratory:  Negative for cough and shortness of breath.    Cardiovascular:  Negative for chest pain.   Gastrointestinal:  Negative  for abdominal pain and diarrhea.   Genitourinary:  Negative for frequency.   Musculoskeletal:  Negative for back pain.   Skin:  Negative for rash.   Neurological:  Positive for numbness. Negative for headaches.   Hematological:  Negative for adenopathy.   Psychiatric/Behavioral:  The patient is not nervous/anxious.        Review of Symptoms      Symptom Assessment (ESAS 0-10 Scale)  Pain:  0  Dyspnea:  0  Anxiety:  0  Nausea:  0  Depression:  0  Anorexia:  0  Fatigue:  0  Insomnia:  0  Restlessness:  0  Agitation:  0       Anxiety:  Is not nervous/anxious    ECOG Performance Status ndGndrndanddndend:nd nd2nd Living Arrangements:  Lives with family    Psychosocial/Cultural:   See Palliative Psychosocial Note: Yes  **Primary  to Follow**  Palliative Care  Consult: Yes     Time-Based Charting:  Yes  Chart Review: 20 minutes  Face to Face: 20 minutes    Total Time Spent: 40 minutes      Advance Care Planning   Advance Directives:     Decision Making:  Patient answered questions  Goals of Care: The patient endorses that what is most important right now is to focus on symptom/pain control    Accordingly, we have decided that the best plan to meet the patient's goals includes continuing with treatment              Physical Exam:  Vitals:    Physical Exam  Constitutional:       General: She is not in acute distress.     Appearance: Normal appearance. She is ill-appearing. She is not toxic-appearing.   Musculoskeletal:      Cervical back: Normal range of motion.   Skin:     Coloration: Skin is not jaundiced or pale.   Neurological:      Mental Status: She is alert and oriented to person, place, and time.   Psychiatric:         Mood and Affect: Mood normal.         Behavior: Behavior normal.         Thought Content: Thought content normal.         Judgment: Judgment normal.         Labs:  CBC:   WBC   Date Value Ref Range Status   11/18/2024 9.03 3.90 - 12.70 K/uL Final     Hemoglobin   Date Value Ref Range Status    09/20/2024 9.4 (L) 12.0 - 16.0 g/dL Final     HGB   Date Value Ref Range Status   11/18/2024 12.2 12.0 - 16.0 gm/dL Final     POC Hematocrit   Date Value Ref Range Status   09/03/2024 27 (L) 36 - 54 %PCV Final     Hematocrit   Date Value Ref Range Status   09/20/2024 27.1 (L) 37.0 - 48.5 % Final     HCT   Date Value Ref Range Status   11/18/2024 35.9 (L) 37.0 - 48.5 % Final     MCV   Date Value Ref Range Status   11/18/2024 95 82 - 98 fL Final   09/20/2024 97 82 - 98 fL Final     Platelet Count   Date Value Ref Range Status   11/18/2024 289 150 - 450 K/uL Final     Platelets   Date Value Ref Range Status   09/20/2024 414 150 - 450 K/uL Final       LFT:   Lab Results   Component Value Date    AST 13 11/18/2024    ALKPHOS 44 (L) 11/18/2024    BILITOT 0.3 11/18/2024       Albumin:   Albumin   Date Value Ref Range Status   11/18/2024 3.7 3.5 - 5.2 g/dL Final   09/20/2024 3.3 (L) 3.5 - 5.2 g/dL Final     Protein:   Total Protein   Date Value Ref Range Status   09/20/2024 6.4 6.0 - 8.4 g/dL Final       Radiology:  CT abd pelvis with IV contrast 8/19/24:  FINDINGS:     Lower chest: Unremarkable.  Liver: 9 x 7 mm hypodensity in left hepatic lobe (series 5, image 23).  This was present in April 2024 and may represent a cyst.  Gallbladder: Unremarkable.  Biliary: No significant biliary ductal dilatation.  Pancreas: Unremarkable.  Spleen: Unremarkable.  Adrenal glands: Unremarkable.  Kidneys/ureters: Unremarkable.  Bladder: Unremarkable.  Reproductive: Uterus is unremarkable.  Persistent cystic area in the expected location of the right ovary measures approximately 3.4 x 2.5 cm.  Other previously demonstrated cystic lesions in the pelvis are no longer present.  There is some persistent thickening along the left pelvic wall as seen on series 5, image 126.  Gastrointestinal: No bowel obstruction or inflammation. No intraperitoneal free air or significant free fluid.  Retroperitoneum: No adenopathy.  Vascular: Atherosclerotic  disease.  No abdominal aortic aneurysm.  Osseous: No acute fracture or aggressive appearing osseous lesion.        Impression:     1.    Significant improvement in the previous cystic lesions in the pelvis.  With some residual in the right pelvis and along the pelvic wall and the left.  2.    Similar appearance of the subcentimeter hypodensity left hepatic lobe which statistically represents a cyst; however, continued attention on follow-up is recommended.    40 minutes of total time spent on the encounter, which includes face to face time and non-face to face time preparing to see the patient (eg, review of tests), Obtaining and/or reviewing separately obtained history, Documenting clinical information in the electronic or other health record, Independently interpreting results (not separately reported) and communicating results to the patient/family/caregiver, or Care coordination (not separately reported).    Signature: Kristen Blum DO

## 2024-12-05 ENCOUNTER — PATIENT MESSAGE (OUTPATIENT)
Dept: PALLIATIVE MEDICINE | Facility: CLINIC | Age: 74
End: 2024-12-05
Payer: MEDICARE

## 2024-12-05 DIAGNOSIS — C56.1 MALIGNANT NEOPLASM OF RIGHT OVARY: ICD-10-CM

## 2024-12-05 DIAGNOSIS — G89.3 CANCER RELATED PAIN: ICD-10-CM

## 2024-12-10 RX ORDER — HYDROCODONE BITARTRATE AND ACETAMINOPHEN 10; 325 MG/1; MG/1
1 TABLET ORAL EVERY 6 HOURS PRN
Qty: 120 TABLET | Refills: 0 | Status: SHIPPED | OUTPATIENT
Start: 2024-12-10

## 2024-12-10 NOTE — TELEPHONE ENCOUNTER
Call placed to patient's daughter Eugenia. Discussed improving appetite after initiation of Marinol. Recommended no further dose increase at this time given improvement of appetite. Encouraged initiation of Lyrica and pending eval for PM&R for leg pain. Refill for Kermit requested and placed.

## 2024-12-18 ENCOUNTER — OFFICE VISIT (OUTPATIENT)
Dept: PALLIATIVE MEDICINE | Facility: CLINIC | Age: 74
End: 2024-12-18
Payer: MEDICARE

## 2024-12-18 DIAGNOSIS — G89.3 CANCER RELATED PAIN: ICD-10-CM

## 2024-12-18 DIAGNOSIS — R64 CACHEXIA: ICD-10-CM

## 2024-12-18 DIAGNOSIS — Z51.5 PALLIATIVE CARE BY SPECIALIST: Primary | ICD-10-CM

## 2024-12-18 DIAGNOSIS — G25.81 RESTLESS LEG SYNDROME: ICD-10-CM

## 2024-12-18 DIAGNOSIS — C78.6 PERITONEAL CARCINOMATOSIS: ICD-10-CM

## 2024-12-18 DIAGNOSIS — C56.1 MALIGNANT NEOPLASM OF RIGHT OVARY: ICD-10-CM

## 2024-12-18 RX ORDER — MORPHINE SULFATE 15 MG/1
15 TABLET ORAL 2 TIMES DAILY PRN
Qty: 60 TABLET | Refills: 0 | Status: SHIPPED | OUTPATIENT
Start: 2024-12-18

## 2024-12-18 NOTE — PROGRESS NOTES
The patient location is: [place], [place]    Visit type: audiovisual    Face to Face time with patient: 10  30 minutes of total time spent on the encounter, which includes face to face time and non-face to face time preparing to see the patient (eg, review of tests), Obtaining and/or reviewing separately obtained history, Documenting clinical information in the electronic or other health record, Independently interpreting results (not separately reported) and communicating results to the patient/family/caregiver, or Care coordination (not separately reported).         Each patient to whom he or she provides medical services by telemedicine is:  (1) informed of the relationship between the physician and patient and the respective role of any other health care provider with respect to management of the patient; and (2) notified that he or she may decline to receive medical services by telemedicine and may withdraw from such care at any time.    Notes:     Office Visit  [facility]      Reason for Consult: pain and symptom management      ASSESSMENT/PLAN:     Plan/Recommendations:  Diagnoses and all orders for this visit:    Palliative care by specialist    Peritoneal carcinomatosis    Malignant neoplasm of right ovary  -     morphine (MSIR) 15 MG tablet; Take 1 tablet (15 mg total) by mouth 2 (two) times daily as needed for Pain.    Restless leg syndrome    Cancer related pain  -     morphine (MSIR) 15 MG tablet; Take 1 tablet (15 mg total) by mouth 2 (two) times daily as needed for Pain.    Cachexia          # Stage III Ovarian Cancer  # Palliative care by specialist  # Peritoneal carcinomatosis  Completed 6 cycles of Carboplatin/ Paclitaxel followed by Ex Lap/FANNY/BSO/OMX/debulking/LAR on 9/3/2024.  Currently on surveillance with Niraparib per gyn onc.   - Follow up gyn onc    # Restless leg syndrome  # Leg pain  Patient with throbbing bilateral thigh pain. No corresponding imaging present. Low concern for  bone metastasis given bilateral location. Reports history of restless leg syndrome. Improvement with initiation of magnesium supplements. Not adherent to Mirapex due to sedation. Open to trial of pregabalin for now. Will uptitrate as tolerated otherwise will consider ropinirole. Encouraged further evaluation of leg pain with referral and further imaging with GP. Also encouraged full GP evaluation for other causes of leg pain including vitamin deficiencies and general check up including complete lab workup now that cancer is under control thankfully. Previously had myopathy with atorvastatin however now on pravastatin which is preferred and not likely to be the cause of leg pain. Now intermittently using morphine and hydrocodone.   - Continue Norco 10-325mg PO q6h prn (discussed max 3g/day of APAP)  - Continue miralax prn for bowel regimen  - Continue Magnesium oxide 400mg PO BID scheduled (warned of diarrhea)  - Discontinue pramipexole 0.125mg PO TID scheduled  - Start pregabalin 25mg once daily  - Continue MSIR 15mg PO q12h prn  - Refer to PM&R-- encouraged scheduling an appointment for evaluation, discussed with patient and daughter Eugenia    # Chemotherapy induced peripheral neuropathy  Improved.   - Hold gabapentin 200mg night, see above    # Cancer cachexia  BMI 19<21. Poor appetite. Emesis after eating 2-3 bites. Could be a mechanical issue. Would like further eval with speech evaluation.  - Speech therapy evaluation via [facility]    # Abdominal cramping  Morning abdominal cramping improved with bowel movements. Some improvement in pain with Bentyl.  - Continue Bentyl 10mg PO BID PRN  - Encouraged hydration, laxative use for more consistently regular bowel movements    Follow up: 1 month      SUBJECTIVE:     History of Present Illness:  Patient is a 74 y.o. year old female presenting with recent diagnosis of Stage IIIC ovarian cancer. She is a patient of Dr. Maliha Crawford. She presents via virtual visit  "for follow up.    12/18/24:  Still has leg pain. Has had further evaluation. Walking with a walker. Taking Lyrica at night. Feels no difference in pain. Taking hydrocodone which is helping pain. Not as bad now.     Appetite is still poor.     Discussed at length with patient's daughter Eugenia with patient's permission. Eugenia states that she is taking pain medication sparingly and is getting weaker. She states that the patient will not move to live with her and will demand to stay with her  in their home. She states that she does not think that the patient will take recommendations from another doctor.     12/3/24:  Has been taking Mirapex once per day but still has leg pain despite this. Using a walker. Having trouble walking. Discussed avenues for further evaluation including imaging and referral. Continued poor appetite. Eats a few bites and then coughs and vomits. Gags after 2-3 bites. Happening for 3-4 days.     Wakes up with stomach pain. Taking Bentyl 10mg which helps but pain comes back. Having mostly regular bowel movements.    Discussed with patient's daughter Eugenia at length. She states Ms Blanco is not adherent to Mirapex due to sedation. Discussed utility of pregabalin versus ropinirole.     11/5/24:  Moving around slowly. Pain is still present. Legs "are killing me". Legs are weak. Still using a walker to get around.     Reports throbbing pain in legs. Can't find anything to ease it except Norco. Nothing helps but Norco. Only lasts 5 hours. Its in top part of legs in thighs. Feels neuropathy is better, less numbness/tingling.     Pain is better with walking. Hurt so bad she does not feel she wants to walk.     Has a history of restless leg syndrome in the past. Treated that with over the counter in the past and started magnesium oxide recently. Felt magnesium helped if she takes it every day. Feel it could be restless leg syndrome. If she misses magnesium she has unbearable pain. PCP wants to " monitor labs with magnesium twice per day if scheduled and not as needed.     Cannot take benadryl due to flaring up legs.     Taking Bentyl for cramps. Felt capsule form worked better at 10mg.     10/9/24:  A little stronger since going to rehab. Legs are still weak. Aching. Was at rehab for 2 weeks. Walking well with a walker now. Appetite is poor but she is eating more than before. For pain, she is taking tramadol 50mg twice per day. Does not feel like it is helping her pain. Wants to try [place] again. Daughter states it is hard to do rehab because of pain.     Has abdominal pain and is taking bentyl for it.     Seeing Dr. Crawford soon.     9/18/24:  Pain is better but still feels tired. Feeling weak as well and fell. Fell three times yesterday and hit her head. Was trying to get out of bed to get to bedside commode and lost balance. If she gets a small stomach pain she takes tylenol and it works well.     Sister reports was not eating due to drowsiness with taking oxycodone. Lead to weight loss. This occurred in the hospital. This lead to choice to avoid oxycodone 5 and 10mg.     Concern for withdrawal while in the hospital including clamminess.     Having a bowel movement helped. Think she may be lactose intolerant.     Considering admission to acute rehab facility near their home for more intensive rehab and nutrition.    Has home health.    Taking two tylenol twice per day. Not taking ibuprofen or gabapentin at this time.     Biggest concern is eating.       9/10/24:    Alternating tylenol and ibuprofen every 3 hours. Cannot sleep. Feeling weak after surgery. Blood sugar has been going all over the place due to poor oral intake. Holding gabapentin because it is making her too drowsy.     Family feels she needs more protein to gain strength. Has home health now. Previously taking oxycodone on empty stomach. Taking zofran for nausea. Requesting zofran that does not dissolve.    For pain, feel that tylenol and  ibuprofen has been helping well for pain. Needs a refill for oxycodone 5mg. Using sparingly twice per day usually.     Having easy bowel movements with miralax.     8/20/24:  Feels neuropathy is the worst and wants to increase gabapentin dose. Neuropathy is in right thigh down into leg. Describes as throbbing pain.    Went up to 200mg at night starting last night.     The patient's sister was also present during the encounter and raised concerns regarding constipation and not wanting to have some any medications related to the colon prior to surgery.  We discussed the high probability of constipation with hydrocodone and with oxycodone use.  She has been using laxatives which they are fearful of.  Discussed nonpharmacological options including increased water intake and prune juice consumption for more natural ways of preventing constipation.  The patient has found greater benefit from oxycodone at this time.  And has been taking Tylenol intermittently for pain as well.  They are aware to stay less than 2000 mg per day.  They are anticipating surgery soon and are aware that pain management needs may change postoperatively.    8/13/24:  Pain right now is a 5-6/10. Talked to Dr. Crawford about gabapentin.  got frustrated on morphine 30mg due to patient due to sleeping a lot and feeling foggy. Delay in answering. Weaned off of morphine. CT scan is Monday to rule out kidney stone. Taking oxycodone every 4-6 hours.     Working on constipation at night even though she's not constipated.     7/12/24:  Currently using Norco 2-3 times per day. Got scared of morphine so she stopped taking it and requested Norco reinitiation prior to this appointment. Discussed use of morphine and stereotyped fears. Having daily bms. Saw colorectal surgery, planning surgery for September. Delayed due to recent clot.    She endorses numbness and tingling of her hands and feet. Discussed neuropathy. She is open to try  treatment.    6/12/24: She states she is doing really well. She had her treatment yesterday and tolerated it well. No pain at all after being on the half tablet of the morphine. She denies any breakthrough at all. She has been able to resume her housework. She feels almost back to normal. She gets tired. Takes a nap once a day. She is having daily bowel movements. She takes senna daily.    5/29/24:Overall pain is a little better. She feels stronger pain pill is lasting a bit longer and quicker onset. Still having pain though. She takes pain medication every 4-5 hours.     She plans to start PT. Her PCP is managing her care overall but her and her family are happy to keep all pain medications with palliative care. She is currently still in [place] with family.     She is having bowel movements every day. She is taking senna everyday.     5/15/24:  Pain has worsened. She has had excruciating abdominal pain. She could not sleep after the pain medication took effect. Then had sweats. She has been taking the Norco 5mg every 6 hours but it takes 45-60 minutes to take effect.     Has started taking two senna s to prevent constipation. Started this yesterday. She had a good BM today.     Current meds for nausea have been helping.     Very groggy days after chemotherapy.       Initial visit:    Introduced the role of palliative care including symptom management and advance care planning through the cancer journey.    She reports pain rated 10/10. When she takes a pain pill it drops down to a 4-5/10. She takes hydrocodone. Pain is located in her right side of the abdomen. Describes this pain as shooting and throbbing.     She reports daily bowel movements. Denies nausea, vomiting. She had her first infusion yesterday. She had no side effects.     Her daughter states that her mother hates pain medications. Family has noted grunting. Family has noticed movement would exacerbate pain. Would be hard to find a comfortable  "position to sit in. Patient wants to "take the edge off" and rest comfortably. Benadryl knocks her out.     The increased dose of hydrocodone at 7.5 helped better. She takes one dose in the morning and one at night. She sometimes take an additional dose if she takes a long car ride.     She does not feel strong. She was in the recliner all day.       Past Medical History:   Diagnosis Date    Cancer     Diabetes mellitus, type 2     Hyperlipidemia     Hypertension     Hypothyroidism     Pulmonary embolism      Past Surgical History:   Procedure Laterality Date    BILATERAL SALPINGO-OOPHORECTOMY (BSO) N/A 9/3/2024    Procedure: SALPINGO-OOPHORECTOMY, BILATERAL;  Surgeon: Maliha Crawford MD;  Location: [facility] OR [address] [place] [address];  Service: Gynecology Oncology;  Laterality: N/A;    DEBULKING OF TUMOR N/A 9/3/2024    Procedure: DEBULKING, NEOPLASM;  Surgeon: Maliha Crawford MD;  [facility];  Service: Gynecology Oncology;  Laterality: N/A;    FLEXIBLE SIGMOIDOSCOPY N/A 9/3/2024    Procedure: SIGMOIDOSCOPY, FLEXIBLE;  Surgeon: Ruby Caicedo MD;  Location: [facility] OR [address] [place] [address];  Service: Colon and Rectal;  Laterality: N/A;    ILEOCOLECTOMY N/A 9/3/2024    Procedure: ILEOCOLECTOMY;  Surgeon: Ruby Caicedo MD;  [facility];  Service: Colon and Rectal;  Laterality: N/A;    LAPAROTOMY, EXPLORATORY N/A 9/3/2024    Procedure: LAPAROTOMY, EXPLORATORY;  Surgeon: Maliha Crawford MD;  [facility];  Service: Gynecology Oncology;  Laterality: N/A;  4 hr case    LOW ANTERIOR RESECTION OF COLON N/A 9/3/2024    Procedure: RESECTION, COLON, LOW ANTERIOR;  Surgeon: Ruby Caicedo MD;  [facility];  Service: Colon and Rectal;  Laterality: N/A;  4 hr case    LYSIS OF ADHESIONS OF URETER Bilateral 9/3/2024    Procedure: URETEROLYSIS;  Surgeon: Maliha Crawford MD;  Location: [facility] OR [address] [place] [address];  Service: Gynecology Oncology;  Laterality: Bilateral;    MOBILIZATION OF SPLENIC FLEXURE  9/3/2024    " Procedure: MOBILIZATION, SPLENIC FLEXURE;  Surgeon: Ruby Caicedo MD;  Location: [facility] OR [address] [place] [address];  Service: Colon and Rectal;;    OMENTECTOMY N/A 9/3/2024    Procedure: OMENTECTOMY;  Surgeon: Maliha Crawford MD;  [facility];  Service: Gynecology Oncology;  Laterality: N/A;    TOTAL ABDOMINAL HYSTERECTOMY N/A 9/3/2024    Procedure: HYSTERECTOMY, TOTAL, ABDOMINAL;  Surgeon: Maliha Crawford MD;  [facility];  Service: Gynecology Oncology;  Laterality: N/A;    TUBAL LIGATION      at age 32's     Family History   Problem Relation Name Age of Onset    Cancer Brother      Breast cancer Neg Hx      Colon cancer Neg Hx      Ovarian cancer Neg Hx      Uterine cancer Neg Hx      Cervical cancer Neg Hx       Review of patient's allergies indicates:  No Known Allergies  Social Drivers of Health     Tobacco Use: Low Risk  (12/4/2024)    [facility]    Patient History     Smoking Tobacco Use: Never     Smokeless Tobacco Use: Never     Passive Exposure: Not on file   Alcohol Use: Not At Risk (7/5/2024)    AUDIT-C     Frequency of Alcohol Consumption: Never     Average Number of Drinks: Patient does not drink     Frequency of Binge Drinking: Never   Financial Resource Strain: Patient Unable To Answer (9/4/2024)    Overall Financial Resource Strain (CARDIA)     Difficulty of Paying Living Expenses: Patient unable to answer   Food Insecurity: Patient Unable To Answer (9/4/2024)    Hunger Vital Sign     Worried About Running Out of Food in the Last Year: Patient unable to answer     Ran Out of Food in the Last Year: Patient unable to answer   Transportation Needs: Patient Unable To Answer (9/4/2024)    TRANSPORTATION NEEDS     Transportation : Patient unable to answer   Physical Activity: Inactive (7/5/2024)    Exercise Vital Sign     Days of Exercise per Week: 0 days     Minutes of Exercise per Session: 20 min    Stress: Patient Unable To Answer (9/4/2024)    [employer] [place] [employer] - Occupational Stress Questionnaire     Feeling of Stress : Patient unable to answer   Housing Stability: Patient Unable To Answer (9/4/2024)    Housing Stability Vital Sign     Unable to Pay for Housing in the Last Year: Patient unable to answer     Homeless in the Last Year: Patient unable to answer   Depression: Low Risk  (11/19/2024)    Depression     Last PHQ-4: Flowsheet Data: 0   Utilities: Patient Unable To Answer (9/4/2024)    [facility] Utilities     Threatened with loss of utilities: Patient unable to answer   Health Literacy: Patient Unable To Answer (9/4/2024)     Health Literacy     Frequency of need for help with medical instructions: Patient unable to respond   Recent Concern: Health Literacy - Inadequate Health Literacy (7/5/2024)     Health Literacy     Frequency of need for help with medical instructions: Sometimes   Social Isolation: Not on file          Medications:    Current Outpatient Medications:     acetaminophen (TYLENOL) 500 MG tablet, Take 2 tablets (1,000 mg total) by mouth every 6 (six) hours., Disp: 30 tablet, Rfl: 3    amLODIPine (NORVASC) 5 MG tablet, Take 1 tablet by mouth every morning., Disp: , Rfl:     apixaban (ELIQUIS) 5 mg Tab, Take 1 tablet (5 mg total) by mouth 2 (two) times daily., Disp: 60 tablet, Rfl: 2    blood-glucose meter,continuous (DEXCOM G7 ) Misc, Use daily, Disp: , Rfl:     blood-glucose sensor (DEXCOM G7 SENSOR) [place], Use every 10 days, Disp: , Rfl:     dexAMETHasone (DECADRON) 4 MG Tab, Take 8 mg (2 tablets) by mouth daily on days 2-4 of each chemotherapy cycle., Disp: 6 tablet, Rfl: 11    dicyclomine (BENTYL) 10 MG capsule, Take 1 capsule (10 mg total) by mouth 2 (two) times daily., Disp: 60 capsule, Rfl: 2    EScitalopram oxalate (LEXAPRO) 10 MG tablet, Take 1 tablet (10 mg total) by mouth once daily., Disp: 90 tablet, Rfl: 3    HYDROcodone-acetaminophen  (NORCO)  mg per tablet, Take 1 tablet by mouth every 6 (six) hours as needed for Pain., Disp: 120 tablet, Rfl: 0    ibuprofen (ADVIL,MOTRIN) 600 MG tablet, Take 1 tablet every 6 hours. Alternate between ibuprofen & tylenol every 3 hours. For example: @0800: ibuprofen 600mg @1100: tylenol 1000mg @1400: ibuprofen 600mg @1700: tylenol 1000 mg @2000: ibuprofen 600mg. Can take two of the 300 mg over the counter ibuprofen is smaller pill is easier., Disp: 30 tablet, Rfl: 3    levothyroxine (SYNTHROID) 75 MCG tablet, Take 1 tablet by mouth every morning., Disp: , Rfl:     LYUMJEV KWIKPEN U-100 INSULIN 100 unit/mL pen, Inject 2-10 Units into the skin 3 (three) times daily with meals., Disp: , Rfl:     magnesium oxide (MAGOX) 400 mg (241.3 mg magnesium) tablet, Take 1 tablet (400 mg total) by mouth once daily., Disp: 200 tablet, Rfl: 1    metFORMIN (GLUCOPHAGE) 1000 MG tablet, Take 1,000 mg by mouth 2 (two) times daily with meals., Disp: , Rfl:     metoprolol succinate (TOPROL-XL) 50 MG 24 hr tablet, Take 1 tablet by mouth every morning., Disp: , Rfl:     mirtazapine (REMERON) 7.5 MG Tab, Take 7.5 mg by mouth every evening., Disp: , Rfl:     morphine (MSIR) 15 MG tablet, Take 1 tablet (15 mg total) by mouth 2 (two) times daily as needed for Pain., Disp: 60 tablet, Rfl: 0    naloxone (NARCAN) 4 mg/actuation [place], 1 spray (4 mg total) by Nasal route 1 (one) time if needed (for emergency opioid reversal)., Disp: 1 each, Rfl: 0    OLANZapine (ZYPREXA) 5 MG tablet, Take 1 tablet by mouth nightly on days 1-4 of each chemotherapy cycle., Disp: 4 tablet, Rfl: 11    ondansetron (ZOFRAN) 4 MG tablet, Take 1 tablet (4 mg total) by mouth every 6 (six) hours as needed for Nausea., Disp: 120 tablet, Rfl: 2    ondansetron (ZOFRAN-ODT) 4 MG TbDL, DISSOLVE ONE TABLET BY MOUTH EVERY 4 TO 6 HOURS AS NEEDED FOR NAUSEA, Disp: , Rfl:     polyethylene glycol (GLYCOLAX) 17 gram/dose powder, Mix 1 capful (17 g) in liquid and drink by mouth  once daily., Disp: 510 g, Rfl: 1    potassium chloride (KLOR-CON) 10 MEQ TbSR, Take 1 tablet (10 mEq total) by mouth 2 (two) times daily., Disp: 30 tablet, Rfl: 0    pravastatin (PRAVACHOL) 10 MG tablet, Take 1 tablet by mouth every evening., Disp: , Rfl:     predniSONE (DELTASONE) 5 MG tablet, Take 5 mg by mouth., Disp: , Rfl:     pregabalin (LYRICA) 25 MG capsule, Take 1 capsule (25 mg total) by mouth once daily., Disp: 30 capsule, Rfl: 0    prochlorperazine (COMPAZINE) 5 MG tablet, Take 1 tablet (5 mg total) by mouth every 6 (six) hours as needed for Nausea., Disp: 20 tablet, Rfl: 5    TOUJEO SOLOSTAR U-300 INSULIN 300 unit/mL (1.5 mL) InPn pen, Inject 10 Units into the skin once daily., Disp: , Rfl:     traMADoL (ULTRAM) 50 mg tablet, Take 25 mg by mouth every 6 (six) hours as needed., Disp: , Rfl:     OBJECTIVE:       ROS:  Review of Systems   Constitutional:  Positive for appetite change and fatigue. Negative for unexpected weight change.   HENT:  Negative for mouth sores.    Eyes:  Negative for visual disturbance.   Respiratory:  Negative for cough and shortness of breath.    Cardiovascular:  Negative for chest pain.   Gastrointestinal:  Negative for abdominal pain and diarrhea.   Genitourinary:  Negative for frequency.   Musculoskeletal:  Negative for back pain.   Skin:  Negative for rash.   Neurological:  Positive for numbness. Negative for headaches.   Hematological:  Negative for adenopathy.   Psychiatric/Behavioral:  The patient is not nervous/anxious.        Review of Symptoms      Symptom Assessment (ESAS 0-10 Scale)  Pain:  0  Dyspnea:  0  Anxiety:  0  Nausea:  0  Depression:  0  Anorexia:  0  Fatigue:  0  Insomnia:  0  Restlessness:  0  Agitation:  0       Anxiety:  Is not nervous/anxious    ECOG Performance Status ndGndrndanddndend:nd nd2nd Living Arrangements:  Lives with family    Psychosocial/Cultural:   See Palliative Psychosocial Note: Yes  **Primary  to Follow**  Palliative Care   Consult: Yes     Time-Based Charting:  Yes  Chart Review: 20 minutes  Face to Face: 10 minutes    Total Time Spent: 30 minutes      Advance Care Planning   Advance Directives:     Decision Making:  Patient answered questions  Goals of Care: The patient endorses that what is most important right now is to focus on symptom/pain control    Accordingly, we have decided that the best plan to meet the patient's goals includes continuing with treatment              Physical Exam:  Vitals:    Physical Exam  Constitutional:       General: She is not in acute distress.     Appearance: Normal appearance. She is ill-appearing. She is not toxic-appearing.   Musculoskeletal:      Cervical back: Normal range of motion.   Skin:     Coloration: Skin is not jaundiced or pale.   Neurological:      Mental Status: She is alert and oriented to person, place, and time.   Psychiatric:         Mood and Affect: Mood normal.         Behavior: Behavior normal.         Thought Content: Thought content normal.         Judgment: Judgment normal.         Labs:  CBC:   WBC   Date Value Ref Range Status   11/18/2024 9.03 3.90 - 12.70 K/uL Final     Hemoglobin   Date Value Ref Range Status   09/20/2024 9.4 (L) 12.0 - 16.0 g/dL Final     HGB   Date Value Ref Range Status   11/18/2024 12.2 12.0 - 16.0 gm/dL Final     POC Hematocrit   Date Value Ref Range Status   09/03/2024 27 (L) 36 - 54 %PCV Final     Hematocrit   Date Value Ref Range Status   09/20/2024 27.1 (L) 37.0 - 48.5 % Final     HCT   Date Value Ref Range Status   11/18/2024 35.9 (L) 37.0 - 48.5 % Final     MCV   Date Value Ref Range Status   11/18/2024 95 82 - 98 fL Final   09/20/2024 97 82 - 98 fL Final     Platelet Count   Date Value Ref Range Status   11/18/2024 289 150 - 450 K/uL Final     Platelets   Date Value Ref Range Status   09/20/2024 414 150 - 450 K/uL Final       LFT:   Lab Results   Component Value Date    AST 13 11/18/2024    ALKPHOS 44 (L) 11/18/2024    BILITOT 0.3 11/18/2024        Albumin:   Albumin   Date Value Ref Range Status   11/18/2024 3.7 3.5 - 5.2 g/dL Final   09/20/2024 3.3 (L) 3.5 - 5.2 g/dL Final     Protein:   Total Protein   Date Value Ref Range Status   09/20/2024 6.4 6.0 - 8.4 g/dL Final       Radiology:  CT abd pelvis with IV contrast 8/19/24:  FINDINGS:     Lower chest: Unremarkable.  Liver: 9 x 7 mm hypodensity in left hepatic lobe (series 5, image 23).  This was present in April 2024 and may represent a cyst.  Gallbladder: Unremarkable.  Biliary: No significant biliary ductal dilatation.  Pancreas: Unremarkable.  Spleen: Unremarkable.  Adrenal glands: Unremarkable.  Kidneys/ureters: Unremarkable.  Bladder: Unremarkable.  Reproductive: Uterus is unremarkable.  Persistent cystic area in the expected location of the right ovary measures approximately 3.4 x 2.5 cm.  Other previously demonstrated cystic lesions in the pelvis are no longer present.  There is some persistent thickening along the left pelvic wall as seen on series 5, image 126.  Gastrointestinal: No bowel obstruction or inflammation. No intraperitoneal free air or significant free fluid.  Retroperitoneum: No adenopathy.  Vascular: Atherosclerotic disease.  No abdominal aortic aneurysm.  Osseous: No acute fracture or aggressive appearing osseous lesion.        Impression:     1.    Significant improvement in the previous cystic lesions in the pelvis.  With some residual in the right pelvis and along the pelvic wall and the left.  2.    Similar appearance of the subcentimeter hypodensity left hepatic lobe which statistically represents a cyst; however, continued attention on follow-up is recommended.    30 minutes of total time spent on the encounter, which includes face to face time and non-face to face time preparing to see the patient (eg, review of tests), Obtaining and/or reviewing separately obtained history, Documenting clinical information in the electronic or other health record, Independently  interpreting results (not separately reported) and communicating results to the patient/family/caregiver, or Care coordination (not separately reported).    Signature: Kristen Blum, DO

## 2025-01-06 ENCOUNTER — LAB VISIT (OUTPATIENT)
Dept: LAB | Facility: HOSPITAL | Age: 75
End: 2025-01-06
Attending: NURSE PRACTITIONER
Payer: MEDICARE

## 2025-01-06 ENCOUNTER — OFFICE VISIT (OUTPATIENT)
Dept: HEMATOLOGY/ONCOLOGY | Facility: CLINIC | Age: 75
End: 2025-01-06
Payer: MEDICARE

## 2025-01-06 VITALS
TEMPERATURE: 98 F | WEIGHT: 98.81 LBS | HEIGHT: 61 IN | DIASTOLIC BLOOD PRESSURE: 82 MMHG | OXYGEN SATURATION: 99 % | SYSTOLIC BLOOD PRESSURE: 130 MMHG | HEART RATE: 108 BPM | BODY MASS INDEX: 18.66 KG/M2

## 2025-01-06 DIAGNOSIS — F43.23 ADJUSTMENT REACTION WITH ANXIETY AND DEPRESSION: ICD-10-CM

## 2025-01-06 DIAGNOSIS — R10.9 ABDOMINAL PAIN, UNSPECIFIED ABDOMINAL LOCATION: ICD-10-CM

## 2025-01-06 DIAGNOSIS — R11.0 NAUSEA: ICD-10-CM

## 2025-01-06 DIAGNOSIS — C78.6 PERITONEAL CARCINOMATOSIS: ICD-10-CM

## 2025-01-06 DIAGNOSIS — R53.1 GENERALIZED WEAKNESS: ICD-10-CM

## 2025-01-06 DIAGNOSIS — R19.00 PELVIC MASS: ICD-10-CM

## 2025-01-06 DIAGNOSIS — C56.1 MALIGNANT NEOPLASM OF RIGHT OVARY: ICD-10-CM

## 2025-01-06 DIAGNOSIS — R97.1 ELEVATED CANCER ANTIGEN 125 (CA 125): ICD-10-CM

## 2025-01-06 DIAGNOSIS — R10.9 ABDOMINAL PAIN, UNSPECIFIED ABDOMINAL LOCATION: Primary | ICD-10-CM

## 2025-01-06 DIAGNOSIS — R53.83 FATIGUE, UNSPECIFIED TYPE: ICD-10-CM

## 2025-01-06 LAB
ALBUMIN SERPL BCP-MCNC: 4.2 G/DL (ref 3.5–5.2)
ALP SERPL-CCNC: 43 U/L (ref 40–150)
ALT SERPL W/O P-5'-P-CCNC: 8 U/L (ref 10–44)
ANION GAP SERPL CALC-SCNC: 15 MMOL/L (ref 8–16)
AST SERPL-CCNC: 14 U/L (ref 10–40)
BASOPHILS # BLD AUTO: 0.04 K/UL (ref 0–0.2)
BASOPHILS NFR BLD: 0.6 % (ref 0–1.9)
BILIRUB SERPL-MCNC: 0.4 MG/DL (ref 0.1–1)
BILIRUB UR QL STRIP: NEGATIVE
BUN SERPL-MCNC: 8 MG/DL (ref 8–23)
CALCIUM SERPL-MCNC: 9.8 MG/DL (ref 8.7–10.5)
CANCER AG125 SERPL-ACNC: 12 U/ML (ref 0–30)
CHLORIDE SERPL-SCNC: 99 MMOL/L (ref 95–110)
CLARITY UR: CLEAR
CO2 SERPL-SCNC: 22 MMOL/L (ref 23–29)
COLOR UR: YELLOW
CREAT SERPL-MCNC: 0.7 MG/DL (ref 0.5–1.4)
DIFFERENTIAL METHOD BLD: ABNORMAL
EOSINOPHIL # BLD AUTO: 0.1 K/UL (ref 0–0.5)
EOSINOPHIL NFR BLD: 0.7 % (ref 0–8)
ERYTHROCYTE [DISTWIDTH] IN BLOOD BY AUTOMATED COUNT: 12 % (ref 11.5–14.5)
EST. GFR  (NO RACE VARIABLE): >60 ML/MIN/1.73 M^2
GLUCOSE SERPL-MCNC: 129 MG/DL (ref 70–110)
GLUCOSE UR QL STRIP: NEGATIVE
HCT VFR BLD AUTO: 38.2 % (ref 37–48.5)
HGB BLD-MCNC: 13.1 G/DL (ref 12–16)
HGB UR QL STRIP: NEGATIVE
IMM GRANULOCYTES # BLD AUTO: 0.01 K/UL (ref 0–0.04)
IMM GRANULOCYTES NFR BLD AUTO: 0.1 % (ref 0–0.5)
KETONES UR QL STRIP: NEGATIVE
LEUKOCYTE ESTERASE UR QL STRIP: NEGATIVE
LYMPHOCYTES # BLD AUTO: 2.7 K/UL (ref 1–4.8)
LYMPHOCYTES NFR BLD: 37.2 % (ref 18–48)
MCH RBC QN AUTO: 31.7 PG (ref 27–31)
MCHC RBC AUTO-ENTMCNC: 34.3 G/DL (ref 32–36)
MCV RBC AUTO: 93 FL (ref 82–98)
MONOCYTES # BLD AUTO: 0.6 K/UL (ref 0.3–1)
MONOCYTES NFR BLD: 7.7 % (ref 4–15)
NEUTROPHILS # BLD AUTO: 3.9 K/UL (ref 1.8–7.7)
NEUTROPHILS NFR BLD: 53.7 % (ref 38–73)
NITRITE UR QL STRIP: NEGATIVE
NRBC BLD-RTO: 0 /100 WBC
PH UR STRIP: 7 [PH] (ref 5–8)
PLATELET # BLD AUTO: 351 K/UL (ref 150–450)
PMV BLD AUTO: 8.1 FL (ref 9.2–12.9)
POTASSIUM SERPL-SCNC: 4.7 MMOL/L (ref 3.5–5.1)
PROT SERPL-MCNC: 7.4 G/DL (ref 6–8.4)
PROT UR QL STRIP: NEGATIVE
RBC # BLD AUTO: 4.13 M/UL (ref 4–5.4)
SODIUM SERPL-SCNC: 136 MMOL/L (ref 136–145)
SP GR UR STRIP: 1.01 (ref 1–1.03)
URN SPEC COLLECT METH UR: NORMAL
WBC # BLD AUTO: 7.24 K/UL (ref 3.9–12.7)

## 2025-01-06 PROCEDURE — 99214 OFFICE O/P EST MOD 30 MIN: CPT | Mod: S$PBB,,, | Performed by: NURSE PRACTITIONER

## 2025-01-06 PROCEDURE — 36415 COLL VENOUS BLD VENIPUNCTURE: CPT | Mod: PN | Performed by: OBSTETRICS & GYNECOLOGY

## 2025-01-06 PROCEDURE — 81003 URINALYSIS AUTO W/O SCOPE: CPT | Mod: PN | Performed by: NURSE PRACTITIONER

## 2025-01-06 PROCEDURE — 86304 IMMUNOASSAY TUMOR CA 125: CPT | Performed by: OBSTETRICS & GYNECOLOGY

## 2025-01-06 PROCEDURE — 99999 PR PBB SHADOW E&M-EST. PATIENT-LVL III: CPT | Mod: PBBFAC,,, | Performed by: NURSE PRACTITIONER

## 2025-01-06 PROCEDURE — 36415 COLL VENOUS BLD VENIPUNCTURE: CPT | Mod: PN | Performed by: NURSE PRACTITIONER

## 2025-01-06 PROCEDURE — 80053 COMPREHEN METABOLIC PANEL: CPT | Mod: PN | Performed by: NURSE PRACTITIONER

## 2025-01-06 PROCEDURE — 85025 COMPLETE CBC W/AUTO DIFF WBC: CPT | Mod: PN | Performed by: NURSE PRACTITIONER

## 2025-01-06 PROCEDURE — 99213 OFFICE O/P EST LOW 20 MIN: CPT | Mod: PBBFAC,PN | Performed by: NURSE PRACTITIONER

## 2025-01-10 ENCOUNTER — PATIENT MESSAGE (OUTPATIENT)
Dept: PALLIATIVE MEDICINE | Facility: CLINIC | Age: 75
End: 2025-01-10
Payer: MEDICARE

## 2025-01-10 DIAGNOSIS — G89.3 CANCER RELATED PAIN: ICD-10-CM

## 2025-01-10 DIAGNOSIS — C56.1 MALIGNANT NEOPLASM OF RIGHT OVARY: ICD-10-CM

## 2025-01-10 RX ORDER — HYDROCODONE BITARTRATE AND ACETAMINOPHEN 10; 325 MG/1; MG/1
1 TABLET ORAL EVERY 6 HOURS PRN
Qty: 120 TABLET | Refills: 0 | Status: SHIPPED | OUTPATIENT
Start: 2025-01-10

## 2025-01-24 ENCOUNTER — OFFICE VISIT (OUTPATIENT)
Dept: PALLIATIVE MEDICINE | Facility: CLINIC | Age: 75
End: 2025-01-24
Payer: MEDICARE

## 2025-01-24 DIAGNOSIS — Z51.5 PALLIATIVE CARE BY SPECIALIST: Primary | ICD-10-CM

## 2025-01-24 DIAGNOSIS — G25.81 RESTLESS LEG SYNDROME: ICD-10-CM

## 2025-01-24 DIAGNOSIS — C56.1 MALIGNANT NEOPLASM OF RIGHT OVARY: ICD-10-CM

## 2025-01-24 DIAGNOSIS — C78.6 PERITONEAL CARCINOMATOSIS: ICD-10-CM

## 2025-01-24 DIAGNOSIS — G62.0 CHEMOTHERAPY-INDUCED PERIPHERAL NEUROPATHY: ICD-10-CM

## 2025-01-24 DIAGNOSIS — T45.1X5A CHEMOTHERAPY-INDUCED PERIPHERAL NEUROPATHY: ICD-10-CM

## 2025-01-24 DIAGNOSIS — R64 CACHEXIA: ICD-10-CM

## 2025-01-24 DIAGNOSIS — G89.3 CANCER RELATED PAIN: ICD-10-CM

## 2025-01-24 PROCEDURE — 98005 SYNCH AUDIO-VIDEO EST LOW 20: CPT | Mod: 95,,, | Performed by: STUDENT IN AN ORGANIZED HEALTH CARE EDUCATION/TRAINING PROGRAM

## 2025-01-24 RX ORDER — GABAPENTIN 100 MG/1
CAPSULE ORAL
Qty: 113 CAPSULE | Refills: 0 | Status: SHIPPED | OUTPATIENT
Start: 2025-01-24 | End: 2025-02-23

## 2025-01-24 NOTE — PROGRESS NOTES
The patient location is: [place]    Visit type: audiovisual    Face to Face time with patient: 10  30 minutes of total time spent on the encounter, which includes face to face time and non-face to face time preparing to see the patient (eg, review of tests), Obtaining and/or reviewing separately obtained history, Documenting clinical information in the electronic or other health record, Independently interpreting results (not separately reported) and communicating results to the patient/family/caregiver, or Care coordination (not separately reported).         Each patient to whom he or she provides medical services by telemedicine is:  (1) informed of the relationship between the physician and patient and the respective role of any other health care provider with respect to management of the patient; and (2) notified that he or she may decline to receive medical services by telemedicine and may withdraw from such care at any time.    Notes:     Office Visit  [facility]      Reason for Consult: pain and symptom management      ASSESSMENT/PLAN:     Plan/Recommendations:  Diagnoses and all orders for this visit:    Palliative care by specialist    Cancer related pain    Malignant neoplasm of right ovary    Peritoneal carcinomatosis    Restless leg syndrome    Chemotherapy-induced peripheral neuropathy    Cachexia    Other orders  -     gabapentin (NEURONTIN) 100 MG capsule; Take 3 capsules (300 mg total) by mouth every evening for 7 days, THEN 4 capsules (400 mg total) every evening for 23 days.            # Stage III Ovarian Cancer  # Palliative care by specialist  # Peritoneal carcinomatosis  Completed 6 cycles of Carboplatin/ Paclitaxel followed by Ex Lap/FANNY/BSO/OMX/debulking/LAR on 9/3/2024.  Currently on surveillance with Niraparib per gyn onc.   - Follow up gyn onc    # Restless leg syndrome  # Leg pain  Patient with throbbing bilateral thigh pain. No corresponding imaging present. Low  concern for bone metastasis given bilateral location. Reports history of restless leg syndrome. Improvement with initiation of magnesium supplements. Not adherent to Mirapex due to sedation. Open to trial of pregabalin for now. Will uptitrate as tolerated otherwise will consider ropinirole. Encouraged further evaluation of leg pain with referral and further imaging with GP. Also encouraged full GP evaluation for other causes of leg pain including vitamin deficiencies and general check up including complete lab workup now that cancer is under control thankfully. Previously had myopathy with atorvastatin however now on pravastatin which is preferred and not likely to be the cause of leg pain. Now intermittently using morphine and hydrocodone.   - Continue Norco 10-325mg PO q6h prn (discussed max 3g/day of APAP)  - Continue miralax prn for bowel regimen  - Continue Magnesium oxide 400mg PO BID scheduled (warned of diarrhea)  - Continue MSIR 15mg PO q12h prn  - Refer to PM&R-- encouraged scheduling an appointment for evaluation, discussed with patient and daughter Eugenia  - Increase to gabapentin 300mg nightly x 7 days followed by 400mg nightly if tolerated  - Follow up hip xray    # Chemotherapy induced peripheral neuropathy  Improved.   - Increase to gabapentin 300mg nightly x 7 days followed by 400mg nightly if tolerated    # Cancer cachexia  BMI 18<19<21. Poor appetite. Emesis after eating 2-3 bites. Could be a mechanical issue. Would like further eval with speech evaluation.  - Follow up nutrition    # Abdominal cramping  Morning abdominal cramping improved with bowel movements. Some improvement in pain with Bentyl.  - Continue Bentyl 10mg PO BID PRN  - Encouraged hydration, laxative use for more consistently regular bowel movements    Follow up: 1 week      SUBJECTIVE:     History of Present Illness:  Patient is a 74 y.o. year old female presenting with recent diagnosis of Stage IIIC ovarian cancer. She is a  "patient of Dr. Maliha Crawford. She presents via virtual visit for follow up.    1/24/25:  Bottom half of calves and toes included in leg pain now  Stopped lyrica and gabapentin not working  Just using aleve   On way to therapy. Went three times   Therapist thinks hip may be misaligned  Going to xray hip   Using Norco every now and then     Only taking 200mg gabapentin at bedtime    Appetite getting better   Walking is getting better. Needs walker though.    12/18/24:  Still has leg pain. Has had further evaluation. Walking with a walker. Taking Lyrica at night. Feels no difference in pain. Taking hydrocodone which is helping pain. Not as bad now.     Appetite is still poor.     Discussed at length with patient's daughter Eugenia with patient's permission. Eugenia states that she is taking pain medication sparingly and is getting weaker. She states that the patient will not move to live with her and will demand to stay with her  in their home. She states that she does not think that the patient will take recommendations from another doctor.     12/3/24:  Has been taking Mirapex once per day but still has leg pain despite this. Using a walker. Having trouble walking. Discussed avenues for further evaluation including imaging and referral. Continued poor appetite. Eats a few bites and then coughs and vomits. Gags after 2-3 bites. Happening for 3-4 days.     Wakes up with stomach pain. Taking Bentyl 10mg which helps but pain comes back. Having mostly regular bowel movements.    Discussed with patient's daughter Eugenia at length. She states Ms Blanco is not adherent to Mirapex due to sedation. Discussed utility of pregabalin versus ropinirole.     11/5/24:  Moving around slowly. Pain is still present. Legs "are killing me". Legs are weak. Still using a walker to get around.     Reports throbbing pain in legs. Can't find anything to ease it except Norco. Nothing helps but Norco. Only lasts 5 hours. Its in top part of " legs in thighs. Feels neuropathy is better, less numbness/tingling.     Pain is better with walking. Hurt so bad she does not feel she wants to walk.     Has a history of restless leg syndrome in the past. Treated that with over the counter in the past and started magnesium oxide recently. Felt magnesium helped if she takes it every day. Feel it could be restless leg syndrome. If she misses magnesium she has unbearable pain. PCP wants to monitor labs with magnesium twice per day if scheduled and not as needed.     Cannot take benadryl due to flaring up legs.     Taking Bentyl for cramps. Felt capsule form worked better at 10mg.     10/9/24:  A little stronger since going to rehab. Legs are still weak. Aching. Was at rehab for 2 weeks. Walking well with a walker now. Appetite is poor but she is eating more than before. For pain, she is taking tramadol 50mg twice per day. Does not feel like it is helping her pain. Wants to try [facility] again. Daughter states it is hard to do rehab because of pain.     Has abdominal pain and is taking bentyl for it.     Seeing Dr. Crawford soon.     9/18/24:  Pain is better but still feels tired. Feeling weak as well and fell. Fell three times yesterday and hit her head. Was trying to get out of bed to get to bedside commode and lost balance. If she gets a small stomach pain she takes tylenol and it works well.     Sister reports was not eating due to drowsiness with taking oxycodone. Lead to weight loss. This occurred in the hospital. This lead to choice to avoid oxycodone 5 and 10mg.     Concern for withdrawal while in the hospital including clamminess.     Having a bowel movement helped. Think she may be lactose intolerant.     Considering admission to acute rehab facility near their home for more intensive rehab and nutrition.    Has home health.    Taking two tylenol twice per day. Not taking ibuprofen or gabapentin at this time.     Biggest concern is eating.        9/10/24:    Alternating tylenol and ibuprofen every 3 hours. Cannot sleep. Feeling weak after surgery. Blood sugar has been going all over the place due to poor oral intake. Holding gabapentin because it is making her too drowsy.     Family feels she needs more protein to gain strength. Has home health now. Previously taking oxycodone on empty stomach. Taking zofran for nausea. Requesting zofran that does not dissolve.    For pain, feel that tylenol and ibuprofen has been helping well for pain. Needs a refill for oxycodone 5mg. Using sparingly twice per day usually.     Having easy bowel movements with miralax.     8/20/24:  Feels neuropathy is the worst and wants to increase gabapentin dose. Neuropathy is in right thigh down into leg. Describes as throbbing pain.    Went up to 200mg at night starting last night.     The patient's sister was also present during the encounter and raised concerns regarding constipation and not wanting to have some any medications related to the colon prior to surgery.  We discussed the high probability of constipation with hydrocodone and with oxycodone use.  She has been using laxatives which they are fearful of.  Discussed nonpharmacological options including increased water intake and prune juice consumption for more natural ways of preventing constipation.  The patient has found greater benefit from oxycodone at this time.  And has been taking Tylenol intermittently for pain as well.  They are aware to stay less than 2000 mg per day.  They are anticipating surgery soon and are aware that pain management needs may change postoperatively.    8/13/24:  Pain right now is a 5-6/10. Talked to Dr. Crawford about gabapentin.  got frustrated on morphine 30mg due to patient due to sleeping a lot and feeling foggy. Delay in answering. Weaned off of morphine. CT scan is Monday to rule out kidney stone. Taking oxycodone every 4-6 hours.     Working on constipation at night even  though she's not constipated.     7/12/24:  Currently using Norco 2-3 times per day. Got scared of morphine so she stopped taking it and requested Norco reinitiation prior to this appointment. Discussed use of morphine and stereotyped fears. Having daily bms. Saw colorectal surgery, planning surgery for September. Delayed due to recent clot.    She endorses numbness and tingling of her hands and feet. Discussed neuropathy. She is open to try treatment.    6/12/24: She states she is doing really well. She had her treatment yesterday and tolerated it well. No pain at all after being on the half tablet of the morphine. She denies any breakthrough at all. She has been able to resume her housework. She feels almost back to normal. She gets tired. Takes a nap once a day. She is having daily bowel movements. She takes senna daily.    5/29/24:Overall pain is a little better. She feels stronger pain pill is lasting a bit longer and quicker onset. Still having pain though. She takes pain medication every 4-5 hours.     She plans to start PT. Her PCP is managing her care overall but her and her family are happy to keep all pain medications with palliative care. She is currently still in [place] with family.     She is having bowel movements every day. She is taking senna everyday.     5/15/24:  Pain has worsened. She has had excruciating abdominal pain. She could not sleep after the pain medication took effect. Then had sweats. She has been taking the Norco 5mg every 6 hours but it takes 45-60 minutes to take effect.     Has started taking two senna s to prevent constipation. Started this yesterday. She had a good BM today.     Current meds for nausea have been helping.     Very groggy days after chemotherapy.       Initial visit:    Introduced the role of palliative care including symptom management and advance care planning through the cancer journey.    She reports pain rated 10/10. When she takes a pain pill it drops  "down to a 4-5/10. She takes hydrocodone. Pain is located in her right side of the abdomen. Describes this pain as shooting and throbbing.     She reports daily bowel movements. Denies nausea, vomiting. She had her first infusion yesterday. She had no side effects.     Her daughter states that her mother hates pain medications. Family has noted grunting. Family has noticed movement would exacerbate pain. Would be hard to find a comfortable position to sit in. Patient wants to "take the edge off" and rest comfortably. Benadryl knocks her out.     The increased dose of hydrocodone at 7.5 helped better. She takes one dose in the morning and one at night. She sometimes take an additional dose if she takes a long car ride.     She does not feel strong. She was in the recliner all day.       Past Medical History:   Diagnosis Date    Cancer     Diabetes mellitus, type 2     Hyperlipidemia     Hypertension     Hypothyroidism     Pulmonary embolism      Past Surgical History:   Procedure Laterality Date    BILATERAL SALPINGO-OOPHORECTOMY (BSO) N/A 9/3/2024    Procedure: SALPINGO-OOPHORECTOMY, BILATERAL;  Surgeon: Maliha Crawford MD;  Location: [address];  Service: Gynecology Oncology;  Laterality: N/A;    DEBULKING OF TUMOR N/A 9/3/2024    Procedure: DEBULKING, NEOPLASM;  Surgeon: Maliha Crawford MD;  Location: [facility] OR [address] [place] [address];  Service: Gynecology Oncology;  Laterality: N/A;    FLEXIBLE SIGMOIDOSCOPY N/A 9/3/2024    Procedure: SIGMOIDOSCOPY, FLEXIBLE;  Surgeon: Ruby Caicedo MD;  Location: [address];  Service: Colon and Rectal;  Laterality: N/A;    ILEOCOLECTOMY N/A 9/3/2024    Procedure: ILEOCOLECTOMY;  Surgeon: Ruby Caicedo MD;  Location: [facility] OR [address] [place] [address];  Service: Colon and Rectal;  Laterality: N/A;    LAPAROTOMY, EXPLORATORY N/A 9/3/2024    Procedure: LAPAROTOMY, EXPLORATORY;  Surgeon: Maliha Crawford MD;  Location: [facility] OR [address] [place] [address];  Service: Gynecology Oncology;  Laterality: N/A;  4 hr case    LOW " ANTERIOR RESECTION OF COLON N/A 9/3/2024    Procedure: RESECTION, COLON, LOW ANTERIOR;  Surgeon: Ruby Caicedo MD;  Location: [facility] OR [facility];  Service: Colon and Rectal;  Laterality: N/A;  4 hr case    LYSIS OF ADHESIONS OF URETER Bilateral 9/3/2024    Procedure: URETEROLYSIS;  Surgeon: Maliha Crawford MD;  Location: [facility] OR [facility];  Service: Gynecology Oncology;  Laterality: Bilateral;    MOBILIZATION OF SPLENIC FLEXURE  9/3/2024    Procedure: MOBILIZATION, SPLENIC FLEXURE;  Surgeon: Ruby Caicedo MD;  [facility];  Service: Colon and Rectal;;    OMENTECTOMY N/A 9/3/2024    Procedure: OMENTECTOMY;  Surgeon: Maliha Crawford MD;  [facility];  Service: Gynecology Oncology;  Laterality: N/A;    TOTAL ABDOMINAL HYSTERECTOMY N/A 9/3/2024    Procedure: HYSTERECTOMY, TOTAL, ABDOMINAL;  Surgeon: Maliha Crawford MD;  [facility];  Service: Gynecology Oncology;  Laterality: N/A;    TUBAL LIGATION      at age 32's     Family History   Problem Relation Name Age of Onset    Cancer Brother      Breast cancer Neg Hx      Colon cancer Neg Hx      Ovarian cancer Neg Hx      Uterine cancer Neg Hx      Cervical cancer Neg Hx       Review of patient's allergies indicates:  No Known Allergies  Social Drivers of Health     Tobacco Use: Low Risk  (1/6/2025)    Patient History     Smoking Tobacco Use: Never     Smokeless Tobacco Use: Never     Passive Exposure: Past   Alcohol Use: Not At Risk (7/5/2024)    AUDIT-C     Frequency of Alcohol Consumption: Never     Average Number of Drinks: Patient does not drink     Frequency of Binge Drinking: Never   Financial Resource Strain: Patient Unable To Answer (9/4/2024)    Overall Financial Resource Strain (CARDIA)     Difficulty of Paying Living Expenses: Patient unable to answer   Food Insecurity: Patient Unable To Answer (9/4/2024)    Hunger Vital Sign     Worried About Running Out of Food in the Last Year: Patient unable to answer     Ran Out of  Food in the Last Year: Patient unable to answer   Transportation Needs: Patient Unable To Answer (9/4/2024)    TRANSPORTATION NEEDS     Transportation : Patient unable to answer   Physical Activity: Inactive (7/5/2024)    Exercise Vital Sign     Days of Exercise per Week: 0 days     Minutes of Exercise per Session: 20 min   Stress: Patient Unable To Answer (9/4/2024)    [employer] - Occupational Stress Questionnaire     Feeling of Stress : Patient unable to answer   Housing Stability: Patient Unable To Answer (9/4/2024)    Housing Stability Vital Sign     Unable to Pay for Housing in the Last Year: Patient unable to answer     Homeless in the Last Year: Patient unable to answer   Depression: Low Risk  (11/19/2024)    Depression     Last PHQ-4: Flowsheet Data: 0   Utilities: Patient Unable To Answer (9/4/2024)    [facility] Utilities     Threatened with loss of utilities: Patient unable to answer   Health Literacy: Patient Unable To Answer (9/4/2024)     Health Literacy     Frequency of need for help with medical instructions: Patient unable to respond   Recent Concern: Health Literacy - Inadequate Health Literacy (7/5/2024)     Health Literacy     Frequency of need for help with medical instructions: Sometimes   Social Isolation: Not on file          Medications:    Current Outpatient Medications:     acetaminophen (TYLENOL) 500 MG tablet, Take 2 tablets (1,000 mg total) by mouth every 6 (six) hours., Disp: 30 tablet, Rfl: 3    amLODIPine (NORVASC) 5 MG tablet, Take 1 tablet by mouth every morning., Disp: , Rfl:     apixaban (ELIQUIS) 5 mg Tab, Take 1 tablet (5 mg total) by mouth 2 (two) times daily., Disp: 60 tablet, Rfl: 2    blood-glucose meter,continuous (DEXCOM G7 ) Misc, Use daily, Disp: , Rfl:     blood-glucose sensor (DEXCOM G7 SENSOR) [place], Use every 10 days, Disp: , Rfl:     dexAMETHasone (DECADRON) 4 MG Tab, Take 8 mg (2 tablets) by mouth daily on days 2-4 of each  chemotherapy cycle., Disp: 6 tablet, Rfl: 11    dicyclomine (BENTYL) 10 MG capsule, Take 1 capsule (10 mg total) by mouth 2 (two) times daily., Disp: 60 capsule, Rfl: 2    EScitalopram oxalate (LEXAPRO) 10 MG tablet, Take 1 tablet (10 mg total) by mouth once daily., Disp: 90 tablet, Rfl: 3    gabapentin (NEURONTIN) 100 MG capsule, Take 3 capsules (300 mg total) by mouth every evening for 7 days, THEN 4 capsules (400 mg total) every evening for 23 days., Disp: 113 capsule, Rfl: 0    HYDROcodone-acetaminophen (NORCO)  mg per tablet, Take 1 tablet by mouth every 6 (six) hours as needed for Pain., Disp: 120 tablet, Rfl: 0    ibuprofen (ADVIL,MOTRIN) 600 MG tablet, Take 1 tablet every 6 hours. Alternate between ibuprofen & tylenol every 3 hours. For example: @0800: ibuprofen 600mg @1100: tylenol 1000mg @1400: ibuprofen 600mg @1700: tylenol 1000 mg @2000: ibuprofen 600mg. Can take two of the 300 mg over the counter ibuprofen is smaller pill is easier., Disp: 30 tablet, Rfl: 3    levothyroxine (SYNTHROID) 75 MCG tablet, Take 1 tablet by mouth every morning., Disp: , Rfl:     LYUMJEV KWIKPEN U-100 INSULIN 100 unit/mL pen, Inject 2-10 Units into the skin 3 (three) times daily with meals., Disp: , Rfl:     magnesium oxide (MAGOX) 400 mg (241.3 mg magnesium) tablet, Take 1 tablet (400 mg total) by mouth once daily., Disp: 200 tablet, Rfl: 1    metFORMIN (GLUCOPHAGE) 1000 MG tablet, Take 1,000 mg by mouth 2 (two) times daily with meals., Disp: , Rfl:     metoprolol succinate (TOPROL-XL) 50 MG 24 hr tablet, Take 1 tablet by mouth every morning., Disp: , Rfl:     mirtazapine (REMERON) 7.5 MG Tab, Take 7.5 mg by mouth every evening., Disp: , Rfl:     morphine (MSIR) 15 MG tablet, Take 1 tablet (15 mg total) by mouth 2 (two) times daily as needed for Pain., Disp: 60 tablet, Rfl: 0    naloxone (NARCAN) 4 mg/actuation [place], 1 spray (4 mg total) by Nasal route 1 (one) time if needed (for emergency opioid reversal)., Disp: 1  each, Rfl: 0    OLANZapine (ZYPREXA) 5 MG tablet, Take 1 tablet by mouth nightly on days 1-4 of each chemotherapy cycle., Disp: 4 tablet, Rfl: 11    ondansetron (ZOFRAN) 4 MG tablet, Take 1 tablet (4 mg total) by mouth every 6 (six) hours as needed for Nausea., Disp: 120 tablet, Rfl: 2    ondansetron (ZOFRAN-ODT) 4 MG TbDL, DISSOLVE ONE TABLET BY MOUTH EVERY 4 TO 6 HOURS AS NEEDED FOR NAUSEA, Disp: , Rfl:     polyethylene glycol (GLYCOLAX) 17 gram/dose powder, Mix 1 capful (17 g) in liquid and drink by mouth once daily., Disp: 510 g, Rfl: 1    potassium chloride (KLOR-CON) 10 MEQ TbSR, Take 1 tablet (10 mEq total) by mouth 2 (two) times daily., Disp: 30 tablet, Rfl: 0    pravastatin (PRAVACHOL) 10 MG tablet, Take 1 tablet by mouth every evening., Disp: , Rfl:     predniSONE (DELTASONE) 5 MG tablet, Take 5 mg by mouth., Disp: , Rfl:     prochlorperazine (COMPAZINE) 5 MG tablet, Take 1 tablet (5 mg total) by mouth every 6 (six) hours as needed for Nausea., Disp: 20 tablet, Rfl: 5    TOUJEO SOLOSTAR U-300 INSULIN 300 unit/mL (1.5 mL) InPn pen, Inject 10 Units into the skin once daily., Disp: , Rfl:     traMADoL (ULTRAM) 50 mg tablet, Take 25 mg by mouth every 6 (six) hours as needed., Disp: , Rfl:     OBJECTIVE:       ROS:  Review of Systems   Constitutional:  Positive for appetite change and fatigue. Negative for unexpected weight change.   HENT:  Negative for mouth sores.    Eyes:  Negative for visual disturbance.   Respiratory:  Negative for cough and shortness of breath.    Cardiovascular:  Negative for chest pain.   Gastrointestinal:  Negative for abdominal pain and diarrhea.   Genitourinary:  Negative for frequency.   Musculoskeletal:  Negative for back pain.   Skin:  Negative for rash.   Neurological:  Positive for numbness. Negative for headaches.   Hematological:  Negative for adenopathy.   Psychiatric/Behavioral:  The patient is not nervous/anxious.        Review of Symptoms      Symptom Assessment (ESAS 0-10  Scale)  Pain:  0  Dyspnea:  0  Anxiety:  0  Nausea:  0  Depression:  0  Anorexia:  0  Fatigue:  0  Insomnia:  0  Restlessness:  0  Agitation:  0       Anxiety:  Is not nervous/anxious    ECOG Performance Status ndGndrndanddndend:nd nd2nd Living Arrangements:  Lives with family    Psychosocial/Cultural:   See Palliative Psychosocial Note: Yes  **Primary  to Follow**  Palliative Care  Consult: Yes     Time-Based Charting:  Yes  Chart Review: 20 minutes  Face to Face: 10 minutes    Total Time Spent: 30 minutes      Advance Care Planning   Advance Directives:     Decision Making:  Patient answered questions  Goals of Care: The patient endorses that what is most important right now is to focus on symptom/pain control    Accordingly, we have decided that the best plan to meet the patient's goals includes continuing with treatment              Physical Exam:  Vitals:    Physical Exam  Constitutional:       General: She is not in acute distress.     Appearance: Normal appearance. She is ill-appearing. She is not toxic-appearing.   Musculoskeletal:      Cervical back: Normal range of motion.   Skin:     Coloration: Skin is not jaundiced or pale.   Neurological:      Mental Status: She is alert and oriented to person, place, and time.   Psychiatric:         Mood and Affect: Mood normal.         Behavior: Behavior normal.         Thought Content: Thought content normal.         Judgment: Judgment normal.         Labs:  CBC:   WBC   Date Value Ref Range Status   01/06/2025 7.24 3.90 - 12.70 K/uL Final   11/18/2024 9.03 3.90 - 12.70 K/uL Final     Hemoglobin   Date Value Ref Range Status   01/06/2025 13.1 12.0 - 16.0 g/dL Final     POC Hematocrit   Date Value Ref Range Status   09/03/2024 27 (L) 36 - 54 %PCV Final     Hematocrit   Date Value Ref Range Status   01/06/2025 38.2 37.0 - 48.5 % Final     MCV   Date Value Ref Range Status   01/06/2025 93 82 - 98 fL Final     Platelets   Date Value Ref Range Status    01/06/2025 351 150 - 450 K/uL Final       LFT:   Lab Results   Component Value Date    AST 14 01/06/2025    ALKPHOS 43 01/06/2025    BILITOT 0.4 01/06/2025       Albumin:   Albumin   Date Value Ref Range Status   01/06/2025 4.2 3.5 - 5.2 g/dL Final     Protein:   Total Protein   Date Value Ref Range Status   01/06/2025 7.4 6.0 - 8.4 g/dL Final       Radiology:  CT abd pelvis with IV contrast 8/19/24:  FINDINGS:     Lower chest: Unremarkable.  Liver: 9 x 7 mm hypodensity in left hepatic lobe (series 5, image 23).  This was present in April 2024 and may represent a cyst.  Gallbladder: Unremarkable.  Biliary: No significant biliary ductal dilatation.  Pancreas: Unremarkable.  Spleen: Unremarkable.  Adrenal glands: Unremarkable.  Kidneys/ureters: Unremarkable.  Bladder: Unremarkable.  Reproductive: Uterus is unremarkable.  Persistent cystic area in the expected location of the right ovary measures approximately 3.4 x 2.5 cm.  Other previously demonstrated cystic lesions in the pelvis are no longer present.  There is some persistent thickening along the left pelvic wall as seen on series 5, image 126.  Gastrointestinal: No bowel obstruction or inflammation. No intraperitoneal free air or significant free fluid.  Retroperitoneum: No adenopathy.  Vascular: Atherosclerotic disease.  No abdominal aortic aneurysm.  Osseous: No acute fracture or aggressive appearing osseous lesion.        Impression:     1.    Significant improvement in the previous cystic lesions in the pelvis.  With some residual in the right pelvis and along the pelvic wall and the left.  2.    Similar appearance of the subcentimeter hypodensity left hepatic lobe which statistically represents a cyst; however, continued attention on follow-up is recommended.    30 minutes of total time spent on the encounter, which includes face to face time and non-face to face time preparing to see the patient (eg, review of tests), Obtaining and/or reviewing separately  obtained history, Documenting clinical information in the electronic or other health record, Independently interpreting results (not separately reported) and communicating results to the patient/family/caregiver, or Care coordination (not separately reported).    Signature: Kristen Blum DO

## 2025-02-12 ENCOUNTER — PATIENT MESSAGE (OUTPATIENT)
Dept: GYNECOLOGIC ONCOLOGY | Facility: CLINIC | Age: 75
End: 2025-02-12
Payer: MEDICARE

## 2025-02-12 ENCOUNTER — OFFICE VISIT (OUTPATIENT)
Dept: PALLIATIVE MEDICINE | Facility: CLINIC | Age: 75
End: 2025-02-12
Payer: MEDICARE

## 2025-02-12 DIAGNOSIS — G25.81 RESTLESS LEG SYNDROME: ICD-10-CM

## 2025-02-12 DIAGNOSIS — T45.1X5A CHEMOTHERAPY-INDUCED PERIPHERAL NEUROPATHY: ICD-10-CM

## 2025-02-12 DIAGNOSIS — R64 CACHEXIA: ICD-10-CM

## 2025-02-12 DIAGNOSIS — G62.0 CHEMOTHERAPY-INDUCED PERIPHERAL NEUROPATHY: ICD-10-CM

## 2025-02-12 DIAGNOSIS — C56.1 MALIGNANT NEOPLASM OF RIGHT OVARY: ICD-10-CM

## 2025-02-12 DIAGNOSIS — C78.6 PERITONEAL CARCINOMATOSIS: ICD-10-CM

## 2025-02-12 DIAGNOSIS — Z51.5 PALLIATIVE CARE BY SPECIALIST: Primary | ICD-10-CM

## 2025-02-13 ENCOUNTER — PATIENT MESSAGE (OUTPATIENT)
Dept: GYNECOLOGIC ONCOLOGY | Facility: CLINIC | Age: 75
End: 2025-02-13
Payer: MEDICARE

## 2025-02-18 ENCOUNTER — OFFICE VISIT (OUTPATIENT)
Dept: GYNECOLOGIC ONCOLOGY | Facility: CLINIC | Age: 75
End: 2025-02-18
Payer: MEDICARE

## 2025-02-18 DIAGNOSIS — C56.1 MALIGNANT NEOPLASM OF RIGHT OVARY: Primary | ICD-10-CM

## 2025-02-18 NOTE — PROGRESS NOTES
The patient location is:  Home ([place])  The chief complaint leading to consultation is: Surveillance Stage IIIC Ovarian Cancer     Visit type: audiovisual    Face to Face time with patient: 10  30 minutes of total time spent on the encounter, which includes face to face time and non-face to face time preparing to see the patient (eg, review of tests), Obtaining and/or reviewing separately obtained history, Documenting clinical information in the electronic or other health record, Independently interpreting results (not separately reported) and communicating results to the patient/family/caregiver, or Care coordination (not separately reported).     Each patient to whom he or she provides medical services by telemedicine is:  (1) informed of the relationship between the physician and patient and the respective role of any other health care provider with respect to management of the patient; and (2) notified that he or she may decline to receive medical services by telemedicine and may withdraw from such care at any time.    Notes:    SUBJECTIVE   Chief complaint:  Ovarian Cancer   History of present Illness:  Bella Meyer is a 74 y.o.  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   She Completed 6 cycles of Carboplatin/ Paclitaxel followed by Ex Lap/FANNY/BSO/OMX/debulking/LAR on 9/3/2024.  normalized following surgery. Received cycle 7 on 10/22/2024 and patient opted to forgo additional cycle.  Entered surveillance.     Doing fairly well and improving over time. Still has leg pain, grateful for Dr. Blum. Tolerating PO. No nausea or vomiting. Normal bowel and bladder function. Gaining strength.     Oncology History   Peritoneal carcinomatosis   2024 Initial Diagnosis    Peritoneal carcinomatosis     2024 -  Chemotherapy    Treatment Summary   Plan Name: OP GYN paclitaxel CARBOplatin (AUC 6)  Q3W  Treatment Goal: Curative  Status: Active  Start Date: 4/30/2024  End Date: 5/20/2025 (Planned)  Provider: Maliha Crawford MD  Chemotherapy: CARBOplatin (PARAPLATIN) 410 mg in sodium chloride 0.9% 326 mL chemo infusion, 410 mg (100 % of original dose 411 mg), Intravenous, Clinic/HOD 1 time, 7 of 17 cycles  Dose modification:   (original dose 411 mg, Cycle 1),   (original dose 379.5 mg, Cycle 2),   (original dose 411 mg, Cycle 3, Reason: Dose not tolerated),   (original dose 452 mg, Cycle 4)  Administration: 410 mg (4/30/2024), 380 mg (5/21/2024), 410 mg (6/11/2024), 450 mg (7/2/2024), 450 mg (7/23/2024), 450 mg (8/13/2024), 450 mg (10/22/2024)  PACLitaxeL (TAXOL) 175 mg/m2 = 276 mg in sodium chloride 0.9% 250 mL chemo infusion, 175 mg/m2 = 276 mg (100 % of original dose 175 mg/m2), Intravenous, Clinic/HOD 1 time, 7 of 17 cycles  Dose modification: 135 mg/m2 (original dose 175 mg/m2, Cycle 1, Reason: MD Discretion), 175 mg/m2 (original dose 175 mg/m2, Cycle 1)  Administration: 276 mg (4/30/2024), 210 mg (5/21/2024), 210 mg (6/11/2024), 210 mg (7/2/2024), 210 mg (7/23/2024), 210 mg (8/13/2024), 210 mg (10/22/2024)     Malignant neoplasm of ovary   4/23/2024 Initial Diagnosis    Malignant neoplasm of ovary  Referred by Dr. Evans for evaluation of a pelvic mass, peritoneal carcinomatosis and elevated .     3/24/24 CT AP- Abnormal inflammatory changes in the lower pelvis associated with the distal sigmoid colon. Tubular or channel-like structure along the posterior margin of the lower descending colon possibly connecting between the sigmoid and rectal regions. Underlying neoplasm and/or fistula not excluded. Mildly enlarged left iliac bifurcation lymph nodes noted. No drainable abscess. There is also abnormal appearance of the descending colon and several small soft tissue nodules in the pericolonic tissues of the descending colon. Malignant neoplasm should be considered.     4/9/24 TVUS- Grossly unremarkable  appearance of the uterus. The left ovary is not seen sonographically. Enlarged right ovary at 6.6 cm with several cysts the largest 3.4 cm with some thin septations and blood flow to the ovary. Trace fluid adjacent to the ovary. No gross ascitic fluid.     4/9/24 - 188    4/18/24 CT CAP- Diffuse peritoneal carcinomatosis. Right adnexal mass (6 cm) which could be the primary neoplasm/ovarian cancer. Sigmoid colon thickening which could be the primary neoplasm/colon cancer.   Index implant left anterior abdomen 2 cm.   Index left pelvic sidewall implant 1.5 cm.    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          4/26/2024 Tumor Markers    4/26/24 - 373     4/30/2024 Cancer Staged    Staging form: Ovary, Fallopian Tube, and Primary Peritoneal Carcinoma, AJCC 8th Edition  - Clinical: FIGO Stage IIIC (cT3c, cNX, cM0)     5/22/2024 Pathology Significant Finding       CARIS- FOLR1 3+, ER positive, HER 2 negative/1+, HRD negative, PDL 1 positivE (CPS=3), p53 positive, BRCA negative      5/29/2024 Genetic Testing    GERMLINE - NEGATIVE  , BRCA 2 VUS      6/20/2024 Imaging Significant Findings    6/20/24 CT CAP  Mild interval enlargement of the bilateral ovarian cystic masses however, there is interval decrease in size and number of omental implants/metastasis.  Positive pulmonary emboli within the right lower lobe segmental branches, nonocclusive.  No right heart strain.  Persistent sigmoid wall thickening.     8/12/2024 Tumor Markers    =30     9/3/2024 Surgery    Ex Lap, FANNY, BSO, OMX, Debulking, LAR, ileocolectomy     FINDINGS:  At case conclusion, there was no gross residual disease (RD=0mm).   PATHOLOGY:   1. ABDOMINAL WALL FAT, EXCISION:  - Negative for malignancy.  - Benign fibroadipose tisue with scar.    2. OMENTUM, OMENTECTOMY:  - Positive for malignancy.  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  - Size of largest nodule: 10  MM.    3. 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  - Unremarkable fallopian tube and fimbriated end.  - Tumor involves ovarian capsule.  - Tumor extends to pericolonic fat without mucosal involvement by tumor.  - Benign small bowel, negative for tumor.    4. 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  - Size of tumor: 3.5 CM.  - Tumor involves left tubal serosa.  - Tubal high-grade serous carcinoma involves left ovarian capsule.    UTERUS:  - Negative for tumor.  - Complex endometrial hyperplasia without atypia.  - Benign endometrial polyp.  - Background inactive endometrium.  - Serosal adhesions.    CERVIX:  - Negative for tumor.    - Benign cervix with reactive changes and paracervical adhesions.    5. COLON, SIGMOID, EXCISION:  - High-grade serous carcinoma of tubal origin involving pericolonic fat.  - Colonic mucosa is negative for tumor.    6. COLON, ANASTOMOTIC RINGS, EXCISION:  - Negative for tumor.  - Focal mucosal changes suggestive of ischemic changes are present.      10/14/2024 Tumor Markers    -15     11/18/2024 Imaging Significant Findings    POST TREATMENT CT CAP  : NO EVIDENCE OF DISEASE      11/18/2024 Tumor Markers    =63     11/19/2024 Remission    Patient is in remission as of 11/19/2024  .        2/17/2025 Tumor Markers    =10     2/17/2025 Imaging Significant Findings    CT CAP: LORI        Review of Systems - Oncology   OBJECTIVE     There were no vitals taken for this visit.    Physical Exam  Constitutional:       General: She is not in acute distress.     Appearance: Normal appearance. She is not ill-appearing, toxic-appearing or diaphoretic.   Pulmonary:      Effort: Pulmonary effort is normal.   Neurological:       General: No focal deficit present.      Mental Status: She is alert and oriented to person, place, and time.   Psychiatric:         Mood and Affect: Mood normal.         Behavior: Behavior normal.       Lab Results   Component Value Date/Time     10.0 02/17/2025 08:49 AM     12 01/06/2025 03:01 PM      ASSESSMENT AND PLAN   1. Malignant neoplasm of right ovary    Remains without evidence of disease by tumor marker or imaging.  Discussed signs and symptoms of recurrence and when to contact us between visits.  Continue palliative care.  Plan for repeat labs (CBC, CMP, ) and virtual surveillance visit in 3 months. Will obtain imaging if concerns based on symptoms, tumor marker or exam. She understands.       Maliha Crawford MD  Gynecologic Oncology     ONGOING COMPLEXITY BILLING  Visit today is associated with current or anticipated ongoing medical care related to this patients single serious condition/complex condition: Ovarian Cancer

## 2025-03-11 ENCOUNTER — OFFICE VISIT (OUTPATIENT)
Dept: PHYSICAL MEDICINE AND REHAB | Facility: CLINIC | Age: 75
End: 2025-03-11
Payer: MEDICARE

## 2025-03-11 VITALS
WEIGHT: 100.5 LBS | BODY MASS INDEX: 18.99 KG/M2 | HEART RATE: 101 BPM | DIASTOLIC BLOOD PRESSURE: 70 MMHG | SYSTOLIC BLOOD PRESSURE: 153 MMHG

## 2025-03-11 DIAGNOSIS — G89.29 CHRONIC PAIN OF LEFT KNEE: ICD-10-CM

## 2025-03-11 DIAGNOSIS — M79.651 PAIN OF RIGHT THIGH: ICD-10-CM

## 2025-03-11 DIAGNOSIS — G89.3 CANCER RELATED PAIN: ICD-10-CM

## 2025-03-11 DIAGNOSIS — M79.652 PAIN OF LEFT THIGH: ICD-10-CM

## 2025-03-11 DIAGNOSIS — G89.29 CHRONIC PAIN OF RIGHT KNEE: Primary | ICD-10-CM

## 2025-03-11 DIAGNOSIS — M25.561 CHRONIC PAIN OF RIGHT KNEE: Primary | ICD-10-CM

## 2025-03-11 DIAGNOSIS — M25.562 CHRONIC PAIN OF LEFT KNEE: ICD-10-CM

## 2025-03-11 PROCEDURE — 64640 INJECTION TREATMENT OF NERVE: CPT | Mod: PBBFAC,PO | Performed by: PHYSICAL MEDICINE & REHABILITATION

## 2025-03-11 PROCEDURE — 99204 OFFICE O/P NEW MOD 45 MIN: CPT | Mod: S$PBB,,, | Performed by: PHYSICAL MEDICINE & REHABILITATION

## 2025-03-11 PROCEDURE — 99999 PR PBB SHADOW E&M-EST. PATIENT-LVL III: CPT | Mod: PBBFAC,,, | Performed by: PHYSICAL MEDICINE & REHABILITATION

## 2025-03-11 PROCEDURE — 99213 OFFICE O/P EST LOW 20 MIN: CPT | Mod: PBBFAC,PO | Performed by: PHYSICAL MEDICINE & REHABILITATION

## 2025-03-11 PROCEDURE — 76942 ECHO GUIDE FOR BIOPSY: CPT | Mod: 26,S$PBB,, | Performed by: PHYSICAL MEDICINE & REHABILITATION

## 2025-03-11 PROCEDURE — 76942 ECHO GUIDE FOR BIOPSY: CPT | Mod: PBBFAC,PO | Performed by: PHYSICAL MEDICINE & REHABILITATION

## 2025-03-11 PROCEDURE — 64640 INJECTION TREATMENT OF NERVE: CPT | Mod: S$PBB,RT,, | Performed by: PHYSICAL MEDICINE & REHABILITATION

## 2025-03-11 NOTE — PROGRESS NOTES
[facility]    Consulting Provider: Dr. Kristen Blum    CHIEF COMPLAINT:   Chief Complaint   Patient presents with    Leg Pain     Pt is experiencing pain in both legs, the pain started after having chemo.        HISTORY OF PRESENT ILLNESS: Bella Meyer is a 74 y.o. female who presents to me for evaluation and management of bilateral thigh/knee pain.     Today reports, chronic bilateral anterior thigh/knee pain which started after chemotherapy treatment for her ovarian cancer. She has since stopped her chemo however still with severe anterior thigh/knee pain which limits her ability to ambulate. Utilizing MWC and walker limited to household due to her pain. Has noted numbness to feet which she believes is neuropathy but that his not painful like her thighs. The right pain is worse than the left at this time and wants to focus on management of this today.     Medications: chronic opioids which brings her pain from a 9/10 to a 1/10  Injections: none  Procedures: none    Review of Systems   Constitutional: Negative for fever.   HENT: Negative for drooling.    Eyes: Negative for discharge.   Respiratory: Negative for choking.    Cardiovascular: Negative for chest pain.   Genitourinary: Negative for flank pain.   Skin: Negative for wound.   Neurological: Negative for tremors and syncope.   Psychiatric/Behavioral: Negative for behavioral problems.     Past Medical History:   Past Medical History:   Diagnosis Date    Cancer     Diabetes mellitus, type 2     Hyperlipidemia     Hypertension     Hypothyroidism     Pulmonary embolism        Past Surgical History:   Past Surgical History:   Procedure Laterality Date    BILATERAL SALPINGO-OOPHORECTOMY (BSO) N/A 9/3/2024    Procedure: SALPINGO-OOPHORECTOMY, BILATERAL;  Surgeon: Maliha Crawford MD;  Location: [facility] OR [address] [place] [address];  Service: Gynecology Oncology;  Laterality: N/A;    DEBULKING OF TUMOR N/A 9/3/2024    Procedure: DEBULKING,  NEOPLASM;  Surgeon: Maliha Crawford MD;  Location: [facility] OR 2ND FLR;  Service: Gynecology Oncology;  Laterality: N/A;    FLEXIBLE SIGMOIDOSCOPY N/A 9/3/2024    Procedure: SIGMOIDOSCOPY, FLEXIBLE;  Surgeon: Ruby Caicedo MD;  Location: [facility] OR 2ND FLR;  Service: Colon and Rectal;  Laterality: N/A;    ILEOCOLECTOMY N/A 9/3/2024    Procedure: ILEOCOLECTOMY;  Surgeon: Ruby Caicedo MD;  Location: [facility] OR 2ND FLR;  Service: Colon and Rectal;  Laterality: N/A;    LAPAROTOMY, EXPLORATORY N/A 9/3/2024    Procedure: LAPAROTOMY, EXPLORATORY;  Surgeon: Maliha Crawford MD;  Location: [facility] OR 2ND FLR;  Service: Gynecology Oncology;  Laterality: N/A;  4 hr case    LOW ANTERIOR RESECTION OF COLON N/A 9/3/2024    Procedure: RESECTION, COLON, LOW ANTERIOR;  Surgeon: Ruby Caicedo MD;  Location: [facility] OR 2ND FLR;  Service: Colon and Rectal;  Laterality: N/A;  4 hr case    LYSIS OF ADHESIONS OF URETER Bilateral 9/3/2024    Procedure: URETEROLYSIS;  Surgeon: Maliha Crawford MD;  Location: [facility] OR 2ND FLR;  Service: Gynecology Oncology;  Laterality: Bilateral;    MOBILIZATION OF SPLENIC FLEXURE  9/3/2024    Procedure: MOBILIZATION, SPLENIC FLEXURE;  Surgeon: Ruby Caicedo MD;  Location: [facility] OR 2ND FLR;  Service: Colon and Rectal;;    OMENTECTOMY N/A 9/3/2024    Procedure: OMENTECTOMY;  Surgeon: Maliha Crawford MD;  Location: [facility] OR [facility] FLR;  Service: Gynecology Oncology;  Laterality: N/A;    TOTAL ABDOMINAL HYSTERECTOMY N/A 9/3/2024    Procedure: HYSTERECTOMY, TOTAL, ABDOMINAL;  Surgeon: Maliha Crawford MD;  Location: [facility] OR 2ND FLR;  Service: Gynecology Oncology;  Laterality: N/A;    TUBAL LIGATION      at age 32's       Family History:   Family History   Problem Relation Name Age of Onset    Cancer Brother      Breast cancer Neg Hx      Colon cancer Neg Hx      Ovarian cancer Neg Hx      Uterine cancer Neg Hx      Cervical cancer Neg Hx         Medications: Medications Ordered Prior to Encounter[1]    Allergies: Review of  patient's allergies indicates:  No Known Allergies    Social History:   Social History     Socioeconomic History    Marital status:    Tobacco Use    Smoking status: Never     Passive exposure: Past    Smokeless tobacco: Never   Substance and Sexual Activity    Alcohol use: Never    Drug use: Never    Sexual activity: Yes     Partners: Male     Birth control/protection: Post-menopausal     Social Drivers of Health     Financial Resource Strain: Patient Unable To Answer (9/4/2024)    Overall Financial Resource Strain (CARDIA)     Difficulty of Paying Living Expenses: Patient unable to answer   Food Insecurity: Patient Unable To Answer (9/4/2024)    Hunger Vital Sign     Worried About Running Out of Food in the Last Year: Patient unable to answer     Ran Out of Food in the Last Year: Patient unable to answer   Transportation Needs: Patient Unable To Answer (9/4/2024)    TRANSPORTATION NEEDS     Transportation : Patient unable to answer   Physical Activity: Inactive (7/5/2024)    Exercise Vital Sign     Days of Exercise per Week: 0 days     Minutes of Exercise per Session: 20 min   Stress: Patient Unable To Answer (9/4/2024)    [employer] - Occupational Stress Questionnaire     Feeling of Stress : Patient unable to answer   Housing Stability: Patient Unable To Answer (9/4/2024)    Housing Stability Vital Sign     Unable to Pay for Housing in the Last Year: Patient unable to answer     Homeless in the Last Year: Patient unable to answer       PHYSICAL EXAMINATION:   Vitals:    03/11/25 1314   BP: (!) 153/70   BP Location: Right forearm   Patient Position: Sitting   Pulse: 101   Weight: 45.6 kg (100 lb 8.5 oz)     Constitutional: No apparent distress. Pleasant. Arrives in [facility]  HENT: Trachea midline.  Head: Normocephalic and atraumatic.   Eyes: Right eye exhibits no discharge. Left eye exhibits no discharge. No scleral icterus.   CV: Well perfused.   Pulmonary/Chest: Effort  normal. No respiratory distress.   Abdominal: There is no guarding.   Neurological: Awake, alert and cooperative.  SKIN: Intact no apparent lesions, cut, ulcers or abrasions  EXT:  No cyanosis, clubbing, or edema.  SENSORY: Intact to light touch in the bilateral lower extemities however diminshed  KNEE EXAMINATION:  MUSCULOSKELETAL:   Muscle Strength:(0-5)                             Right     Left  Hip Flexors                4  4  Knee Extensors          4  4  Knee Flexors              5  5  Ankle Dorsiflex           5  5  Ankle Planterflex        5  5  EHL                            5  5    Reflexes: (0-4+/4)   Right     Left  Patellar(L4)  2+  2+  Ankle(S1)  2+  2+       INSPECTION:                                                                           Right                Left        Localized/Generalized swelling:                     -                       -  Muscle contours normal and symmetrical:     +                      +  Patellas symetrical and level:                         +                      +  Ecchymoses:                                                   -                       -  Erythema:                                                        -                       -  Gross deformity:                                             -                       -     KNEE DEFORMITY:            Right                Left  Normal:                       +                      +  Genu varus:                -                       -  Genu valgum:             -                       -  Genu Recurvatum:     -                       -     RANGE OF MOTION:           Right                Left  Flexion:                       Full                  Full        Extension:                   Full                  Full  Other:      TENDERNESS:                        Right                Left  Medial Tibial Plateau:             -                       -  Lateral Tibial Plateau:             -                        -  Medial Femoral Condyle:        -                       -  Lateral Femoral Condyle:       -                       -  Head of the Fibula:                 -                       -  Medial joint line:                      +                       -  Lateral joint line:                      -                       -  Patella:                                    -                       -  Medial collateral lig:                -                       -  Lateral collateral lig:                -                       -  Pes Anserine:                         -                       -  Other: pain to palpation over bilateral anterior thigh right > left     SOFT TISSUE PALPATION:                                       Right                Left  Baker's cyst:                -                       -             Effusion:                      -                       -  Ballottement:               -                       -  Other:                          -                       -     IMAGING:  No pertinent imaging to review at this time.     Data Reviewed: X-ray  Supportive Actions: Independent visualization of images or test specimens.    ASSESSMENT:   1. Chronic pain of right knee    2. Pain of right thigh    3. Chronic pain of left knee    4. Pain of left thigh    5. Cancer related pain        PLAN:   1. Time was spent reviewing the above diagnosis in depth with Bella today, including acute management and rehabilitation.     2. Physical examination consistent with chronic left > right anterior thigh/knee pain. We discussed options for management of her pain would be to considercryoneurolysis to peripheral nerves (iovera). The use, benefits, risks, and expectations of all of these types of injections was discussed at length with her today. At this time, she elects to proceed with ultrasound-guided right superficial genicular iovera cryoneurolysis (Iovera) injection(s). This procedure was completed today in clinic. Please see  "procedure note for further details. We can repeat this every 3 months if appropriate.     3. Will consider management of left thigh/knee pain with iovera pending her response to the procedure on the right.      RTC for 1 month virtual visit to assess response to right superficial genicular iovera.     This is a consult from Dr. Kristen Blum. Please see the "Communications" section of Epic to see how the consulting physician received the report of today's findings and recommendations. If it's an [facility] provider, it will be forwarded to his/her "in basket".    46 minutes of total time spent on the encounter, which includes face to face time and non-face to face time preparing to see the patient (eg, review of tests), obtaining and/or reviewing separately obtained history, documenting clinical information in the electronic or other health record, independently interpreting results (not separately reported), communicating results to the patient/family/caregiver, and/or care coordination (not separately reported).       IOVERA PROCEDURE NOTE:  CC: right knee pain    74 y.o. Female presents today for Iovera procedure for right knee pain.    Inspection/Palpation:   +Skin warm and dry without open wounds or erythema  -Rubor   -Calor    Procedure Note Iovera:    DATE OF PROCEDURE:  03/11/2025    PREOPERATIVE DIAGNOSIS: right knee pain.     POSTOPERATIVE DIAGNOSIS: right knee pain.     PROCEDURE: Iovera treatment of anterior femoral cutaneous nerve, lateral femoral cutaneous nerve, medial femoral cutaneous nerve, and superior and inferior branches of infrapatellar saphenous nerve using 4 different punctures to treat all 5 nerves. (cpt 64640 x5)    COMPLICATIONS: None.     IMPLANTS:  None    ESTIMATED BLOOD LOSS:  < 5cc    SPECIMENS REMOVED:  None    ANESTHESIA: 10cc Local lidocaine without epinephrine    INDICATIONS FOR PROCEDURE: This is a 74 y.o. female with longstanding knee pain. They have failed non operative management " including injections. I discussed a treatment therapy called Iovera, which is cryotherapy, to provide symptomatic relief along the sensory distribution of the infrapatellar tendon branch of the saphenous nerve  and FCN. The patient was given patient information and literature to review prior to the procedure as well.  Based on this, the patient agreed to move forward with doing the procedure.    Time was spent discussing the cryoanalgesia procedure Iovera with the patient.  Risks and benefits were also discussed with patient.  X-rays were reviewed to use as a diagram to explain procedure. A detailed description of landmarks and placement of anesthetic used during procedure were also explained to patient.  Contraindications for procedure were discussed with patient.    CONSENT:  Verbal consent was obtained from the patient.     PROCEDURE:    A surgical time out was performed, including verification of patient ID, procedure to be performed, site and side, availability of information and equipment, review of safety issues, and the patients agreement and consent.  The patient was placed supine on the exam table and the proximal medial aspect of the right tibia and anterior aspect of distal femur was prepped with sterile chlorhexidine solution. Ultrasound was used to visualize and map out the targeted nerves. We then infiltrated the skin with lidocaine without epinephrine using a 25g needle. We then hydrodissected the nerve with 1cc lidocaine without epinephrine using a 22g spinal needle under ultrasound guidance. We then introduced the Iovera device using ultrasound guidance and this device penetrated the skin, creating cryotherapy to the superior and inferior branches of the infrapatellar saphenous nerve, a third treatment to the medial femoral cutaneous nerve, a fourth treatment to the anterior femoral cutaneous nerve, and fifth treatment to the lateral femoral cutaneous nerve. 4 punctures of the skin were made to  treat the superior and inferior branches of the ISN, the MFCN, the AFCN and LFCN. There were a total of 5 nerves treated with iovera. The patient tolerated the procedure well with no problems.    ASSESSMENT:      ICD-10-CM ICD-9-CM   1. Chronic pain of right knee  M25.561 719.46    G89.29 338.29   2. Pain of right thigh  M79.651 729.5   3. Chronic pain of left knee  M25.562 719.46    G89.29 338.29   4. Pain of left thigh  M79.652 729.5   5. Cancer related pain  G89.3 338.3     PLAN:  Post-procedure instructions given.     All questions were answered to the best of my ability and all concerns were addressed at this time.       [1]   Current Outpatient Medications on File Prior to Visit   Medication Sig Dispense Refill    acetaminophen (TYLENOL) 500 MG tablet Take 2 tablets (1,000 mg total) by mouth every 6 (six) hours. 30 tablet 3    amLODIPine (NORVASC) 5 MG tablet Take 1 tablet by mouth every morning.      apixaban (ELIQUIS) 5 mg Tab Take 1 tablet (5 mg total) by mouth 2 (two) times daily. 60 tablet 2    blood-glucose meter,continuous (DEXCOM G7 ) Misc Use daily      blood-glucose sensor (DEXCOM G7 SENSOR) Charito Use every 10 days      dexAMETHasone (DECADRON) 4 MG Tab Take 8 mg (2 tablets) by mouth daily on days 2-4 of each chemotherapy cycle. 6 tablet 11    EScitalopram oxalate (LEXAPRO) 10 MG tablet Take 1 tablet (10 mg total) by mouth once daily. 90 tablet 3    gabapentin (NEURONTIN) 100 MG capsule Take 3 capsules (300 mg total) by mouth every evening for 7 days, THEN 4 capsules (400 mg total) every evening for 23 days. 113 capsule 0    HYDROcodone-acetaminophen (NORCO)  mg per tablet Take 1 tablet by mouth every 6 (six) hours as needed for Pain. 120 tablet 0    ibuprofen (ADVIL,MOTRIN) 600 MG tablet Take 1 tablet every 6 hours. Alternate between ibuprofen & tylenol every 3 hours. For example: @0800: ibuprofen 600mg @1100: tylenol 1000mg @1400: ibuprofen 600mg @1700: tylenol 1000 mg @2000: ibuprofen  600mg. Can take two of the 300 mg over the counter ibuprofen is smaller pill is easier. 30 tablet 3    levothyroxine (SYNTHROID) 75 MCG tablet Take 1 tablet by mouth every morning.      LYUMJEV KWIKPEN U-100 INSULIN 100 unit/mL pen Inject 2-10 Units into the skin 3 (three) times daily with meals.      magnesium oxide (MAGOX) 400 mg (241.3 mg magnesium) tablet Take 1 tablet (400 mg total) by mouth once daily. 200 tablet 1    metFORMIN (GLUCOPHAGE) 1000 MG tablet Take 1,000 mg by mouth 2 (two) times daily with meals.      metoprolol succinate (TOPROL-XL) 50 MG 24 hr tablet Take 1 tablet by mouth every morning.      mirtazapine (REMERON) 7.5 MG Tab Take 7.5 mg by mouth every evening.      naloxone (NARCAN) 4 mg/actuation Spry 1 spray (4 mg total) by Nasal route 1 (one) time if needed (for emergency opioid reversal). 1 each 0    OLANZapine (ZYPREXA) 5 MG tablet Take 1 tablet by mouth nightly on days 1-4 of each chemotherapy cycle. 4 tablet 11    ondansetron (ZOFRAN) 4 MG tablet Take 1 tablet (4 mg total) by mouth every 6 (six) hours as needed for Nausea. 120 tablet 2    ondansetron (ZOFRAN-ODT) 4 MG TbDL DISSOLVE ONE TABLET BY MOUTH EVERY 4 TO 6 HOURS AS NEEDED FOR NAUSEA      polyethylene glycol (GLYCOLAX) 17 gram/dose powder Mix 1 capful (17 g) in liquid and drink by mouth once daily. 510 g 1    potassium chloride (KLOR-CON) 10 MEQ TbSR Take 1 tablet (10 mEq total) by mouth 2 (two) times daily. 30 tablet 0    pravastatin (PRAVACHOL) 10 MG tablet Take 1 tablet by mouth every evening.      predniSONE (DELTASONE) 5 MG tablet Take 5 mg by mouth.      prochlorperazine (COMPAZINE) 5 MG tablet Take 1 tablet (5 mg total) by mouth every 6 (six) hours as needed for Nausea. 20 tablet 5    TOUJEO SOLOSTAR U-300 INSULIN 300 unit/mL (1.5 mL) InPn pen Inject 10 Units into the skin once daily.       No current facility-administered medications on file prior to visit.

## 2025-03-12 ENCOUNTER — OFFICE VISIT (OUTPATIENT)
Dept: PALLIATIVE MEDICINE | Facility: CLINIC | Age: 75
End: 2025-03-12
Payer: MEDICARE

## 2025-03-12 DIAGNOSIS — C78.6 PERITONEAL CARCINOMATOSIS: ICD-10-CM

## 2025-03-12 DIAGNOSIS — C56.1 MALIGNANT NEOPLASM OF RIGHT OVARY: ICD-10-CM

## 2025-03-12 DIAGNOSIS — G62.0 CHEMOTHERAPY-INDUCED PERIPHERAL NEUROPATHY: ICD-10-CM

## 2025-03-12 DIAGNOSIS — T45.1X5A CHEMOTHERAPY-INDUCED PERIPHERAL NEUROPATHY: ICD-10-CM

## 2025-03-12 DIAGNOSIS — R64 CACHEXIA: ICD-10-CM

## 2025-03-12 DIAGNOSIS — Z51.5 PALLIATIVE CARE BY SPECIALIST: Primary | ICD-10-CM

## 2025-03-12 DIAGNOSIS — G89.3 CANCER RELATED PAIN: ICD-10-CM

## 2025-03-12 DIAGNOSIS — G25.81 RESTLESS LEG SYNDROME: ICD-10-CM

## 2025-03-12 RX ORDER — MORPHINE SULFATE 15 MG/1
15 TABLET ORAL EVERY 6 HOURS PRN
Qty: 30 TABLET | Refills: 0 | Status: SHIPPED | OUTPATIENT
Start: 2025-03-12

## 2025-03-12 NOTE — PROGRESS NOTES
The patient location is: [place], [place]    Visit type: audiovisual    Face to Face time with patient: 10  20 minutes of total time spent on the encounter, which includes face to face time and non-face to face time preparing to see the patient (eg, review of tests), Obtaining and/or reviewing separately obtained history, Documenting clinical information in the electronic or other health record, Independently interpreting results (not separately reported) and communicating results to the patient/family/caregiver, or Care coordination (not separately reported).         Each patient to whom he or she provides medical services by telemedicine is:  (1) informed of the relationship between the physician and patient and the respective role of any other health care provider with respect to management of the patient; and (2) notified that he or she may decline to receive medical services by telemedicine and may withdraw from such care at any time.    Notes:     Office Visit  [facility]      Reason for Consult: pain and symptom management      ASSESSMENT/PLAN:     Plan/Recommendations:  Diagnoses and all orders for this visit:    Palliative care by specialist    Malignant neoplasm of right ovary  -     morphine (MSIR) 15 MG tablet; Take 1 tablet (15 mg total) by mouth every 6 (six) hours as needed for Pain.    Cancer related pain  -     morphine (MSIR) 15 MG tablet; Take 1 tablet (15 mg total) by mouth every 6 (six) hours as needed for Pain.    Peritoneal carcinomatosis    Restless leg syndrome    Chemotherapy-induced peripheral neuropathy    Cachexia                # Stage III Ovarian Cancer  # Palliative care by specialist  # Peritoneal carcinomatosis  Completed 6 cycles of Carboplatin/ Paclitaxel followed by Ex Lap/FANNY/BSO/OMX/debulking/LAR on 9/3/2024.  Currently on surveillance with Niraparib per gyn onc.   - Follow up gyn onc    # Restless leg syndrome  # Leg pain  Patient with throbbing bilateral thigh  pain. No corresponding imaging present. Low concern for bone metastasis given bilateral location. Reports history of restless leg syndrome. Improvement with initiation of magnesium supplements. Not adherent to Mirapex due to sedation. Open to trial of pregabalin for now. Will uptitrate as tolerated otherwise will consider ropinirole. Encouraged further evaluation of leg pain with referral and further imaging with GP. Also encouraged full GP evaluation for other causes of leg pain including vitamin deficiencies and general check up including complete lab workup now that cancer is under control thankfully. Previously had myopathy with atorvastatin however now on pravastatin which is preferred and not likely to be the cause of leg pain. Now intermittently using morphine and hydrocodone. Improved pain with iovera, able to ambulate.  - Continue Norco 10-325mg PO q6h prn (discussed max 3g/day of APAP)  - Continue miralax prn for bowel regimen  - Continue Magnesium oxide 400mg PO BID scheduled (warned of diarrhea)  - Continue MSIR 15mg PO q12h prn  - Follow up PMR  - Continue Lyrica 50mg PO BID scheduled    # Chemotherapy induced peripheral neuropathy  Continued, weaned herself off of gabapentin and PCP switched over to Lyrica with minimal relief.   - Continue Lyrica 50mg PO BID scheduled    # Cancer cachexia  BMI 18<19<21. Poor appetite. Emesis after eating 2-3 bites. Could be a mechanical issue. Would like further eval with speech evaluation.  - Follow up nutrition    # Abdominal cramping  Morning abdominal cramping improved with bowel movements. Some improvement in pain with Bentyl.  - Continue Bentyl 10mg PO BID PRN  - Encouraged hydration, laxative use for more consistently regular bowel movements    Follow up: 1 month      SUBJECTIVE:     History of Present Illness:  Patient is a 74 y.o. year old female presenting with recent diagnosis of Stage IIIC ovarian cancer. She is a patient of Dr. Maliha Crawford. She presents  "via virtual visit for follow up.    3/12/25:  Saw Dr. Borja yesterday had some nerves frozen. Legs feeling better. Denies numbness. Able to walk. Insertion site tenderness. Took Tylenol for achiness. Right leg has been worse than the left. Should see results within 3-6 months. Still using walker but now able to walk without as much pain. Still taking Lyrica.     2/12/25:  Increased strength with PT. Switched to pregabalin 50mg twice per day. Stopped gabapentin. Started pregabalin two weeks ago with no relief. Still having trouble walking. Still weak.     Alternating hydrocodone and morphine very infrequently. Cuts the pain down but does not stop it. "As soon as it wears off it comes back again".     Was constipated but miralax helped.     Losing weight from not eating.     1/24/25:  Bottom half of calves and toes included in leg pain now  Stopped lyrica and gabapentin not working  Just using aleve   On way to therapy. Went three times   Therapist thinks hip may be misaligned  Going to xray hip   Using Norco every now and then     Only taking 200mg gabapentin at bedtime    Appetite getting better   Walking is getting better. Needs walker though.    12/18/24:  Still has leg pain. Has had further evaluation. Walking with a walker. Taking Lyrica at night. Feels no difference in pain. Taking hydrocodone which is helping pain. Not as bad now.     Appetite is still poor.     Discussed at length with patient's daughter Eugenia with patient's permission. Eugenia states that she is taking pain medication sparingly and is getting weaker. She states that the patient will not move to live with her and will demand to stay with her  in their home. She states that she does not think that the patient will take recommendations from another doctor.     12/3/24:  Has been taking Mirapex once per day but still has leg pain despite this. Using a walker. Having trouble walking. Discussed avenues for further evaluation including " "imaging and referral. Continued poor appetite. Eats a few bites and then coughs and vomits. Gags after 2-3 bites. Happening for 3-4 days.     Wakes up with stomach pain. Taking Bentyl 10mg which helps but pain comes back. Having mostly regular bowel movements.    Discussed with patient's daughter Eugenia at length. She states Ms Blanco is not adherent to Mirapex due to sedation. Discussed utility of pregabalin versus ropinirole.     11/5/24:  Moving around slowly. Pain is still present. Legs "are killing me". Legs are weak. Still using a walker to get around.     Reports throbbing pain in legs. Can't find anything to ease it except Norco. Nothing helps but Norco. Only lasts 5 hours. Its in top part of legs in thighs. Feels neuropathy is better, less numbness/tingling.     Pain is better with walking. Hurt so bad she does not feel she wants to walk.     Has a history of restless leg syndrome in the past. Treated that with over the counter in the past and started magnesium oxide recently. Felt magnesium helped if she takes it every day. Feel it could be restless leg syndrome. If she misses magnesium she has unbearable pain. PCP wants to monitor labs with magnesium twice per day if scheduled and not as needed.     Cannot take benadryl due to flaring up legs.     Taking Bentyl for cramps. Felt capsule form worked better at 10mg.     10/9/24:  A little stronger since going to rehab. Legs are still weak. Aching. Was at rehab for 2 weeks. Walking well with a walker now. Appetite is poor but she is eating more than before. For pain, she is taking tramadol 50mg twice per day. Does not feel like it is helping her pain. Wants to try [facility] again. Daughter states it is hard to do rehab because of pain.     Has abdominal pain and is taking bentyl for it.     Seeing Dr. Crawford soon.     9/18/24:  Pain is better but still feels tired. Feeling weak as well and fell. Fell three times yesterday and hit her head. Was trying to get out " of bed to get to bedside commode and lost balance. If she gets a small stomach pain she takes tylenol and it works well.     Sister reports was not eating due to drowsiness with taking oxycodone. Lead to weight loss. This occurred in the hospital. This lead to choice to avoid oxycodone 5 and 10mg.     Concern for withdrawal while in the hospital including clamminess.     Having a bowel movement helped. Think she may be lactose intolerant.     Considering admission to acute rehab facility near their home for more intensive rehab and nutrition.    Has home health.    Taking two tylenol twice per day. Not taking ibuprofen or gabapentin at this time.     Biggest concern is eating.       9/10/24:    Alternating tylenol and ibuprofen every 3 hours. Cannot sleep. Feeling weak after surgery. Blood sugar has been going all over the place due to poor oral intake. Holding gabapentin because it is making her too drowsy.     Family feels she needs more protein to gain strength. Has home health now. Previously taking oxycodone on empty stomach. Taking zofran for nausea. Requesting zofran that does not dissolve.    For pain, feel that tylenol and ibuprofen has been helping well for pain. Needs a refill for oxycodone 5mg. Using sparingly twice per day usually.     Having easy bowel movements with miralax.     8/20/24:  Feels neuropathy is the worst and wants to increase gabapentin dose. Neuropathy is in right thigh down into leg. Describes as throbbing pain.    Went up to 200mg at night starting last night.     The patient's sister was also present during the encounter and raised concerns regarding constipation and not wanting to have some any medications related to the colon prior to surgery.  We discussed the high probability of constipation with hydrocodone and with oxycodone use.  She has been using laxatives which they are fearful of.  Discussed nonpharmacological options including increased water intake and prune juice  consumption for more natural ways of preventing constipation.  The patient has found greater benefit from oxycodone at this time.  And has been taking Tylenol intermittently for pain as well.  They are aware to stay less than 2000 mg per day.  They are anticipating surgery soon and are aware that pain management needs may change postoperatively.    8/13/24:  Pain right now is a 5-6/10. Talked to Dr. Crawford about gabapentin.  got frustrated on morphine 30mg due to patient due to sleeping a lot and feeling foggy. Delay in answering. Weaned off of morphine. CT scan is Monday to rule out kidney stone. Taking oxycodone every 4-6 hours.     Working on constipation at night even though she's not constipated.     7/12/24:  Currently using Norco 2-3 times per day. Got scared of morphine so she stopped taking it and requested Norco reinitiation prior to this appointment. Discussed use of morphine and stereotyped fears. Having daily bms. Saw colorectal surgery, planning surgery for September. Delayed due to recent clot.    She endorses numbness and tingling of her hands and feet. Discussed neuropathy. She is open to try treatment.    6/12/24: She states she is doing really well. She had her treatment yesterday and tolerated it well. No pain at all after being on the half tablet of the morphine. She denies any breakthrough at all. She has been able to resume her housework. She feels almost back to normal. She gets tired. Takes a nap once a day. She is having daily bowel movements. She takes senna daily.    5/29/24:Overall pain is a little better. She feels stronger pain pill is lasting a bit longer and quicker onset. Still having pain though. She takes pain medication every 4-5 hours.     She plans to start PT. Her PCP is managing her care overall but her and her family are happy to keep all pain medications with palliative care. She is currently still in [place] with family.     She is having bowel movements every  "day. She is taking senna everyday.     5/15/24:  Pain has worsened. She has had excruciating abdominal pain. She could not sleep after the pain medication took effect. Then had sweats. She has been taking the Norco 5mg every 6 hours but it takes 45-60 minutes to take effect.     Has started taking two senna s to prevent constipation. Started this yesterday. She had a good BM today.     Current meds for nausea have been helping.     Very groggy days after chemotherapy.       Initial visit:    Introduced the role of palliative care including symptom management and advance care planning through the cancer journey.    She reports pain rated 10/10. When she takes a pain pill it drops down to a 4-5/10. She takes hydrocodone. Pain is located in her right side of the abdomen. Describes this pain as shooting and throbbing.     She reports daily bowel movements. Denies nausea, vomiting. She had her first infusion yesterday. She had no side effects.     Her daughter states that her mother hates pain medications. Family has noted grunting. Family has noticed movement would exacerbate pain. Would be hard to find a comfortable position to sit in. Patient wants to "take the edge off" and rest comfortably. Benadryl knocks her out.     The increased dose of hydrocodone at 7.5 helped better. She takes one dose in the morning and one at night. She sometimes take an additional dose if she takes a long car ride.     She does not feel strong. She was in the recliner all day.       Past Medical History:   Diagnosis Date    Cancer     Diabetes mellitus, type 2     Hyperlipidemia     Hypertension     Hypothyroidism     Pulmonary embolism      Past Surgical History:   Procedure Laterality Date    BILATERAL SALPINGO-OOPHORECTOMY (BSO) N/A 9/3/2024    Procedure: SALPINGO-OOPHORECTOMY, BILATERAL;  Surgeon: Maliha Crawford MD;  Location: [facility] OR [address] [place] [address];  Service: Gynecology Oncology;  Laterality: N/A;    DEBULKING OF TUMOR N/A 9/3/2024    " Procedure: DEBULKING, NEOPLASM;  Surgeon: Maliha Crawford MD;  Location: [facility] OR 2ND FLR;  Service: Gynecology Oncology;  Laterality: N/A;    FLEXIBLE SIGMOIDOSCOPY N/A 9/3/2024    Procedure: SIGMOIDOSCOPY, FLEXIBLE;  Surgeon: Ruby Caicedo MD;  Location: [facility] OR 2ND FLR;  Service: Colon and Rectal;  Laterality: N/A;    ILEOCOLECTOMY N/A 9/3/2024    Procedure: ILEOCOLECTOMY;  Surgeon: Ruby Caicedo MD;  Location: [facility] OR 2ND FLR;  Service: Colon and Rectal;  Laterality: N/A;    LAPAROTOMY, EXPLORATORY N/A 9/3/2024    Procedure: LAPAROTOMY, EXPLORATORY;  Surgeon: Maliha Crawford MD;  Location: [facility] OR 2ND FLR;  Service: Gynecology Oncology;  Laterality: N/A;  4 hr case    LOW ANTERIOR RESECTION OF COLON N/A 9/3/2024    Procedure: RESECTION, COLON, LOW ANTERIOR;  Surgeon: Ruby Caicedo MD;  Location: [facility] OR 2ND FLR;  Service: Colon and Rectal;  Laterality: N/A;  4 hr case    LYSIS OF ADHESIONS OF URETER Bilateral 9/3/2024    Procedure: URETEROLYSIS;  Surgeon: Maliha Crawford MD;  Location: [facility] OR 2ND FLR;  Service: Gynecology Oncology;  Laterality: Bilateral;    MOBILIZATION OF SPLENIC FLEXURE  9/3/2024    Procedure: MOBILIZATION, SPLENIC FLEXURE;  Surgeon: Ruby Caicedo MD;  Location: [facility] OR 2ND FLR;  Service: Colon and Rectal;;    OMENTECTOMY N/A 9/3/2024    Procedure: OMENTECTOMY;  Surgeon: Maliha Crawford MD;  Location: [facility] OR 2ND FLR;  Service: Gynecology Oncology;  Laterality: N/A;    TOTAL ABDOMINAL HYSTERECTOMY N/A 9/3/2024    Procedure: HYSTERECTOMY, TOTAL, ABDOMINAL;  Surgeon: Maliha Crawford MD;  Location: [facility] OR 2ND FLR;  Service: Gynecology Oncology;  Laterality: N/A;    TUBAL LIGATION      at age 30's     Family History   Problem Relation Name Age of Onset    Cancer Brother      Breast cancer Neg Hx      Colon cancer Neg Hx      Ovarian cancer Neg Hx      Uterine cancer Neg Hx      Cervical cancer Neg Hx       Review of patient's allergies indicates:  No Known Allergies  Social Drivers of  Health     Tobacco Use: Low Risk  (3/11/2025)    Patient History     Smoking Tobacco Use: Never     Smokeless Tobacco Use: Never     Passive Exposure: Past   Alcohol Use: Not At Risk (7/5/2024)    AUDIT-C     Frequency of Alcohol Consumption: Never     Average Number of Drinks: Patient does not drink     Frequency of Binge Drinking: Never   Financial Resource Strain: Patient Unable To Answer (9/4/2024)    Overall Financial Resource Strain (CARDIA)     Difficulty of Paying Living Expenses: Patient unable to answer   Food Insecurity: Patient Unable To Answer (9/4/2024)    Hunger Vital Sign     Worried About Running Out of Food in the Last Year: Patient unable to answer     Ran Out of Food in the Last Year: Patient unable to answer   Transportation Needs: Patient Unable To Answer (9/4/2024)    TRANSPORTATION NEEDS     Transportation : Patient unable to answer   Physical Activity: Inactive (7/5/2024)    Exercise Vital Sign     Days of Exercise per Week: 0 days     Minutes of Exercise per Session: 20 min   Stress: Patient Unable To Answer (9/4/2024)    [employer] [place] [employer] - Occupational Stress Questionnaire     Feeling of Stress : Patient unable to answer   Housing Stability: Patient Unable To Answer (9/4/2024)    Housing Stability Vital Sign     Unable to Pay for Housing in the Last Year: Patient unable to answer     Number of Times Moved in the Last Year: Not on file     Homeless in the Last Year: Patient unable to answer   Depression: Low Risk  (11/19/2024)    Depression     Last PHQ-4: Flowsheet Data: 0   Utilities: Patient Unable To Answer (9/4/2024)    [facility] Utilities     Threatened with loss of utilities: Patient unable to answer   Health Literacy: Patient Unable To Answer (9/4/2024)     Health Literacy     Frequency of need for help with medical instructions: Patient unable to respond   Recent Concern: Health Literacy - Inadequate Health Literacy (7/5/2024)     Health Literacy      Frequency of need for help with medical instructions: Sometimes   Social Isolation: Not on file          Medications:    Current Outpatient Medications:     acetaminophen (TYLENOL) 500 MG tablet, Take 2 tablets (1,000 mg total) by mouth every 6 (six) hours., Disp: 30 tablet, Rfl: 3    amLODIPine (NORVASC) 5 MG tablet, Take 1 tablet by mouth every morning., Disp: , Rfl:     apixaban (ELIQUIS) 5 mg Tab, Take 1 tablet (5 mg total) by mouth 2 (two) times daily., Disp: 60 tablet, Rfl: 2    blood-glucose meter,continuous (DEXCOM G7 ) Misc, Use daily, Disp: , Rfl:     blood-glucose sensor (DEXCOM G7 SENSOR) [place], Use every 10 days, Disp: , Rfl:     dexAMETHasone (DECADRON) 4 MG Tab, Take 8 mg (2 tablets) by mouth daily on days 2-4 of each chemotherapy cycle., Disp: 6 tablet, Rfl: 11    EScitalopram oxalate (LEXAPRO) 10 MG tablet, Take 1 tablet (10 mg total) by mouth once daily., Disp: 90 tablet, Rfl: 3    gabapentin (NEURONTIN) 100 MG capsule, Take 3 capsules (300 mg total) by mouth every evening for 7 days, THEN 4 capsules (400 mg total) every evening for 23 days., Disp: 113 capsule, Rfl: 0    HYDROcodone-acetaminophen (NORCO)  mg per tablet, Take 1 tablet by mouth every 6 (six) hours as needed for Pain., Disp: 120 tablet, Rfl: 0    ibuprofen (ADVIL,MOTRIN) 600 MG tablet, Take 1 tablet every 6 hours. Alternate between ibuprofen & tylenol every 3 hours. For example: @0800: ibuprofen 600mg @1100: tylenol 1000mg @1400: ibuprofen 600mg @1700: tylenol 1000 mg @2000: ibuprofen 600mg. Can take two of the 300 mg over the counter ibuprofen is smaller pill is easier., Disp: 30 tablet, Rfl: 3    insulin glargine, TOUJEO, (TOUJEO) 300 unit/mL (1.5 mL) InPn pen, Inject into the skin 7 units once daily., Disp: 4.5 mL, Rfl: 0    insulin lispro-aabc (LYUMJEMARIO CHATMANPEN U-100 INSULIN) 100 unit/mL pen, Utilize sliding scale: 151-200, give 2 Units; 201-250, give 4 units; 251 -300, give 6 units of insulin, Disp: 5 Pen, Rfl: 0     levothyroxine (SYNTHROID) 75 MCG tablet, Take 1 tablet by mouth every morning., Disp: , Rfl:     LYUMJEV KWIKPEN U-100 INSULIN 100 unit/mL pen, Inject 2-10 Units into the skin 3 (three) times daily with meals., Disp: , Rfl:     magnesium oxide (MAGOX) 400 mg (241.3 mg magnesium) tablet, Take 1 tablet (400 mg total) by mouth once daily., Disp: 200 tablet, Rfl: 1    metFORMIN (GLUCOPHAGE) 1000 MG tablet, Take 1,000 mg by mouth 2 (two) times daily with meals., Disp: , Rfl:     metFORMIN (GLUCOPHAGE) 1000 MG tablet, Take 1 Tablet by mouth as directed twice daily, Disp: 1 tablet, Rfl: 0    metoprolol succinate (TOPROL-XL) 50 MG 24 hr tablet, Take 1 tablet by mouth every morning., Disp: , Rfl:     mirtazapine (REMERON) 7.5 MG Tab, Take 7.5 mg by mouth every evening., Disp: , Rfl:     morphine (MSIR) 15 MG tablet, Take 1 tablet (15 mg total) by mouth every 6 (six) hours as needed for Pain., Disp: 30 tablet, Rfl: 0    naloxone (NARCAN) 4 mg/actuation Spry, 1 spray (4 mg total) by Nasal route 1 (one) time if needed (for emergency opioid reversal)., Disp: 1 each, Rfl: 0    OLANZapine (ZYPREXA) 5 MG tablet, Take 1 tablet by mouth nightly on days 1-4 of each chemotherapy cycle., Disp: 4 tablet, Rfl: 11    ondansetron (ZOFRAN) 4 MG tablet, Take 1 tablet (4 mg total) by mouth every 6 (six) hours as needed for Nausea., Disp: 120 tablet, Rfl: 2    ondansetron (ZOFRAN-ODT) 4 MG TbDL, DISSOLVE ONE TABLET BY MOUTH EVERY 4 TO 6 HOURS AS NEEDED FOR NAUSEA, Disp: , Rfl:     polyethylene glycol (GLYCOLAX) 17 gram/dose powder, Mix 1 capful (17 g) in liquid and drink by mouth once daily., Disp: 510 g, Rfl: 1    potassium chloride (KLOR-CON) 10 MEQ TbSR, Take 1 tablet (10 mEq total) by mouth 2 (two) times daily., Disp: 30 tablet, Rfl: 0    pravastatin (PRAVACHOL) 10 MG tablet, Take 1 tablet by mouth every evening., Disp: , Rfl:     predniSONE (DELTASONE) 5 MG tablet, Take 5 mg by mouth., Disp: , Rfl:     prochlorperazine (COMPAZINE) 5 MG  tablet, Take 1 tablet (5 mg total) by mouth every 6 (six) hours as needed for Nausea., Disp: 20 tablet, Rfl: 5    TOUJEO SOLOSTAR U-300 INSULIN 300 unit/mL (1.5 mL) InPn pen, Inject 10 Units into the skin once daily., Disp: , Rfl:     OBJECTIVE:       ROS:  Review of Systems   Constitutional:  Positive for appetite change and fatigue. Negative for unexpected weight change.   HENT:  Negative for mouth sores.    Eyes:  Negative for visual disturbance.   Respiratory:  Negative for cough and shortness of breath.    Cardiovascular:  Negative for chest pain.   Gastrointestinal:  Negative for abdominal pain and diarrhea.   Genitourinary:  Negative for frequency.   Musculoskeletal:  Negative for back pain.   Skin:  Negative for rash.   Neurological:  Positive for numbness. Negative for headaches.   Hematological:  Negative for adenopathy.   Psychiatric/Behavioral:  The patient is not nervous/anxious.        Review of Symptoms      Symptom Assessment (ESAS 0-10 Scale)  Pain:  0  Dyspnea:  0  Anxiety:  0  Nausea:  0  Depression:  0  Anorexia:  0  Fatigue:  0  Insomnia:  0  Restlessness:  0  Agitation:  0       Anxiety:  Is not nervous/anxious    ECOG Performance Status ndGndrndanddndend:nd nd2nd Living Arrangements:  Lives with family    Psychosocial/Cultural:   See Palliative Psychosocial Note: Yes  **Primary  to Follow**  Palliative Care  Consult: Yes     Time-Based Charting:  Yes  Chart Review: 10 minutes  Face to Face: 10 minutes    Total Time Spent: 20 minutes      Advance Care Planning   Advance Directives:     Decision Making:  Patient answered questions  Goals of Care: The patient endorses that what is most important right now is to focus on symptom/pain control    Accordingly, we have decided that the best plan to meet the patient's goals includes continuing with treatment              Physical Exam:  Vitals:    Physical Exam  Constitutional:       General: She is not in acute distress.     Appearance:  Normal appearance. She is ill-appearing. She is not toxic-appearing.   Musculoskeletal:      Cervical back: Normal range of motion.   Skin:     Coloration: Skin is not jaundiced or pale.   Neurological:      Mental Status: She is alert and oriented to person, place, and time.   Psychiatric:         Mood and Affect: Mood normal.         Behavior: Behavior normal.         Thought Content: Thought content normal.         Judgment: Judgment normal.         Labs:  CBC:   WBC   Date Value Ref Range Status   02/17/2025 6.26 3.90 - 12.70 K/uL Final     Hemoglobin   Date Value Ref Range Status   01/06/2025 13.1 12.0 - 16.0 g/dL Final     HGB   Date Value Ref Range Status   02/17/2025 12.0 12.0 - 16.0 gm/dL Final     POC Hematocrit   Date Value Ref Range Status   09/03/2024 27 (L) 36 - 54 %PCV Final     Hematocrit   Date Value Ref Range Status   01/06/2025 38.2 37.0 - 48.5 % Final     HCT   Date Value Ref Range Status   02/17/2025 35.9 (L) 37.0 - 48.5 % Final     MCV   Date Value Ref Range Status   02/17/2025 92 82 - 98 fL Final   01/06/2025 93 82 - 98 fL Final     Platelet Count   Date Value Ref Range Status   02/17/2025 325 150 - 450 K/uL Final     Platelets   Date Value Ref Range Status   01/06/2025 351 150 - 450 K/uL Final       LFT:   Lab Results   Component Value Date    AST 16 02/17/2025    ALKPHOS 53 (L) 02/17/2025    BILITOT 0.3 02/17/2025       Albumin:   Albumin   Date Value Ref Range Status   02/17/2025 3.8 3.5 - 5.2 g/dL Final   01/06/2025 4.2 3.5 - 5.2 g/dL Final     Protein:   Total Protein   Date Value Ref Range Status   01/06/2025 7.4 6.0 - 8.4 g/dL Final       Radiology:  CT abd pelvis with IV contrast 8/19/24:  FINDINGS:     Lower chest: Unremarkable.  Liver: 9 x 7 mm hypodensity in left hepatic lobe (series 5, image 23).  This was present in April 2024 and may represent a cyst.  Gallbladder: Unremarkable.  Biliary: No significant biliary ductal dilatation.  Pancreas: Unremarkable.  Spleen:  Unremarkable.  Adrenal glands: Unremarkable.  Kidneys/ureters: Unremarkable.  Bladder: Unremarkable.  Reproductive: Uterus is unremarkable.  Persistent cystic area in the expected location of the right ovary measures approximately 3.4 x 2.5 cm.  Other previously demonstrated cystic lesions in the pelvis are no longer present.  There is some persistent thickening along the left pelvic wall as seen on series 5, image 126.  Gastrointestinal: No bowel obstruction or inflammation. No intraperitoneal free air or significant free fluid.  Retroperitoneum: No adenopathy.  Vascular: Atherosclerotic disease.  No abdominal aortic aneurysm.  Osseous: No acute fracture or aggressive appearing osseous lesion.        Impression:     1.    Significant improvement in the previous cystic lesions in the pelvis.  With some residual in the right pelvis and along the pelvic wall and the left.  2.    Similar appearance of the subcentimeter hypodensity left hepatic lobe which statistically represents a cyst; however, continued attention on follow-up is recommended.    20 minutes of total time spent on the encounter, which includes face to face time and non-face to face time preparing to see the patient (eg, review of tests), Obtaining and/or reviewing separately obtained history, Documenting clinical information in the electronic or other health record, Independently interpreting results (not separately reported) and communicating results to the patient/family/caregiver, or Care coordination (not separately reported).    Signature: Kristen Blum DO

## 2025-04-16 ENCOUNTER — OFFICE VISIT (OUTPATIENT)
Dept: PHYSICAL MEDICINE AND REHAB | Facility: CLINIC | Age: 75
End: 2025-04-16
Payer: MEDICARE

## 2025-04-16 DIAGNOSIS — G89.29 CHRONIC PAIN OF BOTH KNEES: Primary | ICD-10-CM

## 2025-04-16 DIAGNOSIS — R26.89 IMPAIRED GAIT AND MOBILITY: ICD-10-CM

## 2025-04-16 DIAGNOSIS — M25.561 CHRONIC PAIN OF RIGHT KNEE: ICD-10-CM

## 2025-04-16 DIAGNOSIS — M25.561 CHRONIC PAIN OF BOTH KNEES: Primary | ICD-10-CM

## 2025-04-16 DIAGNOSIS — M25.562 CHRONIC PAIN OF BOTH KNEES: Primary | ICD-10-CM

## 2025-04-16 DIAGNOSIS — G89.29 CHRONIC PAIN OF RIGHT KNEE: ICD-10-CM

## 2025-04-16 NOTE — PROGRESS NOTES
[facility]    Consulting Provider: No ref. provider found    CHIEF COMPLAINT:   Bilateral knee pain    HISTORY OF PRESENT ILLNESS: Bella Meyer is a 74 y.o. female who presents to me for evaluation and management of bilateral thigh/knee pain.     Last seen in clinic on 3/1/25, at which time they underwent Iovera treatment of right superficial genicular nerve(s)    Today reports, 50% improvement in pain and function since Iovera treatment to right knee.  Reports thigh is no longer numb, but continues with pain relief.  Still requiring her pain medications.  Not interested in repeating procedure or having procedure for left knee at this time.    Medications: chronic opioids which brings her pain from a 9/10 to a 1/10  Injections: none  Procedures: none    Review of Systems   Constitutional: Negative for fever.   HENT: Negative for drooling.    Eyes: Negative for discharge.   Respiratory: Negative for choking.    Cardiovascular: Negative for chest pain.   Genitourinary: Negative for flank pain.   Skin: Negative for wound.   Neurological: Negative for tremors and syncope.   Psychiatric/Behavioral: Negative for behavioral problems.     Past Medical History:   Past Medical History:   Diagnosis Date    Cancer     Diabetes mellitus, type 2     Hyperlipidemia     Hypertension     Hypothyroidism     Pulmonary embolism        Past Surgical History:   Past Surgical History:   Procedure Laterality Date    BILATERAL SALPINGO-OOPHORECTOMY (BSO) N/A 9/3/2024    Procedure: SALPINGO-OOPHORECTOMY, BILATERAL;  Surgeon: Maliha Crawford MD;  Location: [address];  Service: Gynecology Oncology;  Laterality: N/A;    DEBULKING OF TUMOR N/A 9/3/2024    Procedure: DEBULKING, NEOPLASM;  Surgeon: Maliha Crawford MD;  Location: [address];  Service: Gynecology Oncology;  Laterality: N/A;    FLEXIBLE SIGMOIDOSCOPY N/A 9/3/2024    Procedure: SIGMOIDOSCOPY, FLEXIBLE;  Surgeon: Ruby Caicedo  MD;  Location: [facility] OR 2ND FLR;  Service: Colon and Rectal;  Laterality: N/A;    ILEOCOLECTOMY N/A 9/3/2024    Procedure: ILEOCOLECTOMY;  Surgeon: Ruby Caicedo MD;  Location: [facility] 2ND [facility];  Service: Colon and Rectal;  Laterality: N/A;    LAPAROTOMY, EXPLORATORY N/A 9/3/2024    Procedure: LAPAROTOMY, EXPLORATORY;  Surgeon: Maliha Crawford MD;  Location: [facility] OR 2ND FLR;  Service: Gynecology Oncology;  Laterality: N/A;  4 hr case    LOW ANTERIOR RESECTION OF COLON N/A 9/3/2024    Procedure: RESECTION, COLON, LOW ANTERIOR;  Surgeon: Ruby Caicedo MD;  Location: [facility] 2ND [facility];  Service: Colon and Rectal;  Laterality: N/A;  4 hr case    LYSIS OF ADHESIONS OF URETER Bilateral 9/3/2024    Procedure: URETEROLYSIS;  Surgeon: Maliha Crawford MD;  Location: [facility] OR 2ND FLR;  Service: Gynecology Oncology;  Laterality: Bilateral;    MOBILIZATION OF SPLENIC FLEXURE  9/3/2024    Procedure: MOBILIZATION, SPLENIC FLEXURE;  Surgeon: Ruby Caicedo MD;  Location: [facility] OR 2ND FLR;  Service: Colon and Rectal;;    OMENTECTOMY N/A 9/3/2024    Procedure: OMENTECTOMY;  Surgeon: Maliha Crawford MD;  [facility];  Service: Gynecology Oncology;  Laterality: N/A;    TOTAL ABDOMINAL HYSTERECTOMY N/A 9/3/2024    Procedure: HYSTERECTOMY, TOTAL, ABDOMINAL;  Surgeon: Maliha Crawford MD;  [facility];  Service: Gynecology Oncology;  Laterality: N/A;    TUBAL LIGATION      at age 32's       Family History:   Family History   Problem Relation Name Age of Onset    Cancer Brother      Breast cancer Neg Hx      Colon cancer Neg Hx      Ovarian cancer Neg Hx      Uterine cancer Neg Hx      Cervical cancer Neg Hx         Medications: Medications Ordered Prior to Encounter[1]    Allergies: Review of patient's allergies indicates:  No Known Allergies    Social History:   Social History     Socioeconomic History    Marital status:    Tobacco Use    Smoking status: Never     Passive exposure: Past    Smokeless  tobacco: Never   Substance and Sexual Activity    Alcohol use: Never    Drug use: Never    Sexual activity: Yes     Partners: Male     Birth control/protection: Post-menopausal     Social Drivers of Health     Financial Resource Strain: Patient Unable To Answer (9/4/2024)    Overall Financial Resource Strain (CARDIA)     Difficulty of Paying Living Expenses: Patient unable to answer   Food Insecurity: Patient Unable To Answer (9/4/2024)    Hunger Vital Sign     Worried About Running Out of Food in the Last Year: Patient unable to answer     Ran Out of Food in the Last Year: Patient unable to answer   Transportation Needs: Patient Unable To Answer (9/4/2024)    TRANSPORTATION NEEDS     Transportation : Patient unable to answer   Physical Activity: Inactive (7/5/2024)    Exercise Vital Sign     Days of Exercise per Week: 0 days     Minutes of Exercise per Session: 20 min   Stress: Patient Unable To Answer (9/4/2024)    [employer] - Occupational Stress Questionnaire     Feeling of Stress : Patient unable to answer   Housing Stability: Patient Unable To Answer (9/4/2024)    Housing Stability Vital Sign     Unable to Pay for Housing in the Last Year: Patient unable to answer     Homeless in the Last Year: Patient unable to answer       PHYSICAL EXAMINATION (limited due to virtual visit):   VITALS:   There were no vitals filed for this visit.  GENERAL: The patient is awake, alert, cooperative, and in no acute distress.   HEENT: Normocephalic, atraumatic. Tracking is in all 4 quadrants. No facial asymmetry. Uvula is midline.   NECK: Full range of motion. No torticollis.   HEART: Appears well perfused. No lower extremity edema.   LUNGS: No increased work of breath.   ABDOMEN: Non-distended. .   NEURO: Cranial nerves II-XII are grossly intact by observation.   EXTREMITIES: No clubbing, cyanosis or edema.   MUSCULOSKELETAL: No focal muscular/limb atrophy/hypertrophy. No leg length discrepancy. No  gross deformity.  NEUROMUSCULAR: Appropriate sitting balance. No dyskinetic or dystonic movements appreciated.     IMAGING:  No pertinent imaging to review at this time.     Data Reviewed: X-ray  Supportive Actions: Independent visualization of images or test specimens.    ASSESSMENT:   1. Chronic pain of both knees    2. Chronic pain of right knee    3. Impaired gait and mobility      PLAN:   1. Time was spent reviewing the above diagnosis in depth with Bella today, including acute management and rehabilitation.     2. Good results from recent Iovera treatment to right superficial genicular nerve(s). Iovera treatment may be repeated every 3 months if appropriate. Patient would like to hold off on treatment for left knee at this time.  Will contact clinic if she would like to repeat procedure to right knee/move forward with left knee.     RTC as needed.    20 minutes of total time spent on the encounter, which includes face to face time and non-face to face time preparing to see the patient (eg, review of tests), obtaining and/or reviewing separately obtained history, documenting clinical information in the electronic or other health record, independently interpreting results (not separately reported), communicating results to the patient/family/caregiver, and/or care coordination (not separately reported).        [1]   Current Outpatient Medications on File Prior to Visit   Medication Sig Dispense Refill    acetaminophen (TYLENOL) 500 MG tablet Take 2 tablets (1,000 mg total) by mouth every 6 (six) hours. 30 tablet 3    amLODIPine (NORVASC) 5 MG tablet Take 1 tablet by mouth every morning.      apixaban (ELIQUIS) 5 mg Tab Take 1 tablet (5 mg total) by mouth 2 (two) times daily. 60 tablet 2    blood-glucose meter,continuous (DEXCOM G7 ) Misc Use daily      blood-glucose sensor (DEXCOM G7 SENSOR) Charito Use every 10 days      dexAMETHasone (DECADRON) 4 MG Tab Take 8 mg (2 tablets) by mouth daily on days 2-4 of  each chemotherapy cycle. 6 tablet 11    EScitalopram oxalate (LEXAPRO) 10 MG tablet Take 1 tablet (10 mg total) by mouth once daily. 90 tablet 3    gabapentin (NEURONTIN) 100 MG capsule Take 3 capsules (300 mg total) by mouth every evening for 7 days, THEN 4 capsules (400 mg total) every evening for 23 days. 113 capsule 0    HYDROcodone-acetaminophen (NORCO)  mg per tablet Take 1 tablet by mouth every 6 (six) hours as needed for Pain. 120 tablet 0    ibuprofen (ADVIL,MOTRIN) 600 MG tablet Take 1 tablet every 6 hours. Alternate between ibuprofen & tylenol every 3 hours. For example: @0800: ibuprofen 600mg @1100: tylenol 1000mg @1400: ibuprofen 600mg @1700: tylenol 1000 mg @2000: ibuprofen 600mg. Can take two of the 300 mg over the counter ibuprofen is smaller pill is easier. 30 tablet 3    insulin glargine, TOUJEO, (TOUJEO) 300 unit/mL (1.5 mL) InPn pen Inject into the skin 7 units once daily. 4.5 mL 0    insulin lispro-aabc (LYUMJEV KWIKPEN U-100 INSULIN) 100 unit/mL pen Utilize sliding scale: 151-200, give 2 Units; 201-250, give 4 units; 251 -300, give 6 units of insulin 5 Pen 0    levothyroxine (SYNTHROID) 75 MCG tablet Take 1 tablet by mouth every morning.      LYUMJEV KWIKPEN U-100 INSULIN 100 unit/mL pen Inject 2-10 Units into the skin 3 (three) times daily with meals.      magnesium oxide (MAGOX) 400 mg (241.3 mg magnesium) tablet Take 1 tablet (400 mg total) by mouth once daily. 200 tablet 1    metFORMIN (GLUCOPHAGE) 1000 MG tablet Take 1,000 mg by mouth 2 (two) times daily with meals.      metFORMIN (GLUCOPHAGE) 1000 MG tablet Take 1 Tablet by mouth as directed twice daily 1 tablet 0    metoprolol succinate (TOPROL-XL) 50 MG 24 hr tablet Take 1 tablet by mouth every morning.      mirtazapine (REMERON) 7.5 MG Tab Take 7.5 mg by mouth every evening.      morphine (MSIR) 15 MG tablet Take 1 tablet (15 mg total) by mouth every 6 (six) hours as needed for Pain. 30 tablet 0    naloxone (NARCAN) 4  mg/actuation [place] 1 spray (4 mg total) by Nasal route 1 (one) time if needed (for emergency opioid reversal). 1 each 0    OLANZapine (ZYPREXA) 5 MG tablet Take 1 tablet by mouth nightly on days 1-4 of each chemotherapy cycle. 4 tablet 11    ondansetron (ZOFRAN) 4 MG tablet Take 1 tablet (4 mg total) by mouth every 6 (six) hours as needed for Nausea. 120 tablet 2    ondansetron (ZOFRAN-ODT) 4 MG TbDL DISSOLVE ONE TABLET BY MOUTH EVERY 4 TO 6 HOURS AS NEEDED FOR NAUSEA      polyethylene glycol (GLYCOLAX) 17 gram/dose powder Mix 1 capful (17 g) in liquid and drink by mouth once daily. 510 g 1    potassium chloride (KLOR-CON) 10 MEQ TbSR Take 1 tablet (10 mEq total) by mouth 2 (two) times daily. 30 tablet 0    pravastatin (PRAVACHOL) 10 MG tablet Take 1 tablet by mouth every evening.      predniSONE (DELTASONE) 5 MG tablet Take 5 mg by mouth.      prochlorperazine (COMPAZINE) 5 MG tablet Take 1 tablet (5 mg total) by mouth every 6 (six) hours as needed for Nausea. 20 tablet 5    TOUJEO SOLOSTAR U-300 INSULIN 300 unit/mL (1.5 mL) InPn pen Inject 10 Units into the skin once daily.       No current facility-administered medications on file prior to visit.

## 2025-04-23 ENCOUNTER — OFFICE VISIT (OUTPATIENT)
Dept: PALLIATIVE MEDICINE | Facility: CLINIC | Age: 75
End: 2025-04-23
Payer: MEDICARE

## 2025-04-23 DIAGNOSIS — T45.1X5A CHEMOTHERAPY-INDUCED PERIPHERAL NEUROPATHY: ICD-10-CM

## 2025-04-23 DIAGNOSIS — R64 CACHEXIA: ICD-10-CM

## 2025-04-23 DIAGNOSIS — Z51.5 PALLIATIVE CARE BY SPECIALIST: Primary | ICD-10-CM

## 2025-04-23 DIAGNOSIS — C56.1 MALIGNANT NEOPLASM OF RIGHT OVARY: ICD-10-CM

## 2025-04-23 DIAGNOSIS — C78.6 PERITONEAL CARCINOMATOSIS: ICD-10-CM

## 2025-04-23 DIAGNOSIS — G25.81 RESTLESS LEG SYNDROME: ICD-10-CM

## 2025-04-23 DIAGNOSIS — G62.0 CHEMOTHERAPY-INDUCED PERIPHERAL NEUROPATHY: ICD-10-CM

## 2025-04-23 RX ORDER — DULOXETIN HYDROCHLORIDE 30 MG/1
30 CAPSULE, DELAYED RELEASE ORAL DAILY
Qty: 30 CAPSULE | Refills: 11 | Status: SHIPPED | OUTPATIENT
Start: 2025-04-23 | End: 2026-04-23

## 2025-04-23 NOTE — PROGRESS NOTES
The patient location is: [place]    Visit type: audiovisual    Face to Face time with patient: 10  20 minutes of total time spent on the encounter, which includes face to face time and non-face to face time preparing to see the patient (eg, review of tests), Obtaining and/or reviewing separately obtained history, Documenting clinical information in the electronic or other health record, Independently interpreting results (not separately reported) and communicating results to the patient/family/caregiver, or Care coordination (not separately reported).         Each patient to whom he or she provides medical services by telemedicine is:  (1) informed of the relationship between the physician and patient and the respective role of any other health care provider with respect to management of the patient; and (2) notified that he or she may decline to receive medical services by telemedicine and may withdraw from such care at any time.    Notes:     Office Visit  [facility]      Reason for Consult: pain and symptom management      ASSESSMENT/PLAN:     Plan/Recommendations:  Diagnoses and all orders for this visit:    Palliative care by specialist    Peritoneal carcinomatosis    Malignant neoplasm of right ovary    Restless leg syndrome    Chemotherapy-induced peripheral neuropathy    Cachexia    Other orders  -     DULoxetine (CYMBALTA) 30 MG capsule; Take 1 capsule (30 mg total) by mouth once daily.        # Stage III Ovarian Cancer  # Palliative care by specialist  # Peritoneal carcinomatosis  Completed 6 cycles of Carboplatin/ Paclitaxel followed by Ex Lap/FANNY/BSO/OMX/debulking/LAR on 9/3/2024.  Currently on surveillance with Niraparib per gyn onc.   - Follow up gyn onc    # Restless leg syndrome  # Leg pain  Patient with throbbing bilateral thigh pain. No corresponding imaging present. Low concern for bone metastasis given bilateral location. Reports history of restless leg syndrome. Improvement  with initiation of magnesium supplements. Not adherent to Mirapex due to sedation. Open to trial of pregabalin for now. Will uptitrate as tolerated otherwise will consider ropinirole. Encouraged further evaluation of leg pain with referral and further imaging with GP. Also encouraged full GP evaluation for other causes of leg pain including vitamin deficiencies and general check up including complete lab workup now that cancer is under control thankfully. Previously had myopathy with atorvastatin however now on pravastatin which is preferred and not likely to be the cause of leg pain. Now intermittently using morphine and hydrocodone. Improved pain with iovera able to ambulate for a few days however pain returned. Patient's  withholds morphine from the patient due to concerns of addition/stigma associated with pain medications. Reports able to walk with morphine when she did take it. Has taken Norco with moderate relief.   - Continue Norco 10-325mg PO q6h prn (discussed max 3g/day of APAP)  - Continue miralax prn for bowel regimen  - Continue Magnesium oxide 400mg PO BID scheduled (warned of diarrhea)  - Continue MSIR 15mg PO q12h prn  - Follow up PMR  - Continue Lyrica 50mg PO BID scheduled  - Start duloxetine 30mg once daily    # Chemotherapy induced peripheral neuropathy  Continued, weaned herself off of gabapentin and PCP switched over to Lyrica with minimal relief.   - Continue Lyrica 50mg PO BID scheduled  - Start cymbalta 30mg once daily    # Cancer cachexia  BMI 18<19<21. Poor appetite. Emesis after eating 2-3 bites. Could be a mechanical issue. Would like further eval with speech evaluation.  - Follow up nutrition    # Abdominal cramping  Morning abdominal cramping improved with bowel movements. Some improvement in pain with Bentyl.  - Continue Bentyl 10mg PO BID PRN  - Encouraged hydration, laxative use for more consistently regular bowel movements    Follow up: 2 weeks      SUBJECTIVE:     History  "of Present Illness:  Patient is a 74 y.o. year old female presenting with recent diagnosis of Stage IIIC ovarian cancer. She is a patient of Dr. Maliha Crawford. She presents via virtual visit for follow up.    4/23/25:  Feeling better today. Having leg cramping that is the worst pain. Had iovera procedure and had numbness for 1-2 days but pain came back. Deferred offer for other leg.     Trying to exercise and walk to help herself. Taking tylenol and ibuprofen. Does not help.     Taking Norco which helps alleviate pain. Feels she cannot say this in front of her  due to fear of becoming a "dope head".      took away morphine. She felt like on the morphine she could function on it all day long.     Norco lasts for about 4-5 hours only.     Feet and toes are numb. Still taking Lyrica 50mg twice per day. Did not tolerate gabapentin.     No longer taking lexapro. Did not feel like it was helping.     3/12/25:  Saw Dr. Borja yesterday had some nerves frozen. Legs feeling better. Denies numbness. Able to walk. Insertion site tenderness. Took Tylenol for achiness. Right leg has been worse than the left. Should see results within 3-6 months. Still using walker but now able to walk without as much pain. Still taking Lyrica.     2/12/25:  Increased strength with PT. Switched to pregabalin 50mg twice per day. Stopped gabapentin. Started pregabalin two weeks ago with no relief. Still having trouble walking. Still weak.     Alternating hydrocodone and morphine very infrequently. Cuts the pain down but does not stop it. "As soon as it wears off it comes back again".     Was constipated but miralax helped.     Losing weight from not eating.     1/24/25:  Bottom half of calves and toes included in leg pain now  Stopped lyrica and gabapentin not working  Just using aleve   On way to therapy. Went three times   Therapist thinks hip may be misaligned  Going to xray hip   Using Norco every now and then     Only taking 200mg " "gabapentin at bedtime    Appetite getting better   Walking is getting better. Needs walker though.    12/18/24:  Still has leg pain. Has had further evaluation. Walking with a walker. Taking Lyrica at night. Feels no difference in pain. Taking hydrocodone which is helping pain. Not as bad now.     Appetite is still poor.     Discussed at length with patient's daughter Eugenia with patient's permission. Eugenia states that she is taking pain medication sparingly and is getting weaker. She states that the patient will not move to live with her and will demand to stay with her  in their home. She states that she does not think that the patient will take recommendations from another doctor.     12/3/24:  Has been taking Mirapex once per day but still has leg pain despite this. Using a walker. Having trouble walking. Discussed avenues for further evaluation including imaging and referral. Continued poor appetite. Eats a few bites and then coughs and vomits. Gags after 2-3 bites. Happening for 3-4 days.     Wakes up with stomach pain. Taking Bentyl 10mg which helps but pain comes back. Having mostly regular bowel movements.    Discussed with patient's daughter Eugenia at length. She states Ms Blanco is not adherent to Mirapex due to sedation. Discussed utility of pregabalin versus ropinirole.     11/5/24:  Moving around slowly. Pain is still present. Legs "are killing me". Legs are weak. Still using a walker to get around.     Reports throbbing pain in legs. Can't find anything to ease it except Norco. Nothing helps but Norco. Only lasts 5 hours. Its in top part of legs in thighs. Feels neuropathy is better, less numbness/tingling.     Pain is better with walking. Hurt so bad she does not feel she wants to walk.     Has a history of restless leg syndrome in the past. Treated that with over the counter in the past and started magnesium oxide recently. Felt magnesium helped if she takes it every day. Feel it could be " restless leg syndrome. If she misses magnesium she has unbearable pain. PCP wants to monitor labs with magnesium twice per day if scheduled and not as needed.     Cannot take benadryl due to flaring up legs.     Taking Bentyl for cramps. Felt capsule form worked better at 10mg.     10/9/24:  A little stronger since going to rehab. Legs are still weak. Aching. Was at rehab for 2 weeks. Walking well with a walker now. Appetite is poor but she is eating more than before. For pain, she is taking tramadol 50mg twice per day. Does not feel like it is helping her pain. Wants to try [facility] again. Daughter states it is hard to do rehab because of pain.     Has abdominal pain and is taking bentyl for it.     Seeing Dr. Crawford soon.     9/18/24:  Pain is better but still feels tired. Feeling weak as well and fell. Fell three times yesterday and hit her head. Was trying to get out of bed to get to bedside commode and lost balance. If she gets a small stomach pain she takes tylenol and it works well.     Sister reports was not eating due to drowsiness with taking oxycodone. Lead to weight loss. This occurred in the hospital. This lead to choice to avoid oxycodone 5 and 10mg.     Concern for withdrawal while in the hospital including clamminess.     Having a bowel movement helped. Think she may be lactose intolerant.     Considering admission to acute rehab facility near their home for more intensive rehab and nutrition.    Has home health.    Taking two tylenol twice per day. Not taking ibuprofen or gabapentin at this time.     Biggest concern is eating.       9/10/24:    Alternating tylenol and ibuprofen every 3 hours. Cannot sleep. Feeling weak after surgery. Blood sugar has been going all over the place due to poor oral intake. Holding gabapentin because it is making her too drowsy.     Family feels she needs more protein to gain strength. Has home health now. Previously taking oxycodone on empty stomach. Taking zofran for  nausea. Requesting zofran that does not dissolve.    For pain, feel that tylenol and ibuprofen has been helping well for pain. Needs a refill for oxycodone 5mg. Using sparingly twice per day usually.     Having easy bowel movements with miralax.     8/20/24:  Feels neuropathy is the worst and wants to increase gabapentin dose. Neuropathy is in right thigh down into leg. Describes as throbbing pain.    Went up to 200mg at night starting last night.     The patient's sister was also present during the encounter and raised concerns regarding constipation and not wanting to have some any medications related to the colon prior to surgery.  We discussed the high probability of constipation with hydrocodone and with oxycodone use.  She has been using laxatives which they are fearful of.  Discussed nonpharmacological options including increased water intake and prune juice consumption for more natural ways of preventing constipation.  The patient has found greater benefit from oxycodone at this time.  And has been taking Tylenol intermittently for pain as well.  They are aware to stay less than 2000 mg per day.  They are anticipating surgery soon and are aware that pain management needs may change postoperatively.    8/13/24:  Pain right now is a 5-6/10. Talked to Dr. Crawford about gabapentin.  got frustrated on morphine 30mg due to patient due to sleeping a lot and feeling foggy. Delay in answering. Weaned off of morphine. CT scan is Monday to rule out kidney stone. Taking oxycodone every 4-6 hours.     Working on constipation at night even though she's not constipated.     7/12/24:  Currently using Norco 2-3 times per day. Got scared of morphine so she stopped taking it and requested Norco reinitiation prior to this appointment. Discussed use of morphine and stereotyped fears. Having daily bms. Saw colorectal surgery, planning surgery for September. Delayed due to recent clot.    She endorses numbness and tingling  of her hands and feet. Discussed neuropathy. She is open to try treatment.    6/12/24: She states she is doing really well. She had her treatment yesterday and tolerated it well. No pain at all after being on the half tablet of the morphine. She denies any breakthrough at all. She has been able to resume her housework. She feels almost back to normal. She gets tired. Takes a nap once a day. She is having daily bowel movements. She takes senna daily.    5/29/24:Overall pain is a little better. She feels stronger pain pill is lasting a bit longer and quicker onset. Still having pain though. She takes pain medication every 4-5 hours.     She plans to start PT. Her PCP is managing her care overall but her and her family are happy to keep all pain medications with palliative care. She is currently still in [place] with family.     She is having bowel movements every day. She is taking senna everyday.     5/15/24:  Pain has worsened. She has had excruciating abdominal pain. She could not sleep after the pain medication took effect. Then had sweats. She has been taking the Norco 5mg every 6 hours but it takes 45-60 minutes to take effect.     Has started taking two senna s to prevent constipation. Started this yesterday. She had a good BM today.     Current meds for nausea have been helping.     Very groggy days after chemotherapy.       Initial visit:    Introduced the role of palliative care including symptom management and advance care planning through the cancer journey.    She reports pain rated 10/10. When she takes a pain pill it drops down to a 4-5/10. She takes hydrocodone. Pain is located in her right side of the abdomen. Describes this pain as shooting and throbbing.     She reports daily bowel movements. Denies nausea, vomiting. She had her first infusion yesterday. She had no side effects.     Her daughter states that her mother hates pain medications. Family has noted grunting. Family has noticed movement  "would exacerbate pain. Would be hard to find a comfortable position to sit in. Patient wants to "take the edge off" and rest comfortably. Benadryl knocks her out.     The increased dose of hydrocodone at 7.5 helped better. She takes one dose in the morning and one at night. She sometimes take an additional dose if she takes a long car ride.     She does not feel strong. She was in the recliner all day.       Past Medical History:   Diagnosis Date    Cancer     Diabetes mellitus, type 2     Hyperlipidemia     Hypertension     Hypothyroidism     Pulmonary embolism      Past Surgical History:   Procedure Laterality Date    BILATERAL SALPINGO-OOPHORECTOMY (BSO) N/A 9/3/2024    Procedure: SALPINGO-OOPHORECTOMY, BILATERAL;  Surgeon: Maliha Crawford MD;  Location: [facility] OR [address] [place] [address];  Service: Gynecology Oncology;  Laterality: N/A;    DEBULKING OF TUMOR N/A 9/3/2024    Procedure: DEBULKING, NEOPLASM;  Surgeon: Maliha Crawford MD;  Location: [facility] OR [address] [place] [address];  Service: Gynecology Oncology;  Laterality: N/A;    FLEXIBLE SIGMOIDOSCOPY N/A 9/3/2024    Procedure: SIGMOIDOSCOPY, FLEXIBLE;  Surgeon: Ruby Caicedo MD;  Location: [facility] OR [address] [place] [address];  Service: Colon and Rectal;  Laterality: N/A;    ILEOCOLECTOMY N/A 9/3/2024    Procedure: ILEOCOLECTOMY;  Surgeon: Ruby Caicedo MD;  Location: [facility] OR [address] [place] [address];  Service: Colon and Rectal;  Laterality: N/A;    LAPAROTOMY, EXPLORATORY N/A 9/3/2024    Procedure: LAPAROTOMY, EXPLORATORY;  Surgeon: Maliha Crawford MD;  Location: [facility] OR [address] [place] [address];  Service: Gynecology Oncology;  Laterality: N/A;  4 hr case    LOW ANTERIOR RESECTION OF COLON N/A 9/3/2024    Procedure: RESECTION, COLON, LOW ANTERIOR;  Surgeon: Ruby Caicedo MD;  Location: [facility] OR [address] [place] [address];  Service: Colon and Rectal;  Laterality: N/A;  4 hr case    LYSIS OF ADHESIONS OF URETER Bilateral 9/3/2024    Procedure: URETEROLYSIS;  Surgeon: Maliha Crawford MD;  Location: [facility] OR [address] [place] [address];  Service: Gynecology Oncology;  Laterality: " Bilateral;    MOBILIZATION OF SPLENIC FLEXURE  9/3/2024    Procedure: MOBILIZATION, SPLENIC FLEXURE;  Surgeon: Ruby Caicedo MD;  [facility];  Service: Colon and Rectal;;    OMENTECTOMY N/A 9/3/2024    Procedure: OMENTECTOMY;  Surgeon: Maliha Crawford MD;  Location: [facility];  Service: Gynecology Oncology;  Laterality: N/A;    TOTAL ABDOMINAL HYSTERECTOMY N/A 9/3/2024    Procedure: HYSTERECTOMY, TOTAL, ABDOMINAL;  Surgeon: Maliha Crawford MD;  [facility]: [facility];  Service: Gynecology Oncology;  Laterality: N/A;    TUBAL LIGATION      at age 32's     Family History   Problem Relation Name Age of Onset    Cancer Brother      Breast cancer Neg Hx      Colon cancer Neg Hx      Ovarian cancer Neg Hx      Uterine cancer Neg Hx      Cervical cancer Neg Hx       Review of patient's allergies indicates:  No Known Allergies  Social Drivers of Health     Tobacco Use: Low Risk  (4/16/2025)    Patient History     Smoking Tobacco Use: Never     Smokeless Tobacco Use: Never     Passive Exposure: Past   Alcohol Use: Not At Risk (7/5/2024)    AUDIT-C     Frequency of Alcohol Consumption: Never     Average Number of Drinks: Patient does not drink     Frequency of Binge Drinking: Never   Financial Resource Strain: Patient Unable To Answer (9/4/2024)    Overall Financial Resource Strain (CARDIA)     Difficulty of Paying Living Expenses: Patient unable to answer   Food Insecurity: Patient Unable To Answer (9/4/2024)    Hunger Vital Sign     Worried About Running Out of Food in the Last Year: Patient unable to answer     Ran Out of Food in the Last Year: Patient unable to answer   Transportation Needs: Patient Unable To Answer (9/4/2024)    TRANSPORTATION NEEDS     Transportation : Patient unable to answer   Physical Activity: Inactive (7/5/2024)    Exercise Vital Sign     Days of Exercise per Week: 0 days     Minutes of Exercise per Session: 20 min   Stress: Patient Unable To Answer (9/4/2024)    [employer] - Occupational Stress Questionnaire     Feeling of Stress : Patient unable to answer   Housing Stability: Patient Unable To Answer (9/4/2024)    Housing Stability Vital Sign     Unable to Pay for Housing in the Last Year: Patient unable to answer     Number of Times Moved in the Last Year: Not on file     Homeless in the Last Year: Patient unable to answer   Depression: Low Risk  (11/19/2024)    Depression     Last PHQ-4: Flowsheet Data: 0   Utilities: Patient Unable To Answer (9/4/2024)    [facility] Utilities     Threatened with loss of utilities: Patient unable to answer   Health Literacy: Patient Unable To Answer (9/4/2024)     Health Literacy     Frequency of need for help with medical instructions: Patient unable to respond   Recent Concern: Health Literacy - Inadequate Health Literacy (7/5/2024)     Health Literacy     Frequency of need for help with medical instructions: Sometimes   Social Isolation: Not on file          Medications:    Current Outpatient Medications:     acetaminophen (TYLENOL) 500 MG tablet, Take 2 tablets (1,000 mg total) by mouth every 6 (six) hours., Disp: 30 tablet, Rfl: 3    amLODIPine (NORVASC) 5 MG tablet, Take 1 tablet by mouth every morning., Disp: , Rfl:     apixaban (ELIQUIS) 5 mg Tab, Take 1 tablet (5 mg total) by mouth 2 (two) times daily., Disp: 60 tablet, Rfl: 2    blood-glucose meter,continuous (DEXCOM G7 ) Misc, Use daily, Disp: , Rfl:     blood-glucose sensor (DEXCOM G7 SENSOR) [place], Use every 10 days, Disp: , Rfl:     dexAMETHasone (DECADRON) 4 MG Tab, Take 8 mg (2 tablets) by mouth daily on days 2-4 of each chemotherapy cycle., Disp: 6 tablet, Rfl: 11    DULoxetine (CYMBALTA) 30 MG capsule, Take 1 capsule (30 mg total) by mouth once daily., Disp: 30 capsule, Rfl: 11    HYDROcodone-acetaminophen (NORCO)  mg per tablet, Take 1 tablet by mouth every 6 (six) hours as needed for Pain., Disp: 120 tablet, Rfl: 0    ibuprofen  (ADVIL,MOTRIN) 600 MG tablet, Take 1 tablet every 6 hours. Alternate between ibuprofen & tylenol every 3 hours. For example: @0800: ibuprofen 600mg @1100: tylenol 1000mg @1400: ibuprofen 600mg @1700: tylenol 1000 mg @2000: ibuprofen 600mg. Can take two of the 300 mg over the counter ibuprofen is smaller pill is easier., Disp: 30 tablet, Rfl: 3    insulin glargine, TOUJEO, (TOUJEO) 300 unit/mL (1.5 mL) InPn pen, Inject into the skin 7 units once daily., Disp: 4.5 mL, Rfl: 0    insulin lispro-aabc (LYUMJEV KWIKPEN U-100 INSULIN) 100 unit/mL pen, Utilize sliding scale: 151-200, give 2 Units; 201-250, give 4 units; 251 -300, give 6 units of insulin, Disp: 5 Pen, Rfl: 0    levothyroxine (SYNTHROID) 75 MCG tablet, Take 1 tablet by mouth every morning., Disp: , Rfl:     LYUMJEV KWIKPEN U-100 INSULIN 100 unit/mL pen, Inject 2-10 Units into the skin 3 (three) times daily with meals., Disp: , Rfl:     magnesium oxide (MAGOX) 400 mg (241.3 mg magnesium) tablet, Take 1 tablet (400 mg total) by mouth once daily., Disp: 200 tablet, Rfl: 1    metFORMIN (GLUCOPHAGE) 1000 MG tablet, Take 1,000 mg by mouth 2 (two) times daily with meals., Disp: , Rfl:     metFORMIN (GLUCOPHAGE) 1000 MG tablet, Take 1 Tablet by mouth as directed twice daily, Disp: 1 tablet, Rfl: 0    metoprolol succinate (TOPROL-XL) 50 MG 24 hr tablet, Take 1 tablet by mouth every morning., Disp: , Rfl:     mirtazapine (REMERON) 7.5 MG Tab, Take 7.5 mg by mouth every evening., Disp: , Rfl:     morphine (MSIR) 15 MG tablet, Take 1 tablet (15 mg total) by mouth every 6 (six) hours as needed for Pain., Disp: 30 tablet, Rfl: 0    naloxone (NARCAN) 4 mg/actuation Spry, 1 spray (4 mg total) by Nasal route 1 (one) time if needed (for emergency opioid reversal)., Disp: 1 each, Rfl: 0    OLANZapine (ZYPREXA) 5 MG tablet, Take 1 tablet by mouth nightly on days 1-4 of each chemotherapy cycle., Disp: 4 tablet, Rfl: 11    ondansetron (ZOFRAN) 4 MG tablet, Take 1 tablet (4 mg  total) by mouth every 6 (six) hours as needed for Nausea., Disp: 120 tablet, Rfl: 2    ondansetron (ZOFRAN-ODT) 4 MG TbDL, DISSOLVE ONE TABLET BY MOUTH EVERY 4 TO 6 HOURS AS NEEDED FOR NAUSEA, Disp: , Rfl:     polyethylene glycol (GLYCOLAX) 17 gram/dose powder, Mix 1 capful (17 g) in liquid and drink by mouth once daily., Disp: 510 g, Rfl: 1    potassium chloride (KLOR-CON) 10 MEQ TbSR, Take 1 tablet (10 mEq total) by mouth 2 (two) times daily., Disp: 30 tablet, Rfl: 0    pravastatin (PRAVACHOL) 10 MG tablet, Take 1 tablet by mouth every evening., Disp: , Rfl:     predniSONE (DELTASONE) 5 MG tablet, Take 5 mg by mouth., Disp: , Rfl:     prochlorperazine (COMPAZINE) 5 MG tablet, Take 1 tablet (5 mg total) by mouth every 6 (six) hours as needed for Nausea., Disp: 20 tablet, Rfl: 5    TOUJEO SOLOSTAR U-300 INSULIN 300 unit/mL (1.5 mL) InPn pen, Inject 10 Units into the skin once daily., Disp: , Rfl:     OBJECTIVE:       ROS:  Review of Systems   Constitutional:  Positive for appetite change and fatigue. Negative for unexpected weight change.   HENT:  Negative for mouth sores.    Eyes:  Negative for visual disturbance.   Respiratory:  Negative for cough and shortness of breath.    Cardiovascular:  Negative for chest pain.   Gastrointestinal:  Negative for abdominal pain and diarrhea.   Genitourinary:  Negative for frequency.   Musculoskeletal:  Negative for back pain.   Skin:  Negative for rash.   Neurological:  Positive for numbness. Negative for headaches.   Hematological:  Negative for adenopathy.   Psychiatric/Behavioral:  The patient is not nervous/anxious.        Review of Symptoms      Symptom Assessment (ESAS 0-10 Scale)  Pain:  0  Dyspnea:  0  Anxiety:  0  Nausea:  0  Depression:  0  Anorexia:  0  Fatigue:  0  Insomnia:  0  Restlessness:  0  Agitation:  0       Anxiety:  Is not nervous/anxious    ECOG Performance Status ndGndrndanddndend:nd nd2nd Living Arrangements:  Lives with family    Psychosocial/Cultural:   See  Palliative Psychosocial Note: Yes  **Primary  to Follow**  Palliative Care  Consult: Yes     Time-Based Charting:  Yes  Chart Review: 10 minutes  Face to Face: 10 minutes    Total Time Spent: 20 minutes      Advance Care Planning   Advance Directives:     Decision Making:  Patient answered questions  Goals of Care: The patient endorses that what is most important right now is to focus on symptom/pain control    Accordingly, we have decided that the best plan to meet the patient's goals includes continuing with treatment              Physical Exam:  Vitals:    Physical Exam  Constitutional:       General: She is not in acute distress.     Appearance: Normal appearance. She is ill-appearing. She is not toxic-appearing.   Musculoskeletal:      Cervical back: Normal range of motion.   Skin:     Coloration: Skin is not jaundiced or pale.   Neurological:      Mental Status: She is alert and oriented to person, place, and time.   Psychiatric:         Mood and Affect: Mood normal.         Behavior: Behavior normal.         Thought Content: Thought content normal.         Judgment: Judgment normal.         Labs:  CBC:   WBC   Date Value Ref Range Status   02/17/2025 6.26 3.90 - 12.70 K/uL Final     Hemoglobin   Date Value Ref Range Status   01/06/2025 13.1 12.0 - 16.0 g/dL Final     HGB   Date Value Ref Range Status   02/17/2025 12.0 12.0 - 16.0 gm/dL Final     POC Hematocrit   Date Value Ref Range Status   09/03/2024 27 (L) 36 - 54 %PCV Final     Hematocrit   Date Value Ref Range Status   01/06/2025 38.2 37.0 - 48.5 % Final     HCT   Date Value Ref Range Status   02/17/2025 35.9 (L) 37.0 - 48.5 % Final     MCV   Date Value Ref Range Status   02/17/2025 92 82 - 98 fL Final   01/06/2025 93 82 - 98 fL Final     Platelet Count   Date Value Ref Range Status   02/17/2025 325 150 - 450 K/uL Final     Platelets   Date Value Ref Range Status   01/06/2025 351 150 - 450 K/uL Final       LFT:   Lab Results    Component Value Date    AST 16 02/17/2025    ALKPHOS 53 (L) 02/17/2025    BILITOT 0.3 02/17/2025       Albumin:   Albumin   Date Value Ref Range Status   02/17/2025 3.8 3.5 - 5.2 g/dL Final   01/06/2025 4.2 3.5 - 5.2 g/dL Final     Protein:   Protein Total   Date Value Ref Range Status   02/17/2025 6.9 6.0 - 8.4 gm/dL Final     Total Protein   Date Value Ref Range Status   01/06/2025 7.4 6.0 - 8.4 g/dL Final       Radiology:  CT abd pelvis with IV contrast 8/19/24:  FINDINGS:     Lower chest: Unremarkable.  Liver: 9 x 7 mm hypodensity in left hepatic lobe (series 5, image 23).  This was present in April 2024 and may represent a cyst.  Gallbladder: Unremarkable.  Biliary: No significant biliary ductal dilatation.  Pancreas: Unremarkable.  Spleen: Unremarkable.  Adrenal glands: Unremarkable.  Kidneys/ureters: Unremarkable.  Bladder: Unremarkable.  Reproductive: Uterus is unremarkable.  Persistent cystic area in the expected location of the right ovary measures approximately 3.4 x 2.5 cm.  Other previously demonstrated cystic lesions in the pelvis are no longer present.  There is some persistent thickening along the left pelvic wall as seen on series 5, image 126.  Gastrointestinal: No bowel obstruction or inflammation. No intraperitoneal free air or significant free fluid.  Retroperitoneum: No adenopathy.  Vascular: Atherosclerotic disease.  No abdominal aortic aneurysm.  Osseous: No acute fracture or aggressive appearing osseous lesion.        Impression:     1.    Significant improvement in the previous cystic lesions in the pelvis.  With some residual in the right pelvis and along the pelvic wall and the left.  2.    Similar appearance of the subcentimeter hypodensity left hepatic lobe which statistically represents a cyst; however, continued attention on follow-up is recommended.    20 minutes of total time spent on the encounter, which includes face to face time and non-face to face time preparing to see the  patient (eg, review of tests), Obtaining and/or reviewing separately obtained history, Documenting clinical information in the electronic or other health record, Independently interpreting results (not separately reported) and communicating results to the patient/family/caregiver, or Care coordination (not separately reported).    Signature: Kristen Blum DO

## 2025-05-01 ENCOUNTER — OFFICE VISIT (OUTPATIENT)
Dept: PALLIATIVE MEDICINE | Facility: CLINIC | Age: 75
End: 2025-05-01
Payer: MEDICARE

## 2025-05-01 DIAGNOSIS — G62.0 CHEMOTHERAPY-INDUCED PERIPHERAL NEUROPATHY: ICD-10-CM

## 2025-05-01 DIAGNOSIS — C56.1 MALIGNANT NEOPLASM OF RIGHT OVARY: ICD-10-CM

## 2025-05-01 DIAGNOSIS — T45.1X5A CHEMOTHERAPY-INDUCED PERIPHERAL NEUROPATHY: ICD-10-CM

## 2025-05-01 DIAGNOSIS — C78.6 PERITONEAL CARCINOMATOSIS: ICD-10-CM

## 2025-05-01 DIAGNOSIS — R53.83 FATIGUE, UNSPECIFIED TYPE: ICD-10-CM

## 2025-05-01 DIAGNOSIS — G89.3 CANCER RELATED PAIN: ICD-10-CM

## 2025-05-01 DIAGNOSIS — Z51.5 PALLIATIVE CARE BY SPECIALIST: Primary | ICD-10-CM

## 2025-05-01 DIAGNOSIS — G25.81 RESTLESS LEG SYNDROME: ICD-10-CM

## 2025-05-01 NOTE — PROGRESS NOTES
The patient location is: [place]    Visit type: audiovisual    Face to Face time with patient: 10  20 minutes of total time spent on the encounter, which includes face to face time and non-face to face time preparing to see the patient (eg, review of tests), Obtaining and/or reviewing separately obtained history, Documenting clinical information in the electronic or other health record, Independently interpreting results (not separately reported) and communicating results to the patient/family/caregiver, or Care coordination (not separately reported).         Each patient to whom he or she provides medical services by telemedicine is:  (1) informed of the relationship between the physician and patient and the respective role of any other health care provider with respect to management of the patient; and (2) notified that he or she may decline to receive medical services by telemedicine and may withdraw from such care at any time.    Notes:     Office Visit  [facility]      Reason for Consult: pain and symptom management      ASSESSMENT/PLAN:     Plan/Recommendations:  Diagnoses and all orders for this visit:    Palliative care by specialist    Restless leg syndrome    Peritoneal carcinomatosis    Malignant neoplasm of right ovary    Chemotherapy-induced peripheral neuropathy    Fatigue, unspecified type          # Stage III Ovarian Cancer  # Palliative care by specialist  # Peritoneal carcinomatosis  Completed 6 cycles of Carboplatin/ Paclitaxel followed by Ex Lap/FANNY/BSO/OMX/debulking/LAR on 9/3/2024.  Currently on surveillance with Niraparib per gyn onc.   - Follow up gyn onc    # Restless leg syndrome  # Leg pain  Patient with throbbing bilateral thigh pain. No corresponding imaging present. Low concern for bone metastasis given bilateral location. Reports history of restless leg syndrome. Improvement with initiation of magnesium supplements. Not adherent to Mirapex due to sedation. Open to  trial of pregabalin for now. Will uptitrate as tolerated otherwise will consider ropinirole. Encouraged further evaluation of leg pain with referral and further imaging with GP. Also encouraged full GP evaluation for other causes of leg pain including vitamin deficiencies and general check up including complete lab workup now that cancer is under control thankfully. Previously had myopathy with atorvastatin however now on pravastatin which is preferred and not likely to be the cause of leg pain. Now intermittently using morphine and hydrocodone. Improved pain with iovera able to ambulate for a few days however pain returned. Patient's  withholds morphine from the patient due to concerns of addition/stigma associated with pain medications. Reports able to walk with morphine when she did take it. Has taken Norco with moderate relief. Reports increased numbness with Cymbalta and would like to go back to Lyrica twice daily, only tolerated at 50mg dose due to hypotension.   - Continue Norco 10-325mg PO q6h prn (discussed max 3g/day of APAP)  - Continue miralax prn for bowel regimen  - Continue Magnesium oxide 400mg PO BID scheduled (warned of diarrhea)  - Continue MSIR 15mg PO q12h prn  - Follow up PMR  - Continue Lyrica 50mg PO BID scheduled  - Stop duloxetine 30mg once daily    # Chemotherapy induced peripheral neuropathy  Continued, weaned herself off of gabapentin and PCP switched over to Lyrica with minimal relief.   - Continue Lyrica 50mg PO BID scheduled  - Stop cymbalta 30mg once daily    # Cancer cachexia  BMI 18<19<21. Poor appetite. Emesis after eating 2-3 bites. Could be a mechanical issue. Would like further eval with speech evaluation.  - Follow up nutrition    # Abdominal cramping  Morning abdominal cramping improved with bowel movements. Some improvement in pain with Bentyl.  - Continue Bentyl 10mg PO BID PRN  - Encouraged hydration, laxative use for more consistently regular bowel  "movements    Follow up: 4 weeks      SUBJECTIVE:     History of Present Illness:  Patient is a 74 y.o. year old female presenting with recent diagnosis of Stage IIIC ovarian cancer. She is a patient of Dr. Maliha Crawford. She presents via virtual visit for follow up.    5/1/25:  Legs are still numb when she tries to walk. Taking it at night. Does not feel any difference with it.     She is okay with going back on the Lyrica twice daily. Does not make her too sleepy.    Has refills of as needed meds if needed.     4/23/25:  Feeling better today. Having leg cramping that is the worst pain. Had iovera procedure and had numbness for 1-2 days but pain came back. Deferred offer for other leg.     Trying to exercise and walk to help herself. Taking tylenol and ibuprofen. Does not help.     Taking Norco which helps alleviate pain. Feels she cannot say this in front of her  due to fear of becoming a "dope head".      took away morphine. She felt like on the morphine she could function on it all day long.     Norco lasts for about 4-5 hours only.     Feet and toes are numb. Still taking Lyrica 50mg twice per day. Did not tolerate gabapentin.     No longer taking lexapro. Did not feel like it was helping.     3/12/25:  Saw Dr. Borja yesterday had some nerves frozen. Legs feeling better. Denies numbness. Able to walk. Insertion site tenderness. Took Tylenol for achiness. Right leg has been worse than the left. Should see results within 3-6 months. Still using walker but now able to walk without as much pain. Still taking Lyrica.     2/12/25:  Increased strength with PT. Switched to pregabalin 50mg twice per day. Stopped gabapentin. Started pregabalin two weeks ago with no relief. Still having trouble walking. Still weak.     Alternating hydrocodone and morphine very infrequently. Cuts the pain down but does not stop it. "As soon as it wears off it comes back again".     Was constipated but miralax helped.     Losing " "weight from not eating.     1/24/25:  Bottom half of calves and toes included in leg pain now  Stopped lyrica and gabapentin not working  Just using aleve   On way to therapy. Went three times   Therapist thinks hip may be misaligned  Going to xray hip   Using Norco every now and then     Only taking 200mg gabapentin at bedtime    Appetite getting better   Walking is getting better. Needs walker though.    12/18/24:  Still has leg pain. Has had further evaluation. Walking with a walker. Taking Lyrica at night. Feels no difference in pain. Taking hydrocodone which is helping pain. Not as bad now.     Appetite is still poor.     Discussed at length with patient's daughter Eugenia with patient's permission. Eugenia states that she is taking pain medication sparingly and is getting weaker. She states that the patient will not move to live with her and will demand to stay with her  in their home. She states that she does not think that the patient will take recommendations from another doctor.     12/3/24:  Has been taking Mirapex once per day but still has leg pain despite this. Using a walker. Having trouble walking. Discussed avenues for further evaluation including imaging and referral. Continued poor appetite. Eats a few bites and then coughs and vomits. Gags after 2-3 bites. Happening for 3-4 days.     Wakes up with stomach pain. Taking Bentyl 10mg which helps but pain comes back. Having mostly regular bowel movements.    Discussed with patient's daughter Eugenia at length. She states Ms Blanco is not adherent to Mirapex due to sedation. Discussed utility of pregabalin versus ropinirole.     11/5/24:  Moving around slowly. Pain is still present. Legs "are killing me". Legs are weak. Still using a walker to get around.     Reports throbbing pain in legs. Can't find anything to ease it except Norco. Nothing helps but Norco. Only lasts 5 hours. Its in top part of legs in thighs. Feels neuropathy is better, less " numbness/tingling.     Pain is better with walking. Hurt so bad she does not feel she wants to walk.     Has a history of restless leg syndrome in the past. Treated that with over the counter in the past and started magnesium oxide recently. Felt magnesium helped if she takes it every day. Feel it could be restless leg syndrome. If she misses magnesium she has unbearable pain. PCP wants to monitor labs with magnesium twice per day if scheduled and not as needed.     Cannot take benadryl due to flaring up legs.     Taking Bentyl for cramps. Felt capsule form worked better at 10mg.     10/9/24:  A little stronger since going to rehab. Legs are still weak. Aching. Was at rehab for 2 weeks. Walking well with a walker now. Appetite is poor but she is eating more than before. For pain, she is taking tramadol 50mg twice per day. Does not feel like it is helping her pain. Wants to try [place] again. Daughter states it is hard to do rehab because of pain.     Has abdominal pain and is taking bentyl for it.     Seeing Dr. Crawford soon.     9/18/24:  Pain is better but still feels tired. Feeling weak as well and fell. Fell three times yesterday and hit her head. Was trying to get out of bed to get to bedside commode and lost balance. If she gets a small stomach pain she takes tylenol and it works well.     Sister reports was not eating due to drowsiness with taking oxycodone. Lead to weight loss. This occurred in the hospital. This lead to choice to avoid oxycodone 5 and 10mg.     Concern for withdrawal while in the hospital including clamminess.     Having a bowel movement helped. Think she may be lactose intolerant.     Considering admission to acute rehab facility near their home for more intensive rehab and nutrition.    Has home health.    Taking two tylenol twice per day. Not taking ibuprofen or gabapentin at this time.     Biggest concern is eating.       9/10/24:    Alternating tylenol and ibuprofen every 3 hours. Cannot  sleep. Feeling weak after surgery. Blood sugar has been going all over the place due to poor oral intake. Holding gabapentin because it is making her too drowsy.     Family feels she needs more protein to gain strength. Has home health now. Previously taking oxycodone on empty stomach. Taking zofran for nausea. Requesting zofran that does not dissolve.    For pain, feel that tylenol and ibuprofen has been helping well for pain. Needs a refill for oxycodone 5mg. Using sparingly twice per day usually.     Having easy bowel movements with miralax.     8/20/24:  Feels neuropathy is the worst and wants to increase gabapentin dose. Neuropathy is in right thigh down into leg. Describes as throbbing pain.    Went up to 200mg at night starting last night.     The patient's sister was also present during the encounter and raised concerns regarding constipation and not wanting to have some any medications related to the colon prior to surgery.  We discussed the high probability of constipation with hydrocodone and with oxycodone use.  She has been using laxatives which they are fearful of.  Discussed nonpharmacological options including increased water intake and prune juice consumption for more natural ways of preventing constipation.  The patient has found greater benefit from oxycodone at this time.  And has been taking Tylenol intermittently for pain as well.  They are aware to stay less than 2000 mg per day.  They are anticipating surgery soon and are aware that pain management needs may change postoperatively.    8/13/24:  Pain right now is a 5-6/10. Talked to Dr. Crawford about gabapentin.  got frustrated on morphine 30mg due to patient due to sleeping a lot and feeling foggy. Delay in answering. Weaned off of morphine. CT scan is Monday to rule out kidney stone. Taking oxycodone every 4-6 hours.     Working on constipation at night even though she's not constipated.     7/12/24:  Currently using Norco 2-3 times  per day. Got scared of morphine so she stopped taking it and requested Norco reinitiation prior to this appointment. Discussed use of morphine and stereotyped fears. Having daily bms. Saw colorectal surgery, planning surgery for September. Delayed due to recent clot.    She endorses numbness and tingling of her hands and feet. Discussed neuropathy. She is open to try treatment.    6/12/24: She states she is doing really well. She had her treatment yesterday and tolerated it well. No pain at all after being on the half tablet of the morphine. She denies any breakthrough at all. She has been able to resume her housework. She feels almost back to normal. She gets tired. Takes a nap once a day. She is having daily bowel movements. She takes senna daily.    5/29/24:Overall pain is a little better. She feels stronger pain pill is lasting a bit longer and quicker onset. Still having pain though. She takes pain medication every 4-5 hours.     She plans to start PT. Her PCP is managing her care overall but her and her family are happy to keep all pain medications with palliative care. She is currently still in [place] with family.     She is having bowel movements every day. She is taking senna everyday.     5/15/24:  Pain has worsened. She has had excruciating abdominal pain. She could not sleep after the pain medication took effect. Then had sweats. She has been taking the Norco 5mg every 6 hours but it takes 45-60 minutes to take effect.     Has started taking two senna s to prevent constipation. Started this yesterday. She had a good BM today.     Current meds for nausea have been helping.     Very groggy days after chemotherapy.       Initial visit:    Introduced the role of palliative care including symptom management and advance care planning through the cancer journey.    She reports pain rated 10/10. When she takes a pain pill it drops down to a 4-5/10. She takes hydrocodone. Pain is located in her right side of  "the abdomen. Describes this pain as shooting and throbbing.     She reports daily bowel movements. Denies nausea, vomiting. She had her first infusion yesterday. She had no side effects.     Her daughter states that her mother hates pain medications. Family has noted grunting. Family has noticed movement would exacerbate pain. Would be hard to find a comfortable position to sit in. Patient wants to "take the edge off" and rest comfortably. Benadryl knocks her out.     The increased dose of hydrocodone at 7.5 helped better. She takes one dose in the morning and one at night. She sometimes take an additional dose if she takes a long car ride.     She does not feel strong. She was in the recliner all day.       Past Medical History:   Diagnosis Date    Cancer     Diabetes mellitus, type 2     Hyperlipidemia     Hypertension     Hypothyroidism     Pulmonary embolism      Past Surgical History:   Procedure Laterality Date    BILATERAL SALPINGO-OOPHORECTOMY (BSO) N/A 9/3/2024    Procedure: SALPINGO-OOPHORECTOMY, BILATERAL;  Surgeon: Maliha Crawford MD;  Location: [address];  Service: Gynecology Oncology;  Laterality: N/A;    DEBULKING OF TUMOR N/A 9/3/2024    Procedure: DEBULKING, NEOPLASM;  Surgeon: Maliha Crawford MD;  Location: [address];  Service: Gynecology Oncology;  Laterality: N/A;    FLEXIBLE SIGMOIDOSCOPY N/A 9/3/2024    Procedure: SIGMOIDOSCOPY, FLEXIBLE;  Surgeon: Ruby Caicedo MD;  Location: [address];  Service: Colon and Rectal;  Laterality: N/A;    ILEOCOLECTOMY N/A 9/3/2024    Procedure: ILEOCOLECTOMY;  Surgeon: Ruby Caicedo MD;  Location: [address];  Service: Colon and Rectal;  Laterality: N/A;    LAPAROTOMY, EXPLORATORY N/A 9/3/2024    Procedure: LAPAROTOMY, EXPLORATORY;  Surgeon: Maliha Crawford MD;  Location: [address];  Service: Gynecology Oncology;  Laterality: N/A;  4 hr case    LOW ANTERIOR RESECTION OF COLON N/A 9/3/2024    Procedure: RESECTION, COLON, LOW " ANTERIOR;  Surgeon: Ruby Caicedo MD;  Location: [facility];  Service: Colon and Rectal;  Laterality: N/A;  4 hr case    LYSIS OF ADHESIONS OF URETER Bilateral 9/3/2024    Procedure: URETEROLYSIS;  Surgeon: Maliha Crawford MD;  Location: [facility];  Service: Gynecology Oncology;  Laterality: Bilateral;    MOBILIZATION OF SPLENIC FLEXURE  9/3/2024    Procedure: MOBILIZATION, SPLENIC FLEXURE;  Surgeon: Ruby Caicedo MD;  Location: [facility];  Service: Colon and Rectal;;    OMENTECTOMY N/A 9/3/2024    Procedure: OMENTECTOMY;  Surgeon: Maliha Crawford MD;  Location: [facility] OR [facility];  Service: Gynecology Oncology;  Laterality: N/A;    TOTAL ABDOMINAL HYSTERECTOMY N/A 9/3/2024    Procedure: HYSTERECTOMY, TOTAL, ABDOMINAL;  Surgeon: Maliha Crawford MD;  [facility];  Service: Gynecology Oncology;  Laterality: N/A;    TUBAL LIGATION      at age 32's     Family History   Problem Relation Name Age of Onset    Cancer Brother      Breast cancer Neg Hx      Colon cancer Neg Hx      Ovarian cancer Neg Hx      Uterine cancer Neg Hx      Cervical cancer Neg Hx       Review of patient's allergies indicates:  No Known Allergies  Social Drivers of Health     Tobacco Use: Low Risk  (4/16/2025)    Patient History     Smoking Tobacco Use: Never     Smokeless Tobacco Use: Never     Passive Exposure: Past   Alcohol Use: Not At Risk (7/5/2024)    AUDIT-C     Frequency of Alcohol Consumption: Never     Average Number of Drinks: Patient does not drink     Frequency of Binge Drinking: Never   Financial Resource Strain: Patient Unable To Answer (9/4/2024)    Overall Financial Resource Strain (CARDIA)     Difficulty of Paying Living Expenses: Patient unable to answer   Food Insecurity: Patient Unable To Answer (9/4/2024)    Hunger Vital Sign     Worried About Running Out of Food in the Last Year: Patient unable to answer     Ran Out of Food in the Last Year: Patient unable to answer   Transportation Needs:  Patient Unable To Answer (9/4/2024)    TRANSPORTATION NEEDS     Transportation : Patient unable to answer   Physical Activity: Inactive (7/5/2024)    Exercise Vital Sign     Days of Exercise per Week: 0 days     Minutes of Exercise per Session: 20 min   Stress: Patient Unable To Answer (9/4/2024)    [employer] [place] [employer] - Occupational Stress Questionnaire     Feeling of Stress : Patient unable to answer   Housing Stability: Patient Unable To Answer (9/4/2024)    Housing Stability Vital Sign     Unable to Pay for Housing in the Last Year: Patient unable to answer     Number of Times Moved in the Last Year: Not on file     Homeless in the Last Year: Patient unable to answer   Depression: Low Risk  (11/19/2024)    Depression     Last PHQ-4: Flowsheet Data: 0   Utilities: Patient Unable To Answer (9/4/2024)    [facility] Utilities     Threatened with loss of utilities: Patient unable to answer   Health Literacy: Patient Unable To Answer (9/4/2024)     Health Literacy     Frequency of need for help with medical instructions: Patient unable to respond   Recent Concern: Health Literacy - Inadequate Health Literacy (7/5/2024)     Health Literacy     Frequency of need for help with medical instructions: Sometimes   Social Isolation: Not on file          Medications:    Current Outpatient Medications:     acetaminophen (TYLENOL) 500 MG tablet, Take 2 tablets (1,000 mg total) by mouth every 6 (six) hours., Disp: 30 tablet, Rfl: 3    amLODIPine (NORVASC) 5 MG tablet, Take 1 tablet by mouth every morning., Disp: , Rfl:     apixaban (ELIQUIS) 5 mg Tab, Take 1 tablet (5 mg total) by mouth 2 (two) times daily., Disp: 60 tablet, Rfl: 2    blood-glucose meter,continuous (DEXCOM G7 ) Misc, Use daily, Disp: , Rfl:     blood-glucose sensor (DEXCOM G7 SENSOR) [place], Use every 10 days, Disp: , Rfl:     dexAMETHasone (DECADRON) 4 MG Tab, Take 8 mg (2 tablets) by mouth daily on days 2-4 of each chemotherapy  cycle., Disp: 6 tablet, Rfl: 11    DULoxetine (CYMBALTA) 30 MG capsule, Take 1 capsule (30 mg total) by mouth once daily., Disp: 30 capsule, Rfl: 11    HYDROcodone-acetaminophen (NORCO)  mg per tablet, Take 1 tablet by mouth every 6 (six) hours as needed for Pain., Disp: 120 tablet, Rfl: 0    ibuprofen (ADVIL,MOTRIN) 600 MG tablet, Take 1 tablet every 6 hours. Alternate between ibuprofen & tylenol every 3 hours. For example: @0800: ibuprofen 600mg @1100: tylenol 1000mg @1400: ibuprofen 600mg @1700: tylenol 1000 mg @2000: ibuprofen 600mg. Can take two of the 300 mg over the counter ibuprofen is smaller pill is easier., Disp: 30 tablet, Rfl: 3    insulin glargine, TOUJEO, (TOUJEO) 300 unit/mL (1.5 mL) InPn pen, Inject into the skin 7 units once daily., Disp: 4.5 mL, Rfl: 0    insulin lispro-aabc (LYUMJEV KWIKPEN U-100 INSULIN) 100 unit/mL pen, Utilize sliding scale: 151-200, give 2 Units; 201-250, give 4 units; 251 -300, give 6 units of insulin, Disp: 5 Pen, Rfl: 0    levothyroxine (SYNTHROID) 75 MCG tablet, Take 1 tablet by mouth every morning., Disp: , Rfl:     LYUMJEV KWIKPEN U-100 INSULIN 100 unit/mL pen, Inject 2-10 Units into the skin 3 (three) times daily with meals., Disp: , Rfl:     magnesium oxide (MAGOX) 400 mg (241.3 mg magnesium) tablet, Take 1 tablet (400 mg total) by mouth once daily., Disp: 200 tablet, Rfl: 1    metFORMIN (GLUCOPHAGE) 1000 MG tablet, Take 1,000 mg by mouth 2 (two) times daily with meals., Disp: , Rfl:     metFORMIN (GLUCOPHAGE) 1000 MG tablet, Take 1 Tablet by mouth as directed twice daily, Disp: 1 tablet, Rfl: 0    metoprolol succinate (TOPROL-XL) 50 MG 24 hr tablet, Take 1 tablet by mouth every morning., Disp: , Rfl:     mirtazapine (REMERON) 7.5 MG Tab, Take 7.5 mg by mouth every evening., Disp: , Rfl:     morphine (MSIR) 15 MG tablet, Take 1 tablet (15 mg total) by mouth every 6 (six) hours as needed for Pain., Disp: 30 tablet, Rfl: 0    naloxone (NARCAN) 4 mg/actuation [place],  1 spray (4 mg total) by Nasal route 1 (one) time if needed (for emergency opioid reversal)., Disp: 1 each, Rfl: 0    OLANZapine (ZYPREXA) 5 MG tablet, Take 1 tablet by mouth nightly on days 1-4 of each chemotherapy cycle., Disp: 4 tablet, Rfl: 11    ondansetron (ZOFRAN) 4 MG tablet, Take 1 tablet (4 mg total) by mouth every 6 (six) hours as needed for Nausea., Disp: 120 tablet, Rfl: 2    ondansetron (ZOFRAN-ODT) 4 MG TbDL, DISSOLVE ONE TABLET BY MOUTH EVERY 4 TO 6 HOURS AS NEEDED FOR NAUSEA, Disp: , Rfl:     polyethylene glycol (GLYCOLAX) 17 gram/dose powder, Mix 1 capful (17 g) in liquid and drink by mouth once daily., Disp: 510 g, Rfl: 1    potassium chloride (KLOR-CON) 10 MEQ TbSR, Take 1 tablet (10 mEq total) by mouth 2 (two) times daily., Disp: 30 tablet, Rfl: 0    pravastatin (PRAVACHOL) 10 MG tablet, Take 1 tablet by mouth every evening., Disp: , Rfl:     predniSONE (DELTASONE) 5 MG tablet, Take 5 mg by mouth., Disp: , Rfl:     prochlorperazine (COMPAZINE) 5 MG tablet, Take 1 tablet (5 mg total) by mouth every 6 (six) hours as needed for Nausea., Disp: 20 tablet, Rfl: 5    TOUJEO SOLOSTAR U-300 INSULIN 300 unit/mL (1.5 mL) InPn pen, Inject 10 Units into the skin once daily., Disp: , Rfl:     OBJECTIVE:       ROS:  Review of Systems   Constitutional:  Positive for appetite change and fatigue. Negative for unexpected weight change.   HENT:  Negative for mouth sores.    Eyes:  Negative for visual disturbance.   Respiratory:  Negative for cough and shortness of breath.    Cardiovascular:  Negative for chest pain.   Gastrointestinal:  Negative for abdominal pain and diarrhea.   Genitourinary:  Negative for frequency.   Musculoskeletal:  Negative for back pain.   Skin:  Negative for rash.   Neurological:  Positive for numbness. Negative for headaches.   Hematological:  Negative for adenopathy.   Psychiatric/Behavioral:  The patient is not nervous/anxious.        Review of Symptoms      Symptom Assessment (ESAS 0-10  Scale)  Pain:  0  Dyspnea:  0  Anxiety:  0  Nausea:  0  Depression:  0  Anorexia:  0  Fatigue:  0  Insomnia:  0  Restlessness:  0  Agitation:  0       Anxiety:  Is not nervous/anxious    ECOG Performance Status ndGndrndanddndend:nd nd2nd Living Arrangements:  Lives with family    Psychosocial/Cultural:   See Palliative Psychosocial Note: Yes  **Primary  to Follow**  Palliative Care  Consult: Yes     Time-Based Charting:  Yes  Chart Review: 10 minutes  Face to Face: 10 minutes    Total Time Spent: 20 minutes      Advance Care Planning   Advance Directives:     Decision Making:  Patient answered questions  Goals of Care: The patient endorses that what is most important right now is to focus on symptom/pain control    Accordingly, we have decided that the best plan to meet the patient's goals includes continuing with treatment              Physical Exam:  Vitals:    Physical Exam  Constitutional:       General: She is not in acute distress.     Appearance: Normal appearance. She is ill-appearing. She is not toxic-appearing.   Musculoskeletal:      Cervical back: Normal range of motion.   Skin:     Coloration: Skin is not jaundiced or pale.   Neurological:      Mental Status: She is alert and oriented to person, place, and time.   Psychiatric:         Mood and Affect: Mood normal.         Behavior: Behavior normal.         Thought Content: Thought content normal.         Judgment: Judgment normal.         Labs:  CBC:   WBC   Date Value Ref Range Status   02/17/2025 6.26 3.90 - 12.70 K/uL Final     Hemoglobin   Date Value Ref Range Status   01/06/2025 13.1 12.0 - 16.0 g/dL Final     HGB   Date Value Ref Range Status   02/17/2025 12.0 12.0 - 16.0 gm/dL Final     POC Hematocrit   Date Value Ref Range Status   09/03/2024 27 (L) 36 - 54 %PCV Final     Hematocrit   Date Value Ref Range Status   01/06/2025 38.2 37.0 - 48.5 % Final     HCT   Date Value Ref Range Status   02/17/2025 35.9 (L) 37.0 - 48.5 % Final      MCV   Date Value Ref Range Status   02/17/2025 92 82 - 98 fL Final   01/06/2025 93 82 - 98 fL Final     Platelet Count   Date Value Ref Range Status   02/17/2025 325 150 - 450 K/uL Final     Platelets   Date Value Ref Range Status   01/06/2025 351 150 - 450 K/uL Final       LFT:   Lab Results   Component Value Date    AST 16 02/17/2025    ALKPHOS 53 (L) 02/17/2025    BILITOT 0.3 02/17/2025       Albumin:   Albumin   Date Value Ref Range Status   02/17/2025 3.8 3.5 - 5.2 g/dL Final   01/06/2025 4.2 3.5 - 5.2 g/dL Final     Protein:   Protein Total   Date Value Ref Range Status   02/17/2025 6.9 6.0 - 8.4 gm/dL Final     Total Protein   Date Value Ref Range Status   01/06/2025 7.4 6.0 - 8.4 g/dL Final       Radiology:  CT abd pelvis with IV contrast 8/19/24:  FINDINGS:     Lower chest: Unremarkable.  Liver: 9 x 7 mm hypodensity in left hepatic lobe (series 5, image 23).  This was present in April 2024 and may represent a cyst.  Gallbladder: Unremarkable.  Biliary: No significant biliary ductal dilatation.  Pancreas: Unremarkable.  Spleen: Unremarkable.  Adrenal glands: Unremarkable.  Kidneys/ureters: Unremarkable.  Bladder: Unremarkable.  Reproductive: Uterus is unremarkable.  Persistent cystic area in the expected location of the right ovary measures approximately 3.4 x 2.5 cm.  Other previously demonstrated cystic lesions in the pelvis are no longer present.  There is some persistent thickening along the left pelvic wall as seen on series 5, image 126.  Gastrointestinal: No bowel obstruction or inflammation. No intraperitoneal free air or significant free fluid.  Retroperitoneum: No adenopathy.  Vascular: Atherosclerotic disease.  No abdominal aortic aneurysm.  Osseous: No acute fracture or aggressive appearing osseous lesion.        Impression:     1.    Significant improvement in the previous cystic lesions in the pelvis.  With some residual in the right pelvis and along the pelvic wall and the left.  2.     Similar appearance of the subcentimeter hypodensity left hepatic lobe which statistically represents a cyst; however, continued attention on follow-up is recommended.    20 minutes of total time spent on the encounter, which includes face to face time and non-face to face time preparing to see the patient (eg, review of tests), Obtaining and/or reviewing separately obtained history, Documenting clinical information in the electronic or other health record, Independently interpreting results (not separately reported) and communicating results to the patient/family/caregiver, or Care coordination (not separately reported).    Signature: Kristen Blum DO

## 2025-05-02 RX ORDER — MORPHINE SULFATE 15 MG/1
15 TABLET ORAL EVERY 6 HOURS PRN
Qty: 30 TABLET | Refills: 0 | Status: SHIPPED | OUTPATIENT
Start: 2025-05-02

## 2025-05-20 ENCOUNTER — OFFICE VISIT (OUTPATIENT)
Dept: GYNECOLOGIC ONCOLOGY | Facility: CLINIC | Age: 75
End: 2025-05-20
Payer: MEDICARE

## 2025-05-20 DIAGNOSIS — Z85.43 HISTORY OF OVARIAN CANCER: Primary | ICD-10-CM

## 2025-05-20 PROBLEM — C78.6 PERITONEAL CARCINOMATOSIS: Status: RESOLVED | Noted: 2024-04-18 | Resolved: 2025-05-20

## 2025-05-20 NOTE — PROGRESS NOTES
The patient location is:  Home ([place])  The chief complaint leading to consultation is: Surveillance Stage IIIC Ovarian Cancer     Visit type: audiovisual    Face to Face time with patient: 10  30 minutes of total time spent on the encounter, which includes face to face time and non-face to face time preparing to see the patient (eg, review of tests), Obtaining and/or reviewing separately obtained history, Documenting clinical information in the electronic or other health record, Independently interpreting results (not separately reported) and communicating results to the patient/family/caregiver, or Care coordination (not separately reported).     Each patient to whom he or she provides medical services by telemedicine is:  (1) informed of the relationship between the physician and patient and the respective role of any other health care provider with respect to management of the patient; and (2) notified that he or she may decline to receive medical services by telemedicine and may withdraw from such care at any time.    Notes:    SUBJECTIVE     Chief complaint:  Ovarian Cancer   History of present Illness:  Bella Meyer is a 74 y.o.  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    She Completed 6 cycles of Carboplatin/ Paclitaxel followed by Ex Lap/FANNY/BSO/OMX/debulking/LAR on 9/3/2024.  normalized following surgery. Received cycle 7 on 10/22/2024 and patient opted to forgo additional cycle.  Entered surveillance.     Today she states she is doing really well.   Tolerating PO with 3 normal meals per day.  No nausea or vomiting. Normal bowel and bladder function. Gaining strength still over time and walking around her house.     Oncology History   Peritoneal carcinomatosis (Resolved)   2024 Initial Diagnosis    Peritoneal carcinomatosis     2024 - 10/22/2024 Chemotherapy     Treatment Summary   Plan Name: OP GYN paclitaxel CARBOplatin (AUC 6) Q3W  Treatment Goal: Curative  Status: Inactive  Start Date: 4/30/2024  End Date: 10/22/2024  Provider: Maliha Crawford MD  Chemotherapy: CARBOplatin (PARAPLATIN) 410 mg in sodium chloride 0.9% 326 mL chemo infusion, 410 mg (100 % of original dose 411 mg), Intravenous, Clinic/HOD 1 time, 7 of 17 cycles  Dose modification:   (original dose 411 mg, Cycle 1),   (original dose 379.5 mg, Cycle 2),   (original dose 411 mg, Cycle 3, Reason: Dose not tolerated),   (original dose 452 mg, Cycle 4)  Administration: 410 mg (4/30/2024), 380 mg (5/21/2024), 410 mg (6/11/2024), 450 mg (7/2/2024), 450 mg (7/23/2024), 450 mg (8/13/2024), 450 mg (10/22/2024)  PACLitaxeL (TAXOL) 175 mg/m2 = 276 mg in sodium chloride 0.9% 250 mL chemo infusion, 175 mg/m2 = 276 mg (100 % of original dose 175 mg/m2), Intravenous, Clinic/HOD 1 time, 7 of 17 cycles  Dose modification: 135 mg/m2 (original dose 175 mg/m2, Cycle 1, Reason: MD Discretion), 175 mg/m2 (original dose 175 mg/m2, Cycle 1)  Administration: 276 mg (4/30/2024), 210 mg (5/21/2024), 210 mg (6/11/2024), 210 mg (7/2/2024), 210 mg (7/23/2024), 210 mg (8/13/2024), 210 mg (10/22/2024)     History of ovarian cancer   4/23/2024 Initial Diagnosis    Malignant neoplasm of ovary  Referred by Dr. Evans for evaluation of a pelvic mass, peritoneal carcinomatosis and elevated .     3/24/24 CT AP- Abnormal inflammatory changes in the lower pelvis associated with the distal sigmoid colon. Tubular or channel-like structure along the posterior margin of the lower descending colon possibly connecting between the sigmoid and rectal regions. Underlying neoplasm and/or fistula not excluded. Mildly enlarged left iliac bifurcation lymph nodes noted. No drainable abscess. There is also abnormal appearance of the descending colon and several small soft tissue nodules in the pericolonic tissues of the descending colon. Malignant neoplasm  should be considered.     4/9/24 TVUS- Grossly unremarkable appearance of the uterus. The left ovary is not seen sonographically. Enlarged right ovary at 6.6 cm with several cysts the largest 3.4 cm with some thin septations and blood flow to the ovary. Trace fluid adjacent to the ovary. No gross ascitic fluid.     4/9/24 - 188    4/18/24 CT CAP- Diffuse peritoneal carcinomatosis. Right adnexal mass (6 cm) which could be the primary neoplasm/ovarian cancer. Sigmoid colon thickening which could be the primary neoplasm/colon cancer.   Index implant left anterior abdomen 2 cm.   Index left pelvic sidewall implant 1.5 cm.    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          4/26/2024 Tumor Markers    4/26/24 - 373     4/30/2024 Cancer Staged    Staging form: Ovary, Fallopian Tube, and Primary Peritoneal Carcinoma, AJCC 8th Edition  - Clinical: FIGO Stage IIIC (cT3c, cNX, cM0)     5/22/2024 Pathology Significant Finding       CARIS- FOLR1 3+, ER positive, HER 2 negative/1+, HRD negative, PDL 1 positivE (CPS=3), p53 positive, BRCA negative      5/29/2024 Genetic Testing    GERMLINE - NEGATIVE  , BRCA 2 VUS      6/20/2024 Imaging Significant Findings    6/20/24 CT CAP  Mild interval enlargement of the bilateral ovarian cystic masses however, there is interval decrease in size and number of omental implants/metastasis.  Positive pulmonary emboli within the right lower lobe segmental branches, nonocclusive.  No right heart strain.  Persistent sigmoid wall thickening.     8/12/2024 Tumor Markers    =86     9/3/2024 Surgery    Ex Lap, FANNY, BSO, OMX, Debulking, LAR, ileocolectomy     FINDINGS:  At case conclusion, there was no gross residual disease (RD=0mm).   PATHOLOGY:   1. ABDOMINAL WALL FAT, EXCISION:  - Negative for malignancy.  - Benign fibroadipose tisue with scar.    2. OMENTUM, OMENTECTOMY:  - Positive for malignancy.  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  - Size of largest nodule: 10 MM.    3. 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  - Unremarkable fallopian tube and fimbriated end.  - Tumor involves ovarian capsule.  - Tumor extends to pericolonic fat without mucosal involvement by tumor.  - Benign small bowel, negative for tumor.    4. 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  - Size of tumor: 3.5 CM.  - Tumor involves left tubal serosa.  - Tubal high-grade serous carcinoma involves left ovarian capsule.    UTERUS:  - Negative for tumor.  - Complex endometrial hyperplasia without atypia.  - Benign endometrial polyp.  - Background inactive endometrium.  - Serosal adhesions.    CERVIX:  - Negative for tumor.    - Benign cervix with reactive changes and paracervical adhesions.    5. COLON, SIGMOID, EXCISION:  - High-grade serous carcinoma of tubal origin involving pericolonic fat.  - Colonic mucosa is negative for tumor.    6. COLON, ANASTOMOTIC RINGS, EXCISION:  - Negative for tumor.  - Focal mucosal changes suggestive of ischemic changes are present.      10/14/2024 Tumor Markers    -15     11/18/2024 Imaging Significant Findings    POST TREATMENT CT CAP  : NO EVIDENCE OF DISEASE      11/18/2024 Tumor Markers    =64     11/19/2024 Remission    Patient is in remission as of 11/19/2024  .        2/17/2025 Tumor Markers    =10     2/17/2025 Imaging Significant Findings    CT CAP: LORI      5/20/2025 Tumor Markers    -66       Review of Systems - Oncology   OBJECTIVE     There were no vitals taken for this visit.    Physical Exam  Constitutional:       General: She is not in acute distress.     Appearance: Normal appearance. She is not ill-appearing,  toxic-appearing or diaphoretic.   Pulmonary:      Effort: Pulmonary effort is normal.   Neurological:      General: No focal deficit present.      Mental Status: She is alert and oriented to person, place, and time.   Psychiatric:         Mood and Affect: Mood normal.         Behavior: Behavior normal.       Lab Results   Component Value Date/Time     18.0 05/19/2025 08:56 AM     12 01/06/2025 03:01 PM      ASSESSMENT AND PLAN   1. History of ovarian cancer    No evidence of recurrent disease by symptoms or tumor marker   Discussed signs and symptoms of recurrence and when to contact us between visits.  Continue palliative care follow ups   Plan for repeat labs () and virtual surveillance visit in 3 months with NP. Will obtain imaging if concerns based on symptoms, tumor marker or exam. She understands.       Maliha Crawford MD  Gynecologic Oncology     ONGOING COMPLEXITY BILLING  Visit today is associated with current or anticipated ongoing medical care related to this patients single serious condition/complex condition: History of Ovarian Cancer requiring active surveillance.

## 2025-08-18 DIAGNOSIS — Z85.43 HISTORY OF OVARIAN CANCER: Primary | ICD-10-CM

## 2025-08-18 DIAGNOSIS — R97.1 ELEVATED CANCER ANTIGEN 125 (CA 125): ICD-10-CM

## 2025-08-18 DIAGNOSIS — R10.9 ABDOMINAL PAIN, UNSPECIFIED ABDOMINAL LOCATION: ICD-10-CM

## 2025-08-19 ENCOUNTER — OFFICE VISIT (OUTPATIENT)
Dept: GYNECOLOGIC ONCOLOGY | Facility: CLINIC | Age: 75
End: 2025-08-19
Payer: MEDICARE

## 2025-08-19 DIAGNOSIS — Z85.43 HISTORY OF OVARIAN CANCER: Primary | ICD-10-CM

## 2025-08-19 DIAGNOSIS — R97.1 ELEVATED CANCER ANTIGEN 125 (CA 125): ICD-10-CM

## 2025-08-27 ENCOUNTER — OFFICE VISIT (OUTPATIENT)
Dept: PALLIATIVE MEDICINE | Facility: CLINIC | Age: 75
End: 2025-08-27
Payer: MEDICARE

## 2025-08-27 DIAGNOSIS — T45.1X5A CHEMOTHERAPY-INDUCED PERIPHERAL NEUROPATHY: ICD-10-CM

## 2025-08-27 DIAGNOSIS — G25.81 RESTLESS LEG SYNDROME: ICD-10-CM

## 2025-08-27 DIAGNOSIS — G62.0 CHEMOTHERAPY-INDUCED PERIPHERAL NEUROPATHY: ICD-10-CM

## 2025-08-27 DIAGNOSIS — Z51.5 PALLIATIVE CARE BY SPECIALIST: Primary | ICD-10-CM

## 2025-08-27 DIAGNOSIS — G89.3 CANCER RELATED PAIN: ICD-10-CM

## 2025-08-27 DIAGNOSIS — C56.1 MALIGNANT NEOPLASM OF RIGHT OVARY: ICD-10-CM

## 2025-08-27 DIAGNOSIS — C78.6 PERITONEAL CARCINOMATOSIS: ICD-10-CM

## 2025-08-27 DIAGNOSIS — R64 CACHEXIA: ICD-10-CM

## (undated) DEVICE — SUT MCRYL PLUS 4-0 PS2 27IN

## (undated) DEVICE — DRAPE CORETEMP FLD WRM 56X62IN

## (undated) DEVICE — TIP YANKAUERS BULB NO VENT

## (undated) DEVICE — SUT PDS II 0 CT VIL MONO 36

## (undated) DEVICE — SUT 0 8-27IN VICRYL PL CT-1

## (undated) DEVICE — TOWEL OR DISP STRL BLUE 4/PK

## (undated) DEVICE — KIT SURGIFLO HEMOSTATIC MATRIX

## (undated) DEVICE — STAPLER INT PROX TX 30X3.5MM

## (undated) DEVICE — SUT 1 48IN PDS II VIO MONO

## (undated) DEVICE — TRAY SKIN SCRUB WET PREMIUM

## (undated) DEVICE — DRAPE UINDERBUT GRAD PCH

## (undated) DEVICE — LEGGING CLEAR POLY 2/PACK

## (undated) DEVICE — GLOVE SENSICARE PI GRN 6.5

## (undated) DEVICE — NDL 22GA X1 1/2 REG BEVEL

## (undated) DEVICE — SEALER LIGASURE IMPACT 18CM

## (undated) DEVICE — SOL IRR SOD CHL .9% POUR

## (undated) DEVICE — DRAPE STERI INSTRUMENT 1018

## (undated) DEVICE — CUTTER PROXIMATE BLUE 75MM

## (undated) DEVICE — DRAPE INCISE IOBAN 2 23X17IN

## (undated) DEVICE — GLOVE SENSICARE PI SURG 6

## (undated) DEVICE — SOL POVIDONE SCRUB IODINE 4 OZ

## (undated) DEVICE — CLIP LIGACLIP XTRA TITANIUM

## (undated) DEVICE — SUT 3-0 12-18IN SILK

## (undated) DEVICE — ELECTRODE BLADE TEFLON 6

## (undated) DEVICE — SUT 2-0 12-18IN SILK

## (undated) DEVICE — TRAY CATH 1-LYR URIMTR 16FR

## (undated) DEVICE — GAUZE SPONGE PEANUT STRL

## (undated) DEVICE — SUT 0 VICRYL / CT-1

## (undated) DEVICE — PAD PINK TRENDELENBURG POS XL

## (undated) DEVICE — SYR 30CC LUER LOCK

## (undated) DEVICE — LUBRICANT SURGILUBE 2 OZ

## (undated) DEVICE — SOL WATER STRL IRR 1000ML

## (undated) DEVICE — BOWL STERILE LARGE 32OZ

## (undated) DEVICE — SUT VICRYL PLUS 3-0 SH 18IN

## (undated) DEVICE — SUT VICRYL PLUS 3-0 18IN

## (undated) DEVICE — RELOAD PROXIMATE CUT BLUE 75MM

## (undated) DEVICE — SUT VICRYL 3-0 27 SH

## (undated) DEVICE — SYR ONLY LUER LOCK 20CC

## (undated) DEVICE — STAPLER SKIN PROXIMATE WIDE

## (undated) DEVICE — SUT VICRYL CTD 2-0 GI 27 SH

## (undated) DEVICE — ELECTRODE REM PLYHSV RETURN 9

## (undated) DEVICE — APPLICATOR CHLORAPREP ORN 26ML

## (undated) DEVICE — DRESSING ABSRBNT ISLAND 3.6X8

## (undated) DEVICE — SUT CTD VICRYL 0 VIL BR/CT

## (undated) DEVICE — LOOP VESSEL BLUE MAXI

## (undated) DEVICE — Device

## (undated) DEVICE — STAPLER ECHELON PWR CIR 29MM

## (undated) DEVICE — TRAY GENERAL SURGERY OMC

## (undated) DEVICE — DRAPE LAP T SHT W/ INSTR PAD

## (undated) DEVICE — GOWN SURGICAL X-LARGE

## (undated) DEVICE — SUT SILK 3-0 SH 18IN BLACK

## (undated) DEVICE — BELLOW CANN HEMOBLAST 1.65GR